# Patient Record
Sex: FEMALE | Race: WHITE | NOT HISPANIC OR LATINO | URBAN - METROPOLITAN AREA
[De-identification: names, ages, dates, MRNs, and addresses within clinical notes are randomized per-mention and may not be internally consistent; named-entity substitution may affect disease eponyms.]

---

## 2016-06-23 RX ORDER — POLYETHYLENE GLYCOL 3350 17 G/17G
17 POWDER, FOR SOLUTION ORAL
Qty: 0 | Refills: 0 | DISCHARGE
Start: 2016-06-23

## 2018-06-01 ENCOUNTER — OUTPATIENT (OUTPATIENT)
Dept: OUTPATIENT SERVICES | Facility: HOSPITAL | Age: 78
LOS: 1 days | End: 2018-06-01
Payer: MEDICAID

## 2018-06-01 DIAGNOSIS — Z90.49 ACQUIRED ABSENCE OF OTHER SPECIFIED PARTS OF DIGESTIVE TRACT: Chronic | ICD-10-CM

## 2018-06-01 PROCEDURE — G9001: CPT

## 2018-06-25 DIAGNOSIS — R69 ILLNESS, UNSPECIFIED: ICD-10-CM

## 2018-07-01 ENCOUNTER — OUTPATIENT (OUTPATIENT)
Dept: OUTPATIENT SERVICES | Facility: HOSPITAL | Age: 78
LOS: 1 days | End: 2018-07-01

## 2018-07-01 DIAGNOSIS — Z90.49 ACQUIRED ABSENCE OF OTHER SPECIFIED PARTS OF DIGESTIVE TRACT: Chronic | ICD-10-CM

## 2018-07-19 DIAGNOSIS — Z71.89 OTHER SPECIFIED COUNSELING: ICD-10-CM

## 2019-01-11 PROBLEM — Z00.00 ENCOUNTER FOR PREVENTIVE HEALTH EXAMINATION: Status: ACTIVE | Noted: 2019-01-11

## 2019-01-14 DIAGNOSIS — I48.91 UNSPECIFIED ATRIAL FIBRILLATION: ICD-10-CM

## 2019-01-14 DIAGNOSIS — Z86.79 PERSONAL HISTORY OF OTHER DISEASES OF THE CIRCULATORY SYSTEM: ICD-10-CM

## 2019-01-14 DIAGNOSIS — I34.0 NONRHEUMATIC MITRAL (VALVE) INSUFFICIENCY: ICD-10-CM

## 2019-01-14 DIAGNOSIS — Z86.39 PERSONAL HISTORY OF OTHER ENDOCRINE, NUTRITIONAL AND METABOLIC DISEASE: ICD-10-CM

## 2019-01-14 DIAGNOSIS — I07.1 RHEUMATIC TRICUSPID INSUFFICIENCY: ICD-10-CM

## 2019-01-14 DIAGNOSIS — Z87.898 PERSONAL HISTORY OF OTHER SPECIFIED CONDITIONS: ICD-10-CM

## 2019-01-14 DIAGNOSIS — I50.9 HEART FAILURE, UNSPECIFIED: ICD-10-CM

## 2019-01-14 RX ORDER — METHYLDOPA 500 MG
160 (50 FE) TABLET ORAL
Refills: 0 | Status: ACTIVE | COMMUNITY

## 2019-01-14 RX ORDER — ALBUTEROL SULFATE 90 UG/1
AEROSOL, METERED RESPIRATORY (INHALATION)
Refills: 0 | Status: ACTIVE | COMMUNITY

## 2019-01-14 RX ORDER — OMEPRAZOLE 40 MG/1
40 CAPSULE, DELAYED RELEASE ORAL
Refills: 0 | Status: ACTIVE | COMMUNITY

## 2019-01-14 RX ORDER — ATORVASTATIN CALCIUM 80 MG/1
80 TABLET, FILM COATED ORAL
Refills: 0 | Status: ACTIVE | COMMUNITY

## 2019-01-18 ENCOUNTER — OUTPATIENT (OUTPATIENT)
Dept: OUTPATIENT SERVICES | Facility: HOSPITAL | Age: 79
LOS: 1 days | End: 2019-01-18
Payer: COMMERCIAL

## 2019-01-18 VITALS
HEART RATE: 74 BPM | DIASTOLIC BLOOD PRESSURE: 80 MMHG | RESPIRATION RATE: 18 BRPM | TEMPERATURE: 98 F | OXYGEN SATURATION: 97 % | WEIGHT: 134.04 LBS | HEIGHT: 55 IN | SYSTOLIC BLOOD PRESSURE: 179 MMHG

## 2019-01-18 DIAGNOSIS — Z01.810 ENCOUNTER FOR PREPROCEDURAL CARDIOVASCULAR EXAMINATION: ICD-10-CM

## 2019-01-18 DIAGNOSIS — Z90.49 ACQUIRED ABSENCE OF OTHER SPECIFIED PARTS OF DIGESTIVE TRACT: Chronic | ICD-10-CM

## 2019-01-18 DIAGNOSIS — I08.1 RHEUMATIC DISORDERS OF BOTH MITRAL AND TRICUSPID VALVES: ICD-10-CM

## 2019-01-18 DIAGNOSIS — I34.0 NONRHEUMATIC MITRAL (VALVE) INSUFFICIENCY: ICD-10-CM

## 2019-01-18 LAB
ANION GAP SERPL CALC-SCNC: 12 MMOL/L — SIGNIFICANT CHANGE UP (ref 5–17)
APTT BLD: 34.1 SEC — SIGNIFICANT CHANGE UP (ref 27.5–36.3)
BASOPHILS # BLD AUTO: 0 K/UL — SIGNIFICANT CHANGE UP (ref 0–0.2)
BASOPHILS NFR BLD AUTO: 0.6 % — SIGNIFICANT CHANGE UP (ref 0–2)
BUN SERPL-MCNC: 12 MG/DL — SIGNIFICANT CHANGE UP (ref 8–20)
CALCIUM SERPL-MCNC: 9.2 MG/DL — SIGNIFICANT CHANGE UP (ref 8.6–10.2)
CHLORIDE SERPL-SCNC: 98 MMOL/L — SIGNIFICANT CHANGE UP (ref 98–107)
CO2 SERPL-SCNC: 26 MMOL/L — SIGNIFICANT CHANGE UP (ref 22–29)
CREAT SERPL-MCNC: 0.76 MG/DL — SIGNIFICANT CHANGE UP (ref 0.5–1.3)
EOSINOPHIL # BLD AUTO: 0 K/UL — SIGNIFICANT CHANGE UP (ref 0–0.5)
EOSINOPHIL NFR BLD AUTO: 0.4 % — SIGNIFICANT CHANGE UP (ref 0–6)
GLUCOSE SERPL-MCNC: 237 MG/DL — HIGH (ref 70–115)
HCT VFR BLD CALC: 30.8 % — LOW (ref 37–47)
HGB BLD-MCNC: 10.3 G/DL — LOW (ref 12–16)
INR BLD: 1.95 RATIO — HIGH (ref 0.88–1.16)
LYMPHOCYTES # BLD AUTO: 1.2 K/UL — SIGNIFICANT CHANGE UP (ref 1–4.8)
LYMPHOCYTES # BLD AUTO: 23 % — SIGNIFICANT CHANGE UP (ref 20–55)
MAGNESIUM SERPL-MCNC: 1.5 MG/DL — LOW (ref 1.6–2.6)
MCHC RBC-ENTMCNC: 29.3 PG — SIGNIFICANT CHANGE UP (ref 27–31)
MCHC RBC-ENTMCNC: 33.4 G/DL — SIGNIFICANT CHANGE UP (ref 32–36)
MCV RBC AUTO: 87.7 FL — SIGNIFICANT CHANGE UP (ref 81–99)
MONOCYTES # BLD AUTO: 0.4 K/UL — SIGNIFICANT CHANGE UP (ref 0–0.8)
MONOCYTES NFR BLD AUTO: 7.5 % — SIGNIFICANT CHANGE UP (ref 3–10)
NEUTROPHILS # BLD AUTO: 3.7 K/UL — SIGNIFICANT CHANGE UP (ref 1.8–8)
NEUTROPHILS NFR BLD AUTO: 68.3 % — SIGNIFICANT CHANGE UP (ref 37–73)
PLATELET # BLD AUTO: 224 K/UL — SIGNIFICANT CHANGE UP (ref 150–400)
POTASSIUM SERPL-MCNC: 4.3 MMOL/L — SIGNIFICANT CHANGE UP (ref 3.5–5.3)
POTASSIUM SERPL-SCNC: 4.3 MMOL/L — SIGNIFICANT CHANGE UP (ref 3.5–5.3)
PROTHROM AB SERPL-ACNC: 22.9 SEC — HIGH (ref 10–12.9)
RBC # BLD: 3.51 M/UL — LOW (ref 4.4–5.2)
RBC # FLD: 16.1 % — HIGH (ref 11–15.6)
SODIUM SERPL-SCNC: 136 MMOL/L — SIGNIFICANT CHANGE UP (ref 135–145)
WBC # BLD: 5.4 K/UL — SIGNIFICANT CHANGE UP (ref 4.8–10.8)
WBC # FLD AUTO: 5.4 K/UL — SIGNIFICANT CHANGE UP (ref 4.8–10.8)

## 2019-01-18 PROCEDURE — 85027 COMPLETE CBC AUTOMATED: CPT

## 2019-01-18 PROCEDURE — 80048 BASIC METABOLIC PNL TOTAL CA: CPT

## 2019-01-18 PROCEDURE — 83735 ASSAY OF MAGNESIUM: CPT

## 2019-01-18 PROCEDURE — 36415 COLL VENOUS BLD VENIPUNCTURE: CPT

## 2019-01-18 PROCEDURE — 93005 ELECTROCARDIOGRAM TRACING: CPT

## 2019-01-18 PROCEDURE — 85730 THROMBOPLASTIN TIME PARTIAL: CPT

## 2019-01-18 PROCEDURE — G0463: CPT

## 2019-01-18 PROCEDURE — 85610 PROTHROMBIN TIME: CPT

## 2019-01-18 PROCEDURE — 93010 ELECTROCARDIOGRAM REPORT: CPT

## 2019-01-18 RX ORDER — MAGNESIUM OXIDE 400 MG ORAL TABLET 241.3 MG
1 TABLET ORAL
Qty: 10 | Refills: 0
Start: 2019-01-18 | End: 2019-01-22

## 2019-01-18 NOTE — H&P PST ADULT - HISTORY OF PRESENT ILLNESS
This is a 78 year old female with CAD HTN HL CAD DM on ( metformin ) Atrial fibrillation ( on Eliquis)  CAD with stents in 2016 2/5/16 EF 55% proximal LAD 70 % in stent , mid LCX 10-30%, Om1 75% ,ostail RCA 60 % , proximal RCA 75% , RCA mid in stent 10-30% , s/p MARTINE proximal LAD    Valvular disease .   Presented to Cardiologist with progressive SOB , LH and episodes of dizziness . Cardiac testing revealed progression of valvular disease   Evaluated by Dr Oshea for possible mitral valve repair vs replacement   Pt presented today for PST prior to URIEL /RHC/LHC This is a 78 year old female with CAD HTN HL CAD DM on ( metformin ) Atrial fibrillation ( on Eliquis)  CAD with stents in 2016 2/5/16 EF 55% proximal LAD 70 % in stent , mid LCX 10-30%, Om1 75% ,ostail RCA 60 % , proximal RCA 75% , RCA mid in stent 10-30% , s/p MARTINE proximal LAD    Valvular disease   being followed and monitored   Presented to Cardiologist with progressive SOB , LH and episodes of dizziness . Echo on  12/19/18  revealed progression of valvular disease with severe MR and TR   Evaluated by Dr Oshea for possible mitral valve repair vs replacement   Pt presented today for PST prior to URIEL /RHC/LHC

## 2019-01-18 NOTE — H&P PST ADULT - PROBLEM SELECTOR PLAN 1
PRE-PROCEDURE ASSESSMENT  URIEL/RHC/LHC   -Patient seen and examined with son at bedside   -Labs reviewed  Hypomagnesemia treat with oral doses sent to pharmacy   -Pre-procedure teaching completed with patient and family  -Questions answered  _ No metformin for 2 days prior to procedure   - No Eliquis 3 days prior to procedure   - Reviewed with Dr Hopkins   - Continue PRE-PROCEDURE ASSESSMENT  URIEL/RHC/LHC   -Patient seen and examined with son at bedside   -Labs reviewed  Hypomagnesemia treat with oral doses sent to pharmacy   -Pre-procedure teaching completed with patient and family  -Questions answered  _ No metformin for 2 days prior to procedure   - No Eliquis 3 days prior to procedure Hold 1/23, 1/24, 1/25    - - Continue all other medications   -NPO except for medications on day of procedure for URIEL/LHC and RHc   reviewed with patient  and son

## 2019-01-18 NOTE — H&P PST ADULT - ASSESSMENT
This is a 78 year old female with CAD HTN HL CAD DM on ( metformin ) Atrial fibrillation ( on Eliquis)  CAD with stents in 2016 < Valvular disease .   Presented to Cardiologist with progressive SOB , LH and episodes of dizziness . Cardiac testing revealed progression of valvular disease   Evaluated by Dr Oshea for possible mitral valve repair vs replacement   Pt presented today for PST prior to URIEL /RHC/LHC   Currently feels well comfortable at rest with remains in Atrial fibrillation BARBIE 2/6 with mild bilateral edema , decreased BS  at bases , This is a 78 year old female with CAD HTN HL CAD DM on ( metformin ) Atrial fibrillation ( on Eliquis)  CAD with stents in 2016 < Valvular disease .   Presented to Cardiologist with progressive SOB , LH and episodes of dizziness . Cardiac testing revealed progression of valvular disease   to be evaluated by Dr Oshea for possible mitral valve repair vs replacement   Pt presented today for PST prior to URIEL /RHC/LHC   Currently feels well comfortable at rest with remains in Atrial fibrillation BARBIE 2/6 with mild bilateral edema , decreased BS  at bases ,

## 2019-01-21 ENCOUNTER — RX RENEWAL (OUTPATIENT)
Age: 79
End: 2019-01-21

## 2019-01-25 ENCOUNTER — TRANSCRIPTION ENCOUNTER (OUTPATIENT)
Age: 79
End: 2019-01-25

## 2019-01-25 ENCOUNTER — APPOINTMENT (OUTPATIENT)
Dept: CARDIOTHORACIC SURGERY | Facility: CLINIC | Age: 79
End: 2019-01-25

## 2019-01-25 ENCOUNTER — OUTPATIENT (OUTPATIENT)
Dept: OUTPATIENT SERVICES | Facility: HOSPITAL | Age: 79
LOS: 1 days | End: 2019-01-25
Payer: MEDICAID

## 2019-01-25 VITALS
SYSTOLIC BLOOD PRESSURE: 157 MMHG | RESPIRATION RATE: 20 BRPM | OXYGEN SATURATION: 99 % | HEART RATE: 61 BPM | DIASTOLIC BLOOD PRESSURE: 99 MMHG

## 2019-01-25 VITALS
DIASTOLIC BLOOD PRESSURE: 71 MMHG | HEART RATE: 70 BPM | SYSTOLIC BLOOD PRESSURE: 162 MMHG | OXYGEN SATURATION: 96 % | RESPIRATION RATE: 17 BRPM

## 2019-01-25 DIAGNOSIS — I36.1 NONRHEUMATIC TRICUSPID (VALVE) INSUFFICIENCY: ICD-10-CM

## 2019-01-25 DIAGNOSIS — I34.0 NONRHEUMATIC MITRAL (VALVE) INSUFFICIENCY: ICD-10-CM

## 2019-01-25 DIAGNOSIS — I08.1 RHEUMATIC DISORDERS OF BOTH MITRAL AND TRICUSPID VALVES: ICD-10-CM

## 2019-01-25 DIAGNOSIS — Z90.49 ACQUIRED ABSENCE OF OTHER SPECIFIED PARTS OF DIGESTIVE TRACT: Chronic | ICD-10-CM

## 2019-01-25 DIAGNOSIS — Z01.810 ENCOUNTER FOR PREPROCEDURAL CARDIOVASCULAR EXAMINATION: ICD-10-CM

## 2019-01-25 LAB — GLUCOSE BLDC GLUCOMTR-MCNC: 205 MG/DL — HIGH (ref 70–99)

## 2019-01-25 PROCEDURE — 93320 DOPPLER ECHO COMPLETE: CPT

## 2019-01-25 PROCEDURE — C1894: CPT

## 2019-01-25 PROCEDURE — C1769: CPT

## 2019-01-25 PROCEDURE — 93460 R&L HRT ART/VENTRICLE ANGIO: CPT

## 2019-01-25 PROCEDURE — 93325 DOPPLER ECHO COLOR FLOW MAPG: CPT | Mod: 26

## 2019-01-25 PROCEDURE — 93320 DOPPLER ECHO COMPLETE: CPT | Mod: 26

## 2019-01-25 PROCEDURE — 99221 1ST HOSP IP/OBS SF/LOW 40: CPT

## 2019-01-25 PROCEDURE — C1887: CPT

## 2019-01-25 PROCEDURE — 93312 ECHO TRANSESOPHAGEAL: CPT

## 2019-01-25 PROCEDURE — 99153 MOD SED SAME PHYS/QHP EA: CPT

## 2019-01-25 PROCEDURE — C1889: CPT

## 2019-01-25 PROCEDURE — 82962 GLUCOSE BLOOD TEST: CPT

## 2019-01-25 PROCEDURE — 93312 ECHO TRANSESOPHAGEAL: CPT | Mod: 26

## 2019-01-25 PROCEDURE — 93325 DOPPLER ECHO COLOR FLOW MAPG: CPT

## 2019-01-25 PROCEDURE — 76376 3D RENDER W/INTRP POSTPROCES: CPT | Mod: 26

## 2019-01-25 PROCEDURE — 99152 MOD SED SAME PHYS/QHP 5/>YRS: CPT

## 2019-01-25 RX ORDER — ATORVASTATIN CALCIUM 80 MG/1
80 TABLET, FILM COATED ORAL AT BEDTIME
Qty: 0 | Refills: 0 | Status: DISCONTINUED | OUTPATIENT
Start: 2019-01-25 | End: 2019-02-09

## 2019-01-25 RX ORDER — ASPIRIN/CALCIUM CARB/MAGNESIUM 324 MG
81 TABLET ORAL DAILY
Qty: 0 | Refills: 0 | Status: DISCONTINUED | OUTPATIENT
Start: 2019-01-25 | End: 2019-02-09

## 2019-01-25 RX ORDER — LOSARTAN POTASSIUM 100 MG/1
25 TABLET, FILM COATED ORAL DAILY
Qty: 0 | Refills: 0 | Status: DISCONTINUED | OUTPATIENT
Start: 2019-01-25 | End: 2019-02-09

## 2019-01-25 RX ORDER — HYDRALAZINE HCL 50 MG
10 TABLET ORAL ONCE
Qty: 0 | Refills: 0 | Status: COMPLETED | OUTPATIENT
Start: 2019-01-25 | End: 2019-01-25

## 2019-01-25 RX ORDER — METFORMIN HYDROCHLORIDE 850 MG/1
1 TABLET ORAL
Qty: 0 | Refills: 0 | COMMUNITY

## 2019-01-25 RX ORDER — MAGNESIUM OXIDE 400 MG ORAL TABLET 241.3 MG
400 TABLET ORAL EVERY 12 HOURS
Qty: 0 | Refills: 0 | Status: DISCONTINUED | OUTPATIENT
Start: 2019-01-25 | End: 2019-02-09

## 2019-01-25 RX ORDER — ALBUTEROL 90 UG/1
1 AEROSOL, METERED ORAL
Qty: 0 | Refills: 0 | Status: DISCONTINUED | OUTPATIENT
Start: 2019-01-25 | End: 2019-02-09

## 2019-01-25 RX ORDER — ISOSORBIDE MONONITRATE 60 MG/1
60 TABLET, EXTENDED RELEASE ORAL DAILY
Qty: 0 | Refills: 0 | Status: DISCONTINUED | OUTPATIENT
Start: 2019-01-25 | End: 2019-01-25

## 2019-01-25 RX ORDER — FUROSEMIDE 40 MG
40 TABLET ORAL DAILY
Qty: 0 | Refills: 0 | Status: DISCONTINUED | OUTPATIENT
Start: 2019-01-25 | End: 2019-02-09

## 2019-01-25 RX ORDER — ASPIRIN/CALCIUM CARB/MAGNESIUM 324 MG
81 TABLET ORAL ONCE
Qty: 0 | Refills: 0 | Status: COMPLETED | OUTPATIENT
Start: 2019-01-25 | End: 2019-01-25

## 2019-01-25 RX ORDER — METOPROLOL TARTRATE 50 MG
200 TABLET ORAL DAILY
Qty: 0 | Refills: 0 | Status: DISCONTINUED | OUTPATIENT
Start: 2019-01-25 | End: 2019-02-09

## 2019-01-25 RX ORDER — FENTANYL CITRATE 50 UG/ML
25 INJECTION INTRAVENOUS ONCE
Qty: 0 | Refills: 0 | Status: DISCONTINUED | OUTPATIENT
Start: 2019-01-25 | End: 2019-01-25

## 2019-01-25 RX ORDER — HYDRALAZINE HCL 50 MG
5 TABLET ORAL ONCE
Qty: 0 | Refills: 0 | Status: COMPLETED | OUTPATIENT
Start: 2019-01-25 | End: 2019-01-25

## 2019-01-25 RX ORDER — LISINOPRIL 2.5 MG/1
10 TABLET ORAL DAILY
Qty: 0 | Refills: 0 | Status: DISCONTINUED | OUTPATIENT
Start: 2019-01-25 | End: 2019-02-09

## 2019-01-25 RX ORDER — INSULIN GLARGINE 100 [IU]/ML
20 INJECTION, SOLUTION SUBCUTANEOUS AT BEDTIME
Qty: 0 | Refills: 0 | Status: DISCONTINUED | OUTPATIENT
Start: 2019-01-25 | End: 2019-02-09

## 2019-01-25 RX ORDER — AMIODARONE HYDROCHLORIDE 400 MG/1
200 TABLET ORAL DAILY
Qty: 0 | Refills: 0 | Status: DISCONTINUED | OUTPATIENT
Start: 2019-01-25 | End: 2019-02-09

## 2019-01-25 RX ORDER — ISOSORBIDE MONONITRATE 60 MG/1
30 TABLET, EXTENDED RELEASE ORAL ONCE
Qty: 0 | Refills: 0 | Status: COMPLETED | OUTPATIENT
Start: 2019-01-25 | End: 2019-01-25

## 2019-01-25 RX ORDER — DIGOXIN 250 MCG
0.12 TABLET ORAL DAILY
Qty: 0 | Refills: 0 | Status: DISCONTINUED | OUTPATIENT
Start: 2019-01-25 | End: 2019-02-09

## 2019-01-25 RX ORDER — SODIUM CHLORIDE 9 MG/ML
400 INJECTION INTRAMUSCULAR; INTRAVENOUS; SUBCUTANEOUS
Qty: 0 | Refills: 0 | Status: DISCONTINUED | OUTPATIENT
Start: 2019-01-25 | End: 2019-02-09

## 2019-01-25 RX ORDER — POLYETHYLENE GLYCOL 3350 17 G/17G
17 POWDER, FOR SOLUTION ORAL DAILY
Qty: 0 | Refills: 0 | Status: DISCONTINUED | OUTPATIENT
Start: 2019-01-25 | End: 2019-02-09

## 2019-01-25 RX ORDER — ISOSORBIDE MONONITRATE 60 MG/1
30 TABLET, EXTENDED RELEASE ORAL DAILY
Qty: 0 | Refills: 0 | Status: DISCONTINUED | OUTPATIENT
Start: 2019-01-25 | End: 2019-02-09

## 2019-01-25 RX ADMIN — Medication 5 MILLIGRAM(S): at 15:46

## 2019-01-25 RX ADMIN — Medication 10 MILLIGRAM(S): at 09:18

## 2019-01-25 RX ADMIN — LOSARTAN POTASSIUM 25 MILLIGRAM(S): 100 TABLET, FILM COATED ORAL at 17:56

## 2019-01-25 RX ADMIN — ISOSORBIDE MONONITRATE 30 MILLIGRAM(S): 60 TABLET, EXTENDED RELEASE ORAL at 15:06

## 2019-01-25 RX ADMIN — Medication 200 MILLIGRAM(S): at 17:57

## 2019-01-25 RX ADMIN — FENTANYL CITRATE 25 MICROGRAM(S): 50 INJECTION INTRAVENOUS at 16:46

## 2019-01-25 RX ADMIN — Medication 40 MILLIGRAM(S): at 17:56

## 2019-01-25 RX ADMIN — LISINOPRIL 10 MILLIGRAM(S): 2.5 TABLET ORAL at 17:56

## 2019-01-25 RX ADMIN — AMIODARONE HYDROCHLORIDE 200 MILLIGRAM(S): 400 TABLET ORAL at 17:56

## 2019-01-25 RX ADMIN — ISOSORBIDE MONONITRATE 30 MILLIGRAM(S): 60 TABLET, EXTENDED RELEASE ORAL at 12:11

## 2019-01-25 RX ADMIN — FENTANYL CITRATE 25 MICROGRAM(S): 50 INJECTION INTRAVENOUS at 15:06

## 2019-01-25 RX ADMIN — Medication 81 MILLIGRAM(S): at 09:18

## 2019-01-25 RX ADMIN — SODIUM CHLORIDE 100 MILLILITER(S): 9 INJECTION INTRAMUSCULAR; INTRAVENOUS; SUBCUTANEOUS at 15:06

## 2019-01-25 RX ADMIN — Medication 10 MILLIGRAM(S): at 11:15

## 2019-01-25 RX ADMIN — MAGNESIUM OXIDE 400 MG ORAL TABLET 400 MILLIGRAM(S): 241.3 TABLET ORAL at 17:56

## 2019-01-25 NOTE — DISCHARGE NOTE ADULT - PLAN OF CARE
Optimize Cardiac Function -Continue all of your home medications  -Follow up with Dr. Rodriguez to optimize your blood pressure medications  -Follow up with Dr. Oshea to discuss -Continue all of your home medications  -Follow up with Dr. Rodriguez to optimize your blood pressure medications  -Follow up with Dr. Oshea to discuss    No heavy lifting, driving, sex, tub baths, swimming, or any activity that submerges the lower half of the body in water for 48 hours.  Limited walking and stairs for 48 hours.    Change the bandaid after 24 hours and every 24 hours after that.  Keep the puncture site dry and covered with a bandaid until a scab forms.    Observe the site frequently.  If bleeding or a large lump (the size of a golf ball or bigger) occurs lie flat, apply continuous direct pressure just above the puncture site for at least 10 minutes, and notify your physician immediately.  If the bleeding cannot be controlled, call 911 immediately for assistance.  Notify your physician of pain, swelling or any drainage.    Notify your physician immediately if coldness, numbness, discoloration or pain in your foot occurs.

## 2019-01-25 NOTE — DISCHARGE NOTE ADULT - CARE PROVIDERS DIRECT ADDRESSES
[email or web address],DirectAddress_Unknown

## 2019-01-25 NOTE — DISCHARGE NOTE ADULT - CARE PLAN
Principal Discharge DX:	CAD (coronary artery disease)  Goal:	Optimize Cardiac Function  Assessment and plan of treatment:	-Continue all of your home medications  -Follow up with Dr. Rodriguez to optimize your blood pressure medications  -Follow up with Dr. Oshea to discuss Principal Discharge DX:	CAD (coronary artery disease)  Goal:	Optimize Cardiac Function  Assessment and plan of treatment:	-Continue all of your home medications  -Follow up with Dr. Rodriguez to optimize your blood pressure medications  -Follow up with Dr. Oshea to discuss    No heavy lifting, driving, sex, tub baths, swimming, or any activity that submerges the lower half of the body in water for 48 hours.  Limited walking and stairs for 48 hours.    Change the bandaid after 24 hours and every 24 hours after that.  Keep the puncture site dry and covered with a bandaid until a scab forms.    Observe the site frequently.  If bleeding or a large lump (the size of a golf ball or bigger) occurs lie flat, apply continuous direct pressure just above the puncture site for at least 10 minutes, and notify your physician immediately.  If the bleeding cannot be controlled, call 911 immediately for assistance.  Notify your physician of pain, swelling or any drainage.    Notify your physician immediately if coldness, numbness, discoloration or pain in your foot occurs.

## 2019-01-25 NOTE — PROGRESS NOTE ADULT - SUBJECTIVE AND OBJECTIVE BOX
This is a 78 year old female with CAD HTN HL CAD DM on (metformin) Atrial fibrillation (on Eliquis)  CAD with stents in 2016 2/5/16 EF 55% proximal LAD 70 % in stent , mid LCX 10-30%, Om1 75% ,ostail RCA 60 % , proximal RCA 75% , RCA mid in stent 10-30% , s/p MARTINE proximal LAD    Valvular disease being followed and monitored   Presented to Cardiologist with progressive SOB , LH and episodes of dizziness . Echo on  12/19/18  revealed progression of valvular disease with severe MR and TR   Evaluated by Dr Oshea for possible mitral valve repair vs replacement     Now s/p URIEL with Dr. Porter. Pt awaiting L&RHC.    Pt currently reports some numbness with swallowing; denies SOB, dyspnea, chest pain, palps    Per verbal report with Dr. Porter; severe TR/TI, moderate MR, severe pulmonary hypertension    Neuro: A&Ox4, neurologically intact, ROM intact  Pulm: CTAB; able to cough independently to clear secretions  Cardiac: NSR on monitor 80bpm, S1,S2  Vascular: No edema present; palpable pulses+2 x4 extremities

## 2019-01-25 NOTE — CONSULT NOTE ADULT - PROBLEM SELECTOR RECOMMENDATION 9
Pt seen with Dr. Oshea.  Pt currently refusing surgery.  Per Dr. Oshea, the plan is to see patient in the office outpatient for further discussion regarding surgical options, including MVR/TVR.

## 2019-01-25 NOTE — PROGRESS NOTE ADULT - SUBJECTIVE AND OBJECTIVE BOX
This is a 78 year old female with CAD HTN HL CAD DM on ( metformin ) Atrial fibrillation ( on Eliquis)  CAD with stents in 2016 2/5/16 EF 55% proximal LAD 70 % in stent , mid LCX 10-30%, Om1 75% ,ostail RCA 60 % , proximal RCA 75% , RCA mid in stent 10-30% , s/p MARTINE proximal LAD    Valvular disease being followed and monitored   Presented to Cardiologist with progressive SOB , LH and episodes of dizziness . Echo on  12/19/18  revealed progression of valvular disease with severe MR and TR   Evaluated by Dr Oshea for possible mitral valve repair vs replacement   Pt presents today for URIEL /RHC/LHC     -last dose of eliquis, Tuesday, 1/22/19  -NPO since 11pm on 1/24/19  -ASA- 3, Mallampati- 2  -consent obtained for LHC with Dr. Richardson utilizing ; ID #[id number]  -consent obtained for URIEL with anesthesia and Dr. Porter utilizing ; Aseel  # [id number]  -blood sugar 205  -labs and EKG reviewed    Pt hypertensive (SBP>190x3) pre URIEL- d/w Dr. Porter; pt rec'd hydralazine 10mg IVPx1   asa 81mg chewable pre-cath    Neuro: A&Ox4, neurologically intact, ROM intact; tolerated ambulation to restroom pre URIEL  Pulm: CTAB  Cardiac: NSR on monitor 76bpm, S1,S2  Vascular: No edema present; palpable pulses+2 x4 extremities

## 2019-01-25 NOTE — DISCHARGE NOTE ADULT - MEDICATION SUMMARY - MEDICATIONS TO TAKE
I will START or STAY ON the medications listed below when I get home from the hospital:    aspirin 81 mg oral delayed release tablet  -- 1 tab(s) by mouth once a day  -- Indication: For blood flow/heart    lisinopril 10 mg oral tablet  -- 1 tab(s) by mouth once a day  -- Indication: For blood pressure    losartan 25 mg oral tablet  -- 1 tab(s) by mouth once a day  -- Indication: For blood pressure    Imdur 60 mg oral tablet, extended release  -- 1 tab(s) by mouth once a day (in the morning)  -- Indication: For blood pressure    amiodarone 200 mg oral tablet  -- 1 tab(s) by mouth once a day  -- Indication: For heart rate/afib    digoxin 125 mcg (0.125 mg) oral tablet  -- 1 tab(s) by mouth once a day  -- Indication: For heart rate/afib    Eliquis 5 mg oral tablet  -- 1 tab(s) by mouth 2 times a day  -- Indication: For blood thinner/afib    metFORMIN 500 mg oral tablet, extended release  -- 1 tab(s) by mouth 2 times a day    RESTART METFORM ON 1/27/19  -- Indication: For Diabetes    Lantus 100 units/mL subcutaneous solution  -- 20 unit(s) subcutaneous once a day (at bedtime)  -- Indication: For Diabetes    atorvastatin 80 mg oral tablet  -- 1 tab(s) by mouth once a day (at bedtime)  -- Indication: For Cholesterol    methimazole 5 mg oral tablet  -- 1 tab(s) by mouth once a day  -- Indication: For thyroid    metoprolol succinate 200 mg oral tablet, extended release  -- 1 tab(s) by mouth once a day  and 150  mg  at night  -- Indication: For heart rate/afib    Ventolin HFA 90 mcg/inh inhalation aerosol  -- 2 puff(s) inhaled 4 times a day, As Needed  -- Indication: For breathing treatment    Lasix 40 mg oral tablet  -- 1 tab(s) by mouth once a day  -- stop after 3 days  -- Indication: For water pill/diuretic    polyethylene glycol 3350 oral powder for reconstitution  -- 17 gram(s) by mouth once a day, As Needed  -- Indication: For Constipation    magnesium oxide 400 mg (241.3 mg elemental magnesium) oral tablet  -- 1 tab(s) by mouth 2 times a day   -- Indication: For supplement    PriLOSEC 40 mg oral delayed release capsule  -- 1 cap(s) by mouth once a day  -- Indication: For stomach protection

## 2019-01-25 NOTE — DISCHARGE NOTE ADULT - HOSPITAL COURSE
This is a 78 year old female with CAD HTN HL CAD DM on (metformin) Atrial fibrillation (on Eliquis)  CAD with stents in 2016 2/5/16 EF 55% proximal LAD 70 % in stent , mid LCX 10-30%, Om1 75% ,ostail RCA 60 % , proximal RCA 75% , RCA mid in stent 10-30% , s/p MARTINE proximal LAD    Valvular disease being followed and monitored   Presented to Cardiologist with progressive SOB , LH and episodes of dizziness . Echo on  12/19/18  revealed progression of valvular disease with severe MR and TR   Evaluated by Dr Oshea for possible mitral valve repair vs replacement     Now s/p URIEL/R&LHC; URIEL revealed mod MR; severe TR/TI; pulmonary HTN; better blood pressure control;  No intervention with cardiac cath; per Dr. Richardson; pt to follow up with Dr. Rodriguez and Dr. Dorie Sauer, CTS PA, in to evaluate pt for Dr. Oshea; pt to follow up outpt for valve surgery    Neuro: A&Ox4, neurologically intact, ROM intact  Pulm: CTAB  Cardiac: afib on monitor 72bpm, S1,S2  Vascular: no edema present; right groin site precautions; slight hematoma present; pt denies numbness or tingling to RLE, toes moveable, +pulses x4 extremities.

## 2019-01-25 NOTE — DISCHARGE NOTE ADULT - PATIENT PORTAL LINK FT
You can access the [facility] Patient Portal, offered by [facility], by registering with the following website: [email or web address]

## 2019-01-25 NOTE — CONSULT NOTE ADULT - ASSESSMENT
78 year old woman with PMH as above.  Progressive SOB with cp over the last few months.  TTE outpatient revealed severe MR and TR.  Presented today for an elective URIEL that showed severe TR and moderate MR.

## 2019-01-25 NOTE — DISCHARGE NOTE ADULT - CARE PROVIDER_API CALL
Dakota Oshea), Surgery; Thoracic and Cardiac Surgery  [address] [place] [address]  Phone: [phone]  Fax: [phone] Dakota Oshea), Surgery; Thoracic and Cardiac Surgery  [address] [place] [address]  Phone: [phone]  Fax: [phone]    sAael Rodriguez  [address] [place] [address]  Phone: [phone]  Fax: (       -

## 2019-01-25 NOTE — DISCHARGE NOTE ADULT - PROVIDER TOKENS
TOKEN:'[id number]' TOKEN:'[id number]',FREE:[LAST:[Michael],FIRST:[Asael],PHONE:[phone]],FAX:[(   )    -],ADDRESS:[[address] [place] [address]]]

## 2019-01-25 NOTE — CONSULT NOTE ADULT - SUBJECTIVE AND OBJECTIVE BOX
Surgeon: Dorie    Consult requesting by: Debra    HISTORY OF PRESENT ILLNESS:  78y Female with PMH Afib (On Eliquis), DM (on Insulin and oral agents), HTN, CAD with stents x 8, asthma, and hypothyroidism.  Was seen by her cardiologist as an outpatient for c/o progressive SOB on exertion, and left sided chest pain.  She had an outpatient TTE that showed severe MR and severe TR.  She Presented today for an elective URIEL.  On exam she was lying on the stretcher.  Interviewed with son at bedside.  Patient speaks primarily French.  Denies CP or SOB.  NAD noted.    PAST MEDICAL & SURGICAL HISTORY:  Atrial fibrillation  Diabetes  Hypertension  Stented coronary artery  Coronary stent restenosis, sequela  Afib  Hypertension  History of appendectomy      MEDICATIONS  (STANDING):  amiodarone    Tablet 200 milliGRAM(s) Oral daily  aspirin enteric coated 81 milliGRAM(s) Oral daily  atorvastatin 80 milliGRAM(s) Oral at bedtime  digoxin     Tablet 0.125 milliGRAM(s) Oral daily  furosemide    Tablet 40 milliGRAM(s) Oral daily  insulin glargine Injectable (LANTUS) 20 Unit(s) SubCutaneous at bedtime  isosorbide   mononitrate ER Tablet (IMDUR) 30 milliGRAM(s) Oral daily  lisinopril 10 milliGRAM(s) Oral daily  losartan 25 milliGRAM(s) Oral daily  magnesium oxide 400 milliGRAM(s) Oral every 12 hours  methimazole 5 milliGRAM(s) Oral daily  metoprolol succinate  milliGRAM(s) Oral daily  sodium chloride 0.9%. 400 milliLiter(s) (100 mL/Hr) IV Continuous <Continuous>    MEDICATIONS  (PRN):  ALBUTerol    90 MICROgram(s) HFA Inhaler 1 Puff(s) Inhalation four times a day PRN Shortness of Breath  polyethylene glycol 3350 17 Gram(s) Oral daily PRN Constipation    Antiplatelet therapy:                           Last dose/amt:    Allergies    No Known Allergies    Intolerances        SOCIAL HISTORY:  Smoker: [ ] Yes  [x ] No        PACK YEARS:                         WHEN QUIT?  ETOH use: [ ] Yes  [x ] No              FREQUENCY / QUANTITY:  Ilicit Drug use:  [ ] Yes  [x ] No  Occupation: none  Live with: son  Assisted device use: denies use    FAMILY HISTORY:      Review of Systems  CONSTITUTIONAL:  Fevers[ ] chills[ ] sweats[ ] fatigue[ ] weight loss[ ] weight gain [ ]                                     NEGATIVE [x ]   NEURO:  parathesias[ ] seizures [ ]  syncope [ ]  confusion [ ]                                                                                NEGATIVE[x ]   EYES: glasses[ ]  blurry vision[ ]  discharge[ ] pain[ ] glaucoma [ ]                                                                          NEGATIVE[x ]   ENMT:  difficulty hearing [ ]  vertigo[ ]  dysphagia[ ] epistaxis[ ] recent dental work [ ]                                    NEGATIVE[x ]   CV:  chest pain[ x] palpitations[ ] FLETCHER [ ] diaphoresis [ ]                                                                                           NEGATIVE[ ]   RESPIRATORY:  wheezing[ ] SOB[x] cough [ ] sputum[ ] hemoptysis[ ]                                                                  NEGATIVE[ ]   GI:  nausea[ ]  vommiting [ ]  diarrhea[ ] constipation [ ] melena [ ]                                                                      NEGATIVE[x ]   : hematuria[ ]  dysuria[ ] urgency[ ] incontinence[ ]                                                                                            NEGATIVE[x ]   MUSKULOSKELETAL:  arthritis[ ]  joint swelling [ ] muscle weakness [ ]                                                                NEGATIVE[x ]   SKIN/BREAST:  rash[ ] itching [ ]  hair loss[ ] masses[ ]                                                                                              NEGATIVE[x ]   PSYCH:  dementia [ ] depresion [ ] anxiety[ ]                                                                                                               NEGATIVE[x ]   HEME/LYMPH:  bruises easily[ ] enlarged lymph nodes[ ] tender lymph nodes[ ]                                               NEGATIVE[x ]   ENDOCRINE:  cold intolerance[ ] heat intolerance[ ] polydipsia[ ]                                                                          NEGATIVE[x ]     PHYSICAL EXAM  Vital Signs Last 24 Hrs  T(C): --  T(F): --  HR: 70 (25 Jan 2019 18:00) (57 - 79)  BP: 198/82 (25 Jan 2019 18:00) (140/67 - 198/82)  BP(mean): --  RR: 18 (25 Jan 2019 18:00) (15 - 19)  SpO2: 99% (25 Jan 2019 18:00) (96% - 100%) on room air at rest.    CONSTITUTIONAL:                                                                           Neuro: WNL[x ] Normal exam oriented to person/place & time with no focal motor or sensory  deficits. Other                     Eyes: WNL[x ]   Normal exam of conjunctiva & lids, pupils equally reactive. Other     ENT: WNL[x ]    Normal exam of nasal/oral mucosa with absence of cyanosis. Other  Neck: WNL[x ]  Normal exam of jugular veins, trachea & thyroid. Other  Chest: WNL[x ] Normal lung exam with good air movement absence of wheezes, rales, or rhonchi: Other                                                                                CV:  Auscultation: normal [x ] S3[ ] S4[ ] Irregular [ ] Rub[ ] Clicks[ ]    Murmurs none:[ ]systolic [x ]  diastolic [ ] holosystolic [ ]  Carotids: No Bruits[x ] Other                 Abdominal Aorta: normal [ ] nonpalpable[ ]Other                                                                                      GI:           WNL[x ] Normal exam of abdomen, liver & spleen with no noted masses or tenderness. Other                                                                                                        Extremities: WNL[x ] Normal no evidence of cyanosis or deformity Edema: none[ ]trace[ ]1+[ ]2+[ ]3+[ ]4+[ ]  Lower Extremity Pulses: Right[ pp] Left[pp ]Varicosities[ ]  SKIN :WNL[ x] Normal exam to inspection & palpation. Other:                                                          LABS:                      Cardiac Cath: < from: Cardiac Cath Lab - Adult (01.25.19 @ 12:47) >  VENTRICLES: Analysis of regional contractile function demonstrated  inferolateral akinesis. Global left ventricular function was moderately  depressed. EF calculated by contrast ventriculography was 40 %.  VALVES: MITRAL VALVE: The mitral valve exhibited moderate to severe  regurgitation.  CORONARY VESSELS: The coronary circulation is right dominant.  LM:   --  Distal left main: There was a discrete 40 % stenosis in the  distal third of the vessel segment.  LAD:   --  Proximal LAD: There was a 10 % stenosis at the site of a prior  stent.  --  Mid LAD: There was a 10 % stenosis at the site of a prior stent.  --  D2: There was a discrete 70 % stenosis at the ostium of the vessel  segment.  CX:   --  Circumflex: The A-V continuation has a severe 95% stenosis with  LANCE 2-3 flow.  --  Ostial circumflex: There was a discrete 70 % stenosis.  --  Mid circumflex: There was a tubular 40 % stenosis at the site of a  prior stent.  --  OM1: There was a discrete 60 % stenosis at the ostium of the vessel  segment.  RCA:   --  Proximal RCA: There was a tubular 90 % stenosis in the proximal  third of the vessel segment, at the origin of RV marginal 1.  COMPLICATIONS: No complications occurred during the cath lab visit.  SUMMARY:  HEMODYNAMICS: Hemodynamic assessment demonstrates moderate to severe  systemic hypertension, moderately elevated LVEDP, moderately to markedly  elevated pulmonary capillary wedge pressure, and severely elevated  pulmonary vascular resistance.  DIAGNOSTIC IMPRESSIONS: Moderately reduced LV Systolic and Diastolic  function with an EF of 40% eith RWMA. 2. Moderate to sever Mitral  Refurgitation. 3. Two vessel CAD. 4. Branch CAD. 5. Patent Jazlyn in the  Proximal to mid LAD and mid LCX.  DIAGNOSTIC RECOMMENDATIONS: OMT. 2. Aggressive RFM. 3. Heart team approach  re: CABS+MVA+TVA vs PCI to RCA and medical therapy for her VHD.  INTERVENTIONAL IMPRESSIONS: Moderately reduced LV Systolic and Diastolic  function with an EF of 40% eith RWMA. 2. Moderate to sever Mitral  Refurgitation. 3. Two vessel CAD. 4. Branch CAD. 5. Patent Jazlyn in the  Proximal to mid LAD and mid LCX.  INTERVENTIONAL RECOMMENDATIONS: OMT. 2. Aggressive RFM. 3. Heart team  approach re: CABS+MVA+TVA vs PCI to RCA and medical therapy for her VHD.  Prepared and signed by  John Richardson M.D.  Signed 01/25/2019 14:15:50    < end of copied text >      TTE / URIEL:  < from: URIEL Echo Doppler (01.25.19 @ 09:33) >  Summary:   1. Normal left ventricular systolic function. Left ventricular ejection   fraction, by visual estimation, is 50 to 55%.   2. There is mild right ventricular enlargement with mildly reduced   systolic function.   3. Severe functional tricuspid regurgitation.   4. Moderate functional mitral regurgitation.   5. Mild aortic insufficiency.   6. No intracardiac thrombus or shunts.   7. No pericardial effusions.   8. ** No prior echocardiograms available for comparison.    [id number] Juana Porter DO, Electronically signed on 1/25/2019 at   1:31:15 PM    *** Final ***    < end of copied text >

## 2019-01-25 NOTE — PROGRESS NOTE ADULT - SUBJECTIVE AND OBJECTIVE BOX
This is a 78 year old female with CAD HTN HL CAD DM on (metformin) Atrial fibrillation (on Eliquis)  CAD with stents in 2016 2/5/16 EF 55% proximal LAD 70 % in stent , mid LCX 10-30%, Om1 75% ,ostail RCA 60 % , proximal RCA 75% , RCA mid in stent 10-30% , s/p MARTINE proximal LAD    Valvular disease being followed and monitored   Presented to Cardiologist with progressive SOB , LH and episodes of dizziness . Echo on  12/19/18  revealed progression of valvular disease with severe MR and TR   Evaluated by Dr Oshea for possible mitral valve repair vs replacement     Now s/P L&RHC with Dr. Richardson; mild disease noted per verbal report; no intervention at this time. Pt to follow up with Dr. Oshea to discuss surgical procedure.    Neuro: A&Ox4, neurologically intact, ROM intact  Pulm: CTAB; able to cough independently to clear secretions  Cardiac: NSR on monitor 80bpm, S1,S2  Vascular: No edema present; palpable pulses+2 x4 extremities; right groin #5 &#7 sheaths in place; toes moveable; pt denies numbness and tingling    Rec'd no heparin during case.    -BP control; imdur 30mg po pre sheath removal  -fentanyl 25mcg IVPx1 This is a 78 year old female with CAD HTN HL CAD DM on (metformin) Atrial fibrillation (on Eliquis)  CAD with stents in 2016 2/5/16 EF 55% proximal LAD 70 % in stent , mid LCX 10-30%, Om1 75% ,ostail RCA 60 % , proximal RCA 75% , RCA mid in stent 10-30% , s/p MARTINE proximal LAD    Valvular disease being followed and monitored   Presented to Cardiologist with progressive SOB , LH and episodes of dizziness . Echo on  12/19/18  revealed progression of valvular disease with severe MR and TR   Evaluated by Dr Oshea for possible mitral valve repair vs replacement     Now s/P L&RHC with Dr. Richardson; mild disease noted per verbal report; no intervention at this time. Pt to follow up with Dr. Oshea to discuss surgical procedure.    Neuro: A&Ox4, neurologically intact, ROM intact  Pulm: CTAB; able to cough independently to clear secretions  Cardiac: NSR on monitor 80bpm, S1,S2  Vascular: No edema present; palpable pulses+2 x4 extremities; right groin #5 &#7 sheaths in place; toes moveable; pt denies numbness and tingling    Rec'd no heparin during case.    -BP control; imdur 30mg po pre sheath removal  -fentanyl 25mcg IVPx1 pre sheath removal  -post cath orders as rx  -spoke with HARVEY Sauer for evaluation of MVr vs. MVR  -follow up outpatient with Dr. Rodriguez  per Dr. Richardson

## 2019-02-26 ENCOUNTER — APPOINTMENT (OUTPATIENT)
Dept: CARDIOTHORACIC SURGERY | Facility: CLINIC | Age: 79
End: 2019-02-26
Payer: MEDICAID

## 2019-02-26 PROCEDURE — 99213 OFFICE O/P EST LOW 20 MIN: CPT

## 2019-02-26 NOTE — HISTORY OF PRESENT ILLNESS
[FreeTextEntry1] : This is a 78 year old female with hypertension, diabetes, hypercholesterolemia, who presents today for evaluation of her mitral valve regurgitation which was noted to be severe on echo with EF estimated at 54% on 12/19/18.  She also has severe TR with previous history of coronary artery stenting x8. Although she is 78, she is frail and walks slow and is often dizzy with increasing shortness of breath with exertion.

## 2019-02-26 NOTE — CONSULT LETTER
[Dear  ___] : Dear  [unfilled], [Consult Letter:] : I had the pleasure of evaluating your patient, [unfilled]. [FreeTextEntry2] : Asael thurman MD

## 2019-02-26 NOTE — ASSESSMENT
[FreeTextEntry1] : Ms. Bourgeois is a 78 year old female with severe mitral and tricuspid regurgitation. Although she is weak and frail she is a candidate for high risk open heart surgery and mitral and tricuspid surgery. The alternative option is mitral clipping which will not address the tricuspid valve.  I explained the risks, benefits, and alternatives of surgery to the patient and family. the patient would like to have a week to think about her options and decide which way she wants to proceed. \par \par \par I thank you for the opportunity to participate in the care of your patients. Please do not hesitate to contact me should you have any questions.\par \par

## 2019-02-26 NOTE — PHYSICAL EXAM
[General Appearance - Alert] : alert [General Appearance - In No Acute Distress] : in no acute distress [Sclera] : the sclera and conjunctiva were normal [Outer Ear] : the ears and nose were normal in appearance [Neck Appearance] : the appearance of the neck was normal [] : no respiratory distress [Exaggerated Use Of Accessory Muscles For Inspiration] : no accessory muscle use [Apical Impulse] : the apical impulse was normal [Heart Sounds] : normal S1 and S2 [FreeTextEntry1] : III/VI systolic murmru at the apex [Examination Of The Chest] : the chest was normal in appearance [Arterial Pulses Carotid] : carotid pulses were normal with no bruits [Arterial Pulses Femoral] : femoral pulses were normal without bruits [Bowel Sounds] : normal bowel sounds [Abdomen Soft] : soft [Cervical Lymph Nodes Enlarged Posterior Bilaterally] : posterior cervical [No CVA Tenderness] : no ~M costovertebral angle tenderness [Abnormal Walk] : normal gait [Skin Color & Pigmentation] : normal skin color and pigmentation [Skin Turgor] : normal skin turgor [Cranial Nerves] : cranial nerves 2-12 were intact [Oriented To Time, Place, And Person] : oriented to person, place, and time [Impaired Insight] : insight and judgment were intact

## 2019-04-29 ENCOUNTER — INPATIENT (INPATIENT)
Facility: HOSPITAL | Age: 79
LOS: 5 days | Discharge: HOME | DRG: 291 | End: 2019-05-05
Attending: THORACIC SURGERY (CARDIOTHORACIC VASCULAR SURGERY) | Admitting: THORACIC SURGERY (CARDIOTHORACIC VASCULAR SURGERY)
Payer: MEDICAID

## 2019-04-29 VITALS
SYSTOLIC BLOOD PRESSURE: 152 MMHG | WEIGHT: 164.91 LBS | OXYGEN SATURATION: 99 % | RESPIRATION RATE: 18 BRPM | HEART RATE: 70 BPM | DIASTOLIC BLOOD PRESSURE: 73 MMHG | TEMPERATURE: 98 F | HEIGHT: 56 IN

## 2019-04-29 DIAGNOSIS — Z90.49 ACQUIRED ABSENCE OF OTHER SPECIFIED PARTS OF DIGESTIVE TRACT: Chronic | ICD-10-CM

## 2019-04-29 LAB
ALBUMIN SERPL ELPH-MCNC: 4.1 G/DL — SIGNIFICANT CHANGE UP (ref 3.3–5.2)
ALP SERPL-CCNC: 132 U/L — HIGH (ref 40–120)
ALT FLD-CCNC: 16 U/L — SIGNIFICANT CHANGE UP
ANION GAP SERPL CALC-SCNC: 14 MMOL/L — SIGNIFICANT CHANGE UP (ref 5–17)
AST SERPL-CCNC: 21 U/L — SIGNIFICANT CHANGE UP
BILIRUB SERPL-MCNC: 0.6 MG/DL — SIGNIFICANT CHANGE UP (ref 0.4–2)
BUN SERPL-MCNC: 19 MG/DL — SIGNIFICANT CHANGE UP (ref 8–20)
CALCIUM SERPL-MCNC: 9.2 MG/DL — SIGNIFICANT CHANGE UP (ref 8.6–10.2)
CHLORIDE SERPL-SCNC: 86 MMOL/L — LOW (ref 98–107)
CO2 SERPL-SCNC: 25 MMOL/L — SIGNIFICANT CHANGE UP (ref 22–29)
CREAT SERPL-MCNC: 1.1 MG/DL — SIGNIFICANT CHANGE UP (ref 0.5–1.3)
GLUCOSE SERPL-MCNC: 209 MG/DL — HIGH (ref 70–115)
HCT VFR BLD CALC: 30.4 % — LOW (ref 37–47)
HGB BLD-MCNC: 10.1 G/DL — LOW (ref 12–16)
MCHC RBC-ENTMCNC: 26.4 PG — LOW (ref 27–31)
MCHC RBC-ENTMCNC: 33.2 G/DL — SIGNIFICANT CHANGE UP (ref 32–36)
MCV RBC AUTO: 79.4 FL — LOW (ref 81–99)
NT-PROBNP SERPL-SCNC: 5635 PG/ML — HIGH (ref 0–300)
PLATELET # BLD AUTO: 283 K/UL — SIGNIFICANT CHANGE UP (ref 150–400)
POTASSIUM SERPL-MCNC: 4.4 MMOL/L — SIGNIFICANT CHANGE UP (ref 3.5–5.3)
POTASSIUM SERPL-SCNC: 4.4 MMOL/L — SIGNIFICANT CHANGE UP (ref 3.5–5.3)
PROT SERPL-MCNC: 7.2 G/DL — SIGNIFICANT CHANGE UP (ref 6.6–8.7)
RBC # BLD: 3.83 M/UL — LOW (ref 4.4–5.2)
RBC # FLD: 16.6 % — HIGH (ref 11–15.6)
SODIUM SERPL-SCNC: 125 MMOL/L — LOW (ref 135–145)
TROPONIN T SERPL-MCNC: <0.01 NG/ML — SIGNIFICANT CHANGE UP (ref 0–0.06)
WBC # BLD: 6.7 K/UL — SIGNIFICANT CHANGE UP (ref 4.8–10.8)
WBC # FLD AUTO: 6.7 K/UL — SIGNIFICANT CHANGE UP (ref 4.8–10.8)

## 2019-04-29 PROCEDURE — 99285 EMERGENCY DEPT VISIT HI MDM: CPT

## 2019-04-29 PROCEDURE — 99233 SBSQ HOSP IP/OBS HIGH 50: CPT

## 2019-04-29 PROCEDURE — 93010 ELECTROCARDIOGRAM REPORT: CPT

## 2019-04-29 PROCEDURE — 71045 X-RAY EXAM CHEST 1 VIEW: CPT | Mod: 26

## 2019-04-29 RX ORDER — SODIUM CHLORIDE 9 MG/ML
3 INJECTION INTRAMUSCULAR; INTRAVENOUS; SUBCUTANEOUS EVERY 8 HOURS
Qty: 0 | Refills: 0 | Status: DISCONTINUED | OUTPATIENT
Start: 2019-04-29 | End: 2019-05-05

## 2019-04-29 NOTE — ED PROVIDER NOTE - MUSCULOSKELETAL, MLM
Spine appears normal, range of motion is not limited, 1+ pitting edema to bilateral lower extremities with mild tenderness on palpation

## 2019-04-29 NOTE — ED STATDOCS - PROGRESS NOTE DETAILS
79 y/o F presents to ED c/o leg swelling, abdominal distention and shortness of breath, chronic, but worsening over the past week, despite compliance with medications. Denies fevers or chills. Was in the ED about 1.5 months ago, was told she has "2 valves that are no good", but they are unsure if she is supposed to have a clip placed or have open heart surgery. She currently has 8 cardiac stents.   Cardiothoracic surgeon: Dr. Oshea   Cardiologist: family cannot recall name of but state they are affiliated with [facility]   PMD: formerly Dr. Evans, but they switched to a new provider in the office   Focused eval, protocol orders entered. Pt to be moved to main ED for cardiac monitoring and complete evaluation by another provider.

## 2019-04-29 NOTE — ED PROVIDER NOTE - CLINICAL SUMMARY MEDICAL DECISION MAKING FREE TEXT BOX
Pt with likely acute CHF, failing outpatient management will require admission.  Will obtain labs, CXR, and EKG.  Reeval

## 2019-04-29 NOTE — ED PROVIDER NOTE - OBJECTIVE STATEMENT
77 y/o F, with hx of DM, HTN, HLD, Afib, CAD, and coronary stents x8, presents to the ED c/o SOB, onset 1 week ago. SOB is worse with exertion.  Associated sx include bilateral lower extremity swelling, abdominal distention, chest pain, dizziness, and nausea.   Pt describes a fullness in her abdomen.  Sx have progressively worsened since onset.  Currently taking Lasix with no relief.  Son at bedside notes similar sx in the past and states that pt had 2 stents placed at the time.  Pt is also scheduled to mitral valve repair next week, however is unsure if procedure will take place due to pt's current sx.  Denies fever, chills, visual changes, cough, vomiting, abd pain, diarrhea, back pain, numbness, or tingling.  HPI translated by son at bedside.    Cardiologist: Dr. Oshea

## 2019-04-29 NOTE — ED STATDOCS - NS_ ATTENDINGSCRIBEDETAILS _ED_A_ED_FT
I, John Allen, performed the initial face to face bedside interview with this patient regarding history of present illness, review of symptoms and relevant past medical, social and family history.  I completed an independent physical examination.    The history, relevant review of systems, past medical and surgical history, medical decision making, and physical examination was documented by the scribe in my presence and I attest to the accuracy of the documentation.

## 2019-04-29 NOTE — ED PROVIDER NOTE - PMH
Afib    Atrial fibrillation    Coronary stent restenosis, sequela    Diabetes    Hypertension    Hypertension    Stented coronary artery

## 2019-04-29 NOTE — ED ADULT NURSE NOTE - OBJECTIVE STATEMENT
pt received A+Ox4, no apparent distress noted. son at bedside. pt c/o increasing SOB x1wk, abd distension and chest discomfort. pt c/o swelling to bilateral lower extremities. pt taking lasix @ home with no relief. pt states SOB increasing with exertion. pt ambulated well to bathroom in no distress, pt placed back on cardiac monitor and , sat 100% on rm air. pt abd soft, distended, pt denies abd pain. pt c/o generalized back pain. pt has hx of DM, HTN, HLD, Afib, CAD, and coronary stents x8. pt breathing even and unlabored. BLANK well x4. pt educated on POC, verbalized understanding. pt safety measures maintained.

## 2019-04-29 NOTE — ED ADULT TRIAGE NOTE - CHIEF COMPLAINT QUOTE
c/o sob getting worst ,swelling of lower extremities, hx cardiac stents x 8,  mitral valve sx scheduled in may

## 2019-04-29 NOTE — ED PROVIDER NOTE - PROGRESS NOTE DETAILS
Spoke with PA for Dr. Oshea for consultation, given pt is scheduled for surgery next week Patient has remained stable. Labs and CXR are as noted. Patient will be admitted to surg team for management.

## 2019-04-30 DIAGNOSIS — E05.90 THYROTOXICOSIS, UNSPECIFIED WITHOUT THYROTOXIC CRISIS OR STORM: ICD-10-CM

## 2019-04-30 DIAGNOSIS — I48.91 UNSPECIFIED ATRIAL FIBRILLATION: ICD-10-CM

## 2019-04-30 DIAGNOSIS — Z29.9 ENCOUNTER FOR PROPHYLACTIC MEASURES, UNSPECIFIED: ICD-10-CM

## 2019-04-30 DIAGNOSIS — I10 ESSENTIAL (PRIMARY) HYPERTENSION: ICD-10-CM

## 2019-04-30 DIAGNOSIS — I50.9 HEART FAILURE, UNSPECIFIED: ICD-10-CM

## 2019-04-30 DIAGNOSIS — E11.9 TYPE 2 DIABETES MELLITUS WITHOUT COMPLICATIONS: ICD-10-CM

## 2019-04-30 LAB
ALBUMIN SERPL ELPH-MCNC: 3.8 G/DL — SIGNIFICANT CHANGE UP (ref 3.3–5.2)
ALP SERPL-CCNC: 111 U/L — SIGNIFICANT CHANGE UP (ref 40–120)
ALT FLD-CCNC: 13 U/L — SIGNIFICANT CHANGE UP
ANION GAP SERPL CALC-SCNC: 15 MMOL/L — SIGNIFICANT CHANGE UP (ref 5–17)
APPEARANCE UR: CLEAR — SIGNIFICANT CHANGE UP
APTT BLD: 33.1 SEC — SIGNIFICANT CHANGE UP (ref 27.5–36.3)
AST SERPL-CCNC: 19 U/L — SIGNIFICANT CHANGE UP
BASOPHILS # BLD AUTO: 0.1 K/UL — SIGNIFICANT CHANGE UP (ref 0–0.2)
BASOPHILS NFR BLD AUTO: 1 % — SIGNIFICANT CHANGE UP (ref 0–2)
BILIRUB SERPL-MCNC: 0.8 MG/DL — SIGNIFICANT CHANGE UP (ref 0.4–2)
BILIRUB UR-MCNC: NEGATIVE — SIGNIFICANT CHANGE UP
BLD GP AB SCN SERPL QL: SIGNIFICANT CHANGE UP
BUN SERPL-MCNC: 20 MG/DL — SIGNIFICANT CHANGE UP (ref 8–20)
CALCIUM SERPL-MCNC: 9.6 MG/DL — SIGNIFICANT CHANGE UP (ref 8.6–10.2)
CHLORIDE SERPL-SCNC: 88 MMOL/L — LOW (ref 98–107)
CO2 SERPL-SCNC: 28 MMOL/L — SIGNIFICANT CHANGE UP (ref 22–29)
COLOR SPEC: YELLOW — SIGNIFICANT CHANGE UP
CREAT SERPL-MCNC: 0.96 MG/DL — SIGNIFICANT CHANGE UP (ref 0.5–1.3)
DIFF PNL FLD: ABNORMAL
EOSINOPHIL # BLD AUTO: 0 K/UL — SIGNIFICANT CHANGE UP (ref 0–0.5)
EOSINOPHIL NFR BLD AUTO: 0.7 % — SIGNIFICANT CHANGE UP (ref 0–6)
EPI CELLS # UR: SIGNIFICANT CHANGE UP
GLUCOSE BLDC GLUCOMTR-MCNC: 233 MG/DL — HIGH (ref 70–99)
GLUCOSE BLDC GLUCOMTR-MCNC: 291 MG/DL — HIGH (ref 70–99)
GLUCOSE SERPL-MCNC: 186 MG/DL — HIGH (ref 70–115)
GLUCOSE UR QL: NEGATIVE MG/DL — SIGNIFICANT CHANGE UP
HBA1C BLD-MCNC: 8.3 % — HIGH (ref 4–5.6)
HCT VFR BLD CALC: 31.1 % — LOW (ref 37–47)
HGB BLD-MCNC: 10.4 G/DL — LOW (ref 12–16)
INR BLD: 1.69 RATIO — HIGH (ref 0.88–1.16)
KETONES UR-MCNC: NEGATIVE — SIGNIFICANT CHANGE UP
LEUKOCYTE ESTERASE UR-ACNC: NEGATIVE — SIGNIFICANT CHANGE UP
LYMPHOCYTES # BLD AUTO: 1.8 K/UL — SIGNIFICANT CHANGE UP (ref 1–4.8)
LYMPHOCYTES # BLD AUTO: 29.3 % — SIGNIFICANT CHANGE UP (ref 20–55)
MCHC RBC-ENTMCNC: 26.1 PG — LOW (ref 27–31)
MCHC RBC-ENTMCNC: 33.4 G/DL — SIGNIFICANT CHANGE UP (ref 32–36)
MCV RBC AUTO: 77.9 FL — LOW (ref 81–99)
MONOCYTES # BLD AUTO: 0.6 K/UL — SIGNIFICANT CHANGE UP (ref 0–0.8)
MONOCYTES NFR BLD AUTO: 10 % — SIGNIFICANT CHANGE UP (ref 3–10)
MRSA PCR RESULT.: SIGNIFICANT CHANGE UP
NEUTROPHILS # BLD AUTO: 3.5 K/UL — SIGNIFICANT CHANGE UP (ref 1.8–8)
NEUTROPHILS NFR BLD AUTO: 58.8 % — SIGNIFICANT CHANGE UP (ref 37–73)
NITRITE UR-MCNC: NEGATIVE — SIGNIFICANT CHANGE UP
NT-PROBNP SERPL-SCNC: 5361 PG/ML — HIGH (ref 0–300)
PH UR: 7 — SIGNIFICANT CHANGE UP (ref 5–8)
PLATELET # BLD AUTO: 281 K/UL — SIGNIFICANT CHANGE UP (ref 150–400)
POTASSIUM SERPL-MCNC: 3.8 MMOL/L — SIGNIFICANT CHANGE UP (ref 3.5–5.3)
POTASSIUM SERPL-SCNC: 3.8 MMOL/L — SIGNIFICANT CHANGE UP (ref 3.5–5.3)
PROT SERPL-MCNC: 6.9 G/DL — SIGNIFICANT CHANGE UP (ref 6.6–8.7)
PROT UR-MCNC: 30 MG/DL
PROTHROM AB SERPL-ACNC: 19.7 SEC — HIGH (ref 10–12.9)
RBC # BLD: 3.99 M/UL — LOW (ref 4.4–5.2)
RBC # FLD: 16.7 % — HIGH (ref 11–15.6)
RBC CASTS # UR COMP ASSIST: SIGNIFICANT CHANGE UP /HPF (ref 0–4)
S AUREUS DNA NOSE QL NAA+PROBE: SIGNIFICANT CHANGE UP
SODIUM SERPL-SCNC: 131 MMOL/L — LOW (ref 135–145)
SP GR SPEC: 1 — LOW (ref 1.01–1.02)
T3 SERPL-MCNC: 74 NG/DL — LOW (ref 80–200)
T4 AB SER-ACNC: 6.8 UG/DL — SIGNIFICANT CHANGE UP (ref 4.5–12)
TSH SERPL-MCNC: 6.65 UIU/ML — HIGH (ref 0.27–4.2)
TSH SERPL-MCNC: 7.92 UIU/ML — HIGH (ref 0.27–4.2)
TYPE + AB SCN PNL BLD: SIGNIFICANT CHANGE UP
UROBILINOGEN FLD QL: 1 MG/DL
WBC # BLD: 6 K/UL — SIGNIFICANT CHANGE UP (ref 4.8–10.8)
WBC # FLD AUTO: 6 K/UL — SIGNIFICANT CHANGE UP (ref 4.8–10.8)
WBC UR QL: NEGATIVE — SIGNIFICANT CHANGE UP

## 2019-04-30 PROCEDURE — 74018 RADEX ABDOMEN 1 VIEW: CPT | Mod: 26

## 2019-04-30 PROCEDURE — 71045 X-RAY EXAM CHEST 1 VIEW: CPT | Mod: 26

## 2019-04-30 PROCEDURE — 99223 1ST HOSP IP/OBS HIGH 75: CPT

## 2019-04-30 PROCEDURE — 93306 TTE W/DOPPLER COMPLETE: CPT | Mod: 26

## 2019-04-30 RX ORDER — DEXTROSE 50 % IN WATER 50 %
15 SYRINGE (ML) INTRAVENOUS ONCE
Qty: 0 | Refills: 0 | Status: DISCONTINUED | OUTPATIENT
Start: 2019-04-30 | End: 2019-05-04

## 2019-04-30 RX ORDER — INSULIN LISPRO 100/ML
4 VIAL (ML) SUBCUTANEOUS ONCE
Qty: 0 | Refills: 0 | Status: COMPLETED | OUTPATIENT
Start: 2019-04-30 | End: 2019-04-30

## 2019-04-30 RX ORDER — INSULIN LISPRO 100/ML
VIAL (ML) SUBCUTANEOUS
Qty: 0 | Refills: 0 | Status: DISCONTINUED | OUTPATIENT
Start: 2019-04-30 | End: 2019-05-01

## 2019-04-30 RX ORDER — LOSARTAN POTASSIUM 100 MG/1
25 TABLET, FILM COATED ORAL DAILY
Qty: 0 | Refills: 0 | Status: DISCONTINUED | OUTPATIENT
Start: 2019-04-29 | End: 2019-04-30

## 2019-04-30 RX ORDER — FUROSEMIDE 40 MG
40 TABLET ORAL
Qty: 0 | Refills: 0 | Status: DISCONTINUED | OUTPATIENT
Start: 2019-04-30 | End: 2019-05-03

## 2019-04-30 RX ORDER — AMIODARONE HYDROCHLORIDE 400 MG/1
200 TABLET ORAL DAILY
Qty: 0 | Refills: 0 | Status: DISCONTINUED | OUTPATIENT
Start: 2019-04-29 | End: 2019-04-30

## 2019-04-30 RX ORDER — ATORVASTATIN CALCIUM 80 MG/1
80 TABLET, FILM COATED ORAL AT BEDTIME
Qty: 0 | Refills: 0 | Status: DISCONTINUED | OUTPATIENT
Start: 2019-04-29 | End: 2019-05-05

## 2019-04-30 RX ORDER — LISINOPRIL 2.5 MG/1
10 TABLET ORAL DAILY
Qty: 0 | Refills: 0 | Status: DISCONTINUED | OUTPATIENT
Start: 2019-04-30 | End: 2019-05-01

## 2019-04-30 RX ORDER — SENNA PLUS 8.6 MG/1
2 TABLET ORAL AT BEDTIME
Qty: 0 | Refills: 0 | Status: DISCONTINUED | OUTPATIENT
Start: 2019-04-30 | End: 2019-05-05

## 2019-04-30 RX ORDER — DEXTROSE 50 % IN WATER 50 %
25 SYRINGE (ML) INTRAVENOUS ONCE
Qty: 0 | Refills: 0 | Status: DISCONTINUED | OUTPATIENT
Start: 2019-04-30 | End: 2019-05-04

## 2019-04-30 RX ORDER — GLUCAGON INJECTION, SOLUTION 0.5 MG/.1ML
1 INJECTION, SOLUTION SUBCUTANEOUS ONCE
Qty: 0 | Refills: 0 | Status: DISCONTINUED | OUTPATIENT
Start: 2019-04-30 | End: 2019-05-04

## 2019-04-30 RX ORDER — ASPIRIN/CALCIUM CARB/MAGNESIUM 324 MG
81 TABLET ORAL DAILY
Qty: 0 | Refills: 0 | Status: DISCONTINUED | OUTPATIENT
Start: 2019-04-29 | End: 2019-05-03

## 2019-04-30 RX ORDER — SODIUM CHLORIDE 9 MG/ML
1000 INJECTION, SOLUTION INTRAVENOUS
Qty: 0 | Refills: 0 | Status: DISCONTINUED | OUTPATIENT
Start: 2019-04-30 | End: 2019-05-04

## 2019-04-30 RX ORDER — INSULIN GLARGINE 100 [IU]/ML
20 INJECTION, SOLUTION SUBCUTANEOUS AT BEDTIME
Qty: 0 | Refills: 0 | Status: DISCONTINUED | OUTPATIENT
Start: 2019-04-29 | End: 2019-05-03

## 2019-04-30 RX ORDER — POLYETHYLENE GLYCOL 3350 17 G/17G
17 POWDER, FOR SOLUTION ORAL DAILY
Qty: 0 | Refills: 0 | Status: DISCONTINUED | OUTPATIENT
Start: 2019-04-30 | End: 2019-05-05

## 2019-04-30 RX ORDER — DEXTROSE 50 % IN WATER 50 %
12.5 SYRINGE (ML) INTRAVENOUS ONCE
Qty: 0 | Refills: 0 | Status: DISCONTINUED | OUTPATIENT
Start: 2019-04-30 | End: 2019-05-04

## 2019-04-30 RX ORDER — LOSARTAN POTASSIUM 100 MG/1
25 TABLET, FILM COATED ORAL DAILY
Qty: 0 | Refills: 0 | Status: DISCONTINUED | OUTPATIENT
Start: 2019-04-30 | End: 2019-05-05

## 2019-04-30 RX ORDER — AMIODARONE HYDROCHLORIDE 400 MG/1
200 TABLET ORAL DAILY
Qty: 0 | Refills: 0 | Status: DISCONTINUED | OUTPATIENT
Start: 2019-04-30 | End: 2019-05-05

## 2019-04-30 RX ORDER — METOPROLOL TARTRATE 50 MG
150 TABLET ORAL DAILY
Qty: 0 | Refills: 0 | Status: DISCONTINUED | OUTPATIENT
Start: 2019-05-01 | End: 2019-05-01

## 2019-04-30 RX ORDER — LISINOPRIL 2.5 MG/1
10 TABLET ORAL DAILY
Qty: 0 | Refills: 0 | Status: DISCONTINUED | OUTPATIENT
Start: 2019-04-29 | End: 2019-04-30

## 2019-04-30 RX ORDER — DIGOXIN 250 MCG
0.12 TABLET ORAL DAILY
Qty: 0 | Refills: 0 | Status: DISCONTINUED | OUTPATIENT
Start: 2019-04-29 | End: 2019-05-05

## 2019-04-30 RX ORDER — ISOSORBIDE MONONITRATE 60 MG/1
60 TABLET, EXTENDED RELEASE ORAL DAILY
Qty: 0 | Refills: 0 | Status: DISCONTINUED | OUTPATIENT
Start: 2019-04-29 | End: 2019-05-05

## 2019-04-30 RX ORDER — METFORMIN HYDROCHLORIDE 850 MG/1
500 TABLET ORAL
Qty: 0 | Refills: 0 | Status: DISCONTINUED | OUTPATIENT
Start: 2019-04-30 | End: 2019-04-30

## 2019-04-30 RX ORDER — MAGNESIUM OXIDE 400 MG ORAL TABLET 241.3 MG
400 TABLET ORAL
Qty: 0 | Refills: 0 | Status: DISCONTINUED | OUTPATIENT
Start: 2019-04-30 | End: 2019-05-05

## 2019-04-30 RX ORDER — PANTOPRAZOLE SODIUM 20 MG/1
40 TABLET, DELAYED RELEASE ORAL
Qty: 0 | Refills: 0 | Status: DISCONTINUED | OUTPATIENT
Start: 2019-04-30 | End: 2019-05-05

## 2019-04-30 RX ORDER — METOPROLOL TARTRATE 50 MG
200 TABLET ORAL DAILY
Qty: 0 | Refills: 0 | Status: DISCONTINUED | OUTPATIENT
Start: 2019-04-30 | End: 2019-04-30

## 2019-04-30 RX ORDER — ALBUTEROL 90 UG/1
2 AEROSOL, METERED ORAL EVERY 6 HOURS
Qty: 0 | Refills: 0 | Status: DISCONTINUED | OUTPATIENT
Start: 2019-04-30 | End: 2019-05-05

## 2019-04-30 RX ORDER — METOPROLOL TARTRATE 50 MG
150 TABLET ORAL DAILY
Qty: 0 | Refills: 0 | Status: DISCONTINUED | OUTPATIENT
Start: 2019-04-30 | End: 2019-04-30

## 2019-04-30 RX ORDER — DOCUSATE SODIUM 100 MG
100 CAPSULE ORAL THREE TIMES A DAY
Qty: 0 | Refills: 0 | Status: DISCONTINUED | OUTPATIENT
Start: 2019-04-30 | End: 2019-05-05

## 2019-04-30 RX ADMIN — SODIUM CHLORIDE 3 MILLILITER(S): 9 INJECTION INTRAMUSCULAR; INTRAVENOUS; SUBCUTANEOUS at 05:44

## 2019-04-30 RX ADMIN — METFORMIN HYDROCHLORIDE 500 MILLIGRAM(S): 850 TABLET ORAL at 09:19

## 2019-04-30 RX ADMIN — ATORVASTATIN CALCIUM 80 MILLIGRAM(S): 80 TABLET, FILM COATED ORAL at 21:36

## 2019-04-30 RX ADMIN — Medication 0.12 MILLIGRAM(S): at 06:06

## 2019-04-30 RX ADMIN — AMIODARONE HYDROCHLORIDE 200 MILLIGRAM(S): 400 TABLET ORAL at 06:06

## 2019-04-30 RX ADMIN — POLYETHYLENE GLYCOL 3350 17 GRAM(S): 17 POWDER, FOR SOLUTION ORAL at 12:44

## 2019-04-30 RX ADMIN — MAGNESIUM OXIDE 400 MG ORAL TABLET 400 MILLIGRAM(S): 241.3 TABLET ORAL at 09:19

## 2019-04-30 RX ADMIN — Medication 40 MILLIGRAM(S): at 00:13

## 2019-04-30 RX ADMIN — MAGNESIUM OXIDE 400 MG ORAL TABLET 400 MILLIGRAM(S): 241.3 TABLET ORAL at 18:04

## 2019-04-30 RX ADMIN — Medication 81 MILLIGRAM(S): at 12:43

## 2019-04-30 RX ADMIN — PANTOPRAZOLE SODIUM 40 MILLIGRAM(S): 20 TABLET, DELAYED RELEASE ORAL at 06:06

## 2019-04-30 RX ADMIN — LOSARTAN POTASSIUM 25 MILLIGRAM(S): 100 TABLET, FILM COATED ORAL at 06:03

## 2019-04-30 RX ADMIN — SENNA PLUS 2 TABLET(S): 8.6 TABLET ORAL at 21:21

## 2019-04-30 RX ADMIN — LISINOPRIL 10 MILLIGRAM(S): 2.5 TABLET ORAL at 06:03

## 2019-04-30 RX ADMIN — SODIUM CHLORIDE 3 MILLILITER(S): 9 INJECTION INTRAMUSCULAR; INTRAVENOUS; SUBCUTANEOUS at 21:30

## 2019-04-30 RX ADMIN — Medication 40 MILLIGRAM(S): at 18:03

## 2019-04-30 RX ADMIN — Medication 100 MILLIGRAM(S): at 21:23

## 2019-04-30 RX ADMIN — ISOSORBIDE MONONITRATE 60 MILLIGRAM(S): 60 TABLET, EXTENDED RELEASE ORAL at 12:43

## 2019-04-30 RX ADMIN — Medication 4 UNIT(S): at 18:28

## 2019-04-30 RX ADMIN — SODIUM CHLORIDE 3 MILLILITER(S): 9 INJECTION INTRAMUSCULAR; INTRAVENOUS; SUBCUTANEOUS at 14:02

## 2019-04-30 RX ADMIN — INSULIN GLARGINE 20 UNIT(S): 100 INJECTION, SOLUTION SUBCUTANEOUS at 21:23

## 2019-04-30 NOTE — PROGRESS NOTE ADULT - PROBLEM SELECTOR PLAN 2
-Afib with slow ventricular response (HR 50's)  -Continue AMIO  -Anti-plt and anti-coagulation therapy when appropriate  Monitor lytes closely with aggressive diuresis. -Afib with slow ventricular response (HR 50's)  -Continue AMIO  -Continue Anti-plt   -Anti-coagulation therapy when appropriate  Monitor lytes closely with aggressive diuresis.

## 2019-04-30 NOTE — PROGRESS NOTE ADULT - PROBLEM SELECTOR PLAN 1
Mitral clip canceled, insurance not covering.   Optimize with medical mgmt.   -Lasix 40 BIB  Monitor lytes, labs, CXR, EDG.  -Continue ASA, statin, digoxin, Imdur, metoprolol (HOLD parameters) Mitral clip canceled, insurance not covering.   Optimize with medical mgmt.   -Lasix 40 BID  Monitor lytes, labs, CXR, EDG.  -Continue ASA, statin, digoxin, Imdur, metoprolol (HOLD parameters)

## 2019-04-30 NOTE — PHYSICAL THERAPY INITIAL EVALUATION ADULT - CRITERIA FOR SKILLED THERAPEUTIC INTERVENTIONS
functional limitations in following categories/impairments found/anticipated discharge recommendation/therapy frequency

## 2019-04-30 NOTE — PHYSICAL THERAPY INITIAL EVALUATION ADULT - PERTINENT HX OF CURRENT PROBLEM, REHAB EVAL
79 yo F with longstanding history of CHF, multivalvular dx presents with exacerbation of CHF.  Son states pt has been getting progressively more SOB over the last 10 days, of which the last 2 days were particularly worse. Son reports pt has mitral clipping scheduled for next week

## 2019-04-30 NOTE — H&P ADULT - ASSESSMENT
79 yo F with longstanding history of CHF, multivalvular dx presents with exacerbation of CHF.  Son states pt has been getting progressively more SOB over the last 10 days, of which the last 2 days were particularly worse.  Pt's functional status is minimal ambulation at home and has 2 pillow orthopnea.  Pt has a mitral clip scheduled with Dr. Oshea for next week, although son states insurance will not cover surgery and it may be cancelled.  Pt states (through translation with son) that her abdomin has increased in size, and is becoming painful, along with the swelling in her legs getting progressively worse over the last week.    Plan to admit to SSH, ICU, aggressive diuresis, monitor lytes, labs, CXR closely.  Above d/w Dr. Oshea and agrees. 79 yo F with longstanding history of CHF, multivalvular dx presents with exacerbation of CHF.  Son states pt has been getting progressively more SOB over the last 10 days, of which the last 2 days were particularly worse.  Pt's functional status is minimal ambulation at home and has 2 pillow orthopnea.  Pt has a mitral clip scheduled with Dr. Oshea for next week, although son states insurance will not cover surgery and it may be cancelled.  Pt states (through translation with son) that her abdomin has increased in size, and is becoming painful, along with the swelling in her legs getting progressively worse over the last week.    Plan to admit to SSH, ICU, aggressive diuresis, monitor lytes, labs, CXR closely.  Above d/w Dr. Oshea and agrees.    E/M TIME SPENT:   39 minutes of noncontinuous time spent   Evaluating/treating patient, reviewing data/labs/imaging, discussing case with multidisciplinary team, discussing plan/goals of care with patient/family about patient's condition . Non-inclusive of procedure time.   greater than 50% of time spent educating patient about condition, medication and prognosis.

## 2019-04-30 NOTE — H&P ADULT - PROBLEM SELECTOR PLAN 2
Continue Lantus 20 uts q HS, Metformin 500 mg BID  Monitor glucose q 4 hrs  May need endocrine consult in am

## 2019-04-30 NOTE — PROGRESS NOTE ADULT - SUBJECTIVE AND OBJECTIVE BOX
Brief Hospital Course:   : Present to [facility] ED c/o worsening SOB x10d with concurrent b/l LE edema, abd distension.   : ECHO:     Significant recent/past 24 hr events:  No acute overnight events. Pt remains in Afib with slow ventricular response, maintain -150's, OOB-chair, and in NAD. ECHO: EF 50 to   55%   3. Basal and mid inferior wall, basal and mid inferolateral wall, and   apical inferior segment are abnormal as described above.   4. RV systolic function is mildly reduced.   5. Moderate mitral annular calcification.   6. Moderate mitral regurgitation.   7. Moderate-severe tricuspid regurgitation.   8. Mild aortic regurgitation.   9. Estimated pulmonary artery systolic pressure is 71.9 mmHg assuming a   right atrial pressure of 15 mmHg, which is consistent with severe   pulmonary hypertension.  10. Right ventricular pressure and volume overload.  11. There is no evidence of pericardial effusion.  12. ** No prior TTEs available for comparison.    < end of copied text >      Subjective:    Review of Systems         Unable to obtain due to: intubated & sedated altered mental status _______________    Constitutional: denies fever, chills, general malaise, fatigue, weight loss, weight gain, diaphoresis   HEENT: denies dry mouth, sore throat, runny nose, photophobia, blurry vision, double vision, discharge, eye pain, difficulty hearing, vertigo, dysphagia, epistaxis, recent dental work    Neck: denies pain or stiffness  Breasts: denies pain, masses, or nipple discharge  Respiratory: denies SOB, FLETCHER, cough, phlegm, wheezing, hemoptysis  Cardiovascular: denies CP, palpitations, edema, diaphoresis   Gastrointestinal: denies nausea, vomiting or hematemesis, diarrhea, constipation, abdominal or epigastric pain, melena or hematochezia   Genitourinary: denies dysuria, frequency, urgency, incontinence, hematuria   Skin: denies rash, hives, itching, burning, masses  Musculoskeletal: denies myalgias, arthritis, joint swelling, muscle weakness  Neurologic: denies syncope, LOC, headache, weakness, dizziness, parasthesias, numbness, seizures, confusion, dementia   Psychiatric: denies feeling anxious, depressed, suicidal, homicidal  Endocrine: denies increased fingerstick glucoses, cold or heat intolerance, polydipsia, polyuria, polyphagia, hair loss  Hematology/Oncology: denies bruising, tender or enlarged lymph nodes   Allergy/immunologic: denies hives or eczema  ROS negative x 10 systems except as noted above      Patient is a 78y old  Female who presents with a chief complaint of "I'm feeling hard to breath, legs swollen" (2019 01:50)    HPI:  77 yo F with longstanding history of CHF, multivalvular dx presents with exacerbation of CHF.  Son states pt has been getting progressively more SOB over the last 10 days, of which the last 2 days were particularly worse.  Pt's functional status is minimal ambulation at home and has 2 pillow orthopnea.  Pt has a mitral clip scheduled with Dr. Oshea for next week, although son states insurance will not cover surgery and it may be cancelled.  Pt states (through translation with son) that her abdomin has increased in size, and is becoming painful, along with the swelling in her legs getting progressively worse over the last week. (2019 01:50)    PAST MEDICAL & SURGICAL HISTORY:  Atrial fibrillation  Diabetes  Hypertension  Stented coronary artery  Coronary stent restenosis, sequela  Afib  Hypertension  History of appendectomy    FAMILY HISTORY:      Vitals   ICU Vital Signs Last 24 Hrs  T(C): 37.4 (2019 14:30), Max: 37.4 (2019 14:30)  T(F): 99.3 (2019 14:30), Max: 99.3 (2019 14:30)  HR: 69 (2019 14:00) (49 - 70)  BP: 137/63 (2019 14:00) (105/57 - 167/67)  BP(mean): 91 (2019 14:00) (71 - 110)  ABP: --  ABP(mean): --  RR: 19 (2019 14:00) (18 - 49)  SpO2: 100% (2019 14:00) (98% - 100%)      VENT SETTINGS         I&O's Detail    2019 07:01  -  2019 07:00  --------------------------------------------------------  IN:    Oral Fluid: 100 mL    Other: 500 mL  Total IN: 600 mL    OUT:    Voided: 600 mL  Total OUT: 600 mL    Total NET: 0 mL      2019 07:01  -  2019 15:18  --------------------------------------------------------  IN:  Total IN: 0 mL    OUT:    Voided: 650 mL  Total OUT: 650 mL    Total NET: -650 mL          LABS                        10.4   6.0   )-----------( 281      ( 2019 06:46 )             31.1     04    131<L>  |  88<L>  |  20.0  ----------------------------<  186<H>  3.8   |  28.0  |  0.96    Ca    9.6      2019 06:46  Mg     1.7         TPro  6.9  /  Alb  3.8  /  TBili  0.8  /  DBili  x   /  AST  19  /  ALT  13  /  AlkPhos  111  30    LIVER FUNCTIONS - ( 2019 06:46 )  Alb: 3.8 g/dL / Pro: 6.9 g/dL / ALK PHOS: 111 U/L / ALT: 13 U/L / AST: 19 U/L / GGT: x           PT/INR - ( 2019 04:06 )   PT: 19.7 sec;   INR: 1.69 ratio         PTT - ( 2019 04:06 )  PTT:33.1 sec  CARDIAC MARKERS ( 2019 21:47 )  CKx     / CKMBx     / Troponin T<0.01 ng/mL /        Urinalysis Basic - ( 2019 00:52 )    Color: Yellow / Appearance: Clear / S.005 / pH: x  Gluc: x / Ketone: Negative  / Bili: Negative / Urobili: 1 mg/dL   Blood: x / Protein: 30 mg/dL / Nitrite: Negative   Leuk Esterase: Negative / RBC: 0-2 /HPF / WBC Negative   Sq Epi: x / Non Sq Epi: Occasional / Bacteria: x            MEDICATIONS  (STANDING):  amiodarone    Tablet 200 milliGRAM(s) Oral daily  aspirin enteric coated 81 milliGRAM(s) Oral daily  atorvastatin 80 milliGRAM(s) Oral at bedtime  digoxin     Tablet 0.125 milliGRAM(s) Oral daily  furosemide   Injectable 40 milliGRAM(s) IV Push two times a day  insulin glargine Injectable (LANTUS) 20 Unit(s) SubCutaneous at bedtime  isosorbide   mononitrate ER Tablet (IMDUR) 60 milliGRAM(s) Oral daily  lisinopril 10 milliGRAM(s) Oral daily  losartan 25 milliGRAM(s) Oral daily  magnesium oxide 400 milliGRAM(s) Oral two times a day with meals  metFORMIN 500 milliGRAM(s) Oral two times a day  pantoprazole    Tablet 40 milliGRAM(s) Oral before breakfast  polyethylene glycol 3350 17 Gram(s) Oral daily  sodium chloride 0.9% lock flush 3 milliLiter(s) IV Push every 8 hours    MEDICATIONS  (PRN):  ALBUTerol    90 MICROgram(s) HFA Inhaler 2 Puff(s) Inhalation every 6 hours PRN Shortness of Breath and/or Wheezing      Allergies:  No Known Allergies        Physical Exam:   Constitutional: NAD, well-groomed, well-developed  HEENT: PERRLA, EOMI, no drainage or redness  Neck: supple,  No JVD  Back: Normal spine flexure, No CVA tenderness, No deformity or limitation of movement  Respiratory: Breath Sounds equal & clear bilaterally to auscultation, no accessory muscle use noted  Cardiovascular: Regular rate, regular rhythm, normal S1, S2; no murmurs or rub  Gastrointestinal: Soft, non-tender, non distended, no hepatosplenomegaly, normal bowel sounds  Extremities: BLANK x 4, no peripheral edema, no cyanosis, no clubbing   Vascular: Equal and normal pulses: 2+ peripheral pulses throughout  Neurological: A+O x 3; speech clear and intact; no sensory, motor  deficits, normal reflexes  Psychiatric: calm, normal mood, normal affect  Musculoskeletal: No joint swelling or deformity; no limitation of movement  Skin: warm, dry, well perfused, no rashes      Code Status:       Critical care time spent: ____minutes (reviewing chart including medication, labs and imaging results, discussions with interdisciplinary team, discussing goals of care/advanced directives, counseling patient and/or family, non-inclusive of procedures)    Case including assessment/plan of care discussed with                    [facility] EICU attending. Brief Hospital Course:   : Present to [facility] ED c/o worsening SOB x10d with concurrent b/l LE edema, abd distension.   : ECHO: EF 50 to 55%, basal and mid inferior wall, basal and mid inferolateral wall, and apical inferior segment are abnormal. RV systolic function is mildly reduced, Mod MAC, Mod MR, Mod-severe TR, Mild AR, Estimated pulmonary artery systolic pressure is 71.9 mmHg (severe pulmonary HTN). Right ventricular pressure and volume overload.    Significant recent/past 24 hr events:  No acute overnight events. Pt remains in Afib with slow ventricular response (HR 50's), maintaining -150's, OOB-chair, and in NAD. Endocrine consulted d/t uncontrolled DM (A1C: 8.3) and thyroid medication mgmt (Home RXN: methimazole, TSH 6.65, T3: 74). Slovak speaking, translated examination, denies N/V/D, HA, changes in vision, numbness/tingling, SOB, chest pain, palpitation, abd pain, urinary sx's or changes in BM.      Subjective:    Review of Systems   ROS negative x 10 systems except as noted above    Patient is a 78y old  Female who presents with a chief complaint of "I'm feeling hard to breath, legs swollen" (2019 01:50)    HPI:  77 yo F with longstanding history of CHF, multivalvular dx presents with exacerbation of CHF.  Son states pt has been getting progressively more SOB over the last 10 days, of which the last 2 days were particularly worse.  Pt's functional status is minimal ambulation at home and has 2 pillow orthopnea.  Pt has a mitral clip scheduled with Dr. Oshea for next week, although son states insurance will not cover surgery and it may be cancelled.  Pt states (through translation with son) that her abdomin has increased in size, and is becoming painful, along with the swelling in her legs getting progressively worse over the last week. (2019 01:50)    PAST MEDICAL & SURGICAL HISTORY:  Atrial fibrillation  Diabetes  Hypertension  Stented coronary artery  Coronary stent restenosis, sequela  Afib  Hypertension  History of appendectomy    Vitals   ICU Vital Signs Last 24 Hrs  T(C): 37.4 (2019 14:30), Max: 37.4 (2019 14:30)  T(F): 99.3 (2019 14:30), Max: 99.3 (2019 14:30)  HR: 69 (2019 14:00) (49 - 70)  BP: 137/63 (2019 14:00) (105/57 - 167/67)  BP(mean): 91 (2019 14:00) (71 - 110)  ABP: --  ABP(mean): --  RR: 19 (2019 14:00) (18 - 49)  SpO2: 100% (2019 14:00) (98% - 100%)    I&O's Detail    2019 07:01  -  2019 07:00  --------------------------------------------------------  IN:    Oral Fluid: 100 mL    Other: 500 mL  Total IN: 600 mL    OUT:    Voided: 600 mL  Total OUT: 600 mL    Total NET: 0 mL      2019 07:01  -  2019 15:18  --------------------------------------------------------  IN:  Total IN: 0 mL    OUT:    Voided: 650 mL  Total OUT: 650 mL    Total NET: -650 mL    LABS                        10.4   6.0   )-----------( 281      ( 2019 06:46 )             31.1     04-30    131<L>  |  88<L>  |  20.0  ----------------------------<  186<H>  3.8   |  28.0  |  0.96    Ca    9.6      2019 06:46  Mg     1.7         TPro  6.9  /  Alb  3.8  /  TBili  0.8  /  DBili  x   /  AST  19  /  ALT  13  /  AlkPhos  111  0430    LIVER FUNCTIONS - ( 2019 06:46 )  Alb: 3.8 g/dL / Pro: 6.9 g/dL / ALK PHOS: 111 U/L / ALT: 13 U/L / AST: 19 U/L / GGT: x           PT/INR - ( 2019 04:06 )   PT: 19.7 sec;   INR: 1.69 ratio      PTT - ( 2019 04:06 )  PTT:33.1 sec  CARDIAC MARKERS ( 2019 21:47 )  CKx     / CKMBx     / Troponin T<0.01 ng/mL /    Urinalysis Basic - ( 2019 00:52 )    Color: Yellow / Appearance: Clear / S.005 / pH: x  Gluc: x / Ketone: Negative  / Bili: Negative / Urobili: 1 mg/dL   Blood: x / Protein: 30 mg/dL / Nitrite: Negative   Leuk Esterase: Negative / RBC: 0-2 /HPF / WBC Negative   Sq Epi: x / Non Sq Epi: Occasional / Bacteria: x      MEDICATIONS  (STANDING):  amiodarone    Tablet 200 milliGRAM(s) Oral daily  aspirin enteric coated 81 milliGRAM(s) Oral daily  atorvastatin 80 milliGRAM(s) Oral at bedtime  digoxin     Tablet 0.125 milliGRAM(s) Oral daily  furosemide   Injectable 40 milliGRAM(s) IV Push two times a day  insulin glargine Injectable (LANTUS) 20 Unit(s) SubCutaneous at bedtime  isosorbide   mononitrate ER Tablet (IMDUR) 60 milliGRAM(s) Oral daily  lisinopril 10 milliGRAM(s) Oral daily  losartan 25 milliGRAM(s) Oral daily  magnesium oxide 400 milliGRAM(s) Oral two times a day with meals  metFORMIN 500 milliGRAM(s) Oral two times a day  pantoprazole    Tablet 40 milliGRAM(s) Oral before breakfast  polyethylene glycol 3350 17 Gram(s) Oral daily  sodium chloride 0.9% lock flush 3 milliLiter(s) IV Push every 8 hours    MEDICATIONS  (PRN):  ALBUTerol    90 MICROgram(s) HFA Inhaler 2 Puff(s) Inhalation every 6 hours PRN Shortness of Breath and/or Wheezing    Allergies:  No Known Allergies    Physical Exam:   Constitutional: NAD, well-groomed, well-developed, Wolof speaking.  Neurological: A+O x 3; speech clear and intact; no sensory, motor deficits  HEENT: PERRLA, EOMI  Neck: supple,  No JVD  Respiratory: Breath Sounds equal & clear bilaterally to auscultation, no accessory muscle use noted  Cardiovascular: Regular rate, regular rhythm, normal S1, S2; no murmurs or rub  Gastrointestinal: Mildly distended. Soft, non-tender, normal bowel sounds  Extremities: BLANK x 4, +2 peripheral edema, no cyanosis, no clubbing   Vascular: Equal and normal pulses: 2+ peripheral pulses throughout  Skin: warm, dry, well perfused    Critical care time spent: 45 minutes (reviewing chart including medication, labs and imaging results, discussions with interdisciplinary team, discussing goals of care/advanced directives, counseling patient and/or family, non-inclusive of procedures)    Case including assessment/plan of care discussed with CTS team and attending.

## 2019-04-30 NOTE — ED ADULT NURSE REASSESSMENT NOTE - NS ED NURSE REASSESS COMMENT FT1
pt awake and alert, resp even and unlabored no distress noted, pt appears comfortable laying in stretcher , pt son at bedside, pt transferred to SICU

## 2019-04-30 NOTE — H&P ADULT - HISTORY OF PRESENT ILLNESS
79 yo F with longstanding history of CHF, multivalvular dx presents with exacerbation of CHF.  Son states pt has been getting progressively more SOB over the last 10 days, of which the last 2 days were particularly worse.  Pt's functional status is minimal ambulation at home and has 2 pillow orthopnea.  Pt has a mitral clip scheduled with Dr. Oshea for next week, although son states insurance will not cover surgery and it may be cancelled.  Pt states (through translation with son) that her abdomin has increased in size, and is becoming painful, along with the swelling in her legs getting progressively worse over the last week.

## 2019-04-30 NOTE — H&P ADULT - NSICDXPASTMEDICALHX_GEN_ALL_CORE_FT
PAST MEDICAL HISTORY:  Afib     Atrial fibrillation     Coronary stent restenosis, sequela     Diabetes     Hypertension     Hypertension     Stented coronary artery

## 2019-04-30 NOTE — PHYSICAL THERAPY INITIAL EVALUATION ADULT - LIVES WITH, PROFILE
children/Pt lives with son and his family in private home, 5 steps to enter with one rail- pt unsure what side. No steps inside

## 2019-04-30 NOTE — H&P ADULT - PROBLEM SELECTOR PLAN 1
Continue ASA, statin, digoxin, Imdur, metoprolol  Lasix 40 mg IV BID stat  Monitor lytes, labs, CXR, EDG

## 2019-04-30 NOTE — PATIENT PROFILE ADULT - CENTRAL VENOUS CATHETER/PICC LINE
Telephone Encounter by Zoila Patrick at 03/28/17 09:13 AM     Author:  Zoila Patrick Service:  (none) Author Type:  Patient      Filed:  03/28/17 09:14 AM Encounter Date:  3/28/2017 Status:  Signed     :  Zoila Patrick (Patient )              VALERIE LIMA    Patient Age: 70 year old    ACCT STATUS:   MESSAGE:[CB1.1T]   Valerie states she is being started on IV antibiotics and feels she may not want to continue with Chemo. She would like a call back to discuss what that means in terms of her potential life span.[CB1.1M]    Next and Last Visit with Provider and Department  Next visit with ROMELIA LAYNE is on 04/07/2017 at  9:30 AM in HEMATOLOGY/ONCOLOGY HLD  Next visit with HEMATOLOGY/ONCOLOGY is on 04/07/2017 at  9:30 AM in HEMATOLOGY/ONCOLOGY HLD  Last visit with ROMELIA LAYNE was on 01/12/2017 at  9:15 AM in HEMATOLOGY/ONCOLOGY HLD  Last visit with HEMATOLOGY/ONCOLOGY was on 01/12/2017 at  9:15 AM in HEMATOLOGY/ONCOLOGY HLD     WEIGHT AND HEIGHT: As of 03/27/2017 weight is 246 lbs.(111.585 kg). Height is 5' 2\"(1.575 m).   BMI is 44.98 kg/(m^2) calculated from:     Height 5' 2\" (1.575 m) as of 3/27/17     Weight 246 lb (111.585 kg) as of 3/27/17      Allergies      Allergen   Reactions   • Sulfa Antibiotics  Itching   • Morphine  Other - See Comments     Makes pt \"loopy\"      Current outpatient prescriptions       Medication  Sig Dispense Refill   • clopidogrel (PLAVIX) 75 MG tablet Take 1 Tab by mouth daily. 30 Tab 5   • atorvastatin (LIPITOR) 20 MG tablet Take 1 Tab by mouth nightly. 30 Tab 3   • metformin (GLUCOPHAGE-XR) 500 MG 24 hr tablet Take 1 Tab by mouth daily with breakfast. 30 Tab 3   • lisinopril (PRINIVIL,ZESTRIL) 10 MG tablet Take 1 Tab by mouth daily. 30 Tab 3   • Nystatin POWD Apply under breasts and groin region daily until rash clears then weekly as preventive 1 Each 3   • clotrimazole-betamethasone (LOTRISONE) cream  Apply under right breast bid until rash clears 30 g 0   • CUSTOM FORMULATION -- SEE SIG Zoledronic 1 mg.  Given monthly. 1 Each 0   • ACYCLOVIR Take  by mouth daily.     • aspirin 325 MG EC tablet Take 1 Tab by mouth daily.  0   • zolpidem (AMBIEN) 5 MG tablet Take 1 Tab by mouth nightly as needed. 30 Tab 3   • polyethylene glycol (GLYCOLAX) powder Take  by mouth daily.        PHARMACY to use:[CB1.1T] n/a[CB1.1M]          Pharmacy preference(s) on file:    [facility] [address] [place]    CALL BACK INFO:[CB1.1T] Ok to leave response (including medical information) on answering machine[CB1.1M]  ROUTING:[CB1.1T] Patient's physician/staff[CB1.1M]        PCP: Dottie Reese MD         INS: Payor: MEDICARE - ASSIGNED / Plan: *No Plan* / Product Type: *No Product type* / Note: This is the primary coverage, but no account was found for this location or the patient's primary location.   ADDRESS:  [address] [place] [id number][CB1.1T]       Revision History        User Key Date/Time User Provider Type Action    > CB1.1 03/28/17 09:14 AM Zoila Patrick Patient  Sign    M - Manual, T - Template             no

## 2019-04-30 NOTE — CONSULT NOTE ADULT - ASSESSMENT
Diabetes.  Plan: Continue Lantus 20 uts q HS, Metformin 500 mg BID  Monitor glucose q 4 hrs  once we get Bgs will determine if she needs standing dose of meal insulin.       H/o HyperT  pt does not remember. She takes MMI for 5 yr.   stop for now since TSh oversuppressed.   check TSI in am   check TFst in 3 days and decide from there next course of action.       CHF (congestive heart failure).  Plan: Continue ASA, statin, digoxin, Imdur, metoprolol  Lasix 40 mg IV BID stat  Monitor lytes, labs, CXR, EDG.         Hypertension.  Plan: Continue Lisinopril, Metoprolol, and above meds  Monitor VS hourly.       Atrial fibrillation.  Plan: Continue Amio 200 mg daily  Monitor lytes closely with aggressive diuresis.

## 2019-04-30 NOTE — CONSULT NOTE ADULT - SUBJECTIVE AND OBJECTIVE BOX
79 yo F with longstanding history of CHF, multivalvular dx presents with exacerbation of CHF.  Son states pt has been getting progressively more SOB over the last 10 days, of which the last 2 days were particularly worse.  Pt's functional status is minimal ambulation at home and has 2 pillow orthopnea.  Pt has a mitral clip scheduled with Dr. Oshea for next week, although son states insurance will not cover surgery and it may be cancelled.  Pt states (through translation with son) that her abdomin has increased in size, and is becoming painful, along with the swelling in her legs getting progressively worse over the last week. She's had dm for many years, but she has memory impairment and she does not know exactly how long. She takes MF and lantus only at home. She checks Bgs only she feels bad and she has 120-140. She has sx of neuropathy.     PAST MEDICAL & SURGICAL HISTORY:  Atrial fibrillation  Diabetes  Hypertension  Stented coronary artery  Coronary stent restenosis, sequela  Afib  Hypertension  History of appendectomy    FAMILY HISTORY: has h/o dm2    SOCIAL HISTORY: from [place], lives with son for the 5 yr and sometiems she goes back to [place], no etoh no drugs    REVIEW OF SYSTEMS:  Constitutional: No fever, no chills  Eyes: No eye swelling,no  blurry vision, no redness, no loss of vision.  Neck: No neck pain, no change in voice.  Lungs: HAs shortness of breath, orthopnea, no wheezing, no cough  CV: No chest pain, no palpitations, no pain with walking.  GI: No nausea, no vomiting, no constipation, no diarrhea, no abdominal pain  : No urinary frequency  Musculoskeletal: No muscle pain, no joint pain  Skin: No rash.  Neurologic: No headaches, no weakness, HAsd  burning and numbness and some pain in feet  Endocrine: No heat intolerance, no cold intolerance  Psych: No depression, no anxiety    MEDICATIONS  (STANDING):  amiodarone    Tablet 200 milliGRAM(s) Oral daily  aspirin enteric coated 81 milliGRAM(s) Oral daily  atorvastatin 80 milliGRAM(s) Oral at bedtime  digoxin     Tablet 0.125 milliGRAM(s) Oral daily  furosemide   Injectable 40 milliGRAM(s) IV Push two times a day  insulin glargine Injectable (LANTUS) 20 Unit(s) SubCutaneous at bedtime  isosorbide   mononitrate ER Tablet (IMDUR) 60 milliGRAM(s) Oral daily  lisinopril 10 milliGRAM(s) Oral daily  losartan 25 milliGRAM(s) Oral daily  magnesium oxide 400 milliGRAM(s) Oral two times a day with meals  metFORMIN 500 milliGRAM(s) Oral two times a day  methimazole 5 milliGRAM(s) Oral daily  pantoprazole    Tablet 40 milliGRAM(s) Oral before breakfast  polyethylene glycol 3350 17 Gram(s) Oral daily  sodium chloride 0.9% lock flush 3 milliLiter(s) IV Push every 8 hours    MEDICATIONS  (PRN):  ALBUTerol    90 MICROgram(s) HFA Inhaler 2 Puff(s) Inhalation every 6 hours PRN Shortness of Breath and/or Wheezing    Allergies  No Known Allergies      PHYSICAL EXAM:    Vital Signs Last 24 Hrs  T(C): 36.4 (2019 10:00), Max: 36.7 (2019 19:52)  T(F): 97.5 (2019 10:00), Max: 98 (2019 19:52)  HR: 69 (2019 14:00) (49 - 70)  BP: 137/63 (2019 14:00) (105/57 - 167/67)  BP(mean): 91 (2019 14:00) (71 - 110)  RR: 19 (2019 14:00) (18 - 49)  SpO2: 100% (2019 14:00) (98% - 100%)    General appearance: Well developed, well nourished.  Eyes: Pupils equal and reactive to light. EOM full. No exophthalmos.  Neck: Trachea midline. No thyroid enlargement.  Lungs: Normal respiratory excursion. Lungs clear.  CV: Regular cardiac rhythm. No murmur. Carotid and pedal pulses intact.  Abdomen: Soft, non tender, no organomegaly or mass.  Musculoskeletal: No cyanosis, clubbing  Skin: Warm and moist. No rash.   Neuro: Cranial nerves intact. Normal motor and sensory function.   Psych: Normal affect, good judgement.    LABS:                        10.4   6.0   )-----------( 281      ( 2019 06:46 )             31.1     04-30    131<L>  |  88<L>  |  20.0  ----------------------------<  186<H>  3.8   |  28.0  |  0.96    Ca    9.6      2019 06:46  Mg     1.7         TPro  6.9  /  Alb  3.8  /  TBili  0.8  /  DBili  x   /  AST  19  /  ALT  13  /  AlkPhos  111      Urinalysis Basic - ( 2019 00:52 )    Color: Yellow / Appearance: Clear / S.005 / pH: x  Gluc: x / Ketone: Negative  / Bili: Negative / Urobili: 1 mg/dL   Blood: x / Protein: 30 mg/dL / Nitrite: Negative   Leuk Esterase: Negative / RBC: 0-2 /HPF / WBC Negative   Sq Epi: x / Non Sq Epi: Occasional / Bacteria: x    LIVER FUNCTIONS - ( 2019 06:46 )  Alb: 3.8 g/dL / Pro: 6.9 g/dL / ALK PHOS: 111 U/L / ALT: 13 U/L / AST: 19 U/L / GGT: x         Hemoglobin A1C, Whole Blood: 8.3 % ( @ 02:42)  T4, Serum: 6.8 ug/dL ( @ 10:25)

## 2019-05-01 ENCOUNTER — OUTPATIENT (OUTPATIENT)
Dept: OUTPATIENT SERVICES | Facility: HOSPITAL | Age: 79
LOS: 1 days | End: 2019-05-01

## 2019-05-01 DIAGNOSIS — Z71.89 OTHER SPECIFIED COUNSELING: ICD-10-CM

## 2019-05-01 DIAGNOSIS — Z90.49 ACQUIRED ABSENCE OF OTHER SPECIFIED PARTS OF DIGESTIVE TRACT: Chronic | ICD-10-CM

## 2019-05-01 LAB
ALBUMIN SERPL ELPH-MCNC: 3.5 G/DL — SIGNIFICANT CHANGE UP (ref 3.3–5.2)
ALP SERPL-CCNC: 103 U/L — SIGNIFICANT CHANGE UP (ref 40–120)
ALT FLD-CCNC: 12 U/L — SIGNIFICANT CHANGE UP
ANION GAP SERPL CALC-SCNC: 12 MMOL/L — SIGNIFICANT CHANGE UP (ref 5–17)
AST SERPL-CCNC: 15 U/L — SIGNIFICANT CHANGE UP
BILIRUB DIRECT SERPL-MCNC: 0.3 MG/DL — SIGNIFICANT CHANGE UP (ref 0–0.3)
BILIRUB INDIRECT FLD-MCNC: 0.5 MG/DL — SIGNIFICANT CHANGE UP (ref 0.2–1)
BILIRUB SERPL-MCNC: 0.8 MG/DL — SIGNIFICANT CHANGE UP (ref 0.4–2)
BUN SERPL-MCNC: 22 MG/DL — HIGH (ref 8–20)
CALCIUM SERPL-MCNC: 9.3 MG/DL — SIGNIFICANT CHANGE UP (ref 8.6–10.2)
CHLORIDE SERPL-SCNC: 89 MMOL/L — LOW (ref 98–107)
CO2 SERPL-SCNC: 30 MMOL/L — HIGH (ref 22–29)
CREAT SERPL-MCNC: 1.08 MG/DL — SIGNIFICANT CHANGE UP (ref 0.5–1.3)
GLUCOSE BLDC GLUCOMTR-MCNC: 173 MG/DL — HIGH (ref 70–99)
GLUCOSE BLDC GLUCOMTR-MCNC: 185 MG/DL — HIGH (ref 70–99)
GLUCOSE BLDC GLUCOMTR-MCNC: 185 MG/DL — HIGH (ref 70–99)
GLUCOSE BLDC GLUCOMTR-MCNC: 203 MG/DL — HIGH (ref 70–99)
GLUCOSE BLDC GLUCOMTR-MCNC: 228 MG/DL — HIGH (ref 70–99)
GLUCOSE BLDC GLUCOMTR-MCNC: 294 MG/DL — HIGH (ref 70–99)
GLUCOSE SERPL-MCNC: 217 MG/DL — HIGH (ref 70–115)
HAV IGM SER-ACNC: SIGNIFICANT CHANGE UP
HBV CORE IGM SER-ACNC: SIGNIFICANT CHANGE UP
HBV SURFACE AG SER-ACNC: SIGNIFICANT CHANGE UP
HCT VFR BLD CALC: 29.7 % — LOW (ref 37–47)
HCV AB S/CO SERPL IA: 0.05 S/CO — SIGNIFICANT CHANGE UP (ref 0–0.99)
HCV AB SERPL-IMP: SIGNIFICANT CHANGE UP
HGB BLD-MCNC: 9.8 G/DL — LOW (ref 12–16)
INR BLD: 1.34 RATIO — HIGH (ref 0.88–1.16)
MAGNESIUM SERPL-MCNC: 1.6 MG/DL — SIGNIFICANT CHANGE UP (ref 1.6–2.6)
MCHC RBC-ENTMCNC: 26.1 PG — LOW (ref 27–31)
MCHC RBC-ENTMCNC: 33 G/DL — SIGNIFICANT CHANGE UP (ref 32–36)
MCV RBC AUTO: 79 FL — LOW (ref 81–99)
PLATELET # BLD AUTO: 256 K/UL — SIGNIFICANT CHANGE UP (ref 150–400)
POTASSIUM SERPL-MCNC: 4 MMOL/L — SIGNIFICANT CHANGE UP (ref 3.5–5.3)
POTASSIUM SERPL-SCNC: 4 MMOL/L — SIGNIFICANT CHANGE UP (ref 3.5–5.3)
PROT SERPL-MCNC: 6.4 G/DL — LOW (ref 6.6–8.7)
PROTHROM AB SERPL-ACNC: 15.5 SEC — HIGH (ref 10–12.9)
RBC # BLD: 3.76 M/UL — LOW (ref 4.4–5.2)
RBC # FLD: 16.9 % — HIGH (ref 11–15.6)
SODIUM SERPL-SCNC: 131 MMOL/L — LOW (ref 135–145)
WBC # BLD: 5.2 K/UL — SIGNIFICANT CHANGE UP (ref 4.8–10.8)
WBC # FLD AUTO: 5.2 K/UL — SIGNIFICANT CHANGE UP (ref 4.8–10.8)

## 2019-05-01 PROCEDURE — 71045 X-RAY EXAM CHEST 1 VIEW: CPT | Mod: 26

## 2019-05-01 PROCEDURE — 99232 SBSQ HOSP IP/OBS MODERATE 35: CPT

## 2019-05-01 RX ORDER — SORBITOL SOLUTION 70 %
30 SOLUTION, ORAL MISCELLANEOUS ONCE
Qty: 0 | Refills: 0 | Status: COMPLETED | OUTPATIENT
Start: 2019-05-01 | End: 2019-05-01

## 2019-05-01 RX ORDER — INSULIN LISPRO 100/ML
VIAL (ML) SUBCUTANEOUS
Qty: 0 | Refills: 0 | Status: DISCONTINUED | OUTPATIENT
Start: 2019-05-01 | End: 2019-05-03

## 2019-05-01 RX ORDER — METOPROLOL TARTRATE 50 MG
50 TABLET ORAL
Qty: 0 | Refills: 0 | Status: DISCONTINUED | OUTPATIENT
Start: 2019-05-01 | End: 2019-05-03

## 2019-05-01 RX ORDER — INSULIN LISPRO 100/ML
5 VIAL (ML) SUBCUTANEOUS
Qty: 0 | Refills: 0 | Status: DISCONTINUED | OUTPATIENT
Start: 2019-05-01 | End: 2019-05-02

## 2019-05-01 RX ORDER — CHLORHEXIDINE GLUCONATE 213 G/1000ML
1 SOLUTION TOPICAL
Qty: 0 | Refills: 0 | Status: DISCONTINUED | OUTPATIENT
Start: 2019-05-01 | End: 2019-05-04

## 2019-05-01 RX ORDER — CHLORHEXIDINE GLUCONATE 213 G/1000ML
15 SOLUTION TOPICAL
Qty: 0 | Refills: 0 | Status: DISCONTINUED | OUTPATIENT
Start: 2019-05-01 | End: 2019-05-04

## 2019-05-01 RX ORDER — CEFUROXIME AXETIL 250 MG
1500 TABLET ORAL ONCE
Qty: 0 | Refills: 0 | Status: DISCONTINUED | OUTPATIENT
Start: 2019-05-01 | End: 2019-05-02

## 2019-05-01 RX ORDER — MAGNESIUM SULFATE 500 MG/ML
2 VIAL (ML) INJECTION ONCE
Qty: 0 | Refills: 0 | Status: COMPLETED | OUTPATIENT
Start: 2019-05-01 | End: 2019-05-01

## 2019-05-01 RX ADMIN — Medication 100 MILLIGRAM(S): at 06:34

## 2019-05-01 RX ADMIN — Medication 0.12 MILLIGRAM(S): at 06:34

## 2019-05-01 RX ADMIN — ISOSORBIDE MONONITRATE 60 MILLIGRAM(S): 60 TABLET, EXTENDED RELEASE ORAL at 12:42

## 2019-05-01 RX ADMIN — Medication 40 MILLIGRAM(S): at 17:03

## 2019-05-01 RX ADMIN — Medication 6: at 12:40

## 2019-05-01 RX ADMIN — SODIUM CHLORIDE 3 MILLILITER(S): 9 INJECTION INTRAMUSCULAR; INTRAVENOUS; SUBCUTANEOUS at 21:26

## 2019-05-01 RX ADMIN — Medication 4: at 21:19

## 2019-05-01 RX ADMIN — Medication 50 GRAM(S): at 06:33

## 2019-05-01 RX ADMIN — SODIUM CHLORIDE 3 MILLILITER(S): 9 INJECTION INTRAMUSCULAR; INTRAVENOUS; SUBCUTANEOUS at 06:24

## 2019-05-01 RX ADMIN — ATORVASTATIN CALCIUM 80 MILLIGRAM(S): 80 TABLET, FILM COATED ORAL at 21:18

## 2019-05-01 RX ADMIN — SODIUM CHLORIDE 3 MILLILITER(S): 9 INJECTION INTRAMUSCULAR; INTRAVENOUS; SUBCUTANEOUS at 18:27

## 2019-05-01 RX ADMIN — Medication 40 MILLIGRAM(S): at 06:34

## 2019-05-01 RX ADMIN — Medication 81 MILLIGRAM(S): at 12:41

## 2019-05-01 RX ADMIN — Medication 30 MILLILITER(S): at 12:40

## 2019-05-01 RX ADMIN — PANTOPRAZOLE SODIUM 40 MILLIGRAM(S): 20 TABLET, DELAYED RELEASE ORAL at 06:34

## 2019-05-01 RX ADMIN — MAGNESIUM OXIDE 400 MG ORAL TABLET 400 MILLIGRAM(S): 241.3 TABLET ORAL at 18:22

## 2019-05-01 RX ADMIN — Medication 100 MILLIGRAM(S): at 21:18

## 2019-05-01 RX ADMIN — MAGNESIUM OXIDE 400 MG ORAL TABLET 400 MILLIGRAM(S): 241.3 TABLET ORAL at 08:10

## 2019-05-01 RX ADMIN — CHLORHEXIDINE GLUCONATE 15 MILLILITER(S): 213 SOLUTION TOPICAL at 17:05

## 2019-05-01 RX ADMIN — POLYETHYLENE GLYCOL 3350 17 GRAM(S): 17 POWDER, FOR SOLUTION ORAL at 12:41

## 2019-05-01 RX ADMIN — Medication 2: at 17:02

## 2019-05-01 RX ADMIN — SENNA PLUS 2 TABLET(S): 8.6 TABLET ORAL at 21:18

## 2019-05-01 RX ADMIN — INSULIN GLARGINE 20 UNIT(S): 100 INJECTION, SOLUTION SUBCUTANEOUS at 21:26

## 2019-05-01 RX ADMIN — Medication 2: at 08:09

## 2019-05-01 RX ADMIN — CHLORHEXIDINE GLUCONATE 1 APPLICATION(S): 213 SOLUTION TOPICAL at 17:05

## 2019-05-01 RX ADMIN — Medication 100 MILLIGRAM(S): at 13:43

## 2019-05-01 RX ADMIN — Medication 5 UNIT(S): at 18:03

## 2019-05-01 RX ADMIN — Medication 50 MILLIGRAM(S): at 18:24

## 2019-05-01 NOTE — CDI QUERY NOTE - NSCDIOTHERTXTBX_GEN_ALL_CORE_HH
Can you please specify the acuity and type on CHF associated with patient’s condition?    A.	Acute on chronic combined systolic and diastolic heart failure  B.	Acute on chronic systolic heart failure  C.	Acute combined systolic and diastolic heart failure  D.	Acute systolic heart failure  E.	Other, please specify  F.	Not clinically significant    Supporting Documentations:    Laboratory:     Serum Pro- BNP:    4/29/19 @ 2147: 5635  4/30/19 @ 0242: 5361     4/30/19 0550 Xray Chest 1 View AP/PA:  IMPRESSION: Cardiomegaly No evidence of active chest disease.    4/30/19 1152 tte Echo Complete w/ Doppler  Summary:   1. Endocardial visualization was enhanced with intravenous echo contrast.   2. Left ventricular ejection fraction, by visual estimation, is 50 to   55%.   3. Basal and mid inferior wall, basal and mid inferolateral wall, and   apical inferior segment are abnormal as described above.   4. RV systolic function is mildly reduced.    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 Mitral valve insufficiency. Tricuspid valve insufficiency.  DIAGNOSTIC IMPRESSIONS: Moderately reduced LV Systolic and Diastolic  function with an EF of 40% eith RWMA. 2. Moderate to sever Mitral  Refurgitation. 3. Two vessel CAD. 4. Branch CAD. 5. Patent Jazlyn in the  Proximal to mid LAD and mid LCX.    Medications:   Furosemide Injectable 40 mg IVP 2x/day. Ordered 4/30/19- active     H&P Adult:  History of Present Illness:   77 yo F with longstanding history of CHF, multivalvular dx presents with exacerbation of CHF.  Son states pt has been getting progressively more SOB over the last 10 days, of which the last 2 days were particularly worse.  Pt's functional status is minimal ambulation at home and has 2 pillow orthopnea.  Pt has a mitral clip scheduled with Dr. Oshea for next week, although son states insurance will not cover surgery and it may be cancelled.  Pt states (through translation with son) that her abdomin has increased in size, and is becoming painful, along with the swelling in her legs getting progressively worse over the last week.  Problem/Plan - 1:  •? Problem: CHF (congestive heart failure).  Plan: Continue ASA, statin, digoxin, Imdur, metoprolol  Lasix 40 mg IV BID stat  Monitor lytes, labs, CXR, EDG    4/30 19 @ 1518 and 5/1/19 @ 0310 Progress Note Adult-CT Surgery PA:  Problem/Plan - 1:  •? Problem: CHF (congestive heart failure).  Plan: Mitral clip canceled, insurance not covering.   Optimize with medical mgmt.   -Lasix 40 BID  Monitor lytes, labs, CXR, EDG.  -Continue ASA, statin, digoxin, Imdur, metoprolol (HOLD parameters).

## 2019-05-01 NOTE — DIETITIAN INITIAL EVALUATION ADULT. - PERTINENT LABORATORY DATA
05-01 Na131 mmol/L<L> Glu 217 mg/dL<H> K+ 4.0 mmol/L Cr  1.08 mg/dL BUN 22.0 mg/dL<H> Phos n/a   Alb 3.5 g/dL PAB n/a

## 2019-05-01 NOTE — DIETITIAN INITIAL EVALUATION ADULT. - OTHER INFO
79 yo F with longstanding history of CHF, multivalvular dx presents with exacerbation of CHF, pending mitral clip noted. Pt eating well, tolerating diet.  Pt reports limited adherence to DM diet, declines all diet ed at this time.

## 2019-05-01 NOTE — PROGRESS NOTE ADULT - PROBLEM SELECTOR PLAN 2
-Afib with slow ventricular response (HR 50's)  -Continue AMIO  -Continue Anti-plt   -Anti-coagulation therapy when appropriate  Monitor lytes closely with aggressive diuresis.

## 2019-05-01 NOTE — PROGRESS NOTE ADULT - SUBJECTIVE AND OBJECTIVE BOX
Subjective:  78y Female feeling much better today, ambulating, no c/o pain, improved SOB    History of Present Illness: 	  79 yo F with longstanding history of CHF, multivalvular dx presents with exacerbation of CHF.  Son states pt has been getting progressively more SOB over the last 10 days, of which the last 2 days were particularly worse.  Pt's functional status is minimal ambulation at home and has 2 pillow orthopnea.  Pt has a mitral clip scheduled with Dr. Oshea for next week, although son states insurance will not cover surgery and it may be cancelled.  Pt states (through translation with son) that her abdomin has increased in size, and is becoming painful, along with the swelling in her legs getting progressively worse over the last week.    Past Medical History:  Heart failure  H/o or current diagnosis of HF- ACEI/ARB contraindication unknown  Atrial fibrillation  Diabetes  Hypertension  Stented coronary artery  Coronary stent restenosis, sequela  CHF (congestive heart failure)  Hyperthyroidism  History of appendectomy  MD SENT        ALBUTerol    90 MICROgram(s) HFA Inhaler 2 Puff(s) Inhalation every 6 hours PRN  amiodarone    Tablet 200 milliGRAM(s) Oral daily  aspirin enteric coated 81 milliGRAM(s) Oral daily  atorvastatin 80 milliGRAM(s) Oral at bedtime  dextrose 40% Gel 15 Gram(s) Oral once PRN  dextrose 5%. 1000 milliLiter(s) IV Continuous <Continuous>  dextrose 50% Injectable 12.5 Gram(s) IV Push once  dextrose 50% Injectable 25 Gram(s) IV Push once  dextrose 50% Injectable 25 Gram(s) IV Push once  digoxin     Tablet 0.125 milliGRAM(s) Oral daily  docusate sodium 100 milliGRAM(s) Oral three times a day  furosemide   Injectable 40 milliGRAM(s) IV Push two times a day  glucagon  Injectable 1 milliGRAM(s) IntraMuscular once PRN  insulin glargine Injectable (LANTUS) 20 Unit(s) SubCutaneous at bedtime  insulin lispro (HumaLOG) corrective regimen sliding scale   SubCutaneous three times a day before meals  isosorbide   mononitrate ER Tablet (IMDUR) 60 milliGRAM(s) Oral daily  lisinopril 10 milliGRAM(s) Oral daily  losartan 25 milliGRAM(s) Oral daily  magnesium oxide 400 milliGRAM(s) Oral two times a day with meals  metoprolol succinate  milliGRAM(s) Oral daily  pantoprazole    Tablet 40 milliGRAM(s) Oral before breakfast  polyethylene glycol 3350 17 Gram(s) Oral daily  senna 2 Tablet(s) Oral at bedtime  sodium chloride 0.9% lock flush 3 milliLiter(s) IV Push every 8 hours  MEDICATIONS  (PRN):  ALBUTerol    90 MICROgram(s) HFA Inhaler 2 Puff(s) Inhalation every 6 hours PRN Shortness of Breath and/or Wheezing  dextrose 40% Gel 15 Gram(s) Oral once PRN Blood Glucose LESS THAN 70 milliGRAM(s)/deciLiter  glucagon  Injectable 1 milliGRAM(s) IntraMuscular once PRN Glucose <70 milliGRAM(s)/deciLiter                              10.4   6.0   )-----------( 281      ( 30 Apr 2019 06:46 )             31.1   04-30    131<L>  |  88<L>  |  20.0  ----------------------------<  186<H>  3.8   |  28.0  |  0.96    Ca    9.6      30 Apr 2019 06:46  Mg     1.7     04-29    TPro  6.9  /  Alb  3.8  /  TBili  0.8  /  DBili  x   /  AST  19  /  ALT  13  /  AlkPhos  111  04-30    CARDIAC MARKERS ( 29 Apr 2019 21:47 )  x     / <0.01 ng/mL / x     / x     / x        PT/INR - ( 30 Apr 2019 04:06 )   PT: 19.7 sec;   INR: 1.69 ratio         PTT - ( 30 Apr 2019 04:06 )  PTT:33.1 sec      Objective:  T(C): 37 (05-01-19 @ 02:00), Max: 37.4 (04-30-19 @ 14:30)  HR: 58 (05-01-19 @ 02:00) (49 - 78)  BP: 146/67 (05-01-19 @ 02:00) (105/57 - 167/68)  RR: 19 (05-01-19 @ 02:00) (16 - 35)  SpO2: 100% (05-01-19 @ 02:00) (95% - 100%)  Wt(kg): --CAPILLARY BLOOD GLUCOSE      POCT Blood Glucose.: 291 mg/dL (30 Apr 2019 21:26)  POCT Blood Glucose.: 233 mg/dL (30 Apr 2019 18:10)  I&O's Summary    29 Apr 2019 07:01  -  30 Apr 2019 07:00  --------------------------------------------------------  IN: 600 mL / OUT: 600 mL / NET: 0 mL    30 Apr 2019 07:01  -  01 May 2019 03:11  --------------------------------------------------------  IN: 0 mL / OUT: 1400 mL / NET: -1400 mL    Lines:  PIV    Batista:  Yes    Physical Exam:  Neuro: A0X3, non focal  Pulm: CtA b/l Unlabored  CV: Irreg, bradycardia  Abd: soft nt/nd  Ext: warm, no 1+ edema, well perfused

## 2019-05-01 NOTE — PROGRESS NOTE ADULT - PROBLEM SELECTOR PLAN 1
Mitral clip pending approval from insurance  Optimize with medical mgmt.   -Lasix 40 BID  Monitor lytes, labs, CXR, EDG.  -Continue ASA, statin, digoxin, Imdur, metoprolol (HOLD parameters)

## 2019-05-01 NOTE — CDI QUERY NOTE - NSCDIOTHERTXTBX2_GEN_ALL_CORE_FT
Can you please provide significant clinical diagnosis regarding patient’s renal status?    A.	Chronic kidney disease, stage unknown at this time  B.	Chronic kidney disease, stage 3  C.	Chronic kidney disease, stage 2  D.	Chronic renal insufficiency  E.	Other, please specify  F.	Not clinically significant      Supporting Documentation:     Demographics/ Visit data: Race: White    4/30/19 1443 Consult Note Adult- Endocrinology Attending:  SOCIAL HISTORY: from [place], lives with son for the 5 yr and sometimes she goes back to [place]    Laboratory:   eGFR if Non African American:    4/29/19 @ 2147: 49L  4/30/19 @ 0646: 57L  5/1/19 @ 0345: 50L    Historical result in [facility] date 1/18/19 @ 1338: 76    Interpretative comment  The units for eGFR are mL/min/1.73M2 (normalized body surface area). The  eGFR is calculated from a serum creatinine using the CKD-EPI equation.  Other variables required for calculation are race, age and sex. Among  patients with chronic kidney disease (CKD), the eGFR is useful in  determining the stage of disease according to KDOQI CKD classification.  All eGFR results are reported numerically with the following  interpretation.          GFR                    With                        Without     (ml/min/1.73 m2)    Kidney Damage       Kidney Damage        >= 90                    Stage 1                     Normal        60-89                    Stage 2                     Decreased GFR        30-59                    Stage 3                     Stage 3        15-29                    Stage 4                     Stage 4        < 15                      Stage 5                     Stage 5  Each stage of CKD assumes that the associated GFR level has been in  effect for at least 3 months. Determination of stages one and two (with  eGFR > 59 ml/min/m2) requires estimation of kidney damage for at least 3  months as defined by structural or functional abnormalities.  Limitations: All estimates of GFR will be less accurate for patients at  extremes of muscle mass (including but not limited to frail elderly,  critically ill, or cancer patients), those with unusual diets, and those  with conditions associated with reduced secretion or extrarenal  elimination of creatinine. The eGFR equation is not recommended for use  in patients with unstable creatinine levels.      Can you please provide significant clinical diagnosis regarding patient’s renal status?    A.	Chronic kidney disease, stage unknown at this time  B.	Chronic kidney disease, stage 3  C.	Chronic kidney disease, stage 2  D.	Chronic renal insufficiency  E.	Other, please specify  F.	Not clinically significant      Supporting Documentation:     Demographics/ Visit data: Race: White      Laboratory:   eGFR if Non African American:    4/29/19 @ 2147: 49L  4/30/19 @ 0646: 57L  5/1/19 @ 0345: 50L    Historical result in [facility] date 1/18/19 @ 1338: 76    Interpretative comment  The units for eGFR are mL/min/1.73M2 (normalized body surface area). The  eGFR is calculated from a serum creatinine using the CKD-EPI equation.  Other variables required for calculation are race, age and sex. Among  patients with chronic kidney disease (CKD), the eGFR is useful in  determining the stage of disease according to KDOQI CKD classification.  All eGFR results are reported numerically with the following  interpretation.          GFR                    With                        Without     (ml/min/1.73 m2)    Kidney Damage       Kidney Damage        >= 90                    Stage 1                     Normal        60-89                    Stage 2                     Decreased GFR        30-59                    Stage 3                     Stage 3        15-29                    Stage 4                     Stage 4        < 15                      Stage 5                     Stage 5  Each stage of CKD assumes that the associated GFR level has been in  effect for at least 3 months. Determination of stages one and two (with  eGFR > 59 ml/min/m2) requires estimation of kidney damage for at least 3  months as defined by structural or functional abnormalities.  Limitations: All estimates of GFR will be less accurate for patients at  extremes of muscle mass (including but not limited to frail elderly,  critically ill, or cancer patients), those with unusual diets, and those  with conditions associated with reduced secretion or extrarenal  elimination of creatinine. The eGFR equation is not recommended for use  in patients with unstable creatinine levels.

## 2019-05-01 NOTE — PROGRESS NOTE ADULT - SUBJECTIVE AND OBJECTIVE BOX
INTERVAL HPI/OVERNIGHT EVENTS:  follow up for dm management.    MEDICATIONS  (STANDING):  amiodarone    Tablet 200 milliGRAM(s) Oral daily  aspirin enteric coated 81 milliGRAM(s) Oral daily  atorvastatin 80 milliGRAM(s) Oral at bedtime  cefuroxime  IVPB 1500 milliGRAM(s) IV Intermittent once  chlorhexidine 0.12% Liquid 15 milliLiter(s) Swish and Spit two times a day  chlorhexidine 4% Liquid 1 Application(s) Topical two times a day  dextrose 5%. 1000 milliLiter(s) (50 mL/Hr) IV Continuous <Continuous>  dextrose 50% Injectable 12.5 Gram(s) IV Push once  dextrose 50% Injectable 25 Gram(s) IV Push once  dextrose 50% Injectable 25 Gram(s) IV Push once  digoxin     Tablet 0.125 milliGRAM(s) Oral daily  docusate sodium 100 milliGRAM(s) Oral three times a day  furosemide   Injectable 40 milliGRAM(s) IV Push two times a day  insulin glargine Injectable (LANTUS) 20 Unit(s) SubCutaneous at bedtime  insulin lispro (HumaLOG) corrective regimen sliding scale   SubCutaneous Before meals and at bedtime  insulin lispro Injectable (HumaLOG) 5 Unit(s) SubCutaneous three times a day before meals  isosorbide   mononitrate ER Tablet (IMDUR) 60 milliGRAM(s) Oral daily  losartan 25 milliGRAM(s) Oral daily  magnesium oxide 400 milliGRAM(s) Oral two times a day with meals  metoprolol tartrate 50 milliGRAM(s) Oral two times a day  pantoprazole    Tablet 40 milliGRAM(s) Oral before breakfast  polyethylene glycol 3350 17 Gram(s) Oral daily  senna 2 Tablet(s) Oral at bedtime  sodium chloride 0.9% lock flush 3 milliLiter(s) IV Push every 8 hours    MEDICATIONS  (PRN):  ALBUTerol    90 MICROgram(s) HFA Inhaler 2 Puff(s) Inhalation every 6 hours PRN Shortness of Breath and/or Wheezing  bisacodyl Suppository 10 milliGRAM(s) Rectal daily PRN Constipation  dextrose 40% Gel 15 Gram(s) Oral once PRN Blood Glucose LESS THAN 70 milliGRAM(s)/deciLiter  glucagon  Injectable 1 milliGRAM(s) IntraMuscular once PRN Glucose <70 milliGRAM(s)/deciLiter      Allergies    No Known Allergies    Review of systems: has SOB , leg swelling,no CP, no n/v    Vital Signs Last 24 Hrs  T(C): 37.2 (01 May 2019 18:00), Max: 37.3 (01 May 2019 14:30)  T(F): 99 (01 May 2019 18:00), Max: 99.1 (01 May 2019 14:30)  HR: 58 (01 May 2019 22:00) (53 - 81)  BP: 156/65 (01 May 2019 22:00) (120/61 - 166/71)  BP(mean): 94 (01 May 2019 22:00) (78 - 117)  RR: 19 (01 May 2019 20:00) (16 - 33)  SpO2: 100% (01 May 2019 22:00) (95% - 100%)    PHYSICAL EXAM:  Physical Exam:  Neuro: A0X3, non focal  Pulm: CtA b/l Unlabored  CV: Irreg, bradycardia  Abd: soft nt/nd  Ext: warm, no 1+ edema, well perfused      LABS:                        9.8    5.2   )-----------( 256      ( 01 May 2019 03:45 )             29.7     05-01    131<L>  |  89<L>  |  22.0<H>  ----------------------------<  217<H>  4.0   |  30.0<H>  |  1.08    Ca    9.3      01 May 2019 03:45  Mg     1.6     05-    TPro  6.4<L>  /  Alb  3.5  /  TBili  0.8  /  DBili  0.3  /  AST  15  /  ALT  12  /  AlkPhos  103  05-01    Urinalysis Basic - ( 2019 00:52 )    Color: Yellow / Appearance: Clear / S.005 / pH: x  Gluc: x / Ketone: Negative  / Bili: Negative / Urobili: 1 mg/dL   Blood: x / Protein: 30 mg/dL / Nitrite: Negative   Leuk Esterase: Negative / RBC: 0-2 /HPF / WBC Negative   Sq Epi: x / Non Sq Epi: Occasional / Bacteria: x          CAPILLARY BLOOD GLUCOSE      RADIOLOGY & ADDITIONAL TESTS:

## 2019-05-02 DIAGNOSIS — I48.2 CHRONIC ATRIAL FIBRILLATION: ICD-10-CM

## 2019-05-02 DIAGNOSIS — I34.0 NONRHEUMATIC MITRAL (VALVE) INSUFFICIENCY: ICD-10-CM

## 2019-05-02 DIAGNOSIS — I25.10 ATHEROSCLEROTIC HEART DISEASE OF NATIVE CORONARY ARTERY WITHOUT ANGINA PECTORIS: ICD-10-CM

## 2019-05-02 DIAGNOSIS — I50.33 ACUTE ON CHRONIC DIASTOLIC (CONGESTIVE) HEART FAILURE: ICD-10-CM

## 2019-05-02 LAB
ANION GAP SERPL CALC-SCNC: 13 MMOL/L — SIGNIFICANT CHANGE UP (ref 5–17)
BUN SERPL-MCNC: 22 MG/DL — HIGH (ref 8–20)
CALCIUM SERPL-MCNC: 9.1 MG/DL — SIGNIFICANT CHANGE UP (ref 8.6–10.2)
CHLORIDE SERPL-SCNC: 90 MMOL/L — LOW (ref 98–107)
CO2 SERPL-SCNC: 30 MMOL/L — HIGH (ref 22–29)
CREAT SERPL-MCNC: 0.78 MG/DL — SIGNIFICANT CHANGE UP (ref 0.5–1.3)
GLUCOSE BLDC GLUCOMTR-MCNC: 142 MG/DL — HIGH (ref 70–99)
GLUCOSE BLDC GLUCOMTR-MCNC: 149 MG/DL — HIGH (ref 70–99)
GLUCOSE BLDC GLUCOMTR-MCNC: 149 MG/DL — HIGH (ref 70–99)
GLUCOSE BLDC GLUCOMTR-MCNC: 152 MG/DL — HIGH (ref 70–99)
GLUCOSE BLDC GLUCOMTR-MCNC: 272 MG/DL — HIGH (ref 70–99)
GLUCOSE SERPL-MCNC: 112 MG/DL — SIGNIFICANT CHANGE UP (ref 70–115)
HCT VFR BLD CALC: 31.9 % — LOW (ref 37–47)
HGB BLD-MCNC: 10.5 G/DL — LOW (ref 12–16)
MAGNESIUM SERPL-MCNC: 2 MG/DL — SIGNIFICANT CHANGE UP (ref 1.6–2.6)
MCHC RBC-ENTMCNC: 26.1 PG — LOW (ref 27–31)
MCHC RBC-ENTMCNC: 32.9 G/DL — SIGNIFICANT CHANGE UP (ref 32–36)
MCV RBC AUTO: 79.4 FL — LOW (ref 81–99)
PLATELET # BLD AUTO: 299 K/UL — SIGNIFICANT CHANGE UP (ref 150–400)
POTASSIUM SERPL-MCNC: 4.1 MMOL/L — SIGNIFICANT CHANGE UP (ref 3.5–5.3)
POTASSIUM SERPL-SCNC: 4.1 MMOL/L — SIGNIFICANT CHANGE UP (ref 3.5–5.3)
RBC # BLD: 4.02 M/UL — LOW (ref 4.4–5.2)
RBC # FLD: 17 % — HIGH (ref 11–15.6)
SODIUM SERPL-SCNC: 133 MMOL/L — LOW (ref 135–145)
WBC # BLD: 6.2 K/UL — SIGNIFICANT CHANGE UP (ref 4.8–10.8)
WBC # FLD AUTO: 6.2 K/UL — SIGNIFICANT CHANGE UP (ref 4.8–10.8)

## 2019-05-02 PROCEDURE — 99232 SBSQ HOSP IP/OBS MODERATE 35: CPT

## 2019-05-02 PROCEDURE — 93880 EXTRACRANIAL BILAT STUDY: CPT | Mod: 26

## 2019-05-02 PROCEDURE — 74018 RADEX ABDOMEN 1 VIEW: CPT | Mod: 26

## 2019-05-02 RX ORDER — INSULIN LISPRO 100/ML
7 VIAL (ML) SUBCUTANEOUS
Qty: 0 | Refills: 0 | Status: DISCONTINUED | OUTPATIENT
Start: 2019-05-02 | End: 2019-05-03

## 2019-05-02 RX ORDER — CEFUROXIME AXETIL 250 MG
1500 TABLET ORAL ONCE
Qty: 0 | Refills: 0 | Status: COMPLETED | OUTPATIENT
Start: 2019-05-03 | End: 2019-05-04

## 2019-05-02 RX ORDER — POLYETHYLENE GLYCOL 3350 17 G/17G
17 POWDER, FOR SOLUTION ORAL DAILY
Qty: 0 | Refills: 0 | Status: DISCONTINUED | OUTPATIENT
Start: 2019-05-02 | End: 2019-05-02

## 2019-05-02 RX ADMIN — ATORVASTATIN CALCIUM 80 MILLIGRAM(S): 80 TABLET, FILM COATED ORAL at 22:26

## 2019-05-02 RX ADMIN — MAGNESIUM OXIDE 400 MG ORAL TABLET 400 MILLIGRAM(S): 241.3 TABLET ORAL at 08:50

## 2019-05-02 RX ADMIN — Medication 40 MILLIGRAM(S): at 17:08

## 2019-05-02 RX ADMIN — SODIUM CHLORIDE 3 MILLILITER(S): 9 INJECTION INTRAMUSCULAR; INTRAVENOUS; SUBCUTANEOUS at 13:49

## 2019-05-02 RX ADMIN — MAGNESIUM OXIDE 400 MG ORAL TABLET 400 MILLIGRAM(S): 241.3 TABLET ORAL at 17:12

## 2019-05-02 RX ADMIN — Medication 2: at 08:57

## 2019-05-02 RX ADMIN — Medication 50 MILLIGRAM(S): at 22:26

## 2019-05-02 RX ADMIN — Medication 5 UNIT(S): at 08:57

## 2019-05-02 RX ADMIN — Medication 81 MILLIGRAM(S): at 11:43

## 2019-05-02 RX ADMIN — CHLORHEXIDINE GLUCONATE 1 APPLICATION(S): 213 SOLUTION TOPICAL at 17:07

## 2019-05-02 RX ADMIN — Medication 100 MILLIGRAM(S): at 06:04

## 2019-05-02 RX ADMIN — Medication 100 MILLIGRAM(S): at 13:57

## 2019-05-02 RX ADMIN — PANTOPRAZOLE SODIUM 40 MILLIGRAM(S): 20 TABLET, DELAYED RELEASE ORAL at 08:50

## 2019-05-02 RX ADMIN — SODIUM CHLORIDE 3 MILLILITER(S): 9 INJECTION INTRAMUSCULAR; INTRAVENOUS; SUBCUTANEOUS at 06:12

## 2019-05-02 RX ADMIN — Medication 5 UNIT(S): at 11:44

## 2019-05-02 RX ADMIN — ISOSORBIDE MONONITRATE 60 MILLIGRAM(S): 60 TABLET, EXTENDED RELEASE ORAL at 11:43

## 2019-05-02 RX ADMIN — CHLORHEXIDINE GLUCONATE 1 APPLICATION(S): 213 SOLUTION TOPICAL at 06:11

## 2019-05-02 RX ADMIN — Medication 40 MILLIGRAM(S): at 06:04

## 2019-05-02 RX ADMIN — INSULIN GLARGINE 20 UNIT(S): 100 INJECTION, SOLUTION SUBCUTANEOUS at 22:51

## 2019-05-02 RX ADMIN — SENNA PLUS 2 TABLET(S): 8.6 TABLET ORAL at 22:27

## 2019-05-02 RX ADMIN — Medication 50 MILLIGRAM(S): at 12:28

## 2019-05-02 RX ADMIN — Medication 7 UNIT(S): at 17:09

## 2019-05-02 RX ADMIN — CHLORHEXIDINE GLUCONATE 15 MILLILITER(S): 213 SOLUTION TOPICAL at 17:07

## 2019-05-02 RX ADMIN — Medication 0.12 MILLIGRAM(S): at 06:04

## 2019-05-02 RX ADMIN — LOSARTAN POTASSIUM 25 MILLIGRAM(S): 100 TABLET, FILM COATED ORAL at 06:05

## 2019-05-02 RX ADMIN — CHLORHEXIDINE GLUCONATE 15 MILLILITER(S): 213 SOLUTION TOPICAL at 06:03

## 2019-05-02 RX ADMIN — Medication 6: at 11:43

## 2019-05-02 RX ADMIN — AMIODARONE HYDROCHLORIDE 200 MILLIGRAM(S): 400 TABLET ORAL at 06:03

## 2019-05-02 RX ADMIN — Medication 100 MILLIGRAM(S): at 22:26

## 2019-05-02 RX ADMIN — POLYETHYLENE GLYCOL 3350 17 GRAM(S): 17 POWDER, FOR SOLUTION ORAL at 11:43

## 2019-05-02 NOTE — PROGRESS NOTE ADULT - SUBJECTIVE AND OBJECTIVE BOX
INTERVAL HPI/OVERNIGHT EVENTS:  follow up for dm management.     MEDICATIONS  (STANDING):  amiodarone    Tablet 200 milliGRAM(s) Oral daily  aspirin enteric coated 81 milliGRAM(s) Oral daily  atorvastatin 80 milliGRAM(s) Oral at bedtime  cefuroxime  IVPB 1500 milliGRAM(s) IV Intermittent once  chlorhexidine 0.12% Liquid 15 milliLiter(s) Swish and Spit two times a day  chlorhexidine 4% Liquid 1 Application(s) Topical two times a day  dextrose 5%. 1000 milliLiter(s) (50 mL/Hr) IV Continuous <Continuous>  dextrose 50% Injectable 12.5 Gram(s) IV Push once  dextrose 50% Injectable 25 Gram(s) IV Push once  dextrose 50% Injectable 25 Gram(s) IV Push once  digoxin     Tablet 0.125 milliGRAM(s) Oral daily  docusate sodium 100 milliGRAM(s) Oral three times a day  furosemide   Injectable 40 milliGRAM(s) IV Push two times a day  insulin glargine Injectable (LANTUS) 20 Unit(s) SubCutaneous at bedtime  insulin lispro (HumaLOG) corrective regimen sliding scale   SubCutaneous Before meals and at bedtime  insulin lispro Injectable (HumaLOG) 7 Unit(s) SubCutaneous three times a day before meals  isosorbide   mononitrate ER Tablet (IMDUR) 60 milliGRAM(s) Oral daily  losartan 25 milliGRAM(s) Oral daily  magnesium oxide 400 milliGRAM(s) Oral two times a day with meals  metoprolol tartrate 50 milliGRAM(s) Oral two times a day  pantoprazole    Tablet 40 milliGRAM(s) Oral before breakfast  polyethylene glycol 3350 17 Gram(s) Oral daily  senna 2 Tablet(s) Oral at bedtime  sodium chloride 0.9% lock flush 3 milliLiter(s) IV Push every 8 hours    MEDICATIONS  (PRN):  ALBUTerol    90 MICROgram(s) HFA Inhaler 2 Puff(s) Inhalation every 6 hours PRN Shortness of Breath and/or Wheezing  bisacodyl Suppository 10 milliGRAM(s) Rectal daily PRN Constipation  dextrose 40% Gel 15 Gram(s) Oral once PRN Blood Glucose LESS THAN 70 milliGRAM(s)/deciLiter  glucagon  Injectable 1 milliGRAM(s) IntraMuscular once PRN Glucose <70 milliGRAM(s)/deciLiter    Allergies  No Known Allergies    Review of systems: better SOB , can walk without FLETCHER, no LE swelling ,no cp, eating well, no N/V/D     Vital Signs Last 24 Hrs  T(C): 36.8 (02 May 2019 15:58), Max: 37.2 (01 May 2019 18:00)  T(F): 98.3 (02 May 2019 15:58), Max: 99 (01 May 2019 18:00)  HR: 57 (02 May 2019 15:00) (52 - 83)  BP: 110/56 (02 May 2019 15:00) (98/43 - 184/77)  BP(mean): 79 (02 May 2019 15:00) (62 - 117)  RR: 19 (02 May 2019 06:00) (17 - 19)  SpO2: 99% (02 May 2019 15:00) (94% - 100%)    PHYSICAL EXAM:  Constitutional: NAD, well-groomed  HEENT: PERRLA, EOMI   Neck: No LAD, No JVD, trachea midline, no thyroid enlargement  Respiratory: CTAB  Cardiovascular: S1 and S2, RRR, no M/G/R  Gastrointestinal: BS+, soft, no organomeglag or mass  Extremities: No peripheral edema, no pedal lesions  Vascular: 2+ peripheral pulses  Neurological: A/O x 3, no focal deficits  Psychiatric: Normal mood, normal affect  Musculoskeletal: 5/5 strength b/l upper and lower extremities  Skin: No rashes    LABS:                        10.5   6.2   )-----------( 299      ( 02 May 2019 04:24 )             31.9     05-02    133<L>  |  90<L>  |  22.0<H>  ----------------------------<  112  4.1   |  30.0<H>  |  0.78    Ca    9.1      02 May 2019 04:24  Mg     2.0     05-02    TPro  6.4<L>  /  Alb  3.5  /  TBili  0.8  /  DBili  0.3  /  AST  15  /  ALT  12  /  AlkPhos  103  05-01

## 2019-05-02 NOTE — PROGRESS NOTE ADULT - SUBJECTIVE AND OBJECTIVE BOX
Cardiac Surgery Pre-op Note:    Patient is a 78y old  Female who presents with a chief complaint of "I'm feeling hard to breath, legs swollen" (01 May 2019 22:22)      HPI:  77 yo F with longstanding history of CHF, multivalvular dx presents with exacerbation of CHF.  Son states pt has been getting progressively more SOB over the last 10 days, of which the last 2 days were particularly worse.  Pt's functional status is minimal ambulation at home and has 2 pillow orthopnea.  Pt has a mitral clip scheduled with Dr. Oshea for next week, although son states insurance will not cover surgery and it may be cancelled.  Pt states (through translation with son) that her abdomin has increased in size, and is becoming painful, along with the swelling in her legs getting progressively worse over the last week. (30 Apr 2019 01:50)                                                                                                             Surgeon: Dr Oshea  Procedure: mitral clip    PAST MEDICAL & SURGICAL HISTORY:  Atrial fibrillation  Diabetes  Hypertension  Stented coronary artery  Coronary stent restenosis, sequela  Afib  Hypertension  History of appendectomy    No Known Allergies      MEDICATIONS  (STANDING):  amiodarone    Tablet 200 milliGRAM(s) Oral daily  aspirin enteric coated 81 milliGRAM(s) Oral daily  atorvastatin 80 milliGRAM(s) Oral at bedtime  cefuroxime  IVPB 1500 milliGRAM(s) IV Intermittent once  chlorhexidine 0.12% Liquid 15 milliLiter(s) Swish and Spit two times a day  chlorhexidine 4% Liquid 1 Application(s) Topical two times a day  digoxin     Tablet 0.125 milliGRAM(s) Oral daily  docusate sodium 100 milliGRAM(s) Oral three times a day  furosemide   Injectable 40 milliGRAM(s) IV Push two times a day  insulin glargine Injectable (LANTUS) 20 Unit(s) SubCutaneous at bedtime  insulin lispro (HumaLOG) corrective regimen sliding scale   SubCutaneous Before meals and at bedtime  insulin lispro Injectable (HumaLOG) 5 Unit(s) SubCutaneous three times a day before meals  isosorbide   mononitrate ER Tablet (IMDUR) 60 milliGRAM(s) Oral daily  losartan 25 milliGRAM(s) Oral daily  magnesium oxide 400 milliGRAM(s) Oral two times a day with meals  metoprolol tartrate 50 milliGRAM(s) Oral two times a day  pantoprazole    Tablet 40 milliGRAM(s) Oral before breakfast  polyethylene glycol 3350 17 Gram(s) Oral daily  senna 2 Tablet(s) Oral at bedtime  sodium chloride 0.9% lock flush 3 milliLiter(s) IV Push every 8 hours    MEDICATIONS  (PRN):  ALBUTerol    90 MICROgram(s) HFA Inhaler 2 Puff(s) Inhalation every 6 hours PRN Shortness of Breath and/or Wheezing  bisacodyl Suppository 10 milliGRAM(s) Rectal daily PRN Constipation  dextrose 40% Gel 15 Gram(s) Oral once PRN Blood Glucose LESS THAN 70 milliGRAM(s)/deciLiter  glucagon  Injectable 1 milliGRAM(s) IntraMuscular once PRN Glucose <70 milliGRAM(s)/deciLiter      Labs:                        9.8    5.2   )-----------( 256      ( 01 May 2019 03:45 )             29.7     05-01    131<L>  |  89<L>  |  22.0<H>  ----------------------------<  217<H>  4.0   |  30.0<H>  |  1.08    Ca    9.3      01 May 2019 03:45  Mg     1.6     05-01  TPro  6.4<L>  /  Alb  3.5  /  TBili  0.8  /  DBili  0.3  /  AST  15  /  ALT  12  /  AlkPhos  103  05-01    PT/INR - ( 01 May 2019 03:45 )   PT: 15.5 sec;   INR: 1.34 ratio    PTT - ( 30 Apr 2019 04:06 )  PTT:33.1 sec    Hemoglobin A1C, Whole Blood: 8.3 % (04-30 @ 02:42)    Serum Pro-Brain Natriuretic Peptide: 5361 pg/mL (04-30 @ 02:42)    04-30 @ 10:25  TSH 6.65  T4 6.8  Triiodothyronine, Total (T3 Total) 74    MRSA/MSSA PCR (04.30.19 @ 06:45)    MRSA PCR Result.: NotDetec    Staph Aureus PCR Result: NotDetec    Prealbumin, Serum (06.28.16 @ 09:17)    Prealbumin, Serum: 16 mg/dL      Diagnostic Tests  < from: Xray Chest 1 View- PORTABLE-Routine (05.01.19 @ 05:50) >  Heart suggest enlargement. Left coronary stents again noted.  Lung fields and pleural surfaces are unremarkable.  Chest is similar to April 30.  IMPRESSION: Heart enlargement and left coronary stents again noted.  < end of copied text >    < from: 12 Lead ECG (04.29.19 @ 20:45) >  Ventricular Rate 70 BPM  Atrial Rate 80 BPM  QRS Duration 104 ms  Q-T Interval 366 ms  QTC Calculation(Bezet) 395 ms  R Axis -13 degrees  T Axis 162 degrees  Diagnosis Line Atrial fibrillation  Cannot rule out Inferior infarct , age undetermined  Cannot rule out Anterior infarct , age undetermined  ST & T wave abnormality, consider lateral ischemia  Abnormal ECG  < end of copied text >    < from: TTE Echo Complete w/Doppler (04.30.19 @ 11:52) >  PHYSICIAN INTERPRETATION:  Left Ventricle: Endocardial visualization was enhanced with intravenous echo contrast. The left ventricular internal cavity size is normal.  Left ventricular ejection fraction, by visual estimation, is 50 to 55%. The interventricular septum is flattened in systole and diastole, consistent with right ventricular pressure and volume overload.  LV Wall Scoring: The basal and mid inferior wall and basal and mid inferolateral wall are akinetic. The apical inferior segment is hypokinetic.  Right Ventricle: The right ventricular size is mildly enlarged. RV systolic function is mildly reduced.  Left Atrium: Mildly enlarged left atrium.  Right Atrium: Mildly enlarged right atrium.  Pericardium: There is no evidence of pericardial effusion.  Mitral Valve: Mild thickening and calcification of the anterior and posterior mitral valve leaflets. There is moderate mitral annular calcification. Moderate mitral valve regurgitation is seen.  Tricuspid Valve: Mildly thickened leaflets. Leaflets are tethered. Moderate-severe tricuspid regurgitation is visualized. Estimated pulmonary artery systolic pressure is 71.9 mmHg assuming a right atrial pressure of 15 mmHg, which is consistent with severe pulmonary HTN.  Aortic Valve: The aortic valve is trileaflet. Sclerotic aortic valve. Peak transaortic gradient equals 9.7 mmHg, mean transaortic gradient equals 4.7 mmHg, the calculated aortic valve area equals 1.39 cm² by the continuity equation. Mild aortic valve regurgitation is seen.  Dimensionless index = 0.43. Borderline low stroke volume index (36 mL/sq m). Visual inspection of the valve is suggestive of mild aortic stenosis.  Pulmonic Valve: The pulmonic valve was not well visualized. Trace pulmonic valve regurgitation.  Aorta: The aortic root is normal in size and structure.  Venous: The inferior vena cava was dilated, with respiratory size variation less than 50%, consistent with elevated right atrial pressure.  Summary:   1. Endocardial visualization was enhanced with intravenous echo contrast.   2. Left ventricular ejection fraction, by visual estimation, is 50 to 55%.   3. Basal and mid inferior wall, basal and mid inferolateral wall, and apical inferior segment are abnormal as described above.   4. RV systolic function is mildly reduced.   5. Moderate mitral annular calcification.   6. Moderate mitral regurgitation.   7. Moderate-severe tricuspid regurgitation.   8. Mild aortic regurgitation.   9. Estimated pulmonary artery systolic pressure is 71.9 mmHg assuming a right atrial pressure of 15 mmHg, which is consistent with severe pulmonary hypertension.  10. Right ventricular pressure and volume overload.  11. There is no evidence of pericardial effusion.  12. ** No prior TTEs available for comparison.  < end of copied text >    < from: Cardiac Cath Lab - Adult (01.25.19 @ 12:47) >  HEMODYNAMICS: Severe PAH with elevated filling pressures and a "V" wave on the PCW tracing c/w LV non-complince and MR. Hemodynamic assessment demonstrates moderate to severe systemic hypertension, moderately elevated LVEDP, moderately to markedly elevated pulmonary capillary wedge pressure, and severely elevated pulmonary vascular resistance.  VENTRICLES: Analysis of regional contractile function demonstrated inferolateral akinesis. Global left ventricular function was moderately depressed. EF calculated by contrast ventriculography was 40 %.  VALVES: MITRAL VALVE: The mitral valve exhibited moderate to severe regurgitation.  CORONARY VESSELS: The coronary circulation is right dominant.  LM:   --  Distal left main: There was a discrete 40 % stenosis in the distal third of the vessel segment.  LAD:   --  Proximal LAD: There was a 10 % stenosis at the site of a prior stent.  --  Mid LAD: There was a 10 % stenosis at the site of a prior stent.  --  D2: There was a discrete 70 % stenosis at the ostium of the vessel segment.  CX:   --  Circumflex: The A-V continuation has a severe 95% stenosis with LANCE 2-3 flow.  --  Ostial circumflex: There was a discrete 70 % stenosis.  --  Mid circumflex: There was a tubular 40 % stenosis at the site of a prior stent.  --  OM1: There was a discrete 60 % stenosis at the ostium of the vessel segment.  RCA:   --  Proximal RCA: There was a tubular 90 % stenosis in the proximal third of the vessel segment, at the origin of RV marginal 1.  COMPLICATIONS: No complications occurred during the cath lab visit.  SUMMARY:  HEMODYNAMICS: Hemodynamic assessment demonstrates moderate to severe systemic hypertension, moderately elevated LVEDP, moderately to markedly elevated pulmonary capillary wedge pressure, and severely elevated pulmonary vascular resistance.  DIAGNOSTIC IMPRESSIONS: Moderately reduced LV Systolic and Diastolic function with an EF of 40% eith RWMA. 2. Moderate to sever Mitral Regurgitation. 3. Two vessel CAD. 4. Branch CAD. 5. Patent Jazlyn in the Proximal to mid LAD and mid LCX.  < end of copied text >      Pt has an AICD/PPM?: No   intra-op URIEL ordered?: Yes  Pre-op Beta Blocker ordered within 24 hrs of surgery? No, Why: Bradycardia (HR to 40s at times pre-op)  Pre-op Hibiclens and peridex ordered (BID x 2 days preferred)?: Yes   Type & Cross done: yes  NPO after Midnight? Yes  Pre-op antibiotics ordered and with/on chart: Yes  Consent obtained: Pending      Constitutional: NAD, well developed, well nourished  Neuro: A+O x 3, non-focal, speech clear and intact  HEENT: NC/AT, PERRL, EOMI, anicteric sclerae, oral mucosa pink and moist  Neck: supple, no JVD  CV: regular rate, irregularly irregular rhythm, +S1S2, no murmurs or rub  Pulm/chest: lung sounds CTA and equal bilaterally, no accessory muscle use noted  Abd: soft, NT, ND, +BS  Ext: BLANK x 4, no C/C/E  Skin: warm, well perfused  Psych: calm, appropriate affect      Time spent: 27 minutes (includes patient assessment, examination, discharge planning, coordination of care, patient and family education and counseling)

## 2019-05-02 NOTE — ADVANCED PRACTICE NURSE CONSULT - ASSESSMENT
went to see pt in am pt is a+ox3 c/o 0 pain, she takes metformin but she is not consistent w checking bg. pt was advised in her language to check bg 2 xdaily. both pt and family verbalized understanding. she confused ss of hypoglycemia and treatment vs hyperglycemia. both pt and family were educated about ss of hypoglycemia and correct treatment

## 2019-05-02 NOTE — ADVANCED PRACTICE NURSE CONSULT - RECOMMEDATIONS
continue diabetes self management education  pt to perform all her bg monitoring including fs successfully while in the hospital

## 2019-05-03 ENCOUNTER — APPOINTMENT (OUTPATIENT)
Dept: CARDIOTHORACIC SURGERY | Facility: HOSPITAL | Age: 79
End: 2019-05-03

## 2019-05-03 LAB
ALBUMIN SERPL ELPH-MCNC: 3.8 G/DL — SIGNIFICANT CHANGE UP (ref 3.3–5.2)
ALP SERPL-CCNC: 72 U/L — SIGNIFICANT CHANGE UP (ref 40–120)
ALT FLD-CCNC: 13 U/L — SIGNIFICANT CHANGE UP
ANION GAP SERPL CALC-SCNC: 14 MMOL/L — SIGNIFICANT CHANGE UP (ref 5–17)
ANION GAP SERPL CALC-SCNC: 18 MMOL/L — HIGH (ref 5–17)
AST SERPL-CCNC: 32 U/L — HIGH
BILIRUB SERPL-MCNC: 1.3 MG/DL — SIGNIFICANT CHANGE UP (ref 0.4–2)
BLD GP AB SCN SERPL QL: SIGNIFICANT CHANGE UP
BUN SERPL-MCNC: 20 MG/DL — SIGNIFICANT CHANGE UP (ref 8–20)
BUN SERPL-MCNC: 26 MG/DL — HIGH (ref 8–20)
CALCIUM SERPL-MCNC: 10.3 MG/DL — HIGH (ref 8.6–10.2)
CALCIUM SERPL-MCNC: 9.1 MG/DL — SIGNIFICANT CHANGE UP (ref 8.6–10.2)
CHLORIDE SERPL-SCNC: 92 MMOL/L — LOW (ref 98–107)
CHLORIDE SERPL-SCNC: 94 MMOL/L — LOW (ref 98–107)
CK MB CFR SERPL CALC: 2.6 NG/ML — SIGNIFICANT CHANGE UP (ref 0–6.7)
CK SERPL-CCNC: 664 U/L — HIGH (ref 25–170)
CO2 SERPL-SCNC: 26 MMOL/L — SIGNIFICANT CHANGE UP (ref 22–29)
CO2 SERPL-SCNC: 29 MMOL/L — SIGNIFICANT CHANGE UP (ref 22–29)
CREAT SERPL-MCNC: 0.79 MG/DL — SIGNIFICANT CHANGE UP (ref 0.5–1.3)
CREAT SERPL-MCNC: 0.9 MG/DL — SIGNIFICANT CHANGE UP (ref 0.5–1.3)
GAS PNL BLDA: SIGNIFICANT CHANGE UP
GLUCOSE BLDC GLUCOMTR-MCNC: 152 MG/DL — HIGH (ref 70–99)
GLUCOSE SERPL-MCNC: 141 MG/DL — HIGH (ref 70–115)
GLUCOSE SERPL-MCNC: 185 MG/DL — HIGH (ref 70–115)
HCT VFR BLD CALC: 29.7 % — LOW (ref 37–47)
HCT VFR BLD CALC: 30.7 % — LOW (ref 37–47)
HGB BLD-MCNC: 10 G/DL — LOW (ref 12–16)
HGB BLD-MCNC: 10.1 G/DL — LOW (ref 12–16)
MAGNESIUM SERPL-MCNC: 1.8 MG/DL — SIGNIFICANT CHANGE UP (ref 1.6–2.6)
MAGNESIUM SERPL-MCNC: 2 MG/DL — SIGNIFICANT CHANGE UP (ref 1.6–2.6)
MCHC RBC-ENTMCNC: 26.7 PG — LOW (ref 27–31)
MCHC RBC-ENTMCNC: 26.8 PG — LOW (ref 27–31)
MCHC RBC-ENTMCNC: 32.9 G/DL — SIGNIFICANT CHANGE UP (ref 32–36)
MCHC RBC-ENTMCNC: 33.7 G/DL — SIGNIFICANT CHANGE UP (ref 32–36)
MCV RBC AUTO: 79.6 FL — LOW (ref 81–99)
MCV RBC AUTO: 81.2 FL — SIGNIFICANT CHANGE UP (ref 81–99)
PLATELET # BLD AUTO: 218 K/UL — SIGNIFICANT CHANGE UP (ref 150–400)
PLATELET # BLD AUTO: 315 K/UL — SIGNIFICANT CHANGE UP (ref 150–400)
POTASSIUM SERPL-MCNC: 3.8 MMOL/L — SIGNIFICANT CHANGE UP (ref 3.5–5.3)
POTASSIUM SERPL-MCNC: 3.8 MMOL/L — SIGNIFICANT CHANGE UP (ref 3.5–5.3)
POTASSIUM SERPL-SCNC: 3.8 MMOL/L — SIGNIFICANT CHANGE UP (ref 3.5–5.3)
POTASSIUM SERPL-SCNC: 3.8 MMOL/L — SIGNIFICANT CHANGE UP (ref 3.5–5.3)
PROT SERPL-MCNC: 6.4 G/DL — LOW (ref 6.6–8.7)
RBC # BLD: 3.73 M/UL — LOW (ref 4.4–5.2)
RBC # BLD: 3.78 M/UL — LOW (ref 4.4–5.2)
RBC # FLD: 16 % — HIGH (ref 11–15.6)
RBC # FLD: 17.7 % — HIGH (ref 11–15.6)
SODIUM SERPL-SCNC: 135 MMOL/L — SIGNIFICANT CHANGE UP (ref 135–145)
SODIUM SERPL-SCNC: 138 MMOL/L — SIGNIFICANT CHANGE UP (ref 135–145)
T3 SERPL-MCNC: 75 NG/DL — LOW (ref 80–200)
T4 AB SER-ACNC: 6 UG/DL — SIGNIFICANT CHANGE UP (ref 4.5–12)
TROPONIN T SERPL-MCNC: 0.02 NG/ML — SIGNIFICANT CHANGE UP (ref 0–0.06)
TSH SERPL-MCNC: 2.89 UIU/ML — SIGNIFICANT CHANGE UP (ref 0.27–4.2)
TSI ACT/NOR SER: <0.1 IU/L — SIGNIFICANT CHANGE UP (ref 0–0.55)
TYPE + AB SCN PNL BLD: SIGNIFICANT CHANGE UP
WBC # BLD: 6.3 K/UL — SIGNIFICANT CHANGE UP (ref 4.8–10.8)
WBC # BLD: 7 K/UL — SIGNIFICANT CHANGE UP (ref 4.8–10.8)
WBC # FLD AUTO: 6.3 K/UL — SIGNIFICANT CHANGE UP (ref 4.8–10.8)
WBC # FLD AUTO: 7 K/UL — SIGNIFICANT CHANGE UP (ref 4.8–10.8)

## 2019-05-03 PROCEDURE — 93306 TTE W/DOPPLER COMPLETE: CPT | Mod: 26

## 2019-05-03 PROCEDURE — 33010: CPT

## 2019-05-03 PROCEDURE — 76376 3D RENDER W/INTRP POSTPROCES: CPT | Mod: 26

## 2019-05-03 PROCEDURE — 93312 ECHO TRANSESOPHAGEAL: CPT | Mod: 26

## 2019-05-03 PROCEDURE — 93010 ELECTROCARDIOGRAM REPORT: CPT

## 2019-05-03 PROCEDURE — 99232 SBSQ HOSP IP/OBS MODERATE 35: CPT

## 2019-05-03 PROCEDURE — 93325 DOPPLER ECHO COLOR FLOW MAPG: CPT | Mod: 26,59

## 2019-05-03 PROCEDURE — 76930: CPT | Mod: 26

## 2019-05-03 PROCEDURE — 33418 REPAIR TCAT MITRAL VALVE: CPT | Mod: 53,Q0

## 2019-05-03 RX ORDER — MAGNESIUM SULFATE 500 MG/ML
2 VIAL (ML) INJECTION ONCE
Qty: 0 | Refills: 0 | Status: COMPLETED | OUTPATIENT
Start: 2019-05-03 | End: 2019-05-03

## 2019-05-03 RX ORDER — PROPOFOL 10 MG/ML
13.16 INJECTION, EMULSION INTRAVENOUS
Qty: 1000 | Refills: 0 | Status: DISCONTINUED | OUTPATIENT
Start: 2019-05-03 | End: 2019-05-04

## 2019-05-03 RX ORDER — CEFUROXIME AXETIL 250 MG
1500 TABLET ORAL EVERY 8 HOURS
Qty: 0 | Refills: 0 | Status: COMPLETED | OUTPATIENT
Start: 2019-05-03 | End: 2019-05-04

## 2019-05-03 RX ORDER — FUROSEMIDE 40 MG
20 TABLET ORAL ONCE
Qty: 0 | Refills: 0 | Status: COMPLETED | OUTPATIENT
Start: 2019-05-03 | End: 2019-05-03

## 2019-05-03 RX ORDER — FENTANYL CITRATE 50 UG/ML
25 INJECTION INTRAVENOUS ONCE
Qty: 0 | Refills: 0 | Status: DISCONTINUED | OUTPATIENT
Start: 2019-05-03 | End: 2019-05-03

## 2019-05-03 RX ORDER — POTASSIUM CHLORIDE 20 MEQ
10 PACKET (EA) ORAL
Qty: 0 | Refills: 0 | Status: DISCONTINUED | OUTPATIENT
Start: 2019-05-03 | End: 2019-05-03

## 2019-05-03 RX ORDER — NICARDIPINE HYDROCHLORIDE 30 MG/1
5 CAPSULE, EXTENDED RELEASE ORAL
Qty: 40 | Refills: 0 | Status: DISCONTINUED | OUTPATIENT
Start: 2019-05-03 | End: 2019-05-04

## 2019-05-03 RX ORDER — POTASSIUM CHLORIDE 20 MEQ
10 PACKET (EA) ORAL ONCE
Qty: 0 | Refills: 0 | Status: COMPLETED | OUTPATIENT
Start: 2019-05-03 | End: 2019-05-03

## 2019-05-03 RX ORDER — MILRINONE LACTATE 1 MG/ML
0.25 INJECTION, SOLUTION INTRAVENOUS
Qty: 20 | Refills: 0 | Status: DISCONTINUED | OUTPATIENT
Start: 2019-05-03 | End: 2019-05-05

## 2019-05-03 RX ADMIN — SODIUM CHLORIDE 3 MILLILITER(S): 9 INJECTION INTRAMUSCULAR; INTRAVENOUS; SUBCUTANEOUS at 15:54

## 2019-05-03 RX ADMIN — SODIUM CHLORIDE 3 MILLILITER(S): 9 INJECTION INTRAMUSCULAR; INTRAVENOUS; SUBCUTANEOUS at 22:30

## 2019-05-03 RX ADMIN — Medication 20 MILLIGRAM(S): at 18:54

## 2019-05-03 RX ADMIN — Medication 40 MILLIGRAM(S): at 05:10

## 2019-05-03 RX ADMIN — Medication 2: at 09:11

## 2019-05-03 RX ADMIN — Medication 100 MILLIGRAM(S): at 05:09

## 2019-05-03 RX ADMIN — Medication 50 MILLIEQUIVALENT(S): at 18:54

## 2019-05-03 RX ADMIN — Medication 0.12 MILLIGRAM(S): at 05:09

## 2019-05-03 RX ADMIN — LOSARTAN POTASSIUM 25 MILLIGRAM(S): 100 TABLET, FILM COATED ORAL at 05:09

## 2019-05-03 RX ADMIN — FENTANYL CITRATE 25 MICROGRAM(S): 50 INJECTION INTRAVENOUS at 17:55

## 2019-05-03 RX ADMIN — PANTOPRAZOLE SODIUM 40 MILLIGRAM(S): 20 TABLET, DELAYED RELEASE ORAL at 05:12

## 2019-05-03 RX ADMIN — Medication 50 GRAM(S): at 23:00

## 2019-05-03 RX ADMIN — SODIUM CHLORIDE 3 MILLILITER(S): 9 INJECTION INTRAMUSCULAR; INTRAVENOUS; SUBCUTANEOUS at 05:07

## 2019-05-03 RX ADMIN — AMIODARONE HYDROCHLORIDE 200 MILLIGRAM(S): 400 TABLET ORAL at 05:09

## 2019-05-03 RX ADMIN — CHLORHEXIDINE GLUCONATE 1 APPLICATION(S): 213 SOLUTION TOPICAL at 08:00

## 2019-05-03 RX ADMIN — Medication 100 MILLIGRAM(S): at 20:05

## 2019-05-03 RX ADMIN — FENTANYL CITRATE 25 MICROGRAM(S): 50 INJECTION INTRAVENOUS at 17:40

## 2019-05-03 RX ADMIN — CHLORHEXIDINE GLUCONATE 15 MILLILITER(S): 213 SOLUTION TOPICAL at 07:56

## 2019-05-03 RX ADMIN — SODIUM CHLORIDE 3 MILLILITER(S): 9 INJECTION INTRAMUSCULAR; INTRAVENOUS; SUBCUTANEOUS at 00:08

## 2019-05-03 RX ADMIN — CHLORHEXIDINE GLUCONATE 15 MILLILITER(S): 213 SOLUTION TOPICAL at 18:09

## 2019-05-03 NOTE — CHART NOTE - NSCHARTNOTEFT_GEN_A_CORE
Failed / Aborted [employer] Percutaneous Mitral Valve Repair Utilizing the Mitral Clip via Right Common Femoral Vein & Pericaridiocentesis  NCT# [id number], [facility] TVT Registry Patient ID# [id number].

## 2019-05-03 NOTE — BRIEF OPERATIVE NOTE - NSICDXBRIEFPOSTOP_GEN_ALL_CORE_FT
POST-OP DIAGNOSIS:  Acute pericardial effusion 03-May-2019 19:02:25  Sebastian Wallace  Acute on chronic combined systolic and diastolic CHF, NYHA class 4 03-May-2019 19:01:24  Sebastian Wallace  Severe mitral regurgitation 03-May-2019 19:01:06  Sebastian Wallace

## 2019-05-03 NOTE — BRIEF OPERATIVE NOTE - COMMENTS
Invasive Lines: Left Brachial Arterial Line, Left Femoral Arterial & Venous Sheaths  IV Medication Infusions: Levophed

## 2019-05-03 NOTE — PROVIDER CONTACT NOTE (OTHER) - RECOMMENDATIONS
Please be advice at present time PT will no longer follow, new PT consult needed when pt. medically stable / appropriate.

## 2019-05-03 NOTE — BRIEF OPERATIVE NOTE - NSICDXBRIEFPREOP_GEN_ALL_CORE_FT
PRE-OP DIAGNOSIS:  Acute on chronic combined systolic and diastolic CHF, NYHA class 4 03-May-2019 19:01:56  Sebastian Wallace  Severe mitral regurgitation 03-May-2019 19:00:14  Sebastian Wallace

## 2019-05-03 NOTE — BRIEF OPERATIVE NOTE - NSICDXBRIEFPROCEDURE_GEN_ALL_CORE_FT
PROCEDURES:  Percutaneous mitral valve repair with leaflet clip implant 03-May-2019 19:03:53 Failed / Aborted Percutaneous Mitral Valve Repair Utilizing the Mitral Clip (NCT# [id number]) ([facility] TVT Registry Patient ID# [id number]) Sebastian Wallace

## 2019-05-03 NOTE — PROVIDER CONTACT NOTE (OTHER) - REASON
new order needed to continue skilled PT intervention due to change in medical status, at present time pt. in surgery

## 2019-05-03 NOTE — BRIEF OPERATIVE NOTE - OPERATION/FINDINGS
Severe Mitral Regurgitation, Acute Pericardial Effusion Requiring Pericardiocentesis, Not Extubated in the OR

## 2019-05-04 DIAGNOSIS — I31.4 CARDIAC TAMPONADE: ICD-10-CM

## 2019-05-04 LAB
ANION GAP SERPL CALC-SCNC: 18 MMOL/L — HIGH (ref 5–17)
BUN SERPL-MCNC: 28 MG/DL — HIGH (ref 8–20)
CALCIUM SERPL-MCNC: 10 MG/DL — SIGNIFICANT CHANGE UP (ref 8.6–10.2)
CHLORIDE SERPL-SCNC: 92 MMOL/L — LOW (ref 98–107)
CK MB CFR SERPL CALC: 3.5 NG/ML — SIGNIFICANT CHANGE UP (ref 0–6.7)
CK SERPL-CCNC: 430 U/L — HIGH (ref 25–170)
CO2 SERPL-SCNC: 25 MMOL/L — SIGNIFICANT CHANGE UP (ref 22–29)
CREAT SERPL-MCNC: 0.99 MG/DL — SIGNIFICANT CHANGE UP (ref 0.5–1.3)
GLUCOSE BLDC GLUCOMTR-MCNC: 168 MG/DL — HIGH (ref 70–99)
GLUCOSE BLDC GLUCOMTR-MCNC: 185 MG/DL — HIGH (ref 70–99)
GLUCOSE BLDC GLUCOMTR-MCNC: 214 MG/DL — HIGH (ref 70–99)
GLUCOSE BLDC GLUCOMTR-MCNC: 231 MG/DL — HIGH (ref 70–99)
GLUCOSE BLDC GLUCOMTR-MCNC: 280 MG/DL — HIGH (ref 70–99)
GLUCOSE BLDC GLUCOMTR-MCNC: 317 MG/DL — HIGH (ref 70–99)
GLUCOSE BLDC GLUCOMTR-MCNC: 373 MG/DL — HIGH (ref 70–99)
GLUCOSE BLDC GLUCOMTR-MCNC: 427 MG/DL — HIGH (ref 70–99)
GLUCOSE SERPL-MCNC: 344 MG/DL — HIGH (ref 70–115)
HCT VFR BLD CALC: 34.4 % — LOW (ref 37–47)
HGB BLD-MCNC: 12.1 G/DL — SIGNIFICANT CHANGE UP (ref 12–16)
MAGNESIUM SERPL-MCNC: 2.3 MG/DL — SIGNIFICANT CHANGE UP (ref 1.6–2.6)
MCHC RBC-ENTMCNC: 28.3 PG — SIGNIFICANT CHANGE UP (ref 27–31)
MCHC RBC-ENTMCNC: 35.2 G/DL — SIGNIFICANT CHANGE UP (ref 32–36)
MCV RBC AUTO: 80.6 FL — LOW (ref 81–99)
PLATELET # BLD AUTO: 264 K/UL — SIGNIFICANT CHANGE UP (ref 150–400)
POTASSIUM SERPL-MCNC: 4 MMOL/L — SIGNIFICANT CHANGE UP (ref 3.5–5.3)
POTASSIUM SERPL-SCNC: 4 MMOL/L — SIGNIFICANT CHANGE UP (ref 3.5–5.3)
RBC # BLD: 4.27 M/UL — LOW (ref 4.4–5.2)
RBC # FLD: 16.2 % — HIGH (ref 11–15.6)
SODIUM SERPL-SCNC: 135 MMOL/L — SIGNIFICANT CHANGE UP (ref 135–145)
TROPONIN T SERPL-MCNC: 0.06 NG/ML — SIGNIFICANT CHANGE UP (ref 0–0.06)
WBC # BLD: 13.3 K/UL — HIGH (ref 4.8–10.8)
WBC # FLD AUTO: 13.3 K/UL — HIGH (ref 4.8–10.8)

## 2019-05-04 PROCEDURE — 99233 SBSQ HOSP IP/OBS HIGH 50: CPT

## 2019-05-04 PROCEDURE — 74018 RADEX ABDOMEN 1 VIEW: CPT | Mod: 26

## 2019-05-04 PROCEDURE — 93010 ELECTROCARDIOGRAM REPORT: CPT | Mod: 76

## 2019-05-04 RX ORDER — INSULIN GLARGINE 100 [IU]/ML
20 INJECTION, SOLUTION SUBCUTANEOUS AT BEDTIME
Qty: 0 | Refills: 0 | Status: DISCONTINUED | OUTPATIENT
Start: 2019-05-04 | End: 2019-05-04

## 2019-05-04 RX ORDER — GLUCAGON INJECTION, SOLUTION 0.5 MG/.1ML
1 INJECTION, SOLUTION SUBCUTANEOUS ONCE
Qty: 0 | Refills: 0 | Status: DISCONTINUED | OUTPATIENT
Start: 2019-05-04 | End: 2019-05-05

## 2019-05-04 RX ORDER — FUROSEMIDE 40 MG
40 TABLET ORAL
Qty: 0 | Refills: 0 | Status: DISCONTINUED | OUTPATIENT
Start: 2019-05-04 | End: 2019-05-04

## 2019-05-04 RX ORDER — INSULIN HUMAN 100 [IU]/ML
1 INJECTION, SOLUTION SUBCUTANEOUS
Qty: 250 | Refills: 0 | Status: DISCONTINUED | OUTPATIENT
Start: 2019-05-04 | End: 2019-05-05

## 2019-05-04 RX ORDER — DEXTROSE 50 % IN WATER 50 %
15 SYRINGE (ML) INTRAVENOUS ONCE
Qty: 0 | Refills: 0 | Status: DISCONTINUED | OUTPATIENT
Start: 2019-05-04 | End: 2019-05-04

## 2019-05-04 RX ORDER — OXYCODONE AND ACETAMINOPHEN 5; 325 MG/1; MG/1
1 TABLET ORAL EVERY 4 HOURS
Qty: 0 | Refills: 0 | Status: DISCONTINUED | OUTPATIENT
Start: 2019-05-04 | End: 2019-05-05

## 2019-05-04 RX ORDER — SODIUM CHLORIDE 9 MG/ML
1000 INJECTION, SOLUTION INTRAVENOUS
Qty: 0 | Refills: 0 | Status: DISCONTINUED | OUTPATIENT
Start: 2019-05-04 | End: 2019-05-05

## 2019-05-04 RX ORDER — FENTANYL CITRATE 50 UG/ML
25 INJECTION INTRAVENOUS ONCE
Qty: 0 | Refills: 0 | Status: DISCONTINUED | OUTPATIENT
Start: 2019-05-04 | End: 2019-05-04

## 2019-05-04 RX ORDER — DEXTROSE 50 % IN WATER 50 %
25 SYRINGE (ML) INTRAVENOUS ONCE
Qty: 0 | Refills: 0 | Status: DISCONTINUED | OUTPATIENT
Start: 2019-05-04 | End: 2019-05-04

## 2019-05-04 RX ORDER — DEXTROSE 50 % IN WATER 50 %
12.5 SYRINGE (ML) INTRAVENOUS ONCE
Qty: 0 | Refills: 0 | Status: DISCONTINUED | OUTPATIENT
Start: 2019-05-04 | End: 2019-05-04

## 2019-05-04 RX ORDER — FENTANYL CITRATE 50 UG/ML
12.5 INJECTION INTRAVENOUS ONCE
Qty: 0 | Refills: 0 | Status: DISCONTINUED | OUTPATIENT
Start: 2019-05-04 | End: 2019-05-04

## 2019-05-04 RX ORDER — INSULIN LISPRO 100/ML
4 VIAL (ML) SUBCUTANEOUS
Qty: 0 | Refills: 0 | Status: DISCONTINUED | OUTPATIENT
Start: 2019-05-04 | End: 2019-05-04

## 2019-05-04 RX ORDER — DEXTROSE 50 % IN WATER 50 %
25 SYRINGE (ML) INTRAVENOUS
Qty: 0 | Refills: 0 | Status: DISCONTINUED | OUTPATIENT
Start: 2019-05-04 | End: 2019-05-05

## 2019-05-04 RX ORDER — FUROSEMIDE 40 MG
40 TABLET ORAL
Qty: 0 | Refills: 0 | Status: DISCONTINUED | OUTPATIENT
Start: 2019-05-04 | End: 2019-05-05

## 2019-05-04 RX ORDER — SIMETHICONE 80 MG/1
80 TABLET, CHEWABLE ORAL ONCE
Qty: 0 | Refills: 0 | Status: COMPLETED | OUTPATIENT
Start: 2019-05-04 | End: 2019-05-04

## 2019-05-04 RX ORDER — DEXTROSE 50 % IN WATER 50 %
50 SYRINGE (ML) INTRAVENOUS
Qty: 0 | Refills: 0 | Status: DISCONTINUED | OUTPATIENT
Start: 2019-05-04 | End: 2019-05-05

## 2019-05-04 RX ORDER — INSULIN LISPRO 100/ML
VIAL (ML) SUBCUTANEOUS
Qty: 0 | Refills: 0 | Status: DISCONTINUED | OUTPATIENT
Start: 2019-05-04 | End: 2019-05-04

## 2019-05-04 RX ORDER — OXYCODONE AND ACETAMINOPHEN 5; 325 MG/1; MG/1
2 TABLET ORAL EVERY 4 HOURS
Qty: 0 | Refills: 0 | Status: DISCONTINUED | OUTPATIENT
Start: 2019-05-04 | End: 2019-05-05

## 2019-05-04 RX ORDER — ACETAMINOPHEN 500 MG
1000 TABLET ORAL ONCE
Qty: 0 | Refills: 0 | Status: COMPLETED | OUTPATIENT
Start: 2019-05-04 | End: 2019-05-04

## 2019-05-04 RX ADMIN — MAGNESIUM OXIDE 400 MG ORAL TABLET 400 MILLIGRAM(S): 241.3 TABLET ORAL at 10:03

## 2019-05-04 RX ADMIN — AMIODARONE HYDROCHLORIDE 200 MILLIGRAM(S): 400 TABLET ORAL at 05:32

## 2019-05-04 RX ADMIN — FENTANYL CITRATE 12.5 MICROGRAM(S): 50 INJECTION INTRAVENOUS at 10:53

## 2019-05-04 RX ADMIN — SODIUM CHLORIDE 3 MILLILITER(S): 9 INJECTION INTRAMUSCULAR; INTRAVENOUS; SUBCUTANEOUS at 05:35

## 2019-05-04 RX ADMIN — Medication 6: at 09:47

## 2019-05-04 RX ADMIN — Medication 40 MILLIGRAM(S): at 06:20

## 2019-05-04 RX ADMIN — Medication 100 MILLIGRAM(S): at 05:32

## 2019-05-04 RX ADMIN — CHLORHEXIDINE GLUCONATE 1 APPLICATION(S): 213 SOLUTION TOPICAL at 05:34

## 2019-05-04 RX ADMIN — SIMETHICONE 80 MILLIGRAM(S): 80 TABLET, CHEWABLE ORAL at 17:40

## 2019-05-04 RX ADMIN — Medication 400 MILLIGRAM(S): at 01:35

## 2019-05-04 RX ADMIN — SODIUM CHLORIDE 3 MILLILITER(S): 9 INJECTION INTRAMUSCULAR; INTRAVENOUS; SUBCUTANEOUS at 14:39

## 2019-05-04 RX ADMIN — POLYETHYLENE GLYCOL 3350 17 GRAM(S): 17 POWDER, FOR SOLUTION ORAL at 12:24

## 2019-05-04 RX ADMIN — FENTANYL CITRATE 12.5 MICROGRAM(S): 50 INJECTION INTRAVENOUS at 10:58

## 2019-05-04 RX ADMIN — Medication 0.12 MILLIGRAM(S): at 05:32

## 2019-05-04 RX ADMIN — Medication 100 MILLIGRAM(S): at 04:28

## 2019-05-04 RX ADMIN — MILRINONE LACTATE 7.12 MICROGRAM(S)/KG/MIN: 1 INJECTION, SOLUTION INTRAVENOUS at 05:28

## 2019-05-04 RX ADMIN — Medication 4 UNIT(S): at 09:47

## 2019-05-04 RX ADMIN — Medication 100 MILLIGRAM(S): at 12:25

## 2019-05-04 RX ADMIN — INSULIN HUMAN 1 UNIT(S)/HR: 100 INJECTION, SOLUTION SUBCUTANEOUS at 17:40

## 2019-05-04 RX ADMIN — FENTANYL CITRATE 12.5 MICROGRAM(S): 50 INJECTION INTRAVENOUS at 04:15

## 2019-05-04 RX ADMIN — FENTANYL CITRATE 12.5 MICROGRAM(S): 50 INJECTION INTRAVENOUS at 04:01

## 2019-05-04 RX ADMIN — PANTOPRAZOLE SODIUM 40 MILLIGRAM(S): 20 TABLET, DELAYED RELEASE ORAL at 06:39

## 2019-05-04 RX ADMIN — Medication 10: at 12:25

## 2019-05-04 RX ADMIN — LOSARTAN POTASSIUM 25 MILLIGRAM(S): 100 TABLET, FILM COATED ORAL at 05:32

## 2019-05-04 RX ADMIN — ISOSORBIDE MONONITRATE 60 MILLIGRAM(S): 60 TABLET, EXTENDED RELEASE ORAL at 12:24

## 2019-05-04 RX ADMIN — MAGNESIUM OXIDE 400 MG ORAL TABLET 400 MILLIGRAM(S): 241.3 TABLET ORAL at 17:41

## 2019-05-04 RX ADMIN — Medication 4 UNIT(S): at 12:25

## 2019-05-04 RX ADMIN — Medication 1000 MILLIGRAM(S): at 01:55

## 2019-05-04 RX ADMIN — Medication 40 MILLIGRAM(S): at 17:47

## 2019-05-04 NOTE — PROGRESS NOTE ADULT - PROBLEM SELECTOR PLAN 1
continue with medical management including diuresis with lasix 40 mg IV daily  lopressor for rate control once off primacor

## 2019-05-04 NOTE — PROGRESS NOTE ADULT - PROBLEM SELECTOR PLAN 2
lasix 40 IV daily  restart ARB continue primacor for inotropic support, decrease to 0.25 mcg/kg/min as perviously d/w Dr Oshea  lasix 40 IV daily  restart ARB

## 2019-05-04 NOTE — PROGRESS NOTE ADULT - PROBLEM SELECTOR PLAN 3
s/p pericardialcentesis  maintain drain for now  no effusion on post echo s/p pericardialcentesis  maintain drain for now  no effusion on post echo  confirm with Dr Oshea and cardiology before restarting aspirin or other anticoagulation

## 2019-05-04 NOTE — PROGRESS NOTE ADULT - SUBJECTIVE AND OBJECTIVE BOX
Brief Hospital Course:   4/29 admitted with acute on chronic diastolic HF  4/30 methimazole d/c'd d/t elevated TSH  5/1 lopressor decreased secondary to bradycardia  5/3 failed/aborted mitral clip after right atrial perforation resulting in need for emergent pericardialcentesis      Subjective: pt c/o pain to left shoulder/neck/headache via . Also very thirsty.  Denies SOB, N/V, fever/chills, other c/o      Patient is a 78y old  Female who presents with a chief complaint of "I'm feeling hard to breath, legs swollen" (03 May 2019 15:47)    HPI:  79 yo F with longstanding history of CHF, multivalvular dx presents with exacerbation of CHF.  Son states pt has been getting progressively more SOB over the last 10 days, of which the last 2 days were particularly worse.  Pt's functional status is minimal ambulation at home and has 2 pillow orthopnea.  Pt has a mitral clip scheduled with Dr. Oshea for next week, although son states insurance will not cover surgery and it may be cancelled.  Pt states (through translation with son) that her abdomin has increased in size, and is becoming painful, along with the swelling in her legs getting progressively worse over the last week. (30 Apr 2019 01:50)    PAST MEDICAL & SURGICAL HISTORY:  Atrial fibrillation  Diabetes  Hypertension  Stented coronary artery  Coronary stent restenosis, sequela  Afib  Hypertension  History of appendectomy    FAMILY HISTORY:      Vitals   ICU Vital Signs Last 24 Hrs  T(C): 37 (04 May 2019 01:00), Max: 37 (04 May 2019 01:00)  T(F): 98.6 (04 May 2019 01:00), Max: 98.6 (04 May 2019 01:00)  HR: 84 (04 May 2019 04:00) (49 - 128), a-fib  BP: 100/84 (03 May 2019 11:14) (97/47 - 160/57)  BP(mean): 72 (03 May 2019 10:00) (68 - 94)  ABP: 160/51 (04 May 2019 04:00) (-8/-11 - 185/64)  ABP(mean): 83 (04 May 2019 04:00) (-9 - 108)  RR: 18 (04 May 2019 04:00) (10 - 39)  SpO2: 97% (04 May 2019 04:00) (93% - 100%) on 2L NC      I&O's Detail    02 May 2019 07:01  -  03 May 2019 07:00  --------------------------------------------------------  Total IN: 1030 mL  Total OUT: 3350 mL  Total NET: -2320 mL    03 May 2019 07:01  -  04 May 2019 04:51  --------------------------------------------------------  IN:    milrinone  Infusion: 85.2 mL    niCARdipine Infusion: 75 mL    propofol Infusion: 5.7 mL    Solution: 50 mL    Solution: 100 mL    Solution: 50 mL  Total IN: 365.9 mL    OUT:    Drain: 85 mL    Indwelling Catheter - Urethral: 475 mL    Voided: 700 mL  Total OUT: 1260 mL    Total NET: -894.1 mL      LABS                        12.1   13.3  )-----------( 264      ( 04 May 2019 02:37 )             34.4     05-04    135  |  92<L>  |  28.0<H>  ----------------------------<  344<H>  4.0   |  25.0  |  0.99    Ca    10.0      04 May 2019 02:37  Mg     2.3     05-04    TPro  6.4<L>  /  Alb  3.8  /  TBili  1.3  /  DBili  x   /  AST  32<H>  /  ALT  13  /  AlkPhos  72  05-03    CARDIAC MARKERS ( 04 May 2019 02:38 )   U/L / CKMB3.5 ng/mL / Troponin T0.06 ng/mL /    CARDIAC MARKERS ( 03 May 2019 16:44 )   U/L / CKMB2.6 ng/mL / Troponin T0.02 ng/mL /    ABG - ( 03 May 2019 16:17 )  pH, Arterial: 7.49  /  pCO2: 42    /  pO2: 425   / HCO3: 32    / Base Excess: 7.7   /  SaO2: x         POCT Blood Glucose.: 152 mg/dL (05-03-19 @ 09:07)      < from: Xray Chest 1 View- PORTABLE-Routine (05.03.19 @ 15:29) >  FINDINGS:   ET tube tip above tracheal bifurcation.  NG tube tip beyond GE junction.  LEFT multi-sidehole pigtail catheter overlies LEFT lower hemithorax  The lungs  are clear.  No pleural abnormality is seen.  The  heart is enlarged in transverse diameter. No hilar mass. Trachea midline.  Visualized osseous structures are intact.  IMPRESSION: Cardiomegaly No evidence of active chest disease. Catheters in place.  < end of copied text >      < from: TTE Echo Complete w/Doppler (05.03.19 @ 17:35) >  PHYSICIAN INTERPRETATION:  Left Ventricle: The left ventricular internal cavity size is normal. Left ventricular ejection fraction, by visual estimation, is 50 to 55%. The mitral in-flow pattern reveals no discernable A-wave, therefore no comment on diastolic function can be made.  Right Ventricle: Normal right ventricular size and function. TV S' 0.1 m/s.  Left Atrium: Mildly enlarged left atrium.  Pericardium: There is no evidence of pericardial effusion.  Mitral Valve: Thickening and calcification of the anterior and posterior mitral valve leaflets. No evidence of mitral stenosis. Moderate mitral valve regurgitation is seen.  Tricuspid Valve: Leaflet thickness is normal. Leaflets are tethered.   Moderate tricuspid regurgitation is visualized. Estimated pulmonary artery systolic pressure is 59.4 mmHg assuming a right atrial pressure of 15 mmHg, which is consistent with moderate pulmonary hypertension.  Aortic Valve: The aortic valve is trileaflet. No evidence of aortic stenosis. Trivial aortic valve regurgitation is seen.  Pulmonic Valve: The pulmonic valve was not well visualized. Trace pulmonic valve regurgitation.  Aorta: The aortic root is normal in size and structure. There is mild aortic root calcification.  Pulmonary Artery: The pulmonary artery is of normal size and origin.  Venous: The inferior vena cava was dilated, with respiratory size variation less than 50%. Patient on Mechanical ventilation unable to assess Right Atrial pressure.  Summary:   1. Left ventricular ejection fraction, by visual estimation, is 50 to 55%.   2. Moderate mitral regurgitation.   3. Moderate tricuspid regurgitation.   4. Estimated pulmonary artery systolic pressure is 59.4 mmHg assuming a right atrial pressure of 15 mmHg, which is consistent with moderate pulmonary hypertension.   5. There is mild aortic root calcification.   6. No pericardial effusion.  < end of copied text >      MEDICATIONS  (STANDING):  amiodarone    Tablet 200 milliGRAM(s) Oral daily  atorvastatin 80 milliGRAM(s) Oral at bedtime  cefuroxime  IVPB 1500 milliGRAM(s) IV Intermittent every 8 hours  chlorhexidine 4% Liquid 1 Application(s) Topical two times a day  digoxin     Tablet 0.125 milliGRAM(s) Oral daily  docusate sodium 100 milliGRAM(s) Oral three times a day  fentaNYL    Injectable 12.5 MICROGram(s) IV Push once  isosorbide   mononitrate ER Tablet (IMDUR) 60 milliGRAM(s) Oral daily  losartan 25 milliGRAM(s) Oral daily  magnesium oxide 400 milliGRAM(s) Oral two times a day with meals  milrinone Infusion 0.375 MICROgram(s)/kG/Min (7.121 mL/Hr) IV Continuous   pantoprazole Tablet 40 milliGRAM(s) Oral before breakfast  polyethylene glycol 3350 17 Gram(s) Oral daily  senna 2 Tablet(s) Oral at bedtime  sodium chloride 0.9% lock flush 3 milliLiter(s) IV Push every 8 hours    MEDICATIONS  (PRN):  ALBUTerol    90 MICROgram(s) HFA Inhaler 2 Puff(s) Inhalation every 6 hours PRN Shortness of Breath and/or Wheezing  bisacodyl Suppository 10 milliGRAM(s) Rectal daily PRN Constipation  dextrose 40% Gel 15 Gram(s) Oral once PRN Blood Glucose LESS THAN 70 milliGRAM(s)/deciLiter  glucagon  Injectable 1 milliGRAM(s) IntraMuscular once PRN Glucose <70 milliGRAM(s)/deciLiter    No Known Allergies      Physical Exam:   Constitutional: NAD, well-groomed, well-developed  HEENT: PERRLA, EOMI, no drainage or redness  Neck: supple,  No JVD  Respiratory: Breath Sounds equal & clear bilaterally to auscultation, no rales/rhonchi/wheezing, no accessory muscle use noted  Chest: pericardial drain in place  Cardiovascular: Regular rate, irregularly irregular rhythm, normal S1, S2; no murmurs or rub  Gastrointestinal: Soft, non-tender, non distended, + bowel sounds  Extremities: BLANK x 4, no peripheral edema, no cyanosis, no clubbing. Bilat groins C/D/I/soft/no hematoma.  Neurological: A+O x 3; speech clear and intact; no sensory, motor  deficits, normal reflexes  Skin: warm, dry, well perfused      Code Status: full code      time spent: 39 minutes (reviewing chart including medication, labs and imaging results, discussions with interdisciplinary team, discussing goals of care/advanced directives, counseling patient and/or family, non-inclusive of procedures)

## 2019-05-05 ENCOUNTER — TRANSCRIPTION ENCOUNTER (OUTPATIENT)
Age: 79
End: 2019-05-05

## 2019-05-05 VITALS — DIASTOLIC BLOOD PRESSURE: 60 MMHG | OXYGEN SATURATION: 94 % | SYSTOLIC BLOOD PRESSURE: 120 MMHG | HEART RATE: 95 BPM

## 2019-05-05 LAB
ANION GAP SERPL CALC-SCNC: 14 MMOL/L — SIGNIFICANT CHANGE UP (ref 5–17)
BUN SERPL-MCNC: 39 MG/DL — HIGH (ref 8–20)
CALCIUM SERPL-MCNC: 9 MG/DL — SIGNIFICANT CHANGE UP (ref 8.6–10.2)
CHLORIDE SERPL-SCNC: 94 MMOL/L — LOW (ref 98–107)
CO2 SERPL-SCNC: 26 MMOL/L — SIGNIFICANT CHANGE UP (ref 22–29)
CREAT SERPL-MCNC: 0.95 MG/DL — SIGNIFICANT CHANGE UP (ref 0.5–1.3)
GLUCOSE BLDC GLUCOMTR-MCNC: 100 MG/DL — HIGH (ref 70–99)
GLUCOSE BLDC GLUCOMTR-MCNC: 105 MG/DL — HIGH (ref 70–99)
GLUCOSE BLDC GLUCOMTR-MCNC: 105 MG/DL — HIGH (ref 70–99)
GLUCOSE BLDC GLUCOMTR-MCNC: 106 MG/DL — HIGH (ref 70–99)
GLUCOSE BLDC GLUCOMTR-MCNC: 111 MG/DL — HIGH (ref 70–99)
GLUCOSE BLDC GLUCOMTR-MCNC: 114 MG/DL — HIGH (ref 70–99)
GLUCOSE BLDC GLUCOMTR-MCNC: 118 MG/DL — HIGH (ref 70–99)
GLUCOSE BLDC GLUCOMTR-MCNC: 149 MG/DL — HIGH (ref 70–99)
GLUCOSE BLDC GLUCOMTR-MCNC: 152 MG/DL — HIGH (ref 70–99)
GLUCOSE BLDC GLUCOMTR-MCNC: 159 MG/DL — HIGH (ref 70–99)
GLUCOSE BLDC GLUCOMTR-MCNC: 162 MG/DL — HIGH (ref 70–99)
GLUCOSE BLDC GLUCOMTR-MCNC: 168 MG/DL — HIGH (ref 70–99)
GLUCOSE BLDC GLUCOMTR-MCNC: 223 MG/DL — HIGH (ref 70–99)
GLUCOSE BLDC GLUCOMTR-MCNC: 231 MG/DL — HIGH (ref 70–99)
GLUCOSE BLDC GLUCOMTR-MCNC: 72 MG/DL — SIGNIFICANT CHANGE UP (ref 70–99)
GLUCOSE SERPL-MCNC: 96 MG/DL — SIGNIFICANT CHANGE UP (ref 70–115)
HCT VFR BLD CALC: 34 % — LOW (ref 37–47)
HGB BLD-MCNC: 11.2 G/DL — LOW (ref 12–16)
MAGNESIUM SERPL-MCNC: 1.9 MG/DL — SIGNIFICANT CHANGE UP (ref 1.6–2.6)
MCHC RBC-ENTMCNC: 27.1 PG — SIGNIFICANT CHANGE UP (ref 27–31)
MCHC RBC-ENTMCNC: 32.9 G/DL — SIGNIFICANT CHANGE UP (ref 32–36)
MCV RBC AUTO: 82.1 FL — SIGNIFICANT CHANGE UP (ref 81–99)
PLATELET # BLD AUTO: 235 K/UL — SIGNIFICANT CHANGE UP (ref 150–400)
POTASSIUM SERPL-MCNC: 3.6 MMOL/L — SIGNIFICANT CHANGE UP (ref 3.5–5.3)
POTASSIUM SERPL-SCNC: 3.6 MMOL/L — SIGNIFICANT CHANGE UP (ref 3.5–5.3)
RBC # BLD: 4.14 M/UL — LOW (ref 4.4–5.2)
RBC # FLD: 16.5 % — HIGH (ref 11–15.6)
SODIUM SERPL-SCNC: 134 MMOL/L — LOW (ref 135–145)
WBC # BLD: 12.4 K/UL — HIGH (ref 4.8–10.8)
WBC # FLD AUTO: 12.4 K/UL — HIGH (ref 4.8–10.8)

## 2019-05-05 PROCEDURE — 71045 X-RAY EXAM CHEST 1 VIEW: CPT | Mod: 26

## 2019-05-05 RX ORDER — INSULIN LISPRO 100/ML
VIAL (ML) SUBCUTANEOUS
Qty: 0 | Refills: 0 | Status: DISCONTINUED | OUTPATIENT
Start: 2019-05-05 | End: 2019-05-05

## 2019-05-05 RX ORDER — POTASSIUM CHLORIDE 20 MEQ
40 PACKET (EA) ORAL ONCE
Qty: 0 | Refills: 0 | Status: COMPLETED | OUTPATIENT
Start: 2019-05-05 | End: 2019-05-05

## 2019-05-05 RX ORDER — INSULIN LISPRO 100/ML
2 VIAL (ML) SUBCUTANEOUS ONCE
Qty: 0 | Refills: 0 | Status: COMPLETED | OUTPATIENT
Start: 2019-05-05 | End: 2019-05-05

## 2019-05-05 RX ORDER — INSULIN HUMAN 100 [IU]/ML
1 INJECTION, SOLUTION SUBCUTANEOUS
Qty: 50 | Refills: 0 | Status: DISCONTINUED | OUTPATIENT
Start: 2019-05-05 | End: 2019-05-05

## 2019-05-05 RX ORDER — ACETAMINOPHEN 500 MG
650 TABLET ORAL ONCE
Qty: 0 | Refills: 0 | Status: COMPLETED | OUTPATIENT
Start: 2019-05-05 | End: 2019-05-05

## 2019-05-05 RX ORDER — POTASSIUM CHLORIDE 20 MEQ
20 PACKET (EA) ORAL DAILY
Qty: 0 | Refills: 0 | Status: DISCONTINUED | OUTPATIENT
Start: 2019-05-05 | End: 2019-05-05

## 2019-05-05 RX ORDER — DEXTROSE 50 % IN WATER 50 %
15 SYRINGE (ML) INTRAVENOUS ONCE
Qty: 0 | Refills: 0 | Status: DISCONTINUED | OUTPATIENT
Start: 2019-05-05 | End: 2019-05-05

## 2019-05-05 RX ORDER — INSULIN GLARGINE 100 [IU]/ML
10 INJECTION, SOLUTION SUBCUTANEOUS ONCE
Qty: 0 | Refills: 0 | Status: COMPLETED | OUTPATIENT
Start: 2019-05-05 | End: 2019-05-05

## 2019-05-05 RX ORDER — INSULIN GLARGINE 100 [IU]/ML
20 INJECTION, SOLUTION SUBCUTANEOUS AT BEDTIME
Qty: 0 | Refills: 0 | Status: DISCONTINUED | OUTPATIENT
Start: 2019-05-05 | End: 2019-05-05

## 2019-05-05 RX ORDER — METOPROLOL TARTRATE 50 MG
1 TABLET ORAL
Qty: 30 | Refills: 0
Start: 2019-05-05 | End: 2019-06-03

## 2019-05-05 RX ORDER — MAGNESIUM SULFATE 500 MG/ML
2 VIAL (ML) INJECTION ONCE
Qty: 0 | Refills: 0 | Status: COMPLETED | OUTPATIENT
Start: 2019-05-05 | End: 2019-05-05

## 2019-05-05 RX ORDER — POTASSIUM CHLORIDE 20 MEQ
1 PACKET (EA) ORAL
Qty: 30 | Refills: 0
Start: 2019-05-05

## 2019-05-05 RX ORDER — METFORMIN HYDROCHLORIDE 850 MG/1
1 TABLET ORAL
Qty: 0 | Refills: 0 | COMMUNITY

## 2019-05-05 RX ADMIN — ATORVASTATIN CALCIUM 80 MILLIGRAM(S): 80 TABLET, FILM COATED ORAL at 00:25

## 2019-05-05 RX ADMIN — AMIODARONE HYDROCHLORIDE 200 MILLIGRAM(S): 400 TABLET ORAL at 06:22

## 2019-05-05 RX ADMIN — SODIUM CHLORIDE 3 MILLILITER(S): 9 INJECTION INTRAMUSCULAR; INTRAVENOUS; SUBCUTANEOUS at 00:22

## 2019-05-05 RX ADMIN — ISOSORBIDE MONONITRATE 60 MILLIGRAM(S): 60 TABLET, EXTENDED RELEASE ORAL at 11:11

## 2019-05-05 RX ADMIN — Medication 50 GRAM(S): at 06:21

## 2019-05-05 RX ADMIN — SODIUM CHLORIDE 3 MILLILITER(S): 9 INJECTION INTRAMUSCULAR; INTRAVENOUS; SUBCUTANEOUS at 08:00

## 2019-05-05 RX ADMIN — Medication 40 MILLIGRAM(S): at 06:22

## 2019-05-05 RX ADMIN — LOSARTAN POTASSIUM 25 MILLIGRAM(S): 100 TABLET, FILM COATED ORAL at 06:24

## 2019-05-05 RX ADMIN — Medication 100 MILLIGRAM(S): at 11:11

## 2019-05-05 RX ADMIN — PANTOPRAZOLE SODIUM 40 MILLIGRAM(S): 20 TABLET, DELAYED RELEASE ORAL at 06:24

## 2019-05-05 RX ADMIN — MAGNESIUM OXIDE 400 MG ORAL TABLET 400 MILLIGRAM(S): 241.3 TABLET ORAL at 16:52

## 2019-05-05 RX ADMIN — Medication 0.12 MILLIGRAM(S): at 06:22

## 2019-05-05 RX ADMIN — SODIUM CHLORIDE 3 MILLILITER(S): 9 INJECTION INTRAMUSCULAR; INTRAVENOUS; SUBCUTANEOUS at 14:22

## 2019-05-05 RX ADMIN — Medication 40 MILLIEQUIVALENT(S): at 06:22

## 2019-05-05 RX ADMIN — Medication 2 UNIT(S): at 16:55

## 2019-05-05 RX ADMIN — INSULIN GLARGINE 10 UNIT(S): 100 INJECTION, SOLUTION SUBCUTANEOUS at 10:04

## 2019-05-05 RX ADMIN — Medication 20 MILLIEQUIVALENT(S): at 11:13

## 2019-05-05 RX ADMIN — MAGNESIUM OXIDE 400 MG ORAL TABLET 400 MILLIGRAM(S): 241.3 TABLET ORAL at 08:39

## 2019-05-05 NOTE — DISCHARGE NOTE PROVIDER - NSDCCPTREATMENT_GEN_ALL_CORE_FT
PRINCIPAL PROCEDURE  Procedure: Pericardiocentesis  Findings and Treatment: s/p attempted mitral clip

## 2019-05-05 NOTE — DISCHARGE NOTE PROVIDER - CARE PROVIDER_API CALL
Dakota Oshea)  Surgery; Thoracic and Cardiac Surgery  [address] [place] [address]  Phone: [phone]  Fax: [phone]  Follow Up Time:

## 2019-05-05 NOTE — PROGRESS NOTE ADULT - PROBLEM SELECTOR PLAN 2
continue primacor for inotropic support, @ 0.25 mcg/kg/min as perviously   Lasix 40 IV daily  restart ARB

## 2019-05-05 NOTE — PROGRESS NOTE ADULT - PROBLEM SELECTOR PROBLEM 6
57 F with PMH sjogrens syndrome with peripheral neuropathy, chronic inflammatory demyelinating polyneuritis, restless leg syndrome, dysautonomia, GI dysmotility, orthostatic hypotension (on midodrine), patient has prior multiple hospitalizations for unresponsiveness/ syncope as well as prior concern for self medication with narcotics/ benzos, currently presented s/p episode of unresponsiveness concern for syncope vs seizure, extensive hx of orthostatic hypotension and pt currently remains orthostatic positive.
Prophylactic measure
Coronary artery disease involving native coronary artery of native heart, angina presence unspecified
Diabetes
Prophylactic measure
Diabetes

## 2019-05-05 NOTE — PROGRESS NOTE ADULT - PROBLEM SELECTOR PLAN 5
Continue Lisinopril, losartan Metoprolol, and above meds  Monitor VS hourly  HOLD parameters
continue digoxin and amiodarone  no a/c until cleared by cardiology
continue premeal humalog and lantus  FS AC+HS with coverage
Continue Lisinopril, losartan Metoprolol, and above meds  Monitor VS hourly  HOLD parameters
continue digoxin and amiodarone  no a/c until cleared by cardiology

## 2019-05-05 NOTE — PROGRESS NOTE ADULT - PROVIDER SPECIALTY LIST ADULT
Anesthesia
CT Surgery
Endocrinology

## 2019-05-05 NOTE — DISCHARGE NOTE PROVIDER - HOSPITAL COURSE
77 yo F with longstanding history of CHF, multivalvular dx presents with exacerbation of CHF. (Acute on chronic diastolic heart failure)  Son states pt has been getting progressively more SOB over the 10 days prior to admission, of which the last 2 days were particularly worse.  Pt's functional status is minimal ambulation at home and has 2 pillow orthopnea.  Pt had a mitral clip scheduled with Dr. Oshea for next week, however it was moved up due to patients symptoms.  The procedure was aborted due to development of pericardial effusion during procedure requiring pericardiocentesis.  She has been aggressively diuresed during this admission.  Her pericardial tube has been removed and she is stable for D/C home.  She has been instructed to follow up closely with her primary doctor/cardiologist and with Dorie Jean to reschedule the procedure to address her mitral regurgitation which is the primary cause of her repeated heart failure exacurbations. 79 yo F with longstanding history of CHF, multivalvular dx presents with exacerbation of CHF. (Acute on chronic diastolic heart failure)  Son states pt has been getting progressively more SOB over the 10 days prior to admission, of which the last 2 days were particularly worse.  Pt's functional status is minimal ambulation at home and has 2 pillow orthopnea.  Pt had a mitral clip scheduled with Dr. Oshea for next week, however it was moved up due to patients symptoms.  The procedure was aborted due to development of pericardial effusion during procedure requiring pericardiocentesis.  She has been aggressively diuresed during this admission, prior to surgery.  Her pericardial tube has been removed and she is stable for D/C home.  She has been instructed to follow up closely with her primary doctor/cardiologist and with Dorie Jean to reschedule the procedure to address her mitral regurgitation which is the primary cause of her repeated heart failure exacurbations. 79 yo F with longstanding history of CHF, multivalvular dx presents with exacerbation of CHF. (Acute on chronic diastolic heart failure)  Son states pt has been getting progressively more SOB over the 10 days prior to admission, of which the last 2 days were particularly worse.  Pt's functional status is minimal ambulation at home and has 2 pillow orthopnea.  Pt had a mitral clip scheduled with Dr. Oshea for next week, however it was moved up due to patients symptoms.  The procedure was aborted due to development of pericardial effusion during procedure requiring pericardiocentesis.  She has been aggressively diuresed during this admission, prior to surgery.  Her pericardial tube has been removed and she is stable for D/C home.  She has been instructed to follow up closely with her primary doctor/cardiologist and with Dorie Jean to reschedule the procedure to address her mitral regurgitation which is the primary cause of her repeated heart failure exacurbations.  She has stage 2 CKD (reduced GFR) and had mild KARLA on CKD during her hospitalization secondary to heart failure and diuresis.

## 2019-05-05 NOTE — PROGRESS NOTE ADULT - PROBLEM SELECTOR PROBLEM 2
Acute on chronic diastolic heart failure
Acute on chronic diastolic heart failure
Atrial fibrillation
Acute on chronic diastolic heart failure
Atrial fibrillation

## 2019-05-05 NOTE — PROGRESS NOTE ADULT - SUBJECTIVE AND OBJECTIVE BOX
INTERVAL HPI/OVERNIGHT EVENTS:  follow up for dm     MEDICATIONS  (STANDING):  amiodarone    Tablet 200 milliGRAM(s) Oral daily  atorvastatin 80 milliGRAM(s) Oral at bedtime  dextrose 5%. 1000 milliLiter(s) (50 mL/Hr) IV Continuous <Continuous>  dextrose 50% Injectable 50 milliLiter(s) IV Push every 15 minutes  dextrose 50% Injectable 25 milliLiter(s) IV Push every 15 minutes  digoxin     Tablet 0.125 milliGRAM(s) Oral daily  docusate sodium 100 milliGRAM(s) Oral three times a day  furosemide    Tablet 40 milliGRAM(s) Oral two times a day  isosorbide   mononitrate ER Tablet (IMDUR) 60 milliGRAM(s) Oral daily  losartan 25 milliGRAM(s) Oral daily  magnesium oxide 400 milliGRAM(s) Oral two times a day with meals  pantoprazole    Tablet 40 milliGRAM(s) Oral before breakfast  polyethylene glycol 3350 17 Gram(s) Oral daily  potassium chloride    Tablet ER 20 milliEquivalent(s) Oral daily  senna 2 Tablet(s) Oral at bedtime  sodium chloride 0.9% lock flush 3 milliLiter(s) IV Push every 8 hours    MEDICATIONS  (PRN):  ALBUTerol    90 MICROgram(s) HFA Inhaler 2 Puff(s) Inhalation every 6 hours PRN Shortness of Breath and/or Wheezing  bisacodyl Suppository 10 milliGRAM(s) Rectal daily PRN Constipation  dextrose 40% Gel 15 Gram(s) Oral once PRN Blood Glucose LESS THAN 70 milliGRAM(s)/deciLiter  glucagon  Injectable 1 milliGRAM(s) IntraMuscular once PRN Glucose <70 milliGRAM(s)/deciLiter    Allergies  No Known Allergies    Review of systems: no cp, no n/vd/ c    Vital Signs Last 24 Hrs  T(C): 36.6 (05 May 2019 11:00), Max: 37.5 (04 May 2019 19:54)  T(F): 97.8 (05 May 2019 11:00), Max: 99.5 (04 May 2019 19:54)  HR: 79 (05 May 2019 12:00) (77 - 96)  BP: 120/56 (05 May 2019 12:00) (120/56 - 150/68)  BP(mean): 81 (05 May 2019 12:00) (81 - 98)  RR: 23 (05 May 2019 12:00) (13 - 28)  SpO2: 91% (05 May 2019 12:00) (91% - 100%)    PHYSICAL EXAM:  Constitutional: NAD, well-groomed, well-developed  HEENT: PERRLA, EOMI  Neck: No LAD, No JVD,  no thyroid enlargement  Respiratory: CTAB  Cardiovascular: S1 and S2, RRR, no M/G/R  Gastrointestinal: BS+, soft, no organomeglag or mass  Extremities: No peripheral edema, no pedal lesions  Vascular: 2+ peripheral pulses  Neurological: A/O x 3, no focal deficits  Psychiatric: Normal mood, normal affect  Musculoskeletal: 5/5 strength b/l upper and lower extremities  Skin: No rashes    LABS:                        11.2   12.4  )-----------( 235      ( 05 May 2019 04:02 )             34.0     05-05    134<L>  |  94<L>  |  39.0<H>  ----------------------------<  96  3.6   |  26.0  |  0.95    Ca    9.0      05 May 2019 04:02  Mg     1.9     05-05    TPro  6.4<L>  /  Alb  3.8  /  TBili  1.3  /  DBili  x   /  AST  32<H>  /  ALT  13  /  AlkPhos  72  05-03

## 2019-05-05 NOTE — PROGRESS NOTE ADULT - PROBLEM SELECTOR PROBLEM 7
Coronary artery disease involving native coronary artery of native heart, angina presence unspecified
Coronary artery disease involving native coronary artery of native heart, angina presence unspecified

## 2019-05-05 NOTE — PROGRESS NOTE ADULT - PROBLEM SELECTOR PROBLEM 1
Non-rheumatic mitral regurgitation
Non-rheumatic mitral regurgitation
CHF (congestive heart failure)
Non-rheumatic mitral regurgitation
CHF (congestive heart failure)

## 2019-05-05 NOTE — PROGRESS NOTE ADULT - SUBJECTIVE AND OBJECTIVE BOX
Subjective:  79yo Female s/p Aborted Mitral Clip with Pericardial Effusion and drainage, resting comfortably, no c/o pain, asking for water.    HPI:    77 yo F with longstanding history of CHF, multivalvular dx presents with exacerbation of CHF.  Son states pt has been getting progressively more SOB over the last 10 days, of which the last 2 days were particularly worse.  Pt's functional status is minimal ambulation at home and has 2 pillow orthopnea.  Pt has a mitral clip scheduled with Dr. Oshea for next week, although son states insurance will not cover surgery and it may be cancelled.  Pt states (through translation with son) that her abdomin has increased in size, and is becoming painful, along with the swelling in her legs getting progressively worse over the last week. (2019 01:50)        Past Medical History:  Heart failure  H/o or current diagnosis of HF- ACEI/ARB contraindication unknown  Atrial fibrillation  Diabetes  Hypertension  Stented coronary artery  Coronary stent restenosis, sequela  Acute pericardial effusion  Acute on chronic combined systolic and diastolic CHF, NYHA class 4  Severe mitral regurgitation  Chronic combined systolic and diastolic CHF, NYHA class 4  CHF (congestive heart failure)  Tamponade  Coronary artery disease involving native coronary artery of native heart, angina presence unspecified  Chronic atrial fibrillation  Acute on chronic diastolic heart failure  Non-rheumatic mitral regurgitation  Diabetes  Induced  by one or more intra-amniotic injections including hospital admission and visits, delivery of fetus and secundines with failed intra-amniotic injection  History of appendectomy        ALBUTerol    90 MICROgram(s) HFA Inhaler 2 Puff(s) Inhalation every 6 hours PRN  amiodarone    Tablet 200 milliGRAM(s) Oral daily  atorvastatin 80 milliGRAM(s) Oral at bedtime  bisacodyl Suppository 10 milliGRAM(s) Rectal daily PRN  dextrose 40% Gel 15 Gram(s) Oral once PRN  dextrose 5%. 1000 milliLiter(s) IV Continuous <Continuous>  dextrose 50% Injectable 50 milliLiter(s) IV Push every 15 minutes  dextrose 50% Injectable 25 milliLiter(s) IV Push every 15 minutes  digoxin     Tablet 0.125 milliGRAM(s) Oral daily  docusate sodium 100 milliGRAM(s) Oral three times a day  furosemide    Tablet 40 milliGRAM(s) Oral two times a day  glucagon  Injectable 1 milliGRAM(s) IntraMuscular once PRN  insulin regular Infusion 1 Unit(s)/Hr IV Continuous <Continuous>  isosorbide   mononitrate ER Tablet (IMDUR) 60 milliGRAM(s) Oral daily  losartan 25 milliGRAM(s) Oral daily  magnesium oxide 400 milliGRAM(s) Oral two times a day with meals  milrinone Infusion 0.25 MICROgram(s)/kG/Min IV Continuous <Continuous>  oxyCODONE    5 mG/acetaminophen 325 mG 2 Tablet(s) Oral every 4 hours PRN  oxyCODONE    5 mG/acetaminophen 325 mG 1 Tablet(s) Oral every 4 hours PRN  pantoprazole    Tablet 40 milliGRAM(s) Oral before breakfast  polyethylene glycol 3350 17 Gram(s) Oral daily  senna 2 Tablet(s) Oral at bedtime  sodium chloride 0.9% lock flush 3 milliLiter(s) IV Push every 8 hours  MEDICATIONS  (PRN):  ALBUTerol    90 MICROgram(s) HFA Inhaler 2 Puff(s) Inhalation every 6 hours PRN Shortness of Breath and/or Wheezing  bisacodyl Suppository 10 milliGRAM(s) Rectal daily PRN Constipation  dextrose 40% Gel 15 Gram(s) Oral once PRN Blood Glucose LESS THAN 70 milliGRAM(s)/deciLiter  glucagon  Injectable 1 milliGRAM(s) IntraMuscular once PRN Glucose <70 milliGRAM(s)/deciLiter  oxyCODONE    5 mG/acetaminophen 325 mG 2 Tablet(s) Oral every 4 hours PRN Severe Pain (7 - 10)  oxyCODONE    5 mG/acetaminophen 325 mG 1 Tablet(s) Oral every 4 hours PRN Mild Pain (1 - 3)      Daily     Daily         ABG - ( 03 May 2019 16:17 )  pH, Arterial: 7.49  pH, Blood: x     /  pCO2: 42    /  pO2: 425   / HCO3: 32    / Base Excess: 7.7   /  SaO2: x             < from: TTE Echo Limited or F/U (19 @ 10:40) >  Summary:   1. Technically fair study.   2. Limited study for evaluation of pericardial effusion. There is   trivial pericardial effusion adjacent to right atrium.    < end of copied text >                            12.1   13.3  )-----------( 264      ( 04 May 2019 02:37 )             34.4   05-04    135  |  92<L>  |  28.0<H>  ----------------------------<  344<H>  4.0   |  25.0  |  0.99    Ca    10.0      04 May 2019 02:37  Mg     2.3     -    TPro  6.4<L>  /  Alb  3.8  /  TBili  1.3  /  DBili  x   /  AST  32<H>  /  ALT  13  /  AlkPhos  72  05-03    CARDIAC MARKERS ( 04 May 2019 02:38 )  x     / 0.06 ng/mL / 430 U/L / x     / 3.5 ng/mL  CARDIAC MARKERS ( 03 May 2019 16:44 )  x     / 0.02 ng/mL / 664 U/L / x     / 2.6 ng/mL          Objective:  T(C): 37.5 (19 @ 19:54), Max: 37.5 (19 @ 19:54)  HR: 85 (19 @ 01:00) (75 - 96)  BP: 150/68 (19 @ 16:00) (129/59 - 150/68)  RR: 13 (19 @ 01:00) (11 - 30)  SpO2: 100% (19 @ 01:00) (94% - 100%)  Wt(kg): --CAPILLARY BLOOD GLUCOSE      POCT Blood Glucose.: 111 mg/dL (05 May 2019 02:45)  POCT Blood Glucose.: 118 mg/dL (05 May 2019 01:05)  POCT Blood Glucose.: 168 mg/dL (04 May 2019 23:52)  POCT Blood Glucose.: 185 mg/dL (04 May 2019 22:43)  POCT Blood Glucose.: 214 mg/dL (04 May 2019 21:36)  POCT Blood Glucose.: 231 mg/dL (04 May 2019 19:13)  POCT Blood Glucose.: 317 mg/dL (04 May 2019 16:53)  POCT Blood Glucose.: 373 mg/dL (04 May 2019 12:22)  POCT Blood Glucose.: 427 mg/dL (04 May 2019 12:20)  POCT Blood Glucose.: 280 mg/dL (04 May 2019 08:47)  I&O's Summary    03 May 2019 07:01  -  04 May 2019 07:00  --------------------------------------------------------  IN: 681.9 mL / OUT: 1455 mL / NET: -773.1 mL    04 May 2019 07:01  -  05 May 2019 02:52  --------------------------------------------------------  IN: 1189.4 mL / OUT: 665 mL / NET: 524.4 mL        Lines:  L Brachial rolan Batista:  No    Physical Exam:  Neuro: A0X3, non focal  Pulm: CtA b/l Unlabored  CV: Irreg  Abd: soft nt/nd  Ext: warm, no edema, well perfused

## 2019-05-05 NOTE — PROGRESS NOTE ADULT - REASON FOR ADMISSION
"I'm feeling hard to breath, legs swollen"
"I'm feeling hard to breath, legs swollen"
SOB, LE edema
"I'm feeling hard to breath, legs swollen"

## 2019-05-05 NOTE — PROGRESS NOTE ADULT - PROBLEM SELECTOR PLAN 4
Hold Methimazole  -TSH 6.65, T3: 74  -Afib with slow ventricular response (HR 50's)  Endocrine consult appreciated
continue lopressor, digoxin, amiodarone  hold eliquis for mitral clip
endocrine consult appreciated  methimazole d/c'd 5/1  repeat TFTs on 5/3 reveal TSH WNL
Hold Methimazole  -TSH 6.65, T3: 74  -Afib with slow ventricular response (HR 50's)  Endocrine consult appreciated
endocrine consult appreciated  methimazole d/c'd 5/1  repeat TFTs on 5/3 reveal TSH WNL

## 2019-05-05 NOTE — DISCHARGE NOTE NURSING/CASE MANAGEMENT/SOCIAL WORK - NSDCPEPT PROEDHF_GEN_ALL_CORE
Activities as tolerated/Monitor weight daily/Call primary care provider for follow up after discharge/Low salt diet/Report signs and symptoms to primary care provider

## 2019-05-05 NOTE — DISCHARGE NOTE PROVIDER - NSDCFUSCHEDAPPT_GEN_ALL_CORE_FT
[facility] ; 05/08/2019 ; [facility] [place] ; 05/08/2019 ; [facility] [place] ; 05/08/2019 ; [facility] [place] ; 05/08/2019 ; [facility] [place] ; 05/08/2019 ; [facility]

## 2019-05-05 NOTE — PROGRESS NOTE ADULT - ASSESSMENT
77 yo F with longstanding history of CHF, multivalvular dx presents with exacerbation of CHF.  Son states pt has been getting progressively more SOB over the last 10 days, of which the last 2 days were particularly worse.  Pt's functional status is minimal ambulation at home and has 2 pillow orthopnea.  Pt has a mitral clip scheduled with Dr. Oshea for next week, although son states insurance will not cover surgery and it may be cancelled.  Pt states (through translation with son) that her abdomin has increased in size, and is becoming painful, along with the swelling in her legs getting progressively worse over the last week.
78 year old female with PMH chronic combined systolic and diastolic HF (though 4/30/19 echo with EF 50-55%), moderate mitral regurgitation, moderate to severe TR, severe pulm HTN, DM (A1c 8.3), HTN. 4/29 admitted with acute on chronic diastolic HF.
78 year old female with PMH chronic combined systolic and diastolic HF (though 4/30/19 echo with EF 50-55%), moderate mitral regurgitation, moderate to severe TR, severe pulm HTN, DM (A1c 8.3), HTN. 4/29 admitted with acute on chronic diastolic HF. 5/3 failed/aborted mitral clip after right atrial perforation resulting in need for emergent pericardialcentesis
Diabetes.  Plan: Continue Lantus 20 uts q HS, Metformin 500 mg BID  Monitor glucose q 4 hrs  increase meal insulin by 2 units      H/o HyperT  pt does not remember. She takes MMI for 5 yr.   stop for now since TSh oversuppressed.   check TSI in am   check TFst in 3 days and decide from there next course of action.       CHF (congestive heart failure).  Plan: Continue ASA, statin, digoxin, Imdur, metoprolol  Lasix 40 mg IV BID stat  Monitor lytes, labs, CXR, EDG.         Hypertension.  Plan: Continue Lisinopril, Metoprolol, and above meds  Monitor VS hourly.       Atrial fibrillation.  Plan: Continue Amio 200 mg daily  Monitor lytes closely with aggressive diuresis.
Diabetes.  Plan: Continue Lantus 20 uts q HS, Metformin 500 mg BID  Monitor glucose q 4 hrs  increase meal insulin by 2 units      H/o HyperT  pt does not remember. She takes MMI for 5 yr.   stop for now since TSh oversuppressed.   check TSI in am   check TFst in 3 days and decide from there next course of action.     CHF (congestive heart failure).  Plan: Continue ASA, statin, digoxin, Imdur, metoprolol  Lasix 40 mg IV BID stat  Monitor lytes, labs, CXR, EDG.         Hypertension.  Plan: Continue Lisinopril, Metoprolol, and above meds  Monitor VS hourly.       Atrial fibrillation.  Plan: Continue Amio 200 mg daily  Monitor lytes closely with aggressive diuresis.
Diabetes.  Plan: Continue Lantus 20 uts q HS, Metformin 500 mg BID  she will need meal insulin if eating better   Monitor glucose q 4 hrs  increase meal insulin by 2 units    H/o HyperT  pt does not remember. She takes MMI for 5 yr.   stop for now since TSh oversuppressed. rexcehck tfst in 2 weeks and decide about treatment.   check TSI     CHF (congestive heart failure).  Plan: Continue ASA, statin, digoxin, Imdur, metoprolol  Lasix 40 mg IV BID stat  Monitor lytes, labs, CXR, EDG.         Hypertension.  Plan: Continue Lisinopril, Metoprolol, and above meds  Monitor VS hourly.       Atrial fibrillation.  Plan: Continue Amio 200 mg daily  Monitor lytes closely with aggressive diuresis.
77 yo F with longstanding history of CHF, multivalvular dx presents with exacerbation of CHF.  Son states pt has been getting progressively more SOB over the last 10 days, of which the last 2 days were particularly worse.  Pt's functional status is minimal ambulation at home and has 2 pillow orthopnea.  Pt has a mitral clip scheduled with Dr. Oshea for next week, however insurance will not cover surgery and possibly cancelled. Currently planned to optimize pt medically.
78 year old female with PMH chronic combined systolic and diastolic HF (though 4/30/19 echo with EF 50-55%), moderate mitral regurgitation, moderate to severe TR, severe pulm HTN, DM (A1c 8.3), HTN. 4/29 admitted with acute on chronic diastolic HF. 5/3 failed/aborted mitral clip after right atrial perforation resulting in need for emergent pericardiocentesis.
77 yo F with longstanding history of CHF, multivalvular dx presents with exacerbation of CHF.  Son states pt has been getting progressively more SOB over the last 10 days, of which the last 2 days were particularly worse.  Pt's functional status is minimal ambulation at home and has 2 pillow orthopnea.  Pt has a mitral clip scheduled with Dr. Oshea for next week, however insurance will not cover surgery and possibly cancelled. Currently planned to optimize pt medically.   Insurance contacted with pt hospitalized for reconsideration.

## 2019-05-05 NOTE — DISCHARGE NOTE PROVIDER - NSDCCPCAREPLAN_GEN_ALL_CORE_FT
PRINCIPAL DISCHARGE DIAGNOSIS  Diagnosis: CHF (congestive heart failure)  Assessment and Plan of Treatment: Acute on chronic diastolic heart failure secondary to mitral valve regurgitation      SECONDARY DISCHARGE DIAGNOSES  Diagnosis: Hypertension  Assessment and Plan of Treatment:     Diagnosis: Diabetes mellitus  Assessment and Plan of Treatment: with long term use of insulin, without complications

## 2019-05-05 NOTE — PROGRESS NOTE ADULT - PROBLEM SELECTOR PLAN 6
OOB-Chair, Ambulate with PT assist  Deep breathing and IS  GI prophylaxis  DVT prophylaxis
continue aspirin, statin and imdur
continue premeal humalog and lantus  FS AC+HS with coverage
OOB-Chair, PT walk  Deep breathing and IS  GI prophylaxis  DVT prophylaxis
Insulin gtt currently off, lantus not yet restarted, glucose 113  FS AC+HS with coverage

## 2019-05-05 NOTE — PROGRESS NOTE ADULT - PROBLEM SELECTOR PLAN 3
s/p pericardiocentesis  no effusion on post echo  Drain d/c'd yesterday  confirm with Dr Oshea and cardiology before restarting aspirin or other anticoagulation

## 2019-05-05 NOTE — DISCHARGE NOTE NURSING/CASE MANAGEMENT/SOCIAL WORK - NSDCDPATPORTLINK_GEN_ALL_CORE
You can access the [facility] Patient Portal, offered by [facility], by registering with the following website: [email or web address]

## 2019-05-05 NOTE — PROGRESS NOTE ADULT - PROBLEM SELECTOR PLAN 1
continue with medical management including diuresis with Lasix 40 mg IV daily  lopressor for rate control once off primacor  Plan for return in 3-4 weeks for Mitral clip

## 2019-05-08 ENCOUNTER — APPOINTMENT (OUTPATIENT)
Dept: CARDIOTHORACIC SURGERY | Facility: HOSPITAL | Age: 79
End: 2019-05-08

## 2019-05-12 ENCOUNTER — OTHER (OUTPATIENT)
Age: 79
End: 2019-05-12

## 2019-07-03 ENCOUNTER — APPOINTMENT (OUTPATIENT)
Dept: CARDIOTHORACIC SURGERY | Facility: CLINIC | Age: 79
End: 2019-07-03
Payer: MEDICAID

## 2019-07-03 VITALS
WEIGHT: 156 LBS | RESPIRATION RATE: 18 BRPM | HEART RATE: 81 BPM | HEIGHT: 60 IN | BODY MASS INDEX: 30.63 KG/M2 | DIASTOLIC BLOOD PRESSURE: 72 MMHG | SYSTOLIC BLOOD PRESSURE: 136 MMHG | OXYGEN SATURATION: 99 %

## 2019-07-03 PROCEDURE — 99024 POSTOP FOLLOW-UP VISIT: CPT

## 2019-07-03 NOTE — ASSESSMENT
[FreeTextEntry1] : Ms. Bourgeois is a 79 year old female with severe mitral valve regurgitation and NYHA class II heart fialure,  who is a candidate for mitral clipping. I discussed the risks, benefits, and alternatives of surgery with the son and patient. She will discuss with the rest of the family and will let us know if she would like to proceed. \par \par \par I thank you for the opportunity to participate in the care of your patients. Please do not hesitate to contact me should you have any questions.\par

## 2019-07-03 NOTE — REVIEW OF SYSTEMS
[Feeling Poorly] : feeling poorly [SOB on Exertion] : shortness of breath during exertion [Feeling Tired] : feeling tired [Negative] : Integumentary [de-identified] : forgetfulness (short term memory)

## 2019-07-03 NOTE — PHYSICAL EXAM
[Neck Appearance] : the appearance of the neck was normal [Sclera] : the sclera and conjunctiva were normal [Exaggerated Use Of Accessory Muscles For Inspiration] : no accessory muscle use [Apical Impulse] : the apical impulse was normal [FreeTextEntry1] : III/VI systolic murmru at the apex [Examination Of The Chest] : the chest was normal in appearance [Heart Sounds] : normal S1 and S2 [Arterial Pulses Carotid] : carotid pulses were normal with no bruits [Arterial Pulses Femoral] : femoral pulses were normal without bruits [Bowel Sounds] : normal bowel sounds [Abdomen Soft] : soft [Cervical Lymph Nodes Enlarged Posterior Bilaterally] : posterior cervical [Supraclavicular Lymph Nodes Enlarged Bilaterally] : supraclavicular [Abnormal Walk] : normal gait [Skin Color & Pigmentation] : normal skin color and pigmentation [Nail Clubbing] : no clubbing  or cyanosis of the fingernails [Cranial Nerves] : cranial nerves 2-12 were intact [] : no rash [Sensation] : the sensory exam was normal to light touch and pinprick [Oriented To Time, Place, And Person] : oriented to person, place, and time [Affect] : the affect was normal

## 2019-07-03 NOTE — HISTORY OF PRESENT ILLNESS
[FreeTextEntry1] : This is a 78 year old female who presents today for evaluation of her mitral valve regurgitation which was noted to be severe on echo with EF estimated at 54% on 12/19/18.  \par \par She was here in April for Mitral Clip Surgery which she developed a pericardial effusion, during transeptal procedure. the effusion was drained and the clip was not performed. \par \par She now presents with her son with complaints of fatigue, shortness of breath, and forgetfulness/ short term memory loss (according to her son).

## 2019-07-03 NOTE — CONSULT LETTER
[Courtesy Letter:] : I had the pleasure of seeing your patient, [unfilled], in my office today. [Consult Closing:] : Thank you very much for allowing me to participate in the care of this patient.  If you have any questions, please do not hesitate to contact me. [Dear  ___] : Dear  [unfilled], [Sincerely,] : Sincerely, [FreeTextEntry2] : Asael Rodriguez MD [FreeTextEntry3] : Dakota Oshea MD\par  of Cardiothoracic Surgery\par [facility]\par [address] [place] [address] \[place] [address]\par [phone]\par

## 2019-07-31 ENCOUNTER — INPATIENT (INPATIENT)
Facility: HOSPITAL | Age: 79
LOS: 2 days | Discharge: HOME | DRG: 287 | End: 2019-08-03
Attending: THORACIC SURGERY (CARDIOTHORACIC VASCULAR SURGERY) | Admitting: INTERNAL MEDICINE
Payer: MEDICARE

## 2019-07-31 VITALS
RESPIRATION RATE: 20 BRPM | WEIGHT: 149.91 LBS | TEMPERATURE: 98 F | OXYGEN SATURATION: 97 % | HEIGHT: 59 IN | HEART RATE: 93 BPM | SYSTOLIC BLOOD PRESSURE: 114 MMHG | DIASTOLIC BLOOD PRESSURE: 60 MMHG

## 2019-07-31 DIAGNOSIS — Z90.49 ACQUIRED ABSENCE OF OTHER SPECIFIED PARTS OF DIGESTIVE TRACT: Chronic | ICD-10-CM

## 2019-07-31 DIAGNOSIS — R07.9 CHEST PAIN, UNSPECIFIED: ICD-10-CM

## 2019-07-31 LAB
ALBUMIN SERPL ELPH-MCNC: 4.4 G/DL — SIGNIFICANT CHANGE UP (ref 3.3–5.2)
ALP SERPL-CCNC: 108 U/L — SIGNIFICANT CHANGE UP (ref 40–120)
ALT FLD-CCNC: 19 U/L — SIGNIFICANT CHANGE UP
ANION GAP SERPL CALC-SCNC: 12 MMOL/L — SIGNIFICANT CHANGE UP (ref 5–17)
APTT BLD: 36.5 SEC — HIGH (ref 27.5–36.3)
AST SERPL-CCNC: 23 U/L — SIGNIFICANT CHANGE UP
BASOPHILS # BLD AUTO: 0.07 K/UL — SIGNIFICANT CHANGE UP (ref 0–0.2)
BASOPHILS NFR BLD AUTO: 1 % — SIGNIFICANT CHANGE UP (ref 0–2)
BILIRUB SERPL-MCNC: 0.4 MG/DL — SIGNIFICANT CHANGE UP (ref 0.4–2)
BUN SERPL-MCNC: 24 MG/DL — HIGH (ref 8–20)
CALCIUM SERPL-MCNC: 9.4 MG/DL — SIGNIFICANT CHANGE UP (ref 8.6–10.2)
CHLORIDE SERPL-SCNC: 93 MMOL/L — LOW (ref 98–107)
CK SERPL-CCNC: 55 U/L — SIGNIFICANT CHANGE UP (ref 25–170)
CO2 SERPL-SCNC: 27 MMOL/L — SIGNIFICANT CHANGE UP (ref 22–29)
CREAT SERPL-MCNC: 0.82 MG/DL — SIGNIFICANT CHANGE UP (ref 0.5–1.3)
EOSINOPHIL # BLD AUTO: 0.08 K/UL — SIGNIFICANT CHANGE UP (ref 0–0.5)
EOSINOPHIL NFR BLD AUTO: 1.2 % — SIGNIFICANT CHANGE UP (ref 0–6)
GLUCOSE BLDC GLUCOMTR-MCNC: 120 MG/DL — HIGH (ref 70–99)
GLUCOSE BLDC GLUCOMTR-MCNC: 132 MG/DL — HIGH (ref 70–99)
GLUCOSE BLDC GLUCOMTR-MCNC: 168 MG/DL — HIGH (ref 70–99)
GLUCOSE BLDC GLUCOMTR-MCNC: 174 MG/DL — HIGH (ref 70–99)
GLUCOSE SERPL-MCNC: 135 MG/DL — HIGH (ref 70–115)
HCT VFR BLD CALC: 31.9 % — LOW (ref 34.5–45)
HGB BLD-MCNC: 10.6 G/DL — LOW (ref 11.5–15.5)
IMM GRANULOCYTES NFR BLD AUTO: 0.4 % — SIGNIFICANT CHANGE UP (ref 0–1.5)
INR BLD: 1.68 RATIO — HIGH (ref 0.88–1.16)
LIDOCAIN IGE QN: 44 U/L — SIGNIFICANT CHANGE UP (ref 22–51)
LYMPHOCYTES # BLD AUTO: 2.46 K/UL — SIGNIFICANT CHANGE UP (ref 1–3.3)
LYMPHOCYTES # BLD AUTO: 36.7 % — SIGNIFICANT CHANGE UP (ref 13–44)
MCHC RBC-ENTMCNC: 29.1 PG — SIGNIFICANT CHANGE UP (ref 27–34)
MCHC RBC-ENTMCNC: 33.2 GM/DL — SIGNIFICANT CHANGE UP (ref 32–36)
MCV RBC AUTO: 87.6 FL — SIGNIFICANT CHANGE UP (ref 80–100)
MONOCYTES # BLD AUTO: 0.6 K/UL — SIGNIFICANT CHANGE UP (ref 0–0.9)
MONOCYTES NFR BLD AUTO: 8.9 % — SIGNIFICANT CHANGE UP (ref 2–14)
NEUTROPHILS # BLD AUTO: 3.47 K/UL — SIGNIFICANT CHANGE UP (ref 1.8–7.4)
NEUTROPHILS NFR BLD AUTO: 51.8 % — SIGNIFICANT CHANGE UP (ref 43–77)
NT-PROBNP SERPL-SCNC: 1720 PG/ML — HIGH (ref 0–300)
PLATELET # BLD AUTO: 210 K/UL — SIGNIFICANT CHANGE UP (ref 150–400)
POTASSIUM SERPL-MCNC: 4.4 MMOL/L — SIGNIFICANT CHANGE UP (ref 3.5–5.3)
POTASSIUM SERPL-SCNC: 4.4 MMOL/L — SIGNIFICANT CHANGE UP (ref 3.5–5.3)
PROT SERPL-MCNC: 7.2 G/DL — SIGNIFICANT CHANGE UP (ref 6.6–8.7)
PROTHROM AB SERPL-ACNC: 19.6 SEC — HIGH (ref 10–12.9)
RBC # BLD: 3.64 M/UL — LOW (ref 3.8–5.2)
RBC # FLD: 18.7 % — HIGH (ref 10.3–14.5)
SODIUM SERPL-SCNC: 132 MMOL/L — LOW (ref 135–145)
TROPONIN T SERPL-MCNC: <0.01 NG/ML — SIGNIFICANT CHANGE UP (ref 0–0.06)
WBC # BLD: 6.71 K/UL — SIGNIFICANT CHANGE UP (ref 3.8–10.5)
WBC # FLD AUTO: 6.71 K/UL — SIGNIFICANT CHANGE UP (ref 3.8–10.5)

## 2019-07-31 PROCEDURE — 93306 TTE W/DOPPLER COMPLETE: CPT | Mod: 26

## 2019-07-31 PROCEDURE — 71045 X-RAY EXAM CHEST 1 VIEW: CPT | Mod: 26

## 2019-07-31 PROCEDURE — 99285 EMERGENCY DEPT VISIT HI MDM: CPT

## 2019-07-31 PROCEDURE — 99223 1ST HOSP IP/OBS HIGH 75: CPT

## 2019-07-31 PROCEDURE — 93010 ELECTROCARDIOGRAM REPORT: CPT | Mod: 76

## 2019-07-31 PROCEDURE — 73030 X-RAY EXAM OF SHOULDER: CPT | Mod: 26,LT

## 2019-07-31 RX ORDER — ATORVASTATIN CALCIUM 80 MG/1
80 TABLET, FILM COATED ORAL AT BEDTIME
Refills: 0 | Status: DISCONTINUED | OUTPATIENT
Start: 2019-07-31 | End: 2019-08-03

## 2019-07-31 RX ORDER — DEXTROSE 50 % IN WATER 50 %
12.5 SYRINGE (ML) INTRAVENOUS ONCE
Refills: 0 | Status: DISCONTINUED | OUTPATIENT
Start: 2019-07-31 | End: 2019-08-03

## 2019-07-31 RX ORDER — ACETAMINOPHEN 500 MG
650 TABLET ORAL EVERY 6 HOURS
Refills: 0 | Status: DISCONTINUED | OUTPATIENT
Start: 2019-07-31 | End: 2019-08-03

## 2019-07-31 RX ORDER — GLUCAGON INJECTION, SOLUTION 0.5 MG/.1ML
1 INJECTION, SOLUTION SUBCUTANEOUS ONCE
Refills: 0 | Status: DISCONTINUED | OUTPATIENT
Start: 2019-07-31 | End: 2019-08-03

## 2019-07-31 RX ORDER — ASPIRIN/CALCIUM CARB/MAGNESIUM 324 MG
325 TABLET ORAL ONCE
Refills: 0 | Status: COMPLETED | OUTPATIENT
Start: 2019-07-31 | End: 2019-07-31

## 2019-07-31 RX ORDER — FUROSEMIDE 40 MG
20 TABLET ORAL
Refills: 0 | Status: DISCONTINUED | OUTPATIENT
Start: 2019-07-31 | End: 2019-08-03

## 2019-07-31 RX ORDER — DEXTROSE 50 % IN WATER 50 %
25 SYRINGE (ML) INTRAVENOUS ONCE
Refills: 0 | Status: DISCONTINUED | OUTPATIENT
Start: 2019-07-31 | End: 2019-08-03

## 2019-07-31 RX ORDER — ASPIRIN/CALCIUM CARB/MAGNESIUM 324 MG
81 TABLET ORAL DAILY
Refills: 0 | Status: DISCONTINUED | OUTPATIENT
Start: 2019-08-01 | End: 2019-08-03

## 2019-07-31 RX ORDER — RANOLAZINE 500 MG/1
500 TABLET, FILM COATED, EXTENDED RELEASE ORAL
Refills: 0 | Status: DISCONTINUED | OUTPATIENT
Start: 2019-07-31 | End: 2019-08-03

## 2019-07-31 RX ORDER — APIXABAN 2.5 MG/1
5 TABLET, FILM COATED ORAL EVERY 12 HOURS
Refills: 0 | Status: DISCONTINUED | OUTPATIENT
Start: 2019-07-31 | End: 2019-07-31

## 2019-07-31 RX ORDER — ISOSORBIDE MONONITRATE 60 MG/1
60 TABLET, EXTENDED RELEASE ORAL DAILY
Refills: 0 | Status: DISCONTINUED | OUTPATIENT
Start: 2019-07-31 | End: 2019-08-03

## 2019-07-31 RX ORDER — NITROGLYCERIN 6.5 MG
0.5 CAPSULE, EXTENDED RELEASE ORAL EVERY 6 HOURS
Refills: 0 | Status: COMPLETED | OUTPATIENT
Start: 2019-07-31 | End: 2019-07-31

## 2019-07-31 RX ORDER — SODIUM CHLORIDE 9 MG/ML
1000 INJECTION, SOLUTION INTRAVENOUS
Refills: 0 | Status: DISCONTINUED | OUTPATIENT
Start: 2019-07-31 | End: 2019-08-03

## 2019-07-31 RX ORDER — INSULIN LISPRO 100/ML
VIAL (ML) SUBCUTANEOUS
Refills: 0 | Status: DISCONTINUED | OUTPATIENT
Start: 2019-07-31 | End: 2019-08-03

## 2019-07-31 RX ORDER — DEXTROSE 50 % IN WATER 50 %
15 SYRINGE (ML) INTRAVENOUS ONCE
Refills: 0 | Status: DISCONTINUED | OUTPATIENT
Start: 2019-07-31 | End: 2019-08-03

## 2019-07-31 RX ORDER — NITROGLYCERIN 6.5 MG
0.4 CAPSULE, EXTENDED RELEASE ORAL
Refills: 0 | Status: COMPLETED | OUTPATIENT
Start: 2019-07-31 | End: 2019-07-31

## 2019-07-31 RX ORDER — ENOXAPARIN SODIUM 100 MG/ML
70 INJECTION SUBCUTANEOUS ONCE
Refills: 0 | Status: COMPLETED | OUTPATIENT
Start: 2019-07-31 | End: 2019-07-31

## 2019-07-31 RX ORDER — MORPHINE SULFATE 50 MG/1
1 CAPSULE, EXTENDED RELEASE ORAL EVERY 4 HOURS
Refills: 0 | Status: DISCONTINUED | OUTPATIENT
Start: 2019-07-31 | End: 2019-08-03

## 2019-07-31 RX ADMIN — Medication 325 MILLIGRAM(S): at 04:15

## 2019-07-31 RX ADMIN — Medication 0.5 INCH(S): at 23:48

## 2019-07-31 RX ADMIN — Medication 0.5 INCH(S): at 11:16

## 2019-07-31 RX ADMIN — Medication 650 MILLIGRAM(S): at 10:11

## 2019-07-31 RX ADMIN — MORPHINE SULFATE 1 MILLIGRAM(S): 50 CAPSULE, EXTENDED RELEASE ORAL at 18:53

## 2019-07-31 RX ADMIN — Medication 0.5 INCH(S): at 17:09

## 2019-07-31 RX ADMIN — Medication 0.4 MILLIGRAM(S): at 08:52

## 2019-07-31 RX ADMIN — Medication 0.4 MILLIGRAM(S): at 09:01

## 2019-07-31 RX ADMIN — MORPHINE SULFATE 1 MILLIGRAM(S): 50 CAPSULE, EXTENDED RELEASE ORAL at 09:10

## 2019-07-31 RX ADMIN — MORPHINE SULFATE 1 MILLIGRAM(S): 50 CAPSULE, EXTENDED RELEASE ORAL at 13:22

## 2019-07-31 RX ADMIN — MORPHINE SULFATE 1 MILLIGRAM(S): 50 CAPSULE, EXTENDED RELEASE ORAL at 14:11

## 2019-07-31 RX ADMIN — ISOSORBIDE MONONITRATE 60 MILLIGRAM(S): 60 TABLET, EXTENDED RELEASE ORAL at 13:18

## 2019-07-31 RX ADMIN — MORPHINE SULFATE 1 MILLIGRAM(S): 50 CAPSULE, EXTENDED RELEASE ORAL at 10:11

## 2019-07-31 RX ADMIN — ENOXAPARIN SODIUM 70 MILLIGRAM(S): 100 INJECTION SUBCUTANEOUS at 23:48

## 2019-07-31 RX ADMIN — Medication 20 MILLIGRAM(S): at 17:09

## 2019-07-31 RX ADMIN — Medication 0.4 MILLIGRAM(S): at 08:43

## 2019-07-31 RX ADMIN — RANOLAZINE 500 MILLIGRAM(S): 500 TABLET, FILM COATED, EXTENDED RELEASE ORAL at 17:09

## 2019-07-31 RX ADMIN — Medication 1: at 18:21

## 2019-07-31 RX ADMIN — Medication 650 MILLIGRAM(S): at 09:01

## 2019-07-31 NOTE — ED ADULT NURSE NOTE - OBJECTIVE STATEMENT
pt lying in bed on a cardiac monitor and pulse ox with son at the bedside.  pt is alert and oriented x3. pt c/o of left sided sharp chest pain worsening yesterday and beginning this past Sunday. as per pt the pain is intermittent.

## 2019-07-31 NOTE — ED PROVIDER NOTE - OBJECTIVE STATEMENT
Patient is a 80 y/o female with a pmhx of AFIB, CHF, CAD, 8 cardiac stents, HTN, presenting for left sided chest pain radiating down the left arm for the past three days. Patient states pain worsened in the past day has become constant. Patient last stent was one year ago.  Patient states cardiologist is dr. gomez. Patient was last admitted at [facility] in may for chf, had pericardiocentesis preformed. Last echo in may. Patient denies SOB, palpitations, leg swelling, calf pain, abd pain, nausea, vomiting, diarrhea, back pain Patient is a 80 y/o female with a pmhx of AFIB, CHF, CAD, 8 cardiac stents, HTN, presenting for left sided chest pain radiating down the left arm for the past three days. Patient states pain worsened in the past day has become constant. Patient last stent was one year ago.  Patient states cardiologist is dr. gomez. Patient was last admitted at [facility] in may for chf, had pericardiocentesis preformed. Last echo in may. Patient denies palpitations, leg swelling, calf pain, abd pain, nausea, vomiting, diarrhea, back pain

## 2019-07-31 NOTE — CONSULT NOTE ADULT - ASSESSMENT
80 y/o F PMH HTN, DM, HLD, Mitral regurgitation, Afib on Eliquis, CAD with stents, attempted mitral clip in may 2019- aborted due to pericardial effusion- requiring drainage, presents to ED with c/o chest pain radiating down left arm and into shoulder accompanied with shortness of breath x 3 days. As per son, pt states felt like previous episodes of chest pain requiring stents/ hospitalization.    CAD s/p PCI  -  [facility] 1/19-LM- 40%, D1 70%, Cx 95%, ostial cx 70%, mid cx 40% in-stent restenosis, OM1 60% ostium, Prox RCA 90%- medical management at that time.    - continue imdur, ranexa, statin, asa   - TTE 4/19 severe pulm htn, ef 50-55%, right ventricular Pressure and volume overload, mod to severe TR, mod MR, WMA basal and mid inferior wall, inferolateral wall, apical inferior segment.  - TTE pending  - trop neg x 1- trend   - possible cardiac cath today- pending attending evaluation- keep Pt NPO for now    Afib  - rate controlled  - continue digoxin   - on apixaban at home- hold for pending cath     Chronic HFrEF  - euvolemic on exam  - BNP- 1720  - continue furosemide daily   - Strict i/o and daily standing weights (if possible).    - Keep K > 4, Mg > 2. 78 y/o F PMH HTN, DM, HLD, Mitral regurgitation, Afib on Eliquis, CAD with stents, attempted mitral clip in may 2019- aborted due to pericardial effusion- requiring drainage, presents to ED with c/o chest pain radiating down left arm and into shoulder accompanied with shortness of breath x 3 days. As per son, pt states felt like previous episodes of chest pain requiring stents/ hospitalization.    CAD s/p PCI  -  [facility] 1/19-LM- 40%, D1 70%, Cx 95%, ostial cx 70%, mid cx 40% in-stent restenosis, OM1 60% ostium, Prox RCA 90%- medical management at that time.    - continue imdur, ranexa, statin, asa   - TTE 4/19 severe pulm htn, ef 50-55%, right ventricular Pressure and volume overload, mod to severe TR, mod MR, WMA basal and mid inferior wall, inferolateral wall, apical inferior segment.  - TTE pending  - Troponin neg x 1- trend   - cardiac cath tomorrow  - NPO after midnight     Afib  - rate controlled  - continue digoxin   - on apixaban at home- hold for cath tomorrow  - Lovenox x1 tonight    Chronic HFrEF  - euvolemic on exam  - BNP- 1720  - continue furosemide daily   - Strict i/o and daily standing weights (if possible).    - Keep K > 4, Mg > 2.

## 2019-07-31 NOTE — CONSULT NOTE ADULT - SUBJECTIVE AND OBJECTIVE BOX
[facility]                                                       [facility]                                                        Office: [address] [address]                                                       Telephone: [phone]. Fax:[phone]      CC:     HPI: 80 y/o F PMH HTN, DM, HLD, Mitral regurgitation, Afib on Eliquis, CAD with stents, attempted mitral clip in may 2019- aborted due to pericardial effusion- requiring drainage, presents to ED with c/o chest pain radiating down left arm and into shoulder accompanied with shortness of breath x 3 days. As per son, pt states felt like previous episodes of chest pain requiring stents/ hospitalization. TTE 4/19 severe pulm htn, ef 50-55%, right ventricular Pressure and volume overload, mod to severe TR, mod MR, WMA basal and mid inferior wall, inferolateral wall, apical inferior segment. [facility] 1/19-LM- 40%, D1 70%, Cx 95%, ostial cx 70%, mid cx 40% in-stent restenosis, OM1 60% ostium, Prox RCA 90%- medical management at that time.      PAST MEDICAL & SURGICAL HISTORY:  Atrial fibrillation  Diabetes  Hypertension  Stented coronary artery  Coronary stent restenosis  History of appendectomy    FAMILY HISTORY:    SOCIAL HISTORY: no EtOH, drugs or tobacco    MEDICATIONS  (STANDING):  apixaban 5 milliGRAM(s) Oral every 12 hours  atorvastatin 80 milliGRAM(s) Oral at bedtime  dextrose 5%. 1000 milliLiter(s) (50 mL/Hr) IV Continuous <Continuous>  dextrose 50% Injectable 12.5 Gram(s) IV Push once  dextrose 50% Injectable 25 Gram(s) IV Push once  dextrose 50% Injectable 25 Gram(s) IV Push once  furosemide    Tablet 20 milliGRAM(s) Oral two times a day  insulin lispro (HumaLOG) corrective regimen sliding scale   SubCutaneous three times a day before meals  isosorbide   mononitrate ER Tablet (IMDUR) 60 milliGRAM(s) Oral daily  methimazole 10 milliGRAM(s) Oral daily  nitroglycerin    2% Ointment 0.5 Inch(s) Transdermal every 6 hours  ranolazine 500 milliGRAM(s) Oral two times a day    ROS: All others negative    PHYSICAL EXAM:  Vital Signs Last 24 Hrs  T(C): 36.6 (31 Jul 2019 07:55), Max: 36.8 (31 Jul 2019 00:10)  T(F): 97.8 (31 Jul 2019 07:55), Max: 98.2 (31 Jul 2019 00:10)  HR: 90 (31 Jul 2019 09:09) (79 - 93)  BP: 121/63 (31 Jul 2019 09:09) (100/51 - 121/63)  BP(mean): 76 (31 Jul 2019 07:55) (76 - 76)  RR: 18 (31 Jul 2019 07:55) (18 - 20)  SpO2: 100% (31 Jul 2019 07:55) (97% - 100%)      Appearance: Normal	  HEENT: Normal oral mucosa, PERRL, EOMI	  Lymphatic: No lymphadenopathy  Cardiovascular: Irreg, irreg, No JVD, 2/6 systolic apex, No edema  Respiratory: Lungs clear to auscultation	  Psychiatry: A & O x 3, Mood & affect appropriate  Gastrointestinal:  Soft, Non-tender, + BS	  Skin: No rashes, No ecchymoses, No cyanosis  Neurologic: Non-focal  Extremities: Normal range of motion, No clubbing, cyanosis or edema  Vascular: Peripheral pulses palpable 2+ bilaterally    ECG: Afib, NSST-T abnl    LABS:                        10.6   6.71  )-----------( 210      ( 31 Jul 2019 05:11 )             31.9     07-31    132<L>  |  93<L>  |  24.0<H>  ----------------------------<  135<H>  4.4   |  27.0  |  0.82    Ca    9.4      31 Jul 2019 05:11    TPro  7.2  /  Alb  4.4  /  TBili  0.4  /  DBili  x   /  AST  23  /  ALT  19  /  AlkPhos  108  07-31    PT/INR - ( 31 Jul 2019 05:11 )   PT: 19.6 sec;   INR: 1.68 ratio         PTT - ( 31 Jul 2019 05:11 )  PTT:36.5 sec  CARDIAC MARKERS ( 31 Jul 2019 05:11 )  x     / <0.01 ng/mL / 55 U/L / x     / x          RADIOLOGY & ADDITIONAL STUDIES:  < from: Cardiac Cath Lab - Adult (01.25.19 @ 12:47) >  VENTRICLES: Analysis of regional contractile function demonstrated  inferolateral akinesis. Global left ventricular function was moderately  depressed. EF calculated by contrast ventriculography was 40 %.  VALVES: MITRAL VALVE: The mitral valve exhibited moderate to severe  regurgitation.  CORONARY VESSELS: The coronary circulation is right dominant.  LM:   --  Distal left main: There was a discrete 40 % stenosis in the  distal third of the vessel segment.  LAD:   --  Proximal LAD: There was a 10 % stenosis at the site of a prior  stent.  --  Mid LAD: There was a 10 % stenosis at the site of a prior stent.  --  D2: There was a discrete 70 % stenosis at the ostium of the vessel  segment.  CX:   --  Circumflex: The A-V continuation has a severe 95% stenosis with  LANCE 2-3 flow.  --  Ostial circumflex: There was a discrete 70 % stenosis.  --  Mid circumflex: There was a tubular 40 % stenosis at the site of a  prior stent.  --  OM1: There was a discrete 60 % stenosis at the ostium of the vessel  segment.  RCA:   --  Proximal RCA: There was a tubular 90 % stenosis in the proximal  third of the vessel segment, at the origin of RV marginal 1.  COMPLICATIONS: No complications occurred during the cath lab visit.  SUMMARY:  HEMODYNAMICS: Hemodynamic assessment demonstrates moderate to severe  systemic hypertension, moderately elevated LVEDP, moderately to markedly  elevated pulmonary capillary wedge pressure, and severely elevated  pulmonary vascular resistance.  DIAGNOSTIC IMPRESSIONS: Moderately reduced LV Systolic and Diastolic  function with an EF of 40% eith RWMA. 2. Moderate to sever Mitral  Refurgitation. 3. Two vessel CAD. 4. Branch CAD. 5. Patent Jazlyn in the  Proximal to mid LAD and mid LCX.  DIAGNOSTIC RECOMMENDATIONS: OMT. 2. Aggressive RFM. 3. Heart team approach  re: CABS+MVA+TVA vs PCI to RCA and medical therapy for her VHD.  INTERVENTIONAL IMPRESSIONS: Moderately reduced LV Systolic and Diastolic  function with an EF of 40% eith RWMA. 2. Moderate to sever Mitral  Refurgitation. 3. Two vessel CAD. 4. Branch CAD. 5. Patent Jazlyn in the  Proximal to mid LAD and mid LCX.  INTERVENTIONAL RECOMMENDATIONS: OMT. 2. Aggressive RFM. 3. Heart team  approach re: CABS+MVA+TVA vs PCI to RCA and medical therapy for her VHD.  Prepared and signed by  John Richardson M.D.  Signed 01/25/2019 14:15:50    < end of copied text >    < from: TTE Echo Complete w/Doppler (04.30.19 @ 11:52) >    Summary:   1. Endocardial visualization was enhanced with intravenous echo contrast.   2. Left ventricular ejection fraction, by visual estimation, is 50 to   55%.   3. Basal and mid inferior wall, basal and mid inferolateral wall, and   apical inferior segment are abnormal as described above.   4. RV systolic function is mildly reduced.   5. Moderate mitral annular calcification.   6. Moderate mitral regurgitation.   7. Moderate-severe tricuspid regurgitation.   8. Mild aortic regurgitation.   9. Estimated pulmonary artery systolic pressure is 71.9 mmHg assuming a   right atrial pressure of 15 mmHg, which is consistent with severe   pulmonary hypertension.  10. Right ventricular pressure and volume overload.  11. There is no evidence of pericardial effusion.  12. ** No prior TTEs available for comparison.    < end of copied text >

## 2019-07-31 NOTE — CONSULT NOTE ADULT - ATTENDING COMMENTS
Patient with recurrent chest pain. Negative troponins.   Plan for cardiac cath in am.   Hold lovenox in am.   Evaluation by CT surgery for repeat mitral clip procedure.

## 2019-07-31 NOTE — H&P ADULT - ASSESSMENT
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     1. Chest pain  -admit to telemetry  -serial TNI and EKG  -SL nitro x 3; switch to nitropaste  - mg x 1 in the ED  -continue statin, ranexa and imdur  -obtain TTE  -check lipid panel and HbA1c  -NPO for possible cardiac cath today  -Cardiology consult - [facility]    2. Chronic Systolic Heart Failure  -appears euvolemic  -continue lasix   -continue imdur and losartan    3. DM  -check HbA1c  -hold Metformin and lantus  -ISS for now    4. Atrial Fibrillation  -rate controlled  -hold eliquis for possible cath    5. Severe TR/MR  -failed mitral clip in May due to atrial perforation and cardiac tamponade  -TTE ordered  -CT surgery - Dr. Oshea informed patient is admitted    6. Hyperthyroidism  -check TSH  -continue methimazole    7. HLD  -continue statin    DVT ppx - Eliquis held for possible cath

## 2019-07-31 NOTE — ED PROVIDER NOTE - ATTENDING CONTRIBUTION TO CARE
I personally saw the patient with the PA, and completed the key components of the history and physical exam. I then discussed the management plan with the PA.   gen in nad resp clear cardiac no murmur abd soft neuro intact  agree with pa plan of care

## 2019-07-31 NOTE — H&P ADULT - HISTORY OF PRESENT ILLNESS
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 Son provides most of the history due to the patient being increasing more forgetful. Son reports his mother was complaining of chest pain for the past three days. States the chest pain is left sided and radiates down her left arm. Pain was partially relieved with SL nitro spray. Denies any aggravating factors. Pain also associated with dizziness, nausea and exertional dyspnea for which the son brought the patient to the ED for further evaluation. In the ED, patient had a negative troponin and no acute ischemic EKG changes. Patient was given full dose aspirin, SL nitro and morphine with slight improvement in her pain. TTE was ordered. Cardio was consulted and patient was made NPO for possible cardiac cath.

## 2019-08-01 ENCOUNTER — TRANSCRIPTION ENCOUNTER (OUTPATIENT)
Age: 79
End: 2019-08-01

## 2019-08-01 DIAGNOSIS — I25.10 ATHEROSCLEROTIC HEART DISEASE OF NATIVE CORONARY ARTERY WITHOUT ANGINA PECTORIS: ICD-10-CM

## 2019-08-01 DIAGNOSIS — I50.32 CHRONIC DIASTOLIC (CONGESTIVE) HEART FAILURE: ICD-10-CM

## 2019-08-01 DIAGNOSIS — I10 ESSENTIAL (PRIMARY) HYPERTENSION: ICD-10-CM

## 2019-08-01 DIAGNOSIS — I34.0 NONRHEUMATIC MITRAL (VALVE) INSUFFICIENCY: ICD-10-CM

## 2019-08-01 DIAGNOSIS — I48.2 CHRONIC ATRIAL FIBRILLATION: ICD-10-CM

## 2019-08-01 DIAGNOSIS — E11.9 TYPE 2 DIABETES MELLITUS WITHOUT COMPLICATIONS: ICD-10-CM

## 2019-08-01 LAB
ALBUMIN SERPL ELPH-MCNC: 4.3 G/DL — SIGNIFICANT CHANGE UP (ref 3.3–5.2)
ALP SERPL-CCNC: 95 U/L — SIGNIFICANT CHANGE UP (ref 40–120)
ALT FLD-CCNC: 19 U/L — SIGNIFICANT CHANGE UP
ANION GAP SERPL CALC-SCNC: 13 MMOL/L — SIGNIFICANT CHANGE UP (ref 5–17)
APPEARANCE UR: CLEAR — SIGNIFICANT CHANGE UP
APTT BLD: 35.1 SEC — SIGNIFICANT CHANGE UP (ref 27.5–36.3)
AST SERPL-CCNC: 23 U/L — SIGNIFICANT CHANGE UP
BASOPHILS # BLD AUTO: 0.07 K/UL — SIGNIFICANT CHANGE UP (ref 0–0.2)
BASOPHILS NFR BLD AUTO: 1.4 % — SIGNIFICANT CHANGE UP (ref 0–2)
BILIRUB SERPL-MCNC: 0.7 MG/DL — SIGNIFICANT CHANGE UP (ref 0.4–2)
BILIRUB UR-MCNC: NEGATIVE — SIGNIFICANT CHANGE UP
BLD GP AB SCN SERPL QL: SIGNIFICANT CHANGE UP
BUN SERPL-MCNC: 19 MG/DL — SIGNIFICANT CHANGE UP (ref 8–20)
CALCIUM SERPL-MCNC: 9.9 MG/DL — SIGNIFICANT CHANGE UP (ref 8.6–10.2)
CHLORIDE SERPL-SCNC: 96 MMOL/L — LOW (ref 98–107)
CO2 SERPL-SCNC: 28 MMOL/L — SIGNIFICANT CHANGE UP (ref 22–29)
COLOR SPEC: YELLOW — SIGNIFICANT CHANGE UP
CREAT SERPL-MCNC: 0.82 MG/DL — SIGNIFICANT CHANGE UP (ref 0.5–1.3)
DIFF PNL FLD: NEGATIVE — SIGNIFICANT CHANGE UP
EOSINOPHIL # BLD AUTO: 0.02 K/UL — SIGNIFICANT CHANGE UP (ref 0–0.5)
EOSINOPHIL NFR BLD AUTO: 0.4 % — SIGNIFICANT CHANGE UP (ref 0–6)
FERRITIN SERPL-MCNC: 210 NG/ML — HIGH (ref 15–150)
GLUCOSE BLDC GLUCOMTR-MCNC: 128 MG/DL — HIGH (ref 70–99)
GLUCOSE BLDC GLUCOMTR-MCNC: 133 MG/DL — HIGH (ref 70–99)
GLUCOSE BLDC GLUCOMTR-MCNC: 153 MG/DL — HIGH (ref 70–99)
GLUCOSE BLDC GLUCOMTR-MCNC: 158 MG/DL — HIGH (ref 70–99)
GLUCOSE BLDC GLUCOMTR-MCNC: 388 MG/DL — HIGH (ref 70–99)
GLUCOSE SERPL-MCNC: 154 MG/DL — HIGH (ref 70–115)
GLUCOSE UR QL: NEGATIVE MG/DL — SIGNIFICANT CHANGE UP
HBA1C BLD-MCNC: 7.9 % — HIGH (ref 4–5.6)
HBA1C BLD-MCNC: 8.1 % — HIGH (ref 4–5.6)
HCT VFR BLD CALC: 34.7 % — SIGNIFICANT CHANGE UP (ref 34.5–45)
HGB BLD-MCNC: 11.7 G/DL — SIGNIFICANT CHANGE UP (ref 11.5–15.5)
IMM GRANULOCYTES NFR BLD AUTO: 0.2 % — SIGNIFICANT CHANGE UP (ref 0–1.5)
INR BLD: 1.24 RATIO — HIGH (ref 0.88–1.16)
IRON SATN MFR SERPL: 13 % — LOW (ref 14–50)
IRON SATN MFR SERPL: 45 UG/DL — SIGNIFICANT CHANGE UP (ref 37–145)
KETONES UR-MCNC: NEGATIVE — SIGNIFICANT CHANGE UP
LEUKOCYTE ESTERASE UR-ACNC: NEGATIVE — SIGNIFICANT CHANGE UP
LYMPHOCYTES # BLD AUTO: 1.87 K/UL — SIGNIFICANT CHANGE UP (ref 1–3.3)
LYMPHOCYTES # BLD AUTO: 37.6 % — SIGNIFICANT CHANGE UP (ref 13–44)
MCHC RBC-ENTMCNC: 29.3 PG — SIGNIFICANT CHANGE UP (ref 27–34)
MCHC RBC-ENTMCNC: 33.7 GM/DL — SIGNIFICANT CHANGE UP (ref 32–36)
MCV RBC AUTO: 87 FL — SIGNIFICANT CHANGE UP (ref 80–100)
MONOCYTES # BLD AUTO: 0.32 K/UL — SIGNIFICANT CHANGE UP (ref 0–0.9)
MONOCYTES NFR BLD AUTO: 6.4 % — SIGNIFICANT CHANGE UP (ref 2–14)
MRSA PCR RESULT.: SIGNIFICANT CHANGE UP
NEUTROPHILS # BLD AUTO: 2.69 K/UL — SIGNIFICANT CHANGE UP (ref 1.8–7.4)
NEUTROPHILS NFR BLD AUTO: 54 % — SIGNIFICANT CHANGE UP (ref 43–77)
NITRITE UR-MCNC: NEGATIVE — SIGNIFICANT CHANGE UP
NT-PROBNP SERPL-SCNC: 2380 PG/ML — HIGH (ref 0–300)
PH UR: 7 — SIGNIFICANT CHANGE UP (ref 5–8)
PLATELET # BLD AUTO: 238 K/UL — SIGNIFICANT CHANGE UP (ref 150–400)
POTASSIUM SERPL-MCNC: 3.9 MMOL/L — SIGNIFICANT CHANGE UP (ref 3.5–5.3)
POTASSIUM SERPL-SCNC: 3.9 MMOL/L — SIGNIFICANT CHANGE UP (ref 3.5–5.3)
PREALB SERPL-MCNC: 22 MG/DL — SIGNIFICANT CHANGE UP (ref 18–38)
PROT SERPL-MCNC: 7.6 G/DL — SIGNIFICANT CHANGE UP (ref 6.6–8.7)
PROT UR-MCNC: NEGATIVE MG/DL — SIGNIFICANT CHANGE UP
PROTHROM AB SERPL-ACNC: 14.4 SEC — HIGH (ref 10–12.9)
RBC # BLD: 3.99 M/UL — SIGNIFICANT CHANGE UP (ref 3.8–5.2)
RBC # FLD: 18.3 % — HIGH (ref 10.3–14.5)
S AUREUS DNA NOSE QL NAA+PROBE: SIGNIFICANT CHANGE UP
SODIUM SERPL-SCNC: 137 MMOL/L — SIGNIFICANT CHANGE UP (ref 135–145)
SP GR SPEC: 1.01 — SIGNIFICANT CHANGE UP (ref 1.01–1.02)
T4 FREE SERPL-MCNC: 1.4 NG/DL — SIGNIFICANT CHANGE UP (ref 0.9–1.8)
TIBC SERPL-MCNC: 337 UG/DL — SIGNIFICANT CHANGE UP (ref 220–430)
TRANSFERRIN SERPL-MCNC: 236 MG/DL — SIGNIFICANT CHANGE UP (ref 192–382)
TROPONIN T SERPL-MCNC: <0.01 NG/ML — SIGNIFICANT CHANGE UP (ref 0–0.06)
TSH SERPL-MCNC: 0.24 UIU/ML — LOW (ref 0.27–4.2)
UROBILINOGEN FLD QL: 1 MG/DL
WBC # BLD: 4.98 K/UL — SIGNIFICANT CHANGE UP (ref 3.8–10.5)
WBC # FLD AUTO: 4.98 K/UL — SIGNIFICANT CHANGE UP (ref 3.8–10.5)

## 2019-08-01 PROCEDURE — 93880 EXTRACRANIAL BILAT STUDY: CPT | Mod: 26

## 2019-08-01 PROCEDURE — 99024 POSTOP FOLLOW-UP VISIT: CPT

## 2019-08-01 PROCEDURE — 99152 MOD SED SAME PHYS/QHP 5/>YRS: CPT

## 2019-08-01 PROCEDURE — 71045 X-RAY EXAM CHEST 1 VIEW: CPT | Mod: 26

## 2019-08-01 PROCEDURE — 93458 L HRT ARTERY/VENTRICLE ANGIO: CPT | Mod: 26

## 2019-08-01 PROCEDURE — 99233 SBSQ HOSP IP/OBS HIGH 50: CPT

## 2019-08-01 PROCEDURE — 93010 ELECTROCARDIOGRAM REPORT: CPT

## 2019-08-01 RX ORDER — ASPIRIN/CALCIUM CARB/MAGNESIUM 324 MG
81 TABLET ORAL DAILY
Refills: 0 | Status: DISCONTINUED | OUTPATIENT
Start: 2019-08-01 | End: 2019-08-01

## 2019-08-01 RX ORDER — METOPROLOL TARTRATE 50 MG
50 TABLET ORAL DAILY
Refills: 0 | Status: DISCONTINUED | OUTPATIENT
Start: 2019-08-01 | End: 2019-08-03

## 2019-08-01 RX ORDER — INSULIN GLARGINE 100 [IU]/ML
20 INJECTION, SOLUTION SUBCUTANEOUS AT BEDTIME
Refills: 0 | Status: DISCONTINUED | OUTPATIENT
Start: 2019-08-01 | End: 2019-08-03

## 2019-08-01 RX ORDER — SODIUM CHLORIDE 9 MG/ML
1000 INJECTION INTRAMUSCULAR; INTRAVENOUS; SUBCUTANEOUS
Refills: 0 | Status: DISCONTINUED | OUTPATIENT
Start: 2019-08-01 | End: 2019-08-01

## 2019-08-01 RX ORDER — DIGOXIN 250 MCG
0.12 TABLET ORAL DAILY
Refills: 0 | Status: DISCONTINUED | OUTPATIENT
Start: 2019-08-01 | End: 2019-08-03

## 2019-08-01 RX ORDER — ENOXAPARIN SODIUM 100 MG/ML
70 INJECTION SUBCUTANEOUS
Refills: 0 | Status: DISCONTINUED | OUTPATIENT
Start: 2019-08-01 | End: 2019-08-03

## 2019-08-01 RX ORDER — AMIODARONE HYDROCHLORIDE 400 MG/1
200 TABLET ORAL DAILY
Refills: 0 | Status: DISCONTINUED | OUTPATIENT
Start: 2019-08-01 | End: 2019-08-03

## 2019-08-01 RX ORDER — SODIUM CHLORIDE 9 MG/ML
150 INJECTION INTRAMUSCULAR; INTRAVENOUS; SUBCUTANEOUS
Refills: 0 | Status: DISCONTINUED | OUTPATIENT
Start: 2019-08-01 | End: 2019-08-03

## 2019-08-01 RX ORDER — PANTOPRAZOLE SODIUM 20 MG/1
40 TABLET, DELAYED RELEASE ORAL
Refills: 0 | Status: DISCONTINUED | OUTPATIENT
Start: 2019-08-01 | End: 2019-08-03

## 2019-08-01 RX ADMIN — Medication 20 MILLIGRAM(S): at 17:09

## 2019-08-01 RX ADMIN — RANOLAZINE 500 MILLIGRAM(S): 500 TABLET, FILM COATED, EXTENDED RELEASE ORAL at 05:29

## 2019-08-01 RX ADMIN — AMIODARONE HYDROCHLORIDE 200 MILLIGRAM(S): 400 TABLET ORAL at 17:09

## 2019-08-01 RX ADMIN — Medication 50 MILLIGRAM(S): at 17:09

## 2019-08-01 RX ADMIN — Medication 5 MILLIGRAM(S): at 19:51

## 2019-08-01 RX ADMIN — INSULIN GLARGINE 20 UNIT(S): 100 INJECTION, SOLUTION SUBCUTANEOUS at 21:59

## 2019-08-01 RX ADMIN — Medication 20 MILLIGRAM(S): at 05:29

## 2019-08-01 RX ADMIN — ATORVASTATIN CALCIUM 80 MILLIGRAM(S): 80 TABLET, FILM COATED ORAL at 21:59

## 2019-08-01 RX ADMIN — Medication 5: at 17:10

## 2019-08-01 RX ADMIN — ISOSORBIDE MONONITRATE 60 MILLIGRAM(S): 60 TABLET, EXTENDED RELEASE ORAL at 11:33

## 2019-08-01 RX ADMIN — ENOXAPARIN SODIUM 70 MILLIGRAM(S): 100 INJECTION SUBCUTANEOUS at 21:59

## 2019-08-01 RX ADMIN — Medication 81 MILLIGRAM(S): at 08:14

## 2019-08-01 RX ADMIN — Medication 0.12 MILLIGRAM(S): at 17:09

## 2019-08-01 RX ADMIN — RANOLAZINE 500 MILLIGRAM(S): 500 TABLET, FILM COATED, EXTENDED RELEASE ORAL at 17:09

## 2019-08-01 NOTE — PROGRESS NOTE ADULT - SUBJECTIVE AND OBJECTIVE BOX
Patient s/p cardiac cath.     LM- normal.   LAD- ostial 50-70%.   Circumflex - ostial 805.   RCA- ostial 80%, proximal 705.     LVEDP 14 mmHg.     Patient with new circumflex disease.     Considering significant disease and valvular pathology. Plan for re-evaluation for open surgery.     - will follow.

## 2019-08-01 NOTE — PROGRESS NOTE ADULT - SUBJECTIVE AND OBJECTIVE BOX
services utilized for charting following:  Consent  NPO>8 hours  No CP at present  Dr Beckham to consent  Assessment and Recommendation:   · Assessment		   80 y/o F PMH HTN, DM, HLD, Mitral regurgitation, Afib on Eliquis, CAD with stents, attempted mitral clip in may 2019- aborted due to pericardial effusion- requiring drainage, presents to ED with c/o chest pain radiating down left arm and into shoulder accompanied with shortness of breath x 3 days. As per son, pt states felt like previous episodes of chest pain requiring stents/ hospitalization.    CAD s/p PCI  -  [facility] 1/19-LM- 40%, D1 70%, Cx 95%, ostial cx 70%, mid cx 40% in-stent restenosis, OM1 60% ostium, Prox RCA 90%- medical management at that time.    - continue imdur, ranexa, statin, asa   - TTE 4/19 severe pulm htn, ef 50-55%, right ventricular Pressure and volume overload, mod to severe TR, mod MR, WMA basal and mid inferior wall, inferolateral wall, apical inferior segment.  - TTE pending  - Troponin neg x 1- trend   - cardiac cath tomorrow  - NPO after midnight     Afib  - rate controlled  - continue digoxin   - on apixaban at home- hold for cath tomorrow  - Lovenox x1 tonight    Chronic HFrEF  - euvolemic on exam  - BNP- 1720  - continue furosemide daily   - Strict i/o and daily standing weights (if possible).    - Keep K > 4, Mg > 2.         Attending Attestation:   Patient with recurrent chest pain. Negative troponins.   Plan for cardiac cath in am.   Hold lovenox in am.     ASA 3 Mallampati 2  BR 1.7%        REVIEW OF SYSTEMS:  Denies SOB, CP, NV, HA, dizziness, palpitations,    PHYSICAL EXAM: A&Ox3 NAD Skin warm and dry  NEURO: Speech intact +gag +swallow Tongue midline BLANK  NECK: No JVD, trachea midline. Eupneic  HEART: 89 sR on tele  PULMONARY:  CTA shai  ABDOMEN: Soft nontender X4 +BS   EXTREMITIES: no edema All PP palpable

## 2019-08-01 NOTE — DISCHARGE NOTE PROVIDER - NSDCFUADDINST_GEN_ALL_CORE_FT
No heavy lifting, driving, sex, tub baths, swimming, or any activity that submerges the lower half of the body in water for 48 hours.  Limited walking and stairs for 48 hours.    Change the bandaid after 24 hours and every 24 hours after that.  Keep the puncture site dry and covered with a bandaid until a scab forms.    Observe the site frequently.  If bleeding or a large lump (the size of a golf ball or bigger) occurs lie flat, apply continuous direct pressure just above the puncture site for at least 10 minutes, and notify your physician immediately.  If the bleeding cannot be controlled, call 911 immediately for assistance.  Notify your physician of pain, swelling or any drainage.    Notify your physician immediately if coldness, numbness, discoloration or pain in your foot occurs. Please call the Cardiothoracic Surgery office at [phone] if you are experiencing any shortness of breath, chest pain, fevers or chills, drainage from the incisions, persistent nausea, vomiting or if you have any questions about your medications. If the symptoms are severe, call 911 and go to the nearest hospital. You can also call [phone] for an emergency [facility] ambulance, which will take you to the closest [facility].    If you need any assistance for making any appointments for a new consult or referral in any specialty, please call our [facility] Clinical Coordination Center at [phone].

## 2019-08-01 NOTE — DISCHARGE NOTE PROVIDER - HOSPITAL COURSE
s/p [facility] RFA #5 Cypriot sheath with failed MYNX closure    · Subjective and Objective:     Patient s/p cardiac cath.         LM- normal.     LAD- ostial 50-70%.     Circumflex - ostial 805.     RCA- ostial 80%, proximal 705.         LVEDP 14 mmHg.         Patient with new circumflex disease. s/p [facility] RFA #5 Hebrew sheath with failed MYNX closure    · Subjective and Objective:     Patient s/p cardiac cath.         LM- normal.     LAD- ostial 50-70%.     Circumflex - ostial 805.     RCA- ostial 80%, proximal 705.         LVEDP 14 mmHg.         Patient with new circumflex disease.        79F h/o AF, severe MR, TR, HTN, HLD, chronic diastolic heart failure, DM, CAD s/p PCI, prior acute pericardial effusion d/t bleeding from mitral clip attempt which was aborted in May 2019. Patient presents again with continued complaints of SOB/CP for cath. Cath today revealed new ostial Cx lesion which will require a surgical evaluation. URIEL: moderate MR.

## 2019-08-01 NOTE — PROGRESS NOTE ADULT - SUBJECTIVE AND OBJECTIVE BOX
CHIEF COMPLAINT/INTERVAL HISTORY:    Patient is a 79y old  Female who presents with a chief complaint of chest pain (01 Aug 2019 12:47)    SUBJECTIVE & OBJECTIVE: Pt seen and examined at bedside. No overnight events. Patient reports feeling better today, chest pain resolved. Patient s/p diagnostic cath with new LCX disease. Seen by CT surgery; possible open heart surgery . Start therapeutic Lovenox.     ROS: No chest pain, palpitations, SOB, light headedness, dizziness, headache, nausea/vomiting, fevers/chills, abdominal pain, dysuria.    ICU Vital Signs Last 24 Hrs  T(C): 36.9 (01 Aug 2019 11:35), Max: 36.9 (01 Aug 2019 11:35)  T(F): 98.4 (01 Aug 2019 11:35), Max: 98.4 (01 Aug 2019 11:35)  HR: 94 (01 Aug 2019 17:05) (86 - 103)  BP: 130/76 (01 Aug 2019 17:05) (124/58 - 155/80)  RR: 20 (01 Aug 2019 11:35) (18 - 20)  SpO2: 98% (01 Aug 2019 11:35) (95% - 99%)      MEDICATIONS  (STANDING):  amiodarone    Tablet 200 milliGRAM(s) Oral daily  aspirin  chewable 81 milliGRAM(s) Oral daily  atorvastatin 80 milliGRAM(s) Oral at bedtime  dextrose 5%. 1000 milliLiter(s) (50 mL/Hr) IV Continuous <Continuous>  dextrose 50% Injectable 12.5 Gram(s) IV Push once  dextrose 50% Injectable 25 Gram(s) IV Push once  dextrose 50% Injectable 25 Gram(s) IV Push once  digoxin     Tablet 0.125 milliGRAM(s) Oral daily  enoxaparin Injectable 70 milliGRAM(s) SubCutaneous two times a day  furosemide    Tablet 20 milliGRAM(s) Oral two times a day  insulin glargine Injectable (LANTUS) 20 Unit(s) SubCutaneous at bedtime  insulin lispro (HumaLOG) corrective regimen sliding scale   SubCutaneous three times a day before meals  isosorbide   mononitrate ER Tablet (IMDUR) 60 milliGRAM(s) Oral daily  methimazole 10 milliGRAM(s) Oral daily  metoprolol succinate ER 50 milliGRAM(s) Oral daily  pantoprazole    Tablet 40 milliGRAM(s) Oral before breakfast  ranolazine 500 milliGRAM(s) Oral two times a day  sodium chloride 0.9%. 150 milliLiter(s) (50 mL/Hr) IV Continuous <Continuous>    MEDICATIONS  (PRN):  acetaminophen   Tablet .. 650 milliGRAM(s) Oral every 6 hours PRN Temp greater or equal to 38C (100.4F), Mild Pain (1 - 3)  dextrose 40% Gel 15 Gram(s) Oral once PRN Blood Glucose LESS THAN 70 milliGRAM(s)/deciliter  glucagon  Injectable 1 milliGRAM(s) IntraMuscular once PRN Glucose LESS THAN 70 milligrams/deciliter  morphine  - Injectable 1 milliGRAM(s) IV Push every 4 hours PRN Severe Pain (7 - 10)      LABS:                        11.7   4.98  )-----------( 238      ( 01 Aug 2019 13:17 )             34.7     08    137  |  96<L>  |  19.0  ----------------------------<  154<H>  3.9   |  28.0  |  0.82    Ca    9.9      01 Aug 2019 13:17    TPro  7.6  /  Alb  4.3  /  TBili  0.7  /  DBili  x   /  AST  23  /  ALT  19  /  AlkPhos  95  08    PT/INR - ( 01 Aug 2019 13:13 )   PT: 14.4 sec;   INR: 1.24 ratio         PTT - ( 01 Aug 2019 13:13 )  PTT:35.1 sec  Urinalysis Basic - ( 01 Aug 2019 14:20 )    Color: Yellow / Appearance: Clear / S.010 / pH: x  Gluc: x / Ketone: Negative  / Bili: Negative / Urobili: 1 mg/dL   Blood: x / Protein: Negative mg/dL / Nitrite: Negative   Leuk Esterase: Negative / RBC: x / WBC x   Sq Epi: x / Non Sq Epi: x / Bacteria: x        CAPILLARY BLOOD GLUCOSE      POCT Blood Glucose.: 388 mg/dL (01 Aug 2019 16:55)  POCT Blood Glucose.: 158 mg/dL (01 Aug 2019 12:04)  POCT Blood Glucose.: 133 mg/dL (01 Aug 2019 08:09)  POCT Blood Glucose.: 153 mg/dL (01 Aug 2019 05:27)  POCT Blood Glucose.: 174 mg/dL (2019 22:02)    PHYSICAL EXAM:    GENERAL: NAD, well-groomed, well-developed  HEAD:  Atraumatic, Normocephalic  EYES: EOMI, PERRLA, conjunctiva and sclera clear  ENMT: Moist mucous membranes  NECK: Supple  NERVOUS SYSTEM:  Alert & Oriented X3, Motor Strength 5/5 B/L upper and lower extremities  CHEST/LUNG: bilateral air entry, coarse  HEART: Regular rate and rhythm; S1/S2, + murmur  ABDOMEN: Soft, Nontender, Nondistended; Bowel sounds present  EXTREMITIES:  no pedal edema CHIEF COMPLAINT/INTERVAL HISTORY:    Patient is a 79y old  Female who presents with a chief complaint of chest pain (01 Aug 2019 12:47)    SUBJECTIVE & OBJECTIVE: Pt seen and examined at bedside. No overnight events. Patient reports feeling better today, chest pain resolved. Patient s/p diagnostic cath with new LCX disease. Seen by CT surgery; possible open heart surgery . Start therapeutic Lovenox.     ROS: No chest pain, palpitations, SOB, light headedness, dizziness, headache, nausea/vomiting, fevers/chills, abdominal pain, dysuria.    ICU Vital Signs Last 24 Hrs  T(C): 36.9 (01 Aug 2019 11:35), Max: 36.9 (01 Aug 2019 11:35)  T(F): 98.4 (01 Aug 2019 11:35), Max: 98.4 (01 Aug 2019 11:35)  HR: 94 (01 Aug 2019 17:05) (86 - 103)  BP: 130/76 (01 Aug 2019 17:05) (124/58 - 155/80)  RR: 20 (01 Aug 2019 11:35) (18 - 20)  SpO2: 98% (01 Aug 2019 11:35) (95% - 99%)      MEDICATIONS  (STANDING):  amiodarone    Tablet 200 milliGRAM(s) Oral daily  aspirin  chewable 81 milliGRAM(s) Oral daily  atorvastatin 80 milliGRAM(s) Oral at bedtime  dextrose 5%. 1000 milliLiter(s) (50 mL/Hr) IV Continuous <Continuous>  dextrose 50% Injectable 12.5 Gram(s) IV Push once  dextrose 50% Injectable 25 Gram(s) IV Push once  dextrose 50% Injectable 25 Gram(s) IV Push once  digoxin     Tablet 0.125 milliGRAM(s) Oral daily  enoxaparin Injectable 70 milliGRAM(s) SubCutaneous two times a day  furosemide    Tablet 20 milliGRAM(s) Oral two times a day  insulin glargine Injectable (LANTUS) 20 Unit(s) SubCutaneous at bedtime  insulin lispro (HumaLOG) corrective regimen sliding scale   SubCutaneous three times a day before meals  isosorbide   mononitrate ER Tablet (IMDUR) 60 milliGRAM(s) Oral daily  methimazole 10 milliGRAM(s) Oral daily  metoprolol succinate ER 50 milliGRAM(s) Oral daily  pantoprazole    Tablet 40 milliGRAM(s) Oral before breakfast  ranolazine 500 milliGRAM(s) Oral two times a day  sodium chloride 0.9%. 150 milliLiter(s) (50 mL/Hr) IV Continuous <Continuous>    MEDICATIONS  (PRN):  acetaminophen   Tablet .. 650 milliGRAM(s) Oral every 6 hours PRN Temp greater or equal to 38C (100.4F), Mild Pain (1 - 3)  dextrose 40% Gel 15 Gram(s) Oral once PRN Blood Glucose LESS THAN 70 milliGRAM(s)/deciliter  glucagon  Injectable 1 milliGRAM(s) IntraMuscular once PRN Glucose LESS THAN 70 milligrams/deciliter  morphine  - Injectable 1 milliGRAM(s) IV Push every 4 hours PRN Severe Pain (7 - 10)      LABS:                        11.7   4.98  )-----------( 238      ( 01 Aug 2019 13:17 )             34.7     08    137  |  96<L>  |  19.0  ----------------------------<  154<H>  3.9   |  28.0  |  0.82    Ca    9.9      01 Aug 2019 13:17    TPro  7.6  /  Alb  4.3  /  TBili  0.7  /  DBili  x   /  AST  23  /  ALT  19  /  AlkPhos  95  08    PT/INR - ( 01 Aug 2019 13:13 )   PT: 14.4 sec;   INR: 1.24 ratio         PTT - ( 01 Aug 2019 13:13 )  PTT:35.1 sec  Urinalysis Basic - ( 01 Aug 2019 14:20 )    Color: Yellow / Appearance: Clear / S.010 / pH: x  Gluc: x / Ketone: Negative  / Bili: Negative / Urobili: 1 mg/dL   Blood: x / Protein: Negative mg/dL / Nitrite: Negative   Leuk Esterase: Negative / RBC: x / WBC x   Sq Epi: x / Non Sq Epi: x / Bacteria: x        CAPILLARY BLOOD GLUCOSE      POCT Blood Glucose.: 388 mg/dL (01 Aug 2019 16:55)  POCT Blood Glucose.: 158 mg/dL (01 Aug 2019 12:04)  POCT Blood Glucose.: 133 mg/dL (01 Aug 2019 08:09)  POCT Blood Glucose.: 153 mg/dL (01 Aug 2019 05:27)  POCT Blood Glucose.: 174 mg/dL (2019 22:02)    PHYSICAL EXAM:    GENERAL: elderly female, laying in bed, NAD  HEAD:  Atraumatic, Normocephalic  EYES: EOMI, PERRLA, conjunctiva and sclera clear  ENMT: Moist mucous membranes  NECK: Supple  NERVOUS SYSTEM:  Alert & Oriented X3, Motor Strength 5/5 B/L upper and lower extremities  CHEST/LUNG: bilateral air entry, coarse  HEART: Regular rate and rhythm; S1/S2, + murmur  ABDOMEN: Soft, Nontender, Nondistended; Bowel sounds present  EXTREMITIES:  no pedal edema

## 2019-08-01 NOTE — DISCHARGE NOTE PROVIDER - PROVIDER TOKENS
PROVIDER:[TOKEN:[[id number]]] PROVIDER:[TOKEN:[[id number]]],PROVIDER:[TOKEN:[[id number]],FOLLOWUP:[1 week]]

## 2019-08-01 NOTE — DISCHARGE NOTE PROVIDER - CARE PROVIDER_API CALL
Chang Beckham)  Cardiovascular Disease; Interventional Cardiology  [address] [facility], [address] [place] [address]  Phone: [phone]  Fax: [phone]  Follow Up Time: Chang Beckham)  Cardiovascular Disease; Interventional Cardiology  [address] [facility], [address]  [place] [address]  Phone: [phone]  Fax: [phone]  Follow Up Time:     Dakota Oshea)  Surgery; Thoracic and Cardiac Surgery  [address] [place] [address]  Phone: [phone]  Fax: [phone]  Follow Up Time: 1 week

## 2019-08-01 NOTE — CONSULT NOTE ADULT - SUBJECTIVE AND OBJECTIVE BOX
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 Son provides most of the history due to the patient being increasing more forgetful. Son reports his mother was complaining of chest pain for the past three days. States the chest pain is left sided and radiates down her left arm. Pain was partially relieved with SL nitro spray. Denies any aggravating factors. Pain also associated with dizziness, nausea and exertional dyspnea for which the son brought the patient to the ED for further evaluation. In the ED, patient had a negative troponin and no acute ischemic EKG changes. Patient was given full dose aspirin, SL nitro and morphine with slight improvement in her pain. TTE was ordered. Cardio was consulted and patient was made NPO for possible cardiac cath. (31 Jul 2019 09:43)      Current Subjective Assessment: Patient currently without pain. Seen at the bedside with the [place] video , [facility] #[id number]. Patient reports intermittent SOB/heaviness to her breathing worse with exertion past 3-4 months as well has sharp chest pain which comes and goes and is located mostly under her left breast. She does not know if pain or SOB is positional. She reports being more active until feeling more fatigued and SOB recently. Patient s/p MV clip attempt in May 2019 which was aborted due to pericardial effusion.     Past Medical History  Atrial fibrillation  Diabetes  Hypertension  Stented coronary artery  Coronary stent restenosis, sequela  Afib  Hypertension      Past Surgical History  History of appendectomy      MEDICATIONS  (STANDING):  aspirin  chewable 81 milliGRAM(s) Oral daily  atorvastatin 80 milliGRAM(s) Oral at bedtime  dextrose 5%. 1000 milliLiter(s) (50 mL/Hr) IV Continuous <Continuous>  dextrose 50% Injectable 12.5 Gram(s) IV Push once  dextrose 50% Injectable 25 Gram(s) IV Push once  dextrose 50% Injectable 25 Gram(s) IV Push once  furosemide    Tablet 20 milliGRAM(s) Oral two times a day  insulin lispro (HumaLOG) corrective regimen sliding scale   SubCutaneous three times a day before meals  isosorbide   mononitrate ER Tablet (IMDUR) 60 milliGRAM(s) Oral daily  methimazole 10 milliGRAM(s) Oral daily  ranolazine 500 milliGRAM(s) Oral two times a day  sodium chloride 0.9%. 150 milliLiter(s) (50 mL/Hr) IV Continuous <Continuous>    MEDICATIONS  (PRN):  acetaminophen   Tablet .. 650 milliGRAM(s) Oral every 6 hours PRN Temp greater or equal to 38C (100.4F), Mild Pain (1 - 3)  dextrose 40% Gel 15 Gram(s) Oral once PRN Blood Glucose LESS THAN 70 milliGRAM(s)/deciliter  glucagon  Injectable 1 milliGRAM(s) IntraMuscular once PRN Glucose LESS THAN 70 milligrams/deciliter  morphine  - Injectable 1 milliGRAM(s) IV Push every 4 hours PRN Severe Pain (7 - 10)    Antiplatelet therapy:                           Last dose/amt:    Allergies: No Known Allergies  OHS (Unknown)      SOCIAL HISTORY:  Smoker: [ ] Yes  [x ] No        PACK YEARS:                         WHEN QUIT?  ETOH use: [ ] Yes  [ x] No              FREQUENCY / QUANTITY:  Ilicit Drug use:  [ ] Yes  [ x] No  Occupation: none  Live with: family  Assist device use:     PMD:  Referring Cardiologist:    Relevant Family History  FAMILY HISTORY:      Review of Systems  GENERAL:  Fevers[] chills[] sweats[] fatigue[] weight loss[] weight gain []                                      [ ] NEGATIVE  NEURO:  parathesias[] seizures []  syncope []  confusion []                                                                [ ] NEGATIVE                 EYES: glasses[]  blurry vision[]  discharge[] pain[] glaucoma []                                                           [ ] NEGATIVE                 ENMT:  difficulty hearing []  vertigo[]  dysphagia[] epistaxis[] recent dental work []                     [ ] NEGATIVE                 CV:  chest pain[] palpitations[] FLETCHER [] diaphoresis [] edema[]                                                           [ ] NEGATIVE                                 RESPIRATORY:  wheezing[] SOB[] cough [] sputum[] hemoptysis[]                                                   [ ] NEGATIVE               GI:  nausea[]  vomiting []  diarrhea[] constipation [] melena []                                                          [ ] NEGATIVE            : hematuria[ ]  dysuria[ ] urgency[] incontinence[]                                                                          [ ] NEGATIVE                    MUSKULOSKELETAL:  arthritis[ ]  joint swelling [ ] muscle weakness [ ]                                            [ ] NEGATIVE                     SKIN/BREAST:  rash[ ] itching [ ]  hair loss[ ] masses[ ]                                                                          [ ] NEGATIVE                     PSYCH:  dementia [ ] depression [ ] anxiety[ ]                                                                                          [ ] NEGATIVE                        HEME/LYMPH:  bruises easily[ ] enlarged lymph nodes[ ] tender lymph nodes[ ]                           [ ] NEGATIVE                      ENDOCRINE:  cold intolerance[ ] heat intolerance[ ] polydipsia[ ]                                                      [ ] NEGATIVE                        Telemetry:    PHYSICAL EXAM  Vital Signs Last 24 Hrs  T(C): 36.9 (01 Aug 2019 11:35), Max: 36.9 (01 Aug 2019 11:35)  T(F): 98.4 (01 Aug 2019 11:35), Max: 98.4 (01 Aug 2019 11:35)  HR: 91 (01 Aug 2019 11:35) (82 - 103)  BP: 150/60 (01 Aug 2019 11:35) (114/62 - 155/80)  BP(mean): --  RR: 20 (01 Aug 2019 11:35) (18 - 20)  SpO2: 98% (01 Aug 2019 11:35) (95% - 100%)    General: Well nourished, well developed, no acute distress.                                                         Neuro: Normal exam oriented to person/place & time with no focal motor or sensory  deficits.                    Eyes: Normal exam of conjunctiva & lids, pupils equally reactive.   ENT: Normal exam of nasal/oral mucosa with absence of cyanosis.   Neck: Normal exam of jugular veins, trachea & thyroid.   Chest: Normal lung exam with good air movement absence of wheezes, rales, or rhonchi:                                                                          CV:  Auscultation: normal [ ] S3[ ] S4[ ] Irregular [ ] Rub[ ] Clicks[ ]  Murmurs none:[ ]systolic [ ]  diastolic [ ] holosystolic [ ]  Carotids: No Bruits[ ] Other____________ Abdominal Aorta: normal [ ] nonpalpable[ ]                                                                         GI: Normal exam of abdomen, liver & spleen with no noted masses or tenderness.                                                                                              Extremities: Normal no evidence of cyanosis or deformity Edema: none[ ]trace[ ]1+[ ]2+[ ]3+[ ]4+[ ]  Lower Extremity Pulses: Right[ ] Left[ ]Varicosities[ ]  SKIN : Normal exam to inspection & palpation.                                                           LABS:                        10.6   6.71  )-----------( 210      ( 31 Jul 2019 05:11 )             31.9     07-31    132<L>  |  93<L>  |  24.0<H>  ----------------------------<  135<H>  4.4   |  27.0  |  0.82    Ca    9.4      31 Jul 2019 05:11    TPro  7.2  /  Alb  4.4  /  TBili  0.4  /  DBili  x   /  AST  23  /  ALT  19  /  AlkPhos  108  07-31    PT/INR - ( 31 Jul 2019 05:11 )   PT: 19.6 sec;   INR: 1.68 ratio         PTT - ( 31 Jul 2019 05:11 )  PTT:36.5 sec    CARDIAC MARKERS ( 01 Aug 2019 06:05 )  x     / <0.01 ng/mL / x     / x     / x      CARDIAC MARKERS ( 31 Jul 2019 18:04 )  x     / <0.01 ng/mL / x     / x     / x      CARDIAC MARKERS ( 31 Jul 2019 10:45 )  x     / <0.01 ng/mL / x     / x     / x      CARDIAC MARKERS ( 31 Jul 2019 05:11 )  x     / <0.01 ng/mL / 55 U/L / x     / x              Cardiac Cath:    TTE / URIEL:    Assessment:  79y Female presents with Chest pain      Plan: History of Present Illness:  HPI:  Patient is a 79 year old female with PMH of CHF (EF 55%), HTN, HLD, CAD s/p PCI, DM, Atrial Fibrillation on Eliquis, Valvular Disease (Severe TR/MR), 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 Son provides most of the history due to the patient being increasing more forgetful. Son reports his mother was complaining of chest pain for the past three days. States the chest pain is left sided and radiates down her left arm. Pain was partially relieved with SL nitro spray. Denies any aggravating factors. Pain also associated with dizziness, nausea and exertional dyspnea for which the son brought the patient to the ED for further evaluation. In the ED, patient had a negative troponin and no acute ischemic EKG changes. Patient was given full dose aspirin, SL nitro and morphine with slight improvement in her pain. TTE was ordered. Cardio was consulted and patient was made NPO for possible cardiac cath. (31 Jul 2019 09:43)      Current Subjective Assessment: Patient currently without pain. Seen at the bedside with the [place] video , [facility] #[id number]. Patient reports intermittent SOB/heaviness to her breathing worse with exertion past 3-4 months as well has sharp chest pain which comes and goes and is located mostly under her left breast. She does not know if pain or SOB is positional. She reports being more active until feeling more fatigued and SOB recently. Patient s/p MV clip attempt in May 2019 which was aborted due to pericardial effusion.     Past Medical History  Atrial fibrillation  Diabetes  Hypertension  Stented coronary artery  Coronary stent restenosis, sequela  Afib  Hypertension      Past Surgical History  History of appendectomy      MEDICATIONS  (STANDING):  aspirin  chewable 81 milliGRAM(s) Oral daily  atorvastatin 80 milliGRAM(s) Oral at bedtime  dextrose 5%. 1000 milliLiter(s) (50 mL/Hr) IV Continuous <Continuous>  dextrose 50% Injectable 12.5 Gram(s) IV Push once  dextrose 50% Injectable 25 Gram(s) IV Push once  dextrose 50% Injectable 25 Gram(s) IV Push once  furosemide    Tablet 20 milliGRAM(s) Oral two times a day  insulin lispro (HumaLOG) corrective regimen sliding scale   SubCutaneous three times a day before meals  isosorbide   mononitrate ER Tablet (IMDUR) 60 milliGRAM(s) Oral daily  methimazole 10 milliGRAM(s) Oral daily  ranolazine 500 milliGRAM(s) Oral two times a day  sodium chloride 0.9%. 150 milliLiter(s) (50 mL/Hr) IV Continuous <Continuous>    MEDICATIONS  (PRN):  acetaminophen   Tablet .. 650 milliGRAM(s) Oral every 6 hours PRN Temp greater or equal to 38C (100.4F), Mild Pain (1 - 3)  dextrose 40% Gel 15 Gram(s) Oral once PRN Blood Glucose LESS THAN 70 milliGRAM(s)/deciliter  glucagon  Injectable 1 milliGRAM(s) IntraMuscular once PRN Glucose LESS THAN 70 milligrams/deciliter  morphine  - Injectable 1 milliGRAM(s) IV Push every 4 hours PRN Severe Pain (7 - 10)    Allergies: No Known Allergies  OHS (Unknown)      SOCIAL HISTORY:  Smoker: [ ] Yes  [x ] No        PACK YEARS:                         WHEN QUIT?  ETOH use: [ ] Yes  [ x] No              FREQUENCY / QUANTITY:  Ilicit Drug use:  [ ] Yes  [ x] No  Occupation: none  Live with:    Assist device use: none    PMD:  Referring Cardiologist: [place]    Relevant Family History  FAMILY HISTORY:       Review of Systems  GENERAL:  Fevers[] chills[] sweats[] fatigue[x] weight loss[] weight gain []                                      [ ] NEGATIVE  NEURO:  parathesias[] seizures []  syncope []  confusion []                                                                [x ] NEGATIVE                 EYES: glasses[]  blurry vision[]  discharge[] pain[] glaucoma []                                                           [x ] NEGATIVE                 ENMT:  difficulty hearing []  vertigo[]  dysphagia[] epistaxis[] recent dental work []                     [ x] NEGATIVE                 CV:  chest pain[x] palpitations[] FLETCHER [x] diaphoresis [] edema[]                                                           [ ] NEGATIVE                                 RESPIRATORY:  wheezing[] SOB[x] cough [] sputum[] hemoptysis[]                                                   [ ] NEGATIVE               GI:  nausea[]  vomiting []  diarrhea[] constipation [] melena []                                                          [x ] NEGATIVE            : hematuria[ ]  dysuria[ ] urgency[] incontinence[]                                                                          [ x] NEGATIVE                    MUSKULOSKELETAL:  arthritis[ ]  joint swelling [ ] muscle weakness [ ]                                            [x ] NEGATIVE                     SKIN/BREAST:  rash[ ] itching [ ]  hair loss[ ] masses[ ]                                                                          [x ] NEGATIVE                     PSYCH:  dementia [ ] depression [ ] anxiety[ ]                                                                                          [x ] NEGATIVE                        HEME/LYMPH:  bruises easily[ ] enlarged lymph nodes[ ] tender lymph nodes[ ]                           [ x] NEGATIVE                      ENDOCRINE:  cold intolerance[ ] heat intolerance[ ] polydipsia[ ]                                                      [x ] NEGATIVE                        Telemetry: AF    PHYSICAL EXAM  Vital Signs Last 24 Hrs  T(C): 36.9 (01 Aug 2019 11:35), Max: 36.9 (01 Aug 2019 11:35)  T(F): 98.4 (01 Aug 2019 11:35), Max: 98.4 (01 Aug 2019 11:35)  HR: 91 (01 Aug 2019 11:35) (82 - 103)  BP: 150/60 (01 Aug 2019 11:35) (114/62 - 155/80)  BP(mean): --  RR: 20 (01 Aug 2019 11:35) (18 - 20)  SpO2: 98% (01 Aug 2019 11:35) (95% - 100%)    General: Well nourished, well developed, no acute distress.                                                         Neuro: Normal exam oriented to person/place & time with no focal motor or sensory  deficits.                    Eyes: Normal exam of conjunctiva & lids, pupils equally reactive.   ENT: Normal exam of nasal/oral mucosa with absence of cyanosis. some broken teeth noted  Neck: Normal exam of jugular veins, trachea & thyroid.   Chest: Normal lung exam with good air movement absence of wheezes, rales, or rhonchi:                                                                          CV:  Auscultation: normal [x ] S3[ ] S4[ ] Irregular [ ] Rub[ ] Clicks[ ]  Murmurs none:[ ]systolic [faint ]  diastolic [ ] holosystolic [ ]  Carotids: No Bruits[ ] Other____________ Abdominal Aorta: normal [ ] nonpalpable[ ]                                                                         GI: Normal exam of abdomen, liver & spleen with no noted masses or tenderness.                                                                                              Extremities: Normal no evidence of cyanosis or deformity Edema: none[ x]trace[ ]1+[ ]2+[ ]3+[ ]4+[ ]  Lower Extremity Pulses: Right[+ ] Left[ +]Varicosities[ ]  SKIN : Normal exam to inspection & palpation.                                                           LABS:                        10.6   6.71  )-----------( 210      ( 31 Jul 2019 05:11 )             31.9     07-31    132<L>  |  93<L>  |  24.0<H>  ----------------------------<  135<H>  4.4   |  27.0  |  0.82    Ca    9.4      31 Jul 2019 05:11    TPro  7.2  /  Alb  4.4  /  TBili  0.4  /  DBili  x   /  AST  23  /  ALT  19  /  AlkPhos  108  07-31    PT/INR - ( 31 Jul 2019 05:11 )   PT: 19.6 sec;   INR: 1.68 ratio         PTT - ( 31 Jul 2019 05:11 )  PTT:36.5 sec    CARDIAC MARKERS ( 01 Aug 2019 06:05 )  x     / <0.01 ng/mL / x     / x     / x      CARDIAC MARKERS ( 31 Jul 2019 18:04 )  x     / <0.01 ng/mL / x     / x     / x      CARDIAC MARKERS ( 31 Jul 2019 10:45 )  x     / <0.01 ng/mL / x     / x     / x      CARDIAC MARKERS ( 31 Jul 2019 05:11 )  x     / <0.01 ng/mL / 55 U/L / x     / x          Cath today:   LM- normal.   LAD- ostial 50-70%.   Circumflex - ostial 805.   RCA- ostial 80%, proximal 705.     LVEDP 14 mmHg.     Patient with new circumflex disease.     Considering significant disease and valvular pathology. Plan for re-evaluation for open surgery.     TTE: < from: TTE Echo Complete w/Doppler (07.31.19 @ 11:40) >      Summary:   1. Left ventricular ejection fraction, by visual estimation, is 40 to   45%.   2. Moderately decreased global left ventricular systolic function.   3. Basal and mid inferolateral wall and basal and mid inferior wall are   abnormal as described   4. Elevated mean left atrial pressure.   5. The mitral in-flow pattern reveals no discernable A-wave, therefore   no comment on diastolic function can be made.   6. The right ventricular size is mildly enlarged. RV systolic function   is low normal.   7. Moderately enlarged left atrium.   8. Moderately enlarged right atrium.   9. Sclerotic aortic valve with normal opening.  10. Thickening of the anterior and posterior mitral valve leaflets.  11. Moderate mitral valve regurgitation.  12. Mild to moderate mitral annular calcification.  13. Moderate tricuspid regurgitation.  14. Estimated pulmonary artery systolic pressure is 39.8 mmHg assuming a   right atrial pressure of 8 mmHg, which is consistent with borderline   pulmonary hypertension.  15. There is no evidence of pericardial effusion.    MD Saul Electronically signed on 7/31/2019 at 2:32:38 PM    < end of copied text >

## 2019-08-01 NOTE — CONSULT NOTE ADULT - ASSESSMENT
79F h/o AF, severe MR, TR, HTN, HLD, chronic diastolic heart failure, DM, CAD s/p PCI, prior acute pericardial effusion d/t bleeding from mitral clip attempt which was aborted in May 2019. Patient presents again with continued complaints of SOB/CP for cath. Cath today revealed new ostial Cx lesion which will require a surgical evaluation.     Plan  Will discuss case with Dr Oshea who will evaluate patient for open heart surgery.  Preop testing in progress

## 2019-08-01 NOTE — PROGRESS NOTE ADULT - ASSESSMENT
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     1. Chest pain; ACS ruled out  -history of CAD s/p multiple stents  -TNI negative x 3  -continue aspirin, statin, ranexa and imdur  -TTE reviewed  -s/p cardiac cath today; new circumflex disease  -due to significant disease and valvular pathology; CTS to re-evaluate for open surgery  -cardiology recommendations appreciated    2. Chronic Systolic Heart Failure  -appears euvolemic  -continue lasix PO daily  -continue imdur and losartan  -strict I/os, daily weights  -TTE reviewed  -cardiology recommendations appreciated    3. DM  -HbA1c 8.1  -hold Metformin   -resume lantus 20 units at bedtime  -continue ISS    4. Severe TR/MR  -failed mitral clip in May due to atrial perforation and cardiac tamponade  -TTE reviewed  -CT surgery consult appreciated; discussed with PA. Possibility of open heart surgery on 8/5.    5. Atrial Fibrillation  -rate controlled  -switch to therapeutic lovenox; possible OR on 8/5    6. Hyperthyroidism  -TSH 0.23; check free T3/T4  -continue methimazole    7. 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     1. Chest pain; ACS ruled out  -history of CAD s/p multiple stents  -TNI negative x 3  -continue aspirin, statin, ranexa and imdur  -TTE reviewed  -s/p cardiac cath today; new circumflex disease  -due to significant disease and valvular pathology; CTS to re-evaluate for open surgery  -cardiology recommendations appreciated    2. Chronic Systolic Heart Failure  -appears euvolemic  -continue lasix PO daily  -continue imdur and losartan  -strict I/os, daily weights  -TTE reviewed  -cardiology recommendations appreciated    3. DM  -HbA1c 8.1  -hold Metformin   -resume lantus 20 units at bedtime  -continue ISS    4. Severe TR/MR  -failed mitral clip in May due to atrial perforation and cardiac tamponade  -TTE reviewed  -CT surgery consult appreciated; discussed with PA. Possibility of open heart surgery on 8/5.    5. Atrial Fibrillation  -rate controlled  -switch to therapeutic lovenox; possible OR on 8/5    6. Hyperthyroidism  -TSH 0.23; check free T3/T4  -continue methimazole    7. HLD  -check lipid panel  -continue statin    DVT ppx - Lovenox SC    Dispo - CT surgery to determine if patient will be taken to the OR on 8/5 with Dr. Avila. Recommending to hold eliquis and start therapeutic Lovenox. At this time, patient to remain on medicine service per discussion with CT surgery PA.

## 2019-08-01 NOTE — DISCHARGE NOTE PROVIDER - INSTRUCTIONS
Choose lean meats and poultry without skin and prepare them without added saturated/trans fat. Choose fish at least twice a week, especially those high in omega-3 (salmon or trout). Select fat-free or low-fat dairy products. To lower cholesterol, reduce saturated fat to no more than 5 to 6 percent of total calories. For someone eating 2,000 calories a day, that’s about 13 grams of saturated fat.  Cut back on beverages and foods with added sugars.  Choose and prepare foods with little or no salt. To lower blood pressure, reducing daily intake of sodium to 1,500 mg is desirable because it can lower blood pressure.  If you drink alcohol, drink sparingly and NOT if you are taking narcotics for pain. Follow the American Heart Association recommendations when you eat out, and keep an eye on your portion sizes.

## 2019-08-01 NOTE — DISCHARGE NOTE PROVIDER - NSDCCPCAREPLAN_GEN_ALL_CORE_FT
PRINCIPAL DISCHARGE DIAGNOSIS  Diagnosis: CAD (coronary artery disease)  Assessment and Plan of Treatment: Take all medications as prescribed.  Follow up with Dr Oshea as outpatient for surgical planning.      SECONDARY DISCHARGE DIAGNOSES  Diagnosis: Chronic systolic congestive heart failure  Assessment and Plan of Treatment: You are being discharged with a diagnosis of heart failure. To prevent exacerbation of congestive heart failure (CHF) it is important to follow a heart-healthy, LOW SODIUM diet. You should read all food labels and keep your sodium intake less than 1500 mg per day. Examples of high sodium foods include fast food or processed food (ie frozen TV dinners), chinese food, soup and regular cold cuts. You should also restrict your fluid intake to less than 1-1.5 liters per day. Please weigh yourself every day at the same time in the morning and document your weight in a weight log. Please call your doctor right away if you gain over 2-3 pounds in one day, or you notice an increase in leg swelling, abdominal swelling or shortness of breath. Making sure you take all of your medications as prescribed and maintaining a normal blood pressure are also important for preventing a CHF exacerbation.    Diagnosis: Essential hypertension  Assessment and Plan of Treatment: Take all medications as prescribed.    Diagnosis: Chronic atrial fibrillation  Assessment and Plan of Treatment: Take all medications as prescribed.    Diagnosis: Type 2 diabetes mellitus without complication, with long-term current use of insulin  Assessment and Plan of Treatment: Take all medications as prescribed.

## 2019-08-01 NOTE — PROGRESS NOTE ADULT - ASSESSMENT
APatient s/p cardiac cath #5 Setswana sheath with failed MYNX closure    LM- normal.   LAD- ostial 50-70%.   Circumflex - ostial 805.   RCA- ostial 80%, proximal 705.     LVEDP 14 mmHg.     Patient with new circumflex disease.     Considering significant disease and valvular pathology. Plan for re-evaluation for open surgery.

## 2019-08-01 NOTE — DISCHARGE NOTE PROVIDER - CARE PROVIDERS DIRECT ADDRESSES
,[email or web address]

## 2019-08-01 NOTE — PROGRESS NOTE ADULT - SUBJECTIVE AND OBJECTIVE BOX
Patient s/p cardiac cath #5 Czech sheath with failed MYNX closure    LM- normal.   LAD- ostial 50-70%.   Circumflex - ostial 805.   RCA- ostial 80%, proximal 705.     LVEDP 14 mmHg.     Patient with new circumflex disease.     Considering significant disease and valvular pathology. Plan for re-evaluation for open surgery.     - will follow.     Seen post procedure by Dr Beckham with son at bedside  Tolerated procedure well w/o complications  CTS called by Dr Beckham  LM for Dr Kenny          REVIEW OF SYSTEMS:  Denies SOB, CP, NV, HA, dizziness, palpitations, site pain    PHYSICAL EXAM: A&Ox3 NAD Skin warm and dry  NEURO: Speech intact +gag +swallow Tongue midline BLANK  NECK: No JVD, trachea midline. Eupneic  HEART: RRR gr2/6 SM SR on tele  PULMONARY:  CTA shai  ABDOMEN: Soft nontender X4 +BS Vdg  EXTREMITIES:RFA site: No bleed, hematoma, pain, ecchymosis or swelling Rt DP/PT+ Prior surgery of Rt groin site with palpable scar tissue noted w/concurrent vascular access site

## 2019-08-02 DIAGNOSIS — I50.22 CHRONIC SYSTOLIC (CONGESTIVE) HEART FAILURE: ICD-10-CM

## 2019-08-02 LAB
ALBUMIN SERPL ELPH-MCNC: 4.3 G/DL — SIGNIFICANT CHANGE UP (ref 3.3–5.2)
ALP SERPL-CCNC: 83 U/L — SIGNIFICANT CHANGE UP (ref 40–120)
ALT FLD-CCNC: 19 U/L — SIGNIFICANT CHANGE UP
ANION GAP SERPL CALC-SCNC: 15 MMOL/L — SIGNIFICANT CHANGE UP (ref 5–17)
AST SERPL-CCNC: 20 U/L — SIGNIFICANT CHANGE UP
BASOPHILS # BLD AUTO: 0.06 K/UL — SIGNIFICANT CHANGE UP (ref 0–0.2)
BASOPHILS NFR BLD AUTO: 1 % — SIGNIFICANT CHANGE UP (ref 0–2)
BILIRUB SERPL-MCNC: 0.6 MG/DL — SIGNIFICANT CHANGE UP (ref 0.4–2)
BUN SERPL-MCNC: 29 MG/DL — HIGH (ref 8–20)
CALCIUM SERPL-MCNC: 9.3 MG/DL — SIGNIFICANT CHANGE UP (ref 8.6–10.2)
CHLORIDE SERPL-SCNC: 96 MMOL/L — LOW (ref 98–107)
CHOLEST SERPL-MCNC: 139 MG/DL — SIGNIFICANT CHANGE UP (ref 110–199)
CO2 SERPL-SCNC: 27 MMOL/L — SIGNIFICANT CHANGE UP (ref 22–29)
CREAT SERPL-MCNC: 1.02 MG/DL — SIGNIFICANT CHANGE UP (ref 0.5–1.3)
CULTURE RESULTS: NO GROWTH — SIGNIFICANT CHANGE UP
EOSINOPHIL # BLD AUTO: 0.05 K/UL — SIGNIFICANT CHANGE UP (ref 0–0.5)
EOSINOPHIL NFR BLD AUTO: 0.9 % — SIGNIFICANT CHANGE UP (ref 0–6)
GLUCOSE BLDC GLUCOMTR-MCNC: 136 MG/DL — HIGH (ref 70–99)
GLUCOSE BLDC GLUCOMTR-MCNC: 165 MG/DL — HIGH (ref 70–99)
GLUCOSE BLDC GLUCOMTR-MCNC: 218 MG/DL — HIGH (ref 70–99)
GLUCOSE SERPL-MCNC: 144 MG/DL — HIGH (ref 70–115)
HCT VFR BLD CALC: 34.4 % — LOW (ref 34.5–45)
HDLC SERPL-MCNC: 41 MG/DL — LOW
HGB BLD-MCNC: 11.4 G/DL — LOW (ref 11.5–15.5)
IMM GRANULOCYTES NFR BLD AUTO: 0.3 % — SIGNIFICANT CHANGE UP (ref 0–1.5)
LIPID PNL WITH DIRECT LDL SERPL: 77 MG/DL — SIGNIFICANT CHANGE UP
LYMPHOCYTES # BLD AUTO: 2.17 K/UL — SIGNIFICANT CHANGE UP (ref 1–3.3)
LYMPHOCYTES # BLD AUTO: 37.7 % — SIGNIFICANT CHANGE UP (ref 13–44)
MAGNESIUM SERPL-MCNC: 1.6 MG/DL — SIGNIFICANT CHANGE UP (ref 1.6–2.6)
MCHC RBC-ENTMCNC: 29.2 PG — SIGNIFICANT CHANGE UP (ref 27–34)
MCHC RBC-ENTMCNC: 33.1 GM/DL — SIGNIFICANT CHANGE UP (ref 32–36)
MCV RBC AUTO: 88.2 FL — SIGNIFICANT CHANGE UP (ref 80–100)
MONOCYTES # BLD AUTO: 0.54 K/UL — SIGNIFICANT CHANGE UP (ref 0–0.9)
MONOCYTES NFR BLD AUTO: 9.4 % — SIGNIFICANT CHANGE UP (ref 2–14)
NEUTROPHILS # BLD AUTO: 2.91 K/UL — SIGNIFICANT CHANGE UP (ref 1.8–7.4)
NEUTROPHILS NFR BLD AUTO: 50.7 % — SIGNIFICANT CHANGE UP (ref 43–77)
PHOSPHATE SERPL-MCNC: 4.6 MG/DL — SIGNIFICANT CHANGE UP (ref 2.4–4.7)
PLATELET # BLD AUTO: 236 K/UL — SIGNIFICANT CHANGE UP (ref 150–400)
POTASSIUM SERPL-MCNC: 3.8 MMOL/L — SIGNIFICANT CHANGE UP (ref 3.5–5.3)
POTASSIUM SERPL-SCNC: 3.8 MMOL/L — SIGNIFICANT CHANGE UP (ref 3.5–5.3)
PROT SERPL-MCNC: 7 G/DL — SIGNIFICANT CHANGE UP (ref 6.6–8.7)
RBC # BLD: 3.9 M/UL — SIGNIFICANT CHANGE UP (ref 3.8–5.2)
RBC # FLD: 18.2 % — HIGH (ref 10.3–14.5)
SODIUM SERPL-SCNC: 138 MMOL/L — SIGNIFICANT CHANGE UP (ref 135–145)
SPECIMEN SOURCE: SIGNIFICANT CHANGE UP
T3FREE SERPL-MCNC: 2.6 PG/ML — SIGNIFICANT CHANGE UP (ref 1.8–4.6)
T4 FREE SERPL-MCNC: 1.5 NG/DL — SIGNIFICANT CHANGE UP (ref 0.9–1.8)
TOTAL CHOLESTEROL/HDL RATIO MEASUREMENT: 3 RATIO — LOW (ref 3.3–7.1)
TRIGL SERPL-MCNC: 107 MG/DL — SIGNIFICANT CHANGE UP (ref 10–200)
WBC # BLD: 5.75 K/UL — SIGNIFICANT CHANGE UP (ref 3.8–10.5)
WBC # FLD AUTO: 5.75 K/UL — SIGNIFICANT CHANGE UP (ref 3.8–10.5)

## 2019-08-02 PROCEDURE — 93320 DOPPLER ECHO COMPLETE: CPT | Mod: 26

## 2019-08-02 PROCEDURE — 93312 ECHO TRANSESOPHAGEAL: CPT | Mod: 26

## 2019-08-02 PROCEDURE — 99231 SBSQ HOSP IP/OBS SF/LOW 25: CPT

## 2019-08-02 PROCEDURE — 93325 DOPPLER ECHO COLOR FLOW MAPG: CPT | Mod: 26

## 2019-08-02 RX ADMIN — Medication 0.12 MILLIGRAM(S): at 06:32

## 2019-08-02 RX ADMIN — AMIODARONE HYDROCHLORIDE 200 MILLIGRAM(S): 400 TABLET ORAL at 06:32

## 2019-08-02 RX ADMIN — ENOXAPARIN SODIUM 70 MILLIGRAM(S): 100 INJECTION SUBCUTANEOUS at 11:04

## 2019-08-02 RX ADMIN — PANTOPRAZOLE SODIUM 40 MILLIGRAM(S): 20 TABLET, DELAYED RELEASE ORAL at 06:32

## 2019-08-02 RX ADMIN — ISOSORBIDE MONONITRATE 60 MILLIGRAM(S): 60 TABLET, EXTENDED RELEASE ORAL at 20:10

## 2019-08-02 RX ADMIN — Medication 5 MILLIGRAM(S): at 06:32

## 2019-08-02 RX ADMIN — Medication 20 MILLIGRAM(S): at 20:09

## 2019-08-02 RX ADMIN — ENOXAPARIN SODIUM 70 MILLIGRAM(S): 100 INJECTION SUBCUTANEOUS at 21:39

## 2019-08-02 RX ADMIN — ATORVASTATIN CALCIUM 80 MILLIGRAM(S): 80 TABLET, FILM COATED ORAL at 21:39

## 2019-08-02 RX ADMIN — INSULIN GLARGINE 20 UNIT(S): 100 INJECTION, SOLUTION SUBCUTANEOUS at 21:39

## 2019-08-02 RX ADMIN — Medication 50 MILLIGRAM(S): at 06:32

## 2019-08-02 RX ADMIN — RANOLAZINE 500 MILLIGRAM(S): 500 TABLET, FILM COATED, EXTENDED RELEASE ORAL at 20:09

## 2019-08-02 RX ADMIN — Medication 81 MILLIGRAM(S): at 11:05

## 2019-08-02 RX ADMIN — Medication 20 MILLIGRAM(S): at 06:32

## 2019-08-02 RX ADMIN — RANOLAZINE 500 MILLIGRAM(S): 500 TABLET, FILM COATED, EXTENDED RELEASE ORAL at 06:32

## 2019-08-02 NOTE — PROGRESS NOTE ADULT - SUBJECTIVE AND OBJECTIVE BOX
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  Had LHC yesterday and elevated by CT surgery.      PMH  Afil fibrillation  Diabetes  Hypertension  Stented coronary artery  Type 2 diabetes mellitus without complication, with long-term current use of insulin  Coronary artery disease involving native coronary artery of native heart without angina pectoris  Non-rheumatic mitral regurgitation  History of appendectomy    REVIEW OF SYSTEMS  General: Denies fever, chills, pain, no discomfort  Respiratory and Thorax:  Denies cough, sob, or any discomfort  Cardiovascular:  Denies chest pain, palpitations or any discomfort	  Gastrointestinal:  Denies n/v/d, constipation, or any discomfort  Genitourinary:  Denies frequency, burning, or pain  Musculoskeletal:  Denies joint pain, swelling, or any discomfort   Neurological:  Denies headache, dizziness blurred vision, numbing or tingling  Psychiatric:  Denies sadness or depression  Hematology  Madi bleeding o swelling  	  MEDICATIONS:  amiodarone    Tablet 200 milliGRAM(s) Oral daily  digoxin     Tablet 0.125 milliGRAM(s) Oral daily  enalapril 5 milliGRAM(s) Oral daily  furosemide    Tablet 20 milliGRAM(s) Oral two times a day  isosorbide   mononitrate ER Tablet (IMDUR) 60 milliGRAM(s) Oral daily  metoprolol succinate ER 50 milliGRAM(s) Oral daily  ranolazine 500 milliGRAM(s) Oral two times a day  acetaminophen   Tablet .. 650 milliGRAM(s) Oral every 6 hours PRN  morphine  - Injectable 1 milliGRAM(s) IV Push every 4 hours PRN  pantoprazole    Tablet 40 milliGRAM(s) Oral before breakfast  atorvastatin 80 milliGRAM(s) Oral at bedtime  dextrose 40% Gel 15 Gram(s) Oral once PRN  dextrose 50% Injectable 12.5 Gram(s) IV Push once  dextrose 50% Injectable 25 Gram(s) IV Push once  dextrose 50% Injectable 25 Gram(s) IV Push once  glucagon  Injectable 1 milliGRAM(s) IntraMuscular once PRN  insulin glargine Injectable (LANTUS) 20 Unit(s) SubCutaneous at bedtime  insulin lispro (HumaLOG) corrective regimen sliding scale   SubCutaneous three times a day before meals  methimazole 10 milliGRAM(s) Oral daily  aspirin  chewable 81 milliGRAM(s) Oral daily  dextrose 5%. 1000 milliLiter(s) IV Continuous <Continuous>  enoxaparin Injectable 70 milliGRAM(s) SubCutaneous two times a day  sodium chloride 0.9%. 150 milliLiter(s) IV Continuous <Continuous>    PHYSICAL EXAM:  T(C): 37 (19 @ 05:14), Max: 37 (19 @ 05:14)  HR: 73 (19 @ 05:14) (73 - 94)  BP: 118/56 (19 @ 05:14) (110/63 - 130/76)  RR: 20 (19 @ 05:14) (20 - 20)  SpO2: 95% (19 @ 05:14) (95% - 99%)  I&O's Summary  01 Aug 2019 07:01  -  02 Aug 2019 07:00  --------------------------------------------------------  IN: 360 mL / OUT: 1350 mL / NET: -990 mL  Daily Weight in k.8 (02 Aug 2019 05:25)    Appearance: Normal	Exam via  [facility] [id number]  HEENT:   Normal oral mucosa, PERRL, EOMI	  Lymphatic: No lymphadenopathy  Psychiatry: A & O x 3, Mood & affect appropriate  Gastrointestinal:  Soft, Non-tender,	  Skin: No rashes, No ecchymoses, No cyanosis  Neurologic: Non-focal  Extremities: Normal range of motion, No clubbing, cyanosis or edema  Vascular: Peripheral pulses palpable 2+ bilaterally  Procedure Site:  Right groin:  No bleeding, no pain, no bruising, no hematoma, +PP, no edema.      TELEMETRY: 	 afib 70                         11.4   5.75  )-----------( 236      ( 02 Aug 2019 06:39 )             34.4     08-    138  |  96<L>  |  29.0<H>  ----------------------------<  144<H>  3.8   |  27.0  |  1.02    Ca    9.3      02 Aug 2019 06:39  Phos  4.6       Mg     1.6       TPro  7.0  /  Alb  4.3  /  TBili  0.6  /  DBili  x   /  AST  20  /  ALT  19  /  AlkPhos  83    proBNP: Serum Pro-Brain Natriuretic Peptide: 2380 pg/mL ( @ 13:17)  HgA1c: Hemoglobin A1C, Whole Blood: 8.1 % ( @ 13:17)

## 2019-08-02 NOTE — PHYSICAL THERAPY INITIAL EVALUATION ADULT - ADDITIONAL COMMENTS
Pt lives in a private home with her son, daughter in law and grandchildren. 3 steps to enter with handrails, 5 steps inside with handrails. Pt was independent PTA without assist device. Pt does not own DME.

## 2019-08-02 NOTE — PROGRESS NOTE ADULT - ASSESSMENT
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  Had LHC yesterday and elevated by CT surgery.      Patient s/p cardiac cath #5 Wol[place] sheath with failed MYNX closure  LM- normal.   LAD- ostial 50-70%.   Circumflex - ostial 805.   RCA- ostial 80%, proximal 705.   LVEDP 14 mmHg.   Patient with new circumflex disease.   With significant disease and valvular pathology, CT surgery consulted

## 2019-08-02 NOTE — PROGRESS NOTE ADULT - SUBJECTIVE AND OBJECTIVE BOX
[facility]                                                        Office: [address] [address]                                                       Telephone: [phone]. Fax: [phone]      CC: chest pain    INTERVAL HISTORY: no further symptoms.     MEDICATIONS  (STANDING):  amiodarone    Tablet 200 milliGRAM(s) Oral daily  aspirin  chewable 81 milliGRAM(s) Oral daily  atorvastatin 80 milliGRAM(s) Oral at bedtime  dextrose 5%. 1000 milliLiter(s) (50 mL/Hr) IV Continuous <Continuous>  dextrose 50% Injectable 12.5 Gram(s) IV Push once  dextrose 50% Injectable 25 Gram(s) IV Push once  dextrose 50% Injectable 25 Gram(s) IV Push once  digoxin     Tablet 0.125 milliGRAM(s) Oral daily  enalapril 5 milliGRAM(s) Oral daily  enoxaparin Injectable 70 milliGRAM(s) SubCutaneous two times a day  furosemide    Tablet 20 milliGRAM(s) Oral two times a day  insulin glargine Injectable (LANTUS) 20 Unit(s) SubCutaneous at bedtime  insulin lispro (HumaLOG) corrective regimen sliding scale   SubCutaneous three times a day before meals  isosorbide   mononitrate ER Tablet (IMDUR) 60 milliGRAM(s) Oral daily  methimazole 10 milliGRAM(s) Oral daily  metoprolol succinate ER 50 milliGRAM(s) Oral daily  pantoprazole    Tablet 40 milliGRAM(s) Oral before breakfast  ranolazine 500 milliGRAM(s) Oral two times a day  sodium chloride 0.9%. 150 milliLiter(s) (50 mL/Hr) IV Continuous <Continuous>    ROS: All others negative     PHYSICAL EXAM:  T(C): 36.6 (08-02-19 @ 15:53), Max: 37 (08-02-19 @ 05:14)  HR: 75 (08-02-19 @ 16:30) (73 - 81)  BP: 140/63 (08-02-19 @ 16:30) (98/53 - 140/63)  RR: 18 (08-02-19 @ 16:30) (18 - 20)  SpO2: 98% (08-02-19 @ 16:30) (95% - 100%)  Wt(kg): --  I&O's Summary    01 Aug 2019 07:01  -  02 Aug 2019 07:00  --------------------------------------------------------  IN: 360 mL / OUT: 1350 mL / NET: -990 mL      Appearance: Normal	  HEENT:   Normal oral mucosa, PERRL, EOMI	  Lymphatic: No lymphadenopathy  Cardiovascular: Normal S1 S2, No JVD, No murmurs, No edema  Respiratory: Lungs clear to auscultation	  Psychiatry: A & O x 3, Mood & affect appropriate  Gastrointestinal:  Soft, Non-tender, + BS	  Skin: No rashes, No ecchymoses, No cyanosis  Neurologic: Non-focal  Extremities: Normal range of motion, No clubbing, cyanosis or edema  Vascular: Peripheral pulses palpable 2+ bilaterally  	      LABS:	 	                        11.4   5.75  )-----------( 236      ( 02 Aug 2019 06:39 )             34.4     08-02    138  |  96<L>  |  29.0<H>  ----------------------------<  144<H>  3.8   |  27.0  |  1.02    Ca    9.3      02 Aug 2019 06:39  Phos  4.6     08-02  Mg     1.6     08-02    TPro  7.0  /  Alb  4.3  /  TBili  0.6  /  DBili  x   /  AST  20  /  ALT  19  /  AlkPhos  83  08-02

## 2019-08-02 NOTE — CHART NOTE - NSCHARTNOTEFT_GEN_A_CORE
URIEL procedure note.    Indication: Mitral Regurgitation.    URIEL procedure risks and benefits were discussed with the patient through the - Mr Muñoz, ID # [id number].    Informed consent obtained from the patient.    Patient tolerated the procedure well.     URIEL Findings:  1. Estimated LVEF 40-45%.  2. Biatrial enlargement  3. No Clot in NISSA  4. Moderate MR with tethered mitral leaflets  5. Moderate to Severe TR.  6. No pericardial effusion.    Refer to report for full details.    URIEL findings were informed to Dr Oshea and Dr Beckham.

## 2019-08-02 NOTE — PROGRESS NOTE ADULT - ASSESSMENT
79F h/o AF, severe MR, TR, HTN, HLD, chronic diastolic heart failure, DM, CAD s/p PCI, prior acute pericardial effusion d/t bleeding from mitral clip attempt which was aborted in May 2019. Patient presents again with continued complaints of SOB/CP for cath. Cath today revealed new ostial Cx lesion which will require a surgical evaluation.     Plan  Will discuss case with Dr Oshea who will evaluate patient for open heart surgery in the future   Plan for URIEL today   Preop testing in progress  DW Dr Oshea in am rounds

## 2019-08-02 NOTE — PROGRESS NOTE ADULT - SUBJECTIVE AND OBJECTIVE BOX
Subjective: pt in bed NAD no issues overnight     VITAL SIGNS  T(C): 36.6 (19 @ 15:53), Max: 37 (19 @ 05:14)  HR: 75 (19 @ 16:30) (73 - 94)  BP: 140/63 (19 @ 16:30) (98/53 - 140/63)  RR: 18 (19 @ 16:30) (18 - 20)  SpO2: 98% (19 @ 16:30) (95% - 100%) RA              Daily     Daily Weight in k.8 (02 Aug 2019 05:25)  Admit Wt: Drug Dosing Weight  Height (cm): 149.86 (2019 00:10)  Weight (kg): 68 (2019 00:10)    Telemetry: Afib   LVEF:   40-45%     MEDICATIONS  acetaminophen   Tablet .. 650 milliGRAM(s) Oral every 6 hours PRN  amiodarone    Tablet 200 milliGRAM(s) Oral daily  aspirin  chewable 81 milliGRAM(s) Oral daily  atorvastatin 80 milliGRAM(s) Oral at bedtime  dextrose 40% Gel 15 Gram(s) Oral once PRN  dextrose 5%. 1000 milliLiter(s) IV Continuous <Continuous>  dextrose 50% Injectable 12.5 Gram(s) IV Push once  dextrose 50% Injectable 25 Gram(s) IV Push once  dextrose 50% Injectable 25 Gram(s) IV Push once  digoxin     Tablet 0.125 milliGRAM(s) Oral daily  enalapril 5 milliGRAM(s) Oral daily  enoxaparin Injectable 70 milliGRAM(s) SubCutaneous two times a day  furosemide    Tablet 20 milliGRAM(s) Oral two times a day  glucagon  Injectable 1 milliGRAM(s) IntraMuscular once PRN  insulin glargine Injectable (LANTUS) 20 Unit(s) SubCutaneous at bedtime  insulin lispro (HumaLOG) corrective regimen sliding scale   SubCutaneous three times a day before meals  isosorbide   mononitrate ER Tablet (IMDUR) 60 milliGRAM(s) Oral daily  methimazole 10 milliGRAM(s) Oral daily  metoprolol succinate ER 50 milliGRAM(s) Oral daily  morphine  - Injectable 1 milliGRAM(s) IV Push every 4 hours PRN  pantoprazole    Tablet 40 milliGRAM(s) Oral before breakfast  ranolazine 500 milliGRAM(s) Oral two times a day  sodium chloride 0.9%. 150 milliLiter(s) IV Continuous <Continuous>    MEDICATIONS  (PRN):  acetaminophen   Tablet .. 650 milliGRAM(s) Oral every 6 hours PRN Temp greater or equal to 38C (100.4F), Mild Pain (1 - 3)  dextrose 40% Gel 15 Gram(s) Oral once PRN Blood Glucose LESS THAN 70 milliGRAM(s)/deciliter  glucagon  Injectable 1 milliGRAM(s) IntraMuscular once PRN Glucose LESS THAN 70 milligrams/deciliter  morphine  - Injectable 1 milliGRAM(s) IV Push every 4 hours PRN Severe Pain (7 - 10)        PHYSICAL EXAM  General: Alert Awake NAD   Respiratory:  Decreased at bases b/l   CV: Irreg Rate S1 S2   Abdomen:  Soft NT ND + BS   Extremities:  trace edema b/l LE       I&O's Detail    01 Aug 2019 07:01  -  02 Aug 2019 07:00  --------------------------------------------------------  IN:    Oral Fluid: 360 mL  Total IN: 360 mL    OUT:    Voided: 1350 mL  Total OUT: 1350 mL    Total NET: -990 mL          LABS      138  |  96<L>  |  29.0<H>  ----------------------------<  144<H>  3.8   |  27.0  |  1.02    Ca    9.3      02 Aug 2019 06:39  Phos  4.6       Mg     1.6         TPro  7.0  /  Alb  4.3  /  TBili  0.6  /  DBili  x   /  AST  20  /  ALT  19  /  AlkPhos  83                                   11.4   5.75  )-----------( 236      ( 02 Aug 2019 06:39 )             34.4          PT/INR - ( 01 Aug 2019 13:13 )   PT: 14.4 sec;   INR: 1.24 ratio         PTT - ( 01 Aug 2019 13:13 )  PTT:35.1 sec      LIVER FUNCTIONS - ( 02 Aug 2019 06:39 )  Alb: 4.3 g/dL / Pro: 7.0 g/dL / ALK PHOS: 83 U/L / ALT: 19 U/L / AST: 20 U/L / GGT: x              CAPILLARY BLOOD GLUCOSE      POCT Blood Glucose.: 165 mg/dL (02 Aug 2019 12:18)  POCT Blood Glucose.: 136 mg/dL (02 Aug 2019 08:17)  POCT Blood Glucose.: 128 mg/dL (01 Aug 2019 21:13)           Today's CXR:  < from: Xray Chest 1 View AP/PA. (19 @ 13:56) >  No focal consolidation.  No pleural effusions or pneumothorax.  There is cardiomegaly. Coronary arterial stents.  Atherosclerotic calcifications in the aorta.    IMPRESSION: No focal consolidation.    < end of copied text >      PAST MEDICAL & SURGICAL HISTORY:  Atrial fibrillation  Diabetes  Hypertension  Stented coronary artery  Coronary stent restenosis, sequela  Afib  Hypertension  History of appendectomy

## 2019-08-03 ENCOUNTER — TRANSCRIPTION ENCOUNTER (OUTPATIENT)
Age: 79
End: 2019-08-03

## 2019-08-03 VITALS
SYSTOLIC BLOOD PRESSURE: 97 MMHG | OXYGEN SATURATION: 100 % | TEMPERATURE: 98 F | RESPIRATION RATE: 18 BRPM | DIASTOLIC BLOOD PRESSURE: 60 MMHG | HEART RATE: 76 BPM

## 2019-08-03 LAB
ANION GAP SERPL CALC-SCNC: 13 MMOL/L — SIGNIFICANT CHANGE UP (ref 5–17)
BUN SERPL-MCNC: 33 MG/DL — HIGH (ref 8–20)
CALCIUM SERPL-MCNC: 9.6 MG/DL — SIGNIFICANT CHANGE UP (ref 8.6–10.2)
CHLORIDE SERPL-SCNC: 94 MMOL/L — LOW (ref 98–107)
CO2 SERPL-SCNC: 27 MMOL/L — SIGNIFICANT CHANGE UP (ref 22–29)
CREAT SERPL-MCNC: 1.16 MG/DL — SIGNIFICANT CHANGE UP (ref 0.5–1.3)
GLUCOSE BLDC GLUCOMTR-MCNC: 114 MG/DL — HIGH (ref 70–99)
GLUCOSE BLDC GLUCOMTR-MCNC: 154 MG/DL — HIGH (ref 70–99)
GLUCOSE BLDC GLUCOMTR-MCNC: 217 MG/DL — HIGH (ref 70–99)
GLUCOSE SERPL-MCNC: 144 MG/DL — HIGH (ref 70–115)
HCT VFR BLD CALC: 33.8 % — LOW (ref 34.5–45)
HGB BLD-MCNC: 11.1 G/DL — LOW (ref 11.5–15.5)
MAGNESIUM SERPL-MCNC: 1.6 MG/DL — SIGNIFICANT CHANGE UP (ref 1.6–2.6)
MCHC RBC-ENTMCNC: 29.2 PG — SIGNIFICANT CHANGE UP (ref 27–34)
MCHC RBC-ENTMCNC: 32.8 GM/DL — SIGNIFICANT CHANGE UP (ref 32–36)
MCV RBC AUTO: 88.9 FL — SIGNIFICANT CHANGE UP (ref 80–100)
PHOSPHATE SERPL-MCNC: 4.5 MG/DL — SIGNIFICANT CHANGE UP (ref 2.4–4.7)
PLATELET # BLD AUTO: 225 K/UL — SIGNIFICANT CHANGE UP (ref 150–400)
POTASSIUM SERPL-MCNC: 3.8 MMOL/L — SIGNIFICANT CHANGE UP (ref 3.5–5.3)
POTASSIUM SERPL-SCNC: 3.8 MMOL/L — SIGNIFICANT CHANGE UP (ref 3.5–5.3)
RBC # BLD: 3.8 M/UL — SIGNIFICANT CHANGE UP (ref 3.8–5.2)
RBC # FLD: 18.1 % — HIGH (ref 10.3–14.5)
SODIUM SERPL-SCNC: 134 MMOL/L — LOW (ref 135–145)
WBC # BLD: 5.56 K/UL — SIGNIFICANT CHANGE UP (ref 3.8–10.5)
WBC # FLD AUTO: 5.56 K/UL — SIGNIFICANT CHANGE UP (ref 3.8–10.5)

## 2019-08-03 PROCEDURE — 99238 HOSP IP/OBS DSCHRG MGMT 30/<: CPT

## 2019-08-03 PROCEDURE — 71045 X-RAY EXAM CHEST 1 VIEW: CPT | Mod: 26

## 2019-08-03 RX ORDER — ISOSORBIDE MONONITRATE 60 MG/1
1 TABLET, EXTENDED RELEASE ORAL
Qty: 0 | Refills: 0 | DISCHARGE

## 2019-08-03 RX ORDER — RANOLAZINE 500 MG/1
1 TABLET, FILM COATED, EXTENDED RELEASE ORAL
Qty: 60 | Refills: 1
Start: 2019-08-03 | End: 2019-10-01

## 2019-08-03 RX ORDER — DIGOXIN 250 MCG
1 TABLET ORAL
Qty: 30 | Refills: 1
Start: 2019-08-03 | End: 2019-10-01

## 2019-08-03 RX ORDER — INSULIN GLARGINE 100 [IU]/ML
20 INJECTION, SOLUTION SUBCUTANEOUS
Qty: 5 | Refills: 1
Start: 2019-08-03 | End: 2019-10-01

## 2019-08-03 RX ORDER — INSULIN GLARGINE 100 [IU]/ML
20 INJECTION, SOLUTION SUBCUTANEOUS
Qty: 0 | Refills: 0 | DISCHARGE

## 2019-08-03 RX ORDER — FUROSEMIDE 40 MG
1 TABLET ORAL
Qty: 0 | Refills: 0 | DISCHARGE

## 2019-08-03 RX ORDER — ACETAMINOPHEN 500 MG
2 TABLET ORAL
Qty: 0 | Refills: 0 | DISCHARGE
Start: 2019-08-03

## 2019-08-03 RX ORDER — ATORVASTATIN CALCIUM 80 MG/1
1 TABLET, FILM COATED ORAL
Qty: 30 | Refills: 1
Start: 2019-08-03 | End: 2019-10-01

## 2019-08-03 RX ORDER — ISOSORBIDE MONONITRATE 60 MG/1
1 TABLET, EXTENDED RELEASE ORAL
Qty: 30 | Refills: 1
Start: 2019-08-03 | End: 2019-10-01

## 2019-08-03 RX ORDER — FUROSEMIDE 40 MG
20 TABLET ORAL DAILY
Refills: 0 | Status: DISCONTINUED | OUTPATIENT
Start: 2019-08-03 | End: 2019-08-03

## 2019-08-03 RX ORDER — POTASSIUM CHLORIDE 20 MEQ
40 PACKET (EA) ORAL ONCE
Refills: 0 | Status: COMPLETED | OUTPATIENT
Start: 2019-08-03 | End: 2019-08-03

## 2019-08-03 RX ORDER — APIXABAN 2.5 MG/1
1 TABLET, FILM COATED ORAL
Qty: 60 | Refills: 1
Start: 2019-08-03 | End: 2019-10-01

## 2019-08-03 RX ORDER — ASPIRIN/CALCIUM CARB/MAGNESIUM 324 MG
1 TABLET ORAL
Qty: 30 | Refills: 1
Start: 2019-08-03 | End: 2019-10-01

## 2019-08-03 RX ORDER — AMIODARONE HYDROCHLORIDE 400 MG/1
1 TABLET ORAL
Qty: 30 | Refills: 1
Start: 2019-08-03 | End: 2019-10-01

## 2019-08-03 RX ORDER — FUROSEMIDE 40 MG
1 TABLET ORAL
Qty: 30 | Refills: 0
Start: 2019-08-03 | End: 2019-09-01

## 2019-08-03 RX ORDER — METFORMIN HYDROCHLORIDE 850 MG/1
1 TABLET ORAL
Qty: 0 | Refills: 0 | DISCHARGE

## 2019-08-03 RX ORDER — MAGNESIUM SULFATE 500 MG/ML
2 VIAL (ML) INJECTION ONCE
Refills: 0 | Status: COMPLETED | OUTPATIENT
Start: 2019-08-03 | End: 2019-08-03

## 2019-08-03 RX ORDER — METHIMAZOLE 10 MG/1
1 TABLET ORAL
Qty: 30 | Refills: 1
Start: 2019-08-03 | End: 2019-10-01

## 2019-08-03 RX ORDER — METOPROLOL TARTRATE 50 MG
1 TABLET ORAL
Qty: 30 | Refills: 1
Start: 2019-08-03 | End: 2019-10-01

## 2019-08-03 RX ADMIN — Medication 20 MILLIGRAM(S): at 05:23

## 2019-08-03 RX ADMIN — Medication 50 MILLIGRAM(S): at 05:23

## 2019-08-03 RX ADMIN — ENOXAPARIN SODIUM 70 MILLIGRAM(S): 100 INJECTION SUBCUTANEOUS at 11:37

## 2019-08-03 RX ADMIN — RANOLAZINE 500 MILLIGRAM(S): 500 TABLET, FILM COATED, EXTENDED RELEASE ORAL at 05:23

## 2019-08-03 RX ADMIN — ISOSORBIDE MONONITRATE 60 MILLIGRAM(S): 60 TABLET, EXTENDED RELEASE ORAL at 11:37

## 2019-08-03 RX ADMIN — AMIODARONE HYDROCHLORIDE 200 MILLIGRAM(S): 400 TABLET ORAL at 05:23

## 2019-08-03 RX ADMIN — Medication 5 MILLIGRAM(S): at 05:23

## 2019-08-03 RX ADMIN — Medication 2: at 12:56

## 2019-08-03 RX ADMIN — Medication 0.12 MILLIGRAM(S): at 05:23

## 2019-08-03 RX ADMIN — PANTOPRAZOLE SODIUM 40 MILLIGRAM(S): 20 TABLET, DELAYED RELEASE ORAL at 05:23

## 2019-08-03 RX ADMIN — Medication 81 MILLIGRAM(S): at 11:37

## 2019-08-03 RX ADMIN — Medication 50 GRAM(S): at 11:37

## 2019-08-03 RX ADMIN — Medication 40 MILLIEQUIVALENT(S): at 11:37

## 2019-08-03 RX ADMIN — Medication 1: at 17:34

## 2019-08-03 NOTE — PROGRESS NOTE ADULT - PROBLEM SELECTOR PROBLEM 2
Coronary artery disease involving native coronary artery of native heart without angina pectoris
Chronic systolic congestive heart failure
Coronary artery disease involving native coronary artery of native heart without angina pectoris

## 2019-08-03 NOTE — PROGRESS NOTE ADULT - PROBLEM SELECTOR PROBLEM 1
Coronary artery disease involving native coronary artery of native heart without angina pectoris
Non-rheumatic mitral regurgitation
Non-rheumatic mitral regurgitation

## 2019-08-03 NOTE — PROGRESS NOTE ADULT - ASSESSMENT
79F h/o AF, severe MR, TR, HTN, HLD, chronic diastolic heart failure, DM, CAD s/p PCI, prior acute pericardial effusion d/t bleeding from mitral clip attempt which was aborted in May 2019. Patient presents again with continued complaints of SOB/CP for cath. Cath today revealed new ostial Cx lesion which will require a surgical evaluation. URIEL: moderate MR.    Plan  Supplement electrolytes today  Discussed with Dr Oshea: patient does appear to be a good candidate for open heart surgery but will benefit from a recovery at home instead of waiting for availability in the OR schedule inpatient.   Will plan to discharge today and return as outpatient to see Dr Oshea in the office to discuss surgical options and timing. Discussed with patient's son Chava by phone.

## 2019-08-03 NOTE — DISCHARGE NOTE NURSING/CASE MANAGEMENT/SOCIAL WORK - NSDCDPATPORTLINK_GEN_ALL_CORE
You can access the [facility], offered by [facility], by registering with the following website: [email or web address] + [email or web address]

## 2019-08-03 NOTE — PROGRESS NOTE ADULT - PROBLEM SELECTOR PROBLEM 3
Type 2 diabetes mellitus without complication, with long-term current use of insulin
Chronic atrial fibrillation
Type 2 diabetes mellitus without complication, with long-term current use of insulin

## 2019-08-03 NOTE — PROGRESS NOTE ADULT - PROBLEM SELECTOR PLAN 1
On aspirin.  SEvere 2 vessel CAD.  CT surgery team to make final decision regarding treatment timing.   No further general cardiology workup/management.
Afterload reduction with Metoprolol and Enalapril  For possible eventual MV repair

## 2019-08-03 NOTE — PROGRESS NOTE ADULT - PROBLEM/PLAN-1
Date of Service: 05/02/2019    SUBJECTIVE:  The patient is a 72-year-old female who presents for Medicare wellness visit.  Her hearing is good to soft speech at 3 feet.  Memory and cognitive screen is normal.  She worries about occasional word-finding difficulty, but it has not been a marked problem.  She has a strong family history of Alzheimer's in her family in an elderly age and it is a concern of hers.    The patient's depression screen is negative, but she has significant concerns about her 's illnesses.  He had been found to have abnormal cells in his urothelial tract and the patient has been hesitant to do any further followup.  They have gone by the [facility], but did not go in, and she has been quite concerned about that.    The patient has been bothered by aching discomfort, particularly at the right knee.  The patient received injections by Dr. Leach, had done well, but she was out gardening and now notes more aching discomfort over the lateral aspect of the knee.  She also has occasional pains on the outside aspect of the left hip.    The patient's bone densitometry showed osteopenia in 05/2018, a 2-4 year followup may be recommended.  The patient had a mammogram that was normal in 05/2018.  She had a reassuring ultrasound of the abdomen.  Her CT uro-scan had been unremarkable.  She has a history of multinodular thyroid, had been stable in 11/2017 and no further workup had been recommended at that time.    The patient remains on her Maxzide daily, Vitamin D and Flaxseed Oil.  Her weight is down approximately a pound.  She is a candidate for a second shingles vaccine.  She has had the first already.  Her last TTP had been done in 2015.    CURRENT MEDICATIONS:  The patient's medications were reviewed and updated in the chart.     ALLERGIES:  Reviewed and updated in the chart.     PAST FAMILY, MEDICAL AND SOCIAL HISTORY:  Reviewed and updated in the chart.     REVIEW OF SYSTEMS:  The patient 
notes no new cardiovascular, gastrointestinal or urinary, neurologic symptoms.  Patient has had no history of significant night sweats, fever, weight loss.  The patient has had no marked difficulty with swallowing, cough, wheezing or shortness of breath.  Patient has had no significant nausea, vomiting, diarrhea or painful urination.  The patient notes no joint swelling, stiffness or pain, except as noted above.  The patient has not had any new significant feelings of depression or anxiety, except as noted above.  All other systems were reviewed and were negative except as noted above.       PHYSICAL EXAMINATION:  VITAL SIGNS:  Reviewed and included in the chart.  GENERAL APPEARANCE:  The patient is alert and oriented x3.  Affect is appropriate.  SKIN:  No significant rash or lesions.  HEENT:  Pupils were equal, round and react to light.  Extraocular movements are intact.  Tympanic membranes are gray with normal landmarks.  Hearing is grossly normal.  Oral cavity and pharynx have no significant abnormality of the gums or posterior pharynx.  No significant tonsillar enlargement.  NECK:  No thyromegaly or masses.  No lymphadenopathy.  No bruits.  Supple.  CHEST:  Clear to auscultation.  No rales or wheezing appreciated.  Respiratory effort was normal.  BACK:  Without any deformity or tenderness.  CARDIAC:  Regular rhythm with a normal S1 and S2.  No S3, S4 or significant murmur was appreciated.    ABDOMEN:  No hepatosplenomegaly.  No mass or tenderness.  No abnormal pulsation or bruit.  No hernia was appreciated.  EXTREMITIES:  No clubbing, cyanosis or edema.    LABORATORY AND DIAGNOSTIC:   Her last laboratory testing, in February. Her TSH was normal.  Urinalysis unremarkable.  CBC, normal, chemistry panel reassuring, and cholesterol was 213 with an HDL of 60.    ASSESSMENT AND PLAN:  The patient with stable Medicare wellness examination.  She will follow up in 6 months with a new provider.  History of hypertension, 
hyperlipidemia, history of a multinodular thyroid.    Usual anticipatory guidance was given.      She will follow up with Dr. Leach regarding her knee.  Her other care providers include Dr. Butler regarding hematuria, she sees Dr. Ann from OB/GYN, and Dr. Galan regarding regular eye care.      Dictated By: Rodger Owens MD  Signing Provider: MD CLEVE Falcon/EO ([id number])  DD: 05/02/2019 15:07:45 TD: 05/03/2019 12:23:04    Copy Sent To:     
DISPLAY PLAN FREE TEXT
DISPLAY PLAN FREE TEXT

## 2019-08-03 NOTE — PROGRESS NOTE ADULT - SUBJECTIVE AND OBJECTIVE BOX
Subjective: "I feel ok, thank god" Patient seen and examined with Upper sorbian  via ipad language services. Denies CP, SOB.     VITAL SIGNS  Vital Signs Last 24 Hrs  T(C): 36.6 (19 @ 10:45), Max: 36.9 (19 @ 05:20)  T(F): 97.8 (19 @ 10:45), Max: 98.4 (19 @ 05:20)  HR: 74 (19 @ 11:46) (68 - 85)  BP: 104/65 (19 @ 10:45) (100/66 - 140/63)  RR: 18 (19 @ 11:46) (16 - 18)  SpO2: 98% (19 @ 11:46) (97% - 100%)  on RA              Telemetry/Alarms:  AF 70s  LVEF: 40-45%    MEDICATIONS  acetaminophen   Tablet .. 650 milliGRAM(s) Oral every 6 hours PRN  amiodarone    Tablet 200 milliGRAM(s) Oral daily  aspirin  chewable 81 milliGRAM(s) Oral daily  atorvastatin 80 milliGRAM(s) Oral at bedtime  dextrose 40% Gel 15 Gram(s) Oral once PRN  dextrose 50% Injectable 12.5 Gram(s) IV Push once  dextrose 50% Injectable 25 Gram(s) IV Push once  dextrose 50% Injectable 25 Gram(s) IV Push once  digoxin     Tablet 0.125 milliGRAM(s) Oral daily  enalapril 5 milliGRAM(s) Oral daily  enoxaparin Injectable 70 milliGRAM(s) SubCutaneous two times a day  furosemide    Tablet 20 milliGRAM(s) Oral two times a day  glucagon  Injectable 1 milliGRAM(s) IntraMuscular once PRN  insulin glargine Injectable (LANTUS) 20 Unit(s) SubCutaneous at bedtime  insulin lispro (HumaLOG) corrective regimen sliding scale   SubCutaneous three times a day before meals  isosorbide   mononitrate ER Tablet (IMDUR) 60 milliGRAM(s) Oral daily  methimazole 10 milliGRAM(s) Oral daily  metoprolol succinate ER 50 milliGRAM(s) Oral daily  morphine  - Injectable 1 milliGRAM(s) IV Push every 4 hours PRN  pantoprazole    Tablet 40 milliGRAM(s) Oral before breakfast  ranolazine 500 milliGRAM(s) Oral two times a day      PHYSICAL EXAM  General: well nourished, well developed, no acute distress  Neurology: alert and oriented x 3, nonfocal, no gross deficits  Respiratory: clear to auscultation bilaterally  CV: regular rate and rhythm, normal S1, S2, faint murmur  Abdomen: soft, nontender, nondistended, positive bowel sounds  Extremities: warm, well perfused. no edema. + DP pulses       @ 07:01  -   @ 15:20  --------------------------------------------------------  IN: 50 mL / OUT: 0 mL / NET: 50 mL        Weights:  Daily     Daily Weight in k.2 (03 Aug 2019 05:30)  Admit Wt: Drug Dosing Weight  Height (cm): 149.86 (2019 00:10)  Weight (kg): 68 (2019 00:10)  BMI (kg/m2): 30.3 (2019 00:10)  BSA (m2): 1.63 (2019 00:10)    LABS      134<L>  |  94<L>  |  33.0<H>  ----------------------------<  144<H>  3.8   |  27.0  |  1.16    Ca    9.6      03 Aug 2019 06:16  Phos  4.5       Mg     1.6         TPro  7.0  /  Alb  4.3  /  TBili  0.6  /  DBili  x   /  AST  20  /  ALT  19  /  AlkPhos  83                                   11.1   5.56  )-----------( 225      ( 03 Aug 2019 06:16 )             33.8              Prealbumin, Serum: 22 mg/dL ( @ 13:17)    Hemoglobin A1C, Whole Blood: 8.1 % ( @ 13:17)  Hemoglobin A1C, Whole Blood: 7.9 % ( @ 06:05)      Glucoses:   CAPILLARY BLOOD GLUCOSE      POCT Blood Glucose.: 217 mg/dL (03 Aug 2019 12:15)  POCT Blood Glucose.: 114 mg/dL (03 Aug 2019 08:18)  POCT Blood Glucose.: 218 mg/dL (02 Aug 2019 21:10)      Last CXR:   < from: Xray Chest 1 View- PORTABLE-Routine (19 @ 05:37) >    FINDINGS:  Prior study dated 2019 was available for review.    The lungs are clear.  No pleural abnormality is seen.      The heart is prominent in size.  Calcific tendinosis of the left shoulder   is noted.      IMPRESSION: No gross consolidation is seen. Prominent cardiac silhouette.    < end of copied text >    Last EKG: < from: 12 Lead ECG (19 @ 08:43) >    Diagnosis Line Atrial fibrillation  Nonspecific T wave abnormality  Prolonged QT    < end of copied text >      Echo: < from: TTE Echo Complete w/Doppler (19 @ 11:40) >      Summary:   1. Left ventricular ejection fraction, by visual estimation, is 40 to   45%.   2. Moderately decreased global left ventricular systolic function.   3. Basal and mid inferolateral wall and basal and mid inferior wall are   abnormal as described   4. Elevated mean left atrial pressure.   5. The mitral in-flow pattern reveals no discernable A-wave, therefore   no comment on diastolic function can be made.   6. The right ventricular size is mildly enlarged. RV systolic function   is low normal.   7. Moderately enlarged left atrium.   8. Moderately enlarged right atrium.   9. Sclerotic aortic valve with normal opening.  10. Thickening of the anterior and posterior mitral valve leaflets.  11. Moderate mitral valve regurgitation.  12. Mild to moderate mitral annular calcification.  13. Moderate tricuspid regurgitation.  14. Estimated pulmonary artery systolic pressure is 39.8 mmHg assuming a   right atrial pressure of 8 mmHg, which is consistent with borderline   pulmonary hypertension.  15. There is no evidence of pericardial effusion.    < end of copied text >      Cath: < from: Cardiac Cath Lab - Adult (19 @ 08:55) >  VENTRICLES: No LV gram was performed; however, a recent echocardiogram  demonstrated an EF of 40 %.  CORONARY VESSELS: The coronary circulation is co-dominant.  LM:   --  LM: Normal.  LAD:   --  Ostial LAD: There was a 40 % stenosis.  --  Proximal LAD: There was a 0 % stenosis in-stent.  CX:   --  Ostial circumflex: There was a 80 % stenosis.  RCA:   --  Ostial RCA: There was a 80 % stenosis.  --  Proximal RCA: There was a 70 % stenosis in-stent.  COMPLICATIONS: No complications occurred during the cath lab visit.    < end of copied text >    Carotids: < from: US Duplex Carotid Arteries Complete, Bilateral (19 @ 16:05) >    IMPRESSION: Moderate plaque without a hemodynamic abnormality.    < end of copied text >      PAST MEDICAL & SURGICAL HISTORY:  Atrial fibrillation  Diabetes  Hypertension  Stented coronary artery  Coronary stent restenosis, sequela  Afib  Hypertension  History of appendectomy

## 2019-08-14 ENCOUNTER — APPOINTMENT (OUTPATIENT)
Dept: CARDIOTHORACIC SURGERY | Facility: CLINIC | Age: 79
End: 2019-08-14
Payer: MEDICARE

## 2019-08-14 PROCEDURE — 99214 OFFICE O/P EST MOD 30 MIN: CPT

## 2019-08-14 NOTE — PHYSICAL EXAM
[Sclera] : the sclera and conjunctiva were normal [Extraocular Movements] : extraocular movements were intact [Neck Appearance] : the appearance of the neck was normal [] : no respiratory distress [Apical Impulse] : the apical impulse was normal [Heart Sounds] : normal S1 and S2 [FreeTextEntry1] : 5/6 BARBIE [Examination Of The Chest] : the chest was normal in appearance [Bowel Sounds] : normal bowel sounds [No CVA Tenderness] : no ~M costovertebral angle tenderness [Abnormal Walk] : normal gait [Skin Color & Pigmentation] : normal skin color and pigmentation [No Focal Deficits] : no focal deficits [Oriented To Time, Place, And Person] : oriented to person, place, and time

## 2019-08-14 NOTE — CONSULT LETTER
[Please see my note below.] : Please see my note below. [Consult Closing:] : Thank you very much for allowing me to participate in the care of this patient.  If you have any questions, please do not hesitate to contact me. [Sincerely,] : Sincerely, [FreeTextEntry3] : Colton Fuller MD\par Chief, Cardiovascular Surgery at [facility]\par System Director of Surgical Heart Failure\par Professor, Cardiothoracic Surgery, [employer]\par

## 2019-08-14 NOTE — ASSESSMENT
[FreeTextEntry1] : Very pleasant 79-year-old lady with severe mitral regurgitation. The patient and had an attempt at a mitral clip which was complicated by development of a pericardial effusion. Currently the patient remains very symptomatic, with shortness of breath on minimal exertion, as well as chest discomfort. She also had an angiogram in August that reveals coronary artery disease. Because the patient is not a candidate for a mitral clip, I have been asked to evaluate the patient for open-heart surgery. Given the patient's age, and poor functional condition, I feel that she is at higher risk for surgery. However because there are no other options, and she remains very symptomatic, and finally has agreed to proceed with surgery. We will schedule her for surgery within the next few weeks. Thank you for the opportunity to participate in the care of your patient.

## 2019-08-14 NOTE — HISTORY OF PRESENT ILLNESS
[FreeTextEntry1] : This is a 78 year old female who presents today for evaluation of her mitral valve regurgitation which was noted to be severe on echo with EF estimated at 54% on 12/19/18.  \par \par She was here in April for Mitral Clip Surgery which was unsuccessful at that time.  She now presents with her son with complaints of fatigue, occasional shortness of breath, and forgetfulness/ short term memory loss (according to her son).  \par \par Cardiac Cath was performed on 8/1/19 revealing new ostial RCA lesion 80% and 70% Prox RCA.

## 2019-08-26 ENCOUNTER — OUTPATIENT (OUTPATIENT)
Dept: OUTPATIENT SERVICES | Facility: HOSPITAL | Age: 79
LOS: 1 days | End: 2019-08-26
Payer: MEDICAID

## 2019-08-26 VITALS
WEIGHT: 136.25 LBS | SYSTOLIC BLOOD PRESSURE: 122 MMHG | TEMPERATURE: 99 F | HEIGHT: 60 IN | HEART RATE: 89 BPM | DIASTOLIC BLOOD PRESSURE: 76 MMHG | RESPIRATION RATE: 20 BRPM

## 2019-08-26 DIAGNOSIS — I34.0 NONRHEUMATIC MITRAL (VALVE) INSUFFICIENCY: ICD-10-CM

## 2019-08-26 DIAGNOSIS — I25.10 ATHEROSCLEROTIC HEART DISEASE OF NATIVE CORONARY ARTERY WITHOUT ANGINA PECTORIS: ICD-10-CM

## 2019-08-26 DIAGNOSIS — H26.9 UNSPECIFIED CATARACT: Chronic | ICD-10-CM

## 2019-08-26 DIAGNOSIS — Z86.79 PERSONAL HISTORY OF OTHER DISEASES OF THE CIRCULATORY SYSTEM: ICD-10-CM

## 2019-08-26 DIAGNOSIS — Z90.49 ACQUIRED ABSENCE OF OTHER SPECIFIED PARTS OF DIGESTIVE TRACT: Chronic | ICD-10-CM

## 2019-08-26 DIAGNOSIS — Z01.818 ENCOUNTER FOR OTHER PREPROCEDURAL EXAMINATION: ICD-10-CM

## 2019-08-26 DIAGNOSIS — Z98.61 CORONARY ANGIOPLASTY STATUS: Chronic | ICD-10-CM

## 2019-08-26 DIAGNOSIS — Z98.890 OTHER SPECIFIED POSTPROCEDURAL STATES: Chronic | ICD-10-CM

## 2019-08-26 DIAGNOSIS — E11.9 TYPE 2 DIABETES MELLITUS WITHOUT COMPLICATIONS: ICD-10-CM

## 2019-08-26 DIAGNOSIS — Z29.9 ENCOUNTER FOR PROPHYLACTIC MEASURES, UNSPECIFIED: ICD-10-CM

## 2019-08-26 LAB
ALBUMIN SERPL ELPH-MCNC: 4.6 G/DL — SIGNIFICANT CHANGE UP (ref 3.3–5.2)
ALP SERPL-CCNC: 112 U/L — SIGNIFICANT CHANGE UP (ref 40–120)
ALT FLD-CCNC: 26 U/L — SIGNIFICANT CHANGE UP
ANION GAP SERPL CALC-SCNC: 11 MMOL/L — SIGNIFICANT CHANGE UP (ref 5–17)
APPEARANCE UR: CLEAR — SIGNIFICANT CHANGE UP
APTT BLD: 36.5 SEC — HIGH (ref 27.5–36.3)
AST SERPL-CCNC: 21 U/L — SIGNIFICANT CHANGE UP
BACTERIA # UR AUTO: ABNORMAL
BASOPHILS # BLD AUTO: 0.08 K/UL — SIGNIFICANT CHANGE UP (ref 0–0.2)
BASOPHILS NFR BLD AUTO: 1.5 % — SIGNIFICANT CHANGE UP (ref 0–2)
BILIRUB SERPL-MCNC: 0.4 MG/DL — SIGNIFICANT CHANGE UP (ref 0.4–2)
BILIRUB UR-MCNC: NEGATIVE — SIGNIFICANT CHANGE UP
BLD GP AB SCN SERPL QL: SIGNIFICANT CHANGE UP
BUN SERPL-MCNC: 23 MG/DL — HIGH (ref 8–20)
CALCIUM SERPL-MCNC: 9.7 MG/DL — SIGNIFICANT CHANGE UP (ref 8.6–10.2)
CHLORIDE SERPL-SCNC: 98 MMOL/L — SIGNIFICANT CHANGE UP (ref 98–107)
CO2 SERPL-SCNC: 28 MMOL/L — SIGNIFICANT CHANGE UP (ref 22–29)
COLOR SPEC: YELLOW — SIGNIFICANT CHANGE UP
CREAT SERPL-MCNC: 0.91 MG/DL — SIGNIFICANT CHANGE UP (ref 0.5–1.3)
DIFF PNL FLD: ABNORMAL
EOSINOPHIL # BLD AUTO: 0.04 K/UL — SIGNIFICANT CHANGE UP (ref 0–0.5)
EOSINOPHIL NFR BLD AUTO: 0.7 % — SIGNIFICANT CHANGE UP (ref 0–6)
EPI CELLS # UR: SIGNIFICANT CHANGE UP
GLUCOSE SERPL-MCNC: 225 MG/DL — HIGH (ref 70–115)
GLUCOSE UR QL: NEGATIVE MG/DL — SIGNIFICANT CHANGE UP
HBA1C BLD-MCNC: 7.7 % — HIGH (ref 4–5.6)
HCT VFR BLD CALC: 35.3 % — SIGNIFICANT CHANGE UP (ref 34.5–45)
HGB BLD-MCNC: 11.4 G/DL — LOW (ref 11.5–15.5)
IMM GRANULOCYTES NFR BLD AUTO: 0.4 % — SIGNIFICANT CHANGE UP (ref 0–1.5)
INR BLD: 1.69 RATIO — HIGH (ref 0.88–1.16)
KETONES UR-MCNC: NEGATIVE — SIGNIFICANT CHANGE UP
LEUKOCYTE ESTERASE UR-ACNC: ABNORMAL
LYMPHOCYTES # BLD AUTO: 2.05 K/UL — SIGNIFICANT CHANGE UP (ref 1–3.3)
LYMPHOCYTES # BLD AUTO: 38.3 % — SIGNIFICANT CHANGE UP (ref 13–44)
MCHC RBC-ENTMCNC: 29.8 PG — SIGNIFICANT CHANGE UP (ref 27–34)
MCHC RBC-ENTMCNC: 32.3 GM/DL — SIGNIFICANT CHANGE UP (ref 32–36)
MCV RBC AUTO: 92.4 FL — SIGNIFICANT CHANGE UP (ref 80–100)
MONOCYTES # BLD AUTO: 0.42 K/UL — SIGNIFICANT CHANGE UP (ref 0–0.9)
MONOCYTES NFR BLD AUTO: 7.9 % — SIGNIFICANT CHANGE UP (ref 2–14)
MRSA PCR RESULT.: SIGNIFICANT CHANGE UP
NEUTROPHILS # BLD AUTO: 2.74 K/UL — SIGNIFICANT CHANGE UP (ref 1.8–7.4)
NEUTROPHILS NFR BLD AUTO: 51.2 % — SIGNIFICANT CHANGE UP (ref 43–77)
NITRITE UR-MCNC: NEGATIVE — SIGNIFICANT CHANGE UP
NT-PROBNP SERPL-SCNC: 1578 PG/ML — HIGH (ref 0–300)
PH UR: 6 — SIGNIFICANT CHANGE UP (ref 5–8)
PLATELET # BLD AUTO: 249 K/UL — SIGNIFICANT CHANGE UP (ref 150–400)
POTASSIUM SERPL-MCNC: 4.8 MMOL/L — SIGNIFICANT CHANGE UP (ref 3.5–5.3)
POTASSIUM SERPL-SCNC: 4.8 MMOL/L — SIGNIFICANT CHANGE UP (ref 3.5–5.3)
PREALB SERPL-MCNC: 22 MG/DL — SIGNIFICANT CHANGE UP (ref 18–38)
PROT SERPL-MCNC: 7.7 G/DL — SIGNIFICANT CHANGE UP (ref 6.6–8.7)
PROT UR-MCNC: 30 MG/DL
PROTHROM AB SERPL-ACNC: 19.8 SEC — HIGH (ref 10–12.9)
RBC # BLD: 3.82 M/UL — SIGNIFICANT CHANGE UP (ref 3.8–5.2)
RBC # FLD: 15.7 % — HIGH (ref 10.3–14.5)
RBC CASTS # UR COMP ASSIST: SIGNIFICANT CHANGE UP /HPF (ref 0–4)
S AUREUS DNA NOSE QL NAA+PROBE: SIGNIFICANT CHANGE UP
SODIUM SERPL-SCNC: 137 MMOL/L — SIGNIFICANT CHANGE UP (ref 135–145)
SP GR SPEC: 1.01 — SIGNIFICANT CHANGE UP (ref 1.01–1.02)
T3 SERPL-MCNC: 82 NG/DL — SIGNIFICANT CHANGE UP (ref 80–200)
T4 AB SER-ACNC: 8.6 UG/DL — SIGNIFICANT CHANGE UP (ref 4.5–12)
TSH SERPL-MCNC: 0.45 UIU/ML — SIGNIFICANT CHANGE UP (ref 0.27–4.2)
UROBILINOGEN FLD QL: NEGATIVE MG/DL — SIGNIFICANT CHANGE UP
WBC # BLD: 5.35 K/UL — SIGNIFICANT CHANGE UP (ref 3.8–10.5)
WBC # FLD AUTO: 5.35 K/UL — SIGNIFICANT CHANGE UP (ref 3.8–10.5)
WBC UR QL: SIGNIFICANT CHANGE UP

## 2019-08-26 PROCEDURE — 93010 ELECTROCARDIOGRAM REPORT: CPT

## 2019-08-26 PROCEDURE — 71046 X-RAY EXAM CHEST 2 VIEWS: CPT | Mod: 26

## 2019-08-26 RX ORDER — CEFUROXIME AXETIL 250 MG
1500 TABLET ORAL ONCE
Refills: 0 | Status: COMPLETED | OUTPATIENT
Start: 2019-09-03 | End: 2019-09-03

## 2019-08-26 NOTE — PATIENT PROFILE ADULT - NSPROEDALEARNPREF_GEN_A_NUR
verbal instruction/written material/individual instruction/Pre op teaching surgical scrub pain management instructions given to pt via

## 2019-08-26 NOTE — H&P PST ADULT - NSICDXPASTMEDICALHX_GEN_ALL_CORE_FT
PAST MEDICAL HISTORY:  Afib     Atrial fibrillation     CHF exacerbation     Coronary stent restenosis, sequela     Diabetes     Hypertension     Hypertension     Stented coronary artery

## 2019-08-26 NOTE — H&P PST ADULT - NSICDXPASTSURGICALHX_GEN_ALL_CORE_FT
PAST SURGICAL HISTORY:  Cataract     H/O hernia repair 1970's    History of ankle surgery right orif    History of appendectomy     Post PTCA 8 stents

## 2019-08-26 NOTE — H&P PST ADULT - HISTORY OF PRESENT ILLNESS
79 year old female presents with son Chava to PST , pt speaks Bengali son interpreting . Pt c/o chest pain and history of heart murmurs . Son states pt had an episode of CHF and has history of MI with 8 stents in her heart . Increased dyspnea recently , denies CP at present . Scheduled for surgery with DR. Fuller for bypass and mitral and tricuspid valve repair.

## 2019-08-26 NOTE — PATIENT PROFILE ADULT - NSPROREFERSVCHOMECARDIO_GEN_A_NUR
54F with multiple comorbidities, on HD p/w chest pain with nausea and vomiting today. Must r/o ACS given possible atypical presentation in a patient with DM and ESRD, hyperkalemia with n/v as pt did not complete HD yesterday, basic labs to assess for DKA given elevated FS. Re-assess. no

## 2019-08-26 NOTE — H&P PST ADULT - NSICDXPROBLEM_GEN_ALL_CORE_FT
PROBLEM DIAGNOSES  Problem: Diabetes  Assessment and Plan: fingerstick on admit    Problem: H/O tricuspid valve disease  Assessment and Plan: tricuspid valve repair replace     Problem: MR (mitral regurgitation)  Assessment and Plan: mitral valve repair /replace    Problem: CAD (coronary artery disease)  Assessment and Plan: coronary arter bypass graft with DR. Fuller    Problem: Need for prophylactic measure  Assessment and Plan: caprinin scor e5   surgical team assess need for dvt prophylaxis

## 2019-08-26 NOTE — H&P PST ADULT - NSANTHOSAYNRD_GEN_A_CORE
No. NARAYAN screening performed.  STOP BANG Legend: 0-2 = LOW Risk; 3-4 = INTERMEDIATE Risk; 5-8 = HIGH Risk

## 2019-08-26 NOTE — H&P PST ADULT - ASSESSMENT
Pleasant 79 year old female presents with c/o increased dyspnea. Pt has hx angina, stented heart ,CAD ,htn and diabetes. Pt speaks Setswana , son interpreted . pt scheduled for surgery with DR. Fuller.   OPIOID RISK TOOL    ARNALDO EACH BOX THAT APPLIES AND ADD TOTALS AT THE END    FAMILY HISTORY OF SUBSTANCE ABUSE                 FEMALE         MALE                                                Alcohol                             [  ]1 pt          [  ]3pts                                               Illegal Durgs                     [  ]2 pts        [  ]3pts                                               Rx Drugs                           [  ]4 pts        [  ]4 pts    PERSONAL HISTORY OF SUBSTANCE ABUSE                                                                                          Alcohol                             [  ]3 pts       [  ]3 pts                                               Illegal Durgs                     [  ]4 pts        [  ]4 pts                                               Rx Drugs                           [  ]5 pts        [  ]5 pts    AGE BETWEEN 16-45 YEARS                                      [  ]1 pt         [  ]1 pt    HISTORY OF PREADOLESCENT   SEXUAL ABUSE                                                             [  ]3 pts        [  ]0pts    PSYCHOLOGICAL DISEASE                     ADD, OCD, Bipolar, Schizophrenia        [  ]2 pts         [  ]2 pts                      Depression                                               [  ]1 pt           [  ]1 pt           SCORING TOTAL   (add numbers and type here)              (*0**)                                     A score of 3 or lower indicated LOW risk for future opiod abuse  A score of 4 to 7 indicated moderate risk for future opiod abuse  A score of 8 or higher indicates a high risk for opiod abuse  CAPRINI VTE 2.0 SCORE [CLOT updated 2019]    AGE RELATED RISK FACTORS                                                       MOBILITY RELATED FACTORS  [ ] Age 41-60 years                                            (1 Point)                    [ ] Bed rest                                                        (1 Point)  [ ] Age: 61-74 years                                           (2 Points)                  [ ] Plaster cast                                                   (2 Points)  [X ] Age= 75 years                                              (3 Points)                    [ ] Bed bound for more than 72 hours                 (2 Points)    DISEASE RELATED RISK FACTORS                                               GENDER SPECIFIC FACTORS  [ ] Edema in the lower extremities                       (1 Point)              [ ] Pregnancy                                                     (1 Point)  [ ] Varicose veins                                               (1 Point)                     [ ] Post-partum < 6 weeks                                   (1 Point)             [ ] BMI > 25 Kg/m2                                            (1 Point)                     [ ] Hormonal therapy  or oral contraception          (1 Point)                 [ ] Sepsis (in the previous month)                        (1 Point)               [ ] History of pregnancy complications                 (1 point)  [ ] Pneumonia or serious lung disease                                               [ ] Unexplained or recurrent                     (1 Point)           (in the previous month)                               (1 Point)  [ ] Abnormal pulmonary function test                     (1 Point)                 SURGERY RELATED RISK FACTORS  [ ] Acute myocardial infarction                              (1 Point)               [ ]  Section                                             (1 Point)  [ ] Congestive heart failure (in the previous month)  (1 Point)      [ ] Minor surgery                                                  (1 Point)   [ ] Inflammatory bowel disease                             (1 Point)               [ ] Arthroscopic surgery                                        (2 Points)  [ ] Central venous access                                      (2 Points)                [ X] General surgery lasting more than 45 minutes (2 points)  [ ] Malignancy- Present or previous                   (2 Points)                [ ] Elective arthroplasty                                         (5 points)    [ ] Stroke (in the previous month)                          (5 Points)                                                                                                                                                           HEMATOLOGY RELATED FACTORS                                                 TRAUMA RELATED RISK FACTORS  [ ] Prior episodes of VTE                                     (3 Points)                [ ] Fracture of the hip, pelvis, or leg                       (5 Points)  [ ] Positive family history for VTE                         (3 Points)             [ ] Acute spinal cord injury (in the previous month)  (5 Points)  [ ] Prothrombin 13888 A                                     (3 Points)               [ ] Paralysis  (less than 1 month)                             (5 Points)  [ ] Factor V Leiden                                             (3 Points)                  [ ] Multiple Trauma within 1 month                        (5 Points)  [ ] Lupus anticoagulants                                     (3 Points)                                                           [ ] Anticardiolipin antibodies                               (3 Points)                                                       [ ] High homocysteine in the blood                      (3 Points)                                             [ ] Other congenital or acquired thrombophilia      (3 Points)                                                [ ] Heparin induced thrombocytopenia                  (3 Points)                                     Total Score [     5     ]

## 2019-08-27 LAB
CULTURE RESULTS: NO GROWTH — SIGNIFICANT CHANGE UP
SPECIMEN SOURCE: SIGNIFICANT CHANGE UP

## 2019-09-01 ENCOUNTER — OUTPATIENT (OUTPATIENT)
Dept: OUTPATIENT SERVICES | Facility: HOSPITAL | Age: 79
LOS: 1 days | End: 2019-09-01
Payer: MEDICARE

## 2019-09-01 DIAGNOSIS — Z98.61 CORONARY ANGIOPLASTY STATUS: Chronic | ICD-10-CM

## 2019-09-01 DIAGNOSIS — Z98.890 OTHER SPECIFIED POSTPROCEDURAL STATES: Chronic | ICD-10-CM

## 2019-09-01 DIAGNOSIS — H26.9 UNSPECIFIED CATARACT: Chronic | ICD-10-CM

## 2019-09-01 DIAGNOSIS — Z90.49 ACQUIRED ABSENCE OF OTHER SPECIFIED PARTS OF DIGESTIVE TRACT: Chronic | ICD-10-CM

## 2019-09-01 PROCEDURE — G9001: CPT

## 2019-09-02 ENCOUNTER — TRANSCRIPTION ENCOUNTER (OUTPATIENT)
Age: 79
End: 2019-09-02

## 2019-09-02 NOTE — CHART NOTE - NSCHARTNOTEFT_GEN_A_CORE
Called patient at home number and spoke to son Chava Ibrahim.  Son states patient does not speak english.  Explained to son that her case has been moved to first case and asked that she come to the hospital tomorrow morning at 6am.  Son agrees and states patient will be at [facility] at 6am.  Reminded that she should remain NPO after midnight.  Son understands and will pass information on to his mother.

## 2019-09-03 ENCOUNTER — RESULT REVIEW (OUTPATIENT)
Age: 79
End: 2019-09-03

## 2019-09-03 ENCOUNTER — APPOINTMENT (OUTPATIENT)
Dept: CARDIOTHORACIC SURGERY | Facility: HOSPITAL | Age: 79
End: 2019-09-03

## 2019-09-03 ENCOUNTER — INPATIENT (INPATIENT)
Facility: HOSPITAL | Age: 79
LOS: 7 days | Discharge: HOME | DRG: 220 | End: 2019-09-11
Attending: THORACIC SURGERY (CARDIOTHORACIC VASCULAR SURGERY) | Admitting: THORACIC SURGERY (CARDIOTHORACIC VASCULAR SURGERY)
Payer: MEDICARE

## 2019-09-03 VITALS
HEART RATE: 96 BPM | TEMPERATURE: 98 F | SYSTOLIC BLOOD PRESSURE: 146 MMHG | HEIGHT: 60 IN | WEIGHT: 136.25 LBS | RESPIRATION RATE: 16 BRPM | DIASTOLIC BLOOD PRESSURE: 81 MMHG | OXYGEN SATURATION: 97 %

## 2019-09-03 DIAGNOSIS — Z98.890 OTHER SPECIFIED POSTPROCEDURAL STATES: Chronic | ICD-10-CM

## 2019-09-03 DIAGNOSIS — Z98.61 CORONARY ANGIOPLASTY STATUS: Chronic | ICD-10-CM

## 2019-09-03 DIAGNOSIS — Z71.89 OTHER SPECIFIED COUNSELING: ICD-10-CM

## 2019-09-03 DIAGNOSIS — I34.0 NONRHEUMATIC MITRAL (VALVE) INSUFFICIENCY: ICD-10-CM

## 2019-09-03 DIAGNOSIS — Z90.49 ACQUIRED ABSENCE OF OTHER SPECIFIED PARTS OF DIGESTIVE TRACT: Chronic | ICD-10-CM

## 2019-09-03 DIAGNOSIS — H26.9 UNSPECIFIED CATARACT: Chronic | ICD-10-CM

## 2019-09-03 LAB
ALBUMIN SERPL ELPH-MCNC: 3.9 G/DL — SIGNIFICANT CHANGE UP (ref 3.3–5.2)
ALP SERPL-CCNC: 64 U/L — SIGNIFICANT CHANGE UP (ref 40–120)
ALT FLD-CCNC: 28 U/L — SIGNIFICANT CHANGE UP
ANION GAP SERPL CALC-SCNC: 17 MMOL/L — SIGNIFICANT CHANGE UP (ref 5–17)
APTT BLD: 29.1 SEC — SIGNIFICANT CHANGE UP (ref 27.5–36.3)
AST SERPL-CCNC: 54 U/L — HIGH
BILIRUB DIRECT SERPL-MCNC: 0.3 MG/DL — SIGNIFICANT CHANGE UP (ref 0–0.3)
BILIRUB INDIRECT FLD-MCNC: 0.5 MG/DL — SIGNIFICANT CHANGE UP (ref 0.2–1)
BILIRUB SERPL-MCNC: 0.8 MG/DL — SIGNIFICANT CHANGE UP (ref 0.4–2)
BUN SERPL-MCNC: 14 MG/DL — SIGNIFICANT CHANGE UP (ref 8–20)
CALCIUM SERPL-MCNC: 9.3 MG/DL — SIGNIFICANT CHANGE UP (ref 8.6–10.2)
CHLORIDE SERPL-SCNC: 104 MMOL/L — SIGNIFICANT CHANGE UP (ref 98–107)
CK MB CFR SERPL CALC: 51.9 NG/ML — HIGH (ref 0–6.7)
CK SERPL-CCNC: 473 U/L — HIGH (ref 25–170)
CO2 SERPL-SCNC: 23 MMOL/L — SIGNIFICANT CHANGE UP (ref 22–29)
CREAT SERPL-MCNC: 0.75 MG/DL — SIGNIFICANT CHANGE UP (ref 0.5–1.3)
GAS PNL BLDA: SIGNIFICANT CHANGE UP
GAS PNL BLDA: SIGNIFICANT CHANGE UP
GLUCOSE BLDC GLUCOMTR-MCNC: 137 MG/DL — HIGH (ref 70–99)
GLUCOSE BLDC GLUCOMTR-MCNC: 155 MG/DL — HIGH (ref 70–99)
GLUCOSE BLDC GLUCOMTR-MCNC: 170 MG/DL — HIGH (ref 70–99)
GLUCOSE BLDC GLUCOMTR-MCNC: 201 MG/DL — HIGH (ref 70–99)
GLUCOSE BLDC GLUCOMTR-MCNC: 219 MG/DL — HIGH (ref 70–99)
GLUCOSE BLDC GLUCOMTR-MCNC: 224 MG/DL — HIGH (ref 70–99)
GLUCOSE BLDC GLUCOMTR-MCNC: 225 MG/DL — HIGH (ref 70–99)
GLUCOSE SERPL-MCNC: 157 MG/DL — HIGH (ref 70–115)
HCT VFR BLD CALC: 27.5 % — LOW (ref 34.5–45)
HGB BLD-MCNC: 9.2 G/DL — LOW (ref 11.5–15.5)
INR BLD: 1.38 RATIO — HIGH (ref 0.88–1.16)
MAGNESIUM SERPL-MCNC: 2.5 MG/DL — SIGNIFICANT CHANGE UP (ref 1.6–2.6)
MCHC RBC-ENTMCNC: 30 PG — SIGNIFICANT CHANGE UP (ref 27–34)
MCHC RBC-ENTMCNC: 33.5 GM/DL — SIGNIFICANT CHANGE UP (ref 32–36)
MCV RBC AUTO: 89.6 FL — SIGNIFICANT CHANGE UP (ref 80–100)
PHOSPHATE SERPL-MCNC: 2 MG/DL — LOW (ref 2.4–4.7)
PLATELET # BLD AUTO: 221 K/UL — SIGNIFICANT CHANGE UP (ref 150–400)
POTASSIUM SERPL-MCNC: 4.2 MMOL/L — SIGNIFICANT CHANGE UP (ref 3.5–5.3)
POTASSIUM SERPL-SCNC: 4.2 MMOL/L — SIGNIFICANT CHANGE UP (ref 3.5–5.3)
PROT SERPL-MCNC: 6.2 G/DL — LOW (ref 6.6–8.7)
PROTHROM AB SERPL-ACNC: 16 SEC — HIGH (ref 10–12.9)
RBC # BLD: 3.07 M/UL — LOW (ref 3.8–5.2)
RBC # FLD: 15 % — HIGH (ref 10.3–14.5)
SODIUM SERPL-SCNC: 144 MMOL/L — SIGNIFICANT CHANGE UP (ref 135–145)
TROPONIN T SERPL-MCNC: 0.78 NG/ML — HIGH (ref 0–0.06)
WBC # BLD: 14.92 K/UL — HIGH (ref 3.8–10.5)
WBC # FLD AUTO: 14.92 K/UL — HIGH (ref 3.8–10.5)

## 2019-09-03 PROCEDURE — G0463: CPT

## 2019-09-03 PROCEDURE — 87640 STAPH A DNA AMP PROBE: CPT

## 2019-09-03 PROCEDURE — 93010 ELECTROCARDIOGRAM REPORT: CPT

## 2019-09-03 PROCEDURE — 84480 ASSAY TRIIODOTHYRONINE (T3): CPT

## 2019-09-03 PROCEDURE — 71045 X-RAY EXAM CHEST 1 VIEW: CPT | Mod: 26

## 2019-09-03 PROCEDURE — 33517 CABG ARTERY-VEIN SINGLE: CPT

## 2019-09-03 PROCEDURE — 85027 COMPLETE CBC AUTOMATED: CPT

## 2019-09-03 PROCEDURE — 71046 X-RAY EXAM CHEST 2 VIEWS: CPT

## 2019-09-03 PROCEDURE — 84436 ASSAY OF TOTAL THYROXINE: CPT

## 2019-09-03 PROCEDURE — 84134 ASSAY OF PREALBUMIN: CPT

## 2019-09-03 PROCEDURE — 85610 PROTHROMBIN TIME: CPT

## 2019-09-03 PROCEDURE — 84443 ASSAY THYROID STIM HORMONE: CPT

## 2019-09-03 PROCEDURE — 93005 ELECTROCARDIOGRAM TRACING: CPT

## 2019-09-03 PROCEDURE — 33463 VALVULOPLASTY TRICUSPID: CPT

## 2019-09-03 PROCEDURE — 33533 CABG ARTERIAL SINGLE: CPT

## 2019-09-03 PROCEDURE — 36415 COLL VENOUS BLD VENIPUNCTURE: CPT

## 2019-09-03 PROCEDURE — 83880 ASSAY OF NATRIURETIC PEPTIDE: CPT

## 2019-09-03 PROCEDURE — 33430 REPLACEMENT OF MITRAL VALVE: CPT

## 2019-09-03 PROCEDURE — 87086 URINE CULTURE/COLONY COUNT: CPT

## 2019-09-03 PROCEDURE — 33533 CABG ARTERIAL SINGLE: CPT | Mod: AS

## 2019-09-03 PROCEDURE — 86900 BLOOD TYPING SEROLOGIC ABO: CPT

## 2019-09-03 PROCEDURE — 88305 TISSUE EXAM BY PATHOLOGIST: CPT | Mod: 26

## 2019-09-03 PROCEDURE — 86850 RBC ANTIBODY SCREEN: CPT

## 2019-09-03 PROCEDURE — 85730 THROMBOPLASTIN TIME PARTIAL: CPT

## 2019-09-03 PROCEDURE — 81001 URINALYSIS AUTO W/SCOPE: CPT

## 2019-09-03 PROCEDURE — 86901 BLOOD TYPING SEROLOGIC RH(D): CPT

## 2019-09-03 PROCEDURE — 33430 REPLACEMENT OF MITRAL VALVE: CPT | Mod: AS

## 2019-09-03 PROCEDURE — 83036 HEMOGLOBIN GLYCOSYLATED A1C: CPT

## 2019-09-03 PROCEDURE — 33463 VALVULOPLASTY TRICUSPID: CPT | Mod: AS

## 2019-09-03 PROCEDURE — 80053 COMPREHEN METABOLIC PANEL: CPT

## 2019-09-03 PROCEDURE — 33517 CABG ARTERY-VEIN SINGLE: CPT | Mod: AS

## 2019-09-03 PROCEDURE — 86923 COMPATIBILITY TEST ELECTRIC: CPT

## 2019-09-03 RX ORDER — POTASSIUM CHLORIDE 20 MEQ
10 PACKET (EA) ORAL
Refills: 0 | Status: COMPLETED | OUTPATIENT
Start: 2019-09-03 | End: 2019-09-03

## 2019-09-03 RX ORDER — PROPOFOL 10 MG/ML
26.97 INJECTION, EMULSION INTRAVENOUS
Qty: 1000 | Refills: 0 | Status: DISCONTINUED | OUTPATIENT
Start: 2019-09-03 | End: 2019-09-04

## 2019-09-03 RX ORDER — SODIUM CHLORIDE 9 MG/ML
1000 INJECTION INTRAMUSCULAR; INTRAVENOUS; SUBCUTANEOUS
Refills: 0 | Status: DISCONTINUED | OUTPATIENT
Start: 2019-09-03 | End: 2019-09-05

## 2019-09-03 RX ORDER — POTASSIUM CHLORIDE 20 MEQ
10 PACKET (EA) ORAL
Refills: 0 | Status: DISCONTINUED | OUTPATIENT
Start: 2019-09-03 | End: 2019-09-04

## 2019-09-03 RX ORDER — ACETAMINOPHEN 500 MG
1000 TABLET ORAL ONCE
Refills: 0 | Status: COMPLETED | OUTPATIENT
Start: 2019-09-03 | End: 2019-09-03

## 2019-09-03 RX ORDER — DOCUSATE SODIUM 100 MG
100 CAPSULE ORAL THREE TIMES A DAY
Refills: 0 | Status: DISCONTINUED | OUTPATIENT
Start: 2019-09-04 | End: 2019-09-11

## 2019-09-03 RX ORDER — PANTOPRAZOLE SODIUM 20 MG/1
40 TABLET, DELAYED RELEASE ORAL DAILY
Refills: 0 | Status: DISCONTINUED | OUTPATIENT
Start: 2019-09-04 | End: 2019-09-11

## 2019-09-03 RX ORDER — CHLORHEXIDINE GLUCONATE 213 G/1000ML
5 SOLUTION TOPICAL EVERY 4 HOURS
Refills: 0 | Status: DISCONTINUED | OUTPATIENT
Start: 2019-09-03 | End: 2019-09-04

## 2019-09-03 RX ORDER — CHLORHEXIDINE GLUCONATE 213 G/1000ML
1 SOLUTION TOPICAL DAILY
Refills: 0 | Status: DISCONTINUED | OUTPATIENT
Start: 2019-09-03 | End: 2019-09-05

## 2019-09-03 RX ORDER — PANTOPRAZOLE SODIUM 20 MG/1
40 TABLET, DELAYED RELEASE ORAL ONCE
Refills: 0 | Status: COMPLETED | OUTPATIENT
Start: 2019-09-03 | End: 2019-09-03

## 2019-09-03 RX ORDER — CEFUROXIME AXETIL 250 MG
1500 TABLET ORAL EVERY 8 HOURS
Refills: 0 | Status: COMPLETED | OUTPATIENT
Start: 2019-09-03 | End: 2019-09-05

## 2019-09-03 RX ORDER — INSULIN HUMAN 100 [IU]/ML
2 INJECTION, SOLUTION SUBCUTANEOUS
Qty: 100 | Refills: 0 | Status: DISCONTINUED | OUTPATIENT
Start: 2019-09-03 | End: 2019-09-04

## 2019-09-03 RX ORDER — NOREPINEPHRINE BITARTRATE/D5W 8 MG/250ML
0.05 PLASTIC BAG, INJECTION (ML) INTRAVENOUS
Qty: 8 | Refills: 0 | Status: DISCONTINUED | OUTPATIENT
Start: 2019-09-03 | End: 2019-09-04

## 2019-09-03 RX ORDER — NICARDIPINE HYDROCHLORIDE 30 MG/1
5 CAPSULE, EXTENDED RELEASE ORAL
Qty: 40 | Refills: 0 | Status: DISCONTINUED | OUTPATIENT
Start: 2019-09-03 | End: 2019-09-05

## 2019-09-03 RX ORDER — SODIUM CHLORIDE 9 MG/ML
3 INJECTION INTRAMUSCULAR; INTRAVENOUS; SUBCUTANEOUS EVERY 8 HOURS
Refills: 0 | Status: DISCONTINUED | OUTPATIENT
Start: 2019-09-03 | End: 2019-09-03

## 2019-09-03 RX ORDER — VANCOMYCIN HCL 1 G
1000 VIAL (EA) INTRAVENOUS EVERY 12 HOURS
Refills: 0 | Status: COMPLETED | OUTPATIENT
Start: 2019-09-03 | End: 2019-09-05

## 2019-09-03 RX ORDER — ASPIRIN/CALCIUM CARB/MAGNESIUM 324 MG
325 TABLET ORAL ONCE
Refills: 0 | Status: COMPLETED | OUTPATIENT
Start: 2019-09-03 | End: 2019-09-03

## 2019-09-03 RX ADMIN — Medication 100 MILLIGRAM(S): at 17:04

## 2019-09-03 RX ADMIN — NICARDIPINE HYDROCHLORIDE 25 MG/HR: 30 CAPSULE, EXTENDED RELEASE ORAL at 23:19

## 2019-09-03 RX ADMIN — INSULIN HUMAN 2 UNIT(S)/HR: 100 INJECTION, SOLUTION SUBCUTANEOUS at 17:03

## 2019-09-03 RX ADMIN — Medication 100 MILLIEQUIVALENT(S): at 21:22

## 2019-09-03 RX ADMIN — Medication 400 MILLIGRAM(S): at 21:01

## 2019-09-03 RX ADMIN — Medication 100 MILLIEQUIVALENT(S): at 16:14

## 2019-09-03 RX ADMIN — Medication 100 MILLIEQUIVALENT(S): at 22:00

## 2019-09-03 RX ADMIN — Medication 100 MILLIGRAM(S): at 23:34

## 2019-09-03 RX ADMIN — PANTOPRAZOLE SODIUM 40 MILLIGRAM(S): 20 TABLET, DELAYED RELEASE ORAL at 17:15

## 2019-09-03 RX ADMIN — Medication 250 MILLIGRAM(S): at 20:04

## 2019-09-03 RX ADMIN — CHLORHEXIDINE GLUCONATE 5 MILLILITER(S): 213 SOLUTION TOPICAL at 22:00

## 2019-09-03 RX ADMIN — NICARDIPINE HYDROCHLORIDE 25 MG/HR: 30 CAPSULE, EXTENDED RELEASE ORAL at 16:14

## 2019-09-03 RX ADMIN — Medication 325 MILLIGRAM(S): at 21:34

## 2019-09-03 RX ADMIN — Medication 1000 MILLIGRAM(S): at 21:45

## 2019-09-03 RX ADMIN — CHLORHEXIDINE GLUCONATE 5 MILLILITER(S): 213 SOLUTION TOPICAL at 17:14

## 2019-09-03 RX ADMIN — Medication 100 MILLIEQUIVALENT(S): at 17:03

## 2019-09-03 NOTE — BRIEF OPERATIVE NOTE - NSICDXBRIEFPOSTOP_GEN_ALL_CORE_FT
POST-OP DIAGNOSIS:  CAD (coronary artery disease) 03-Sep-2019 15:02:04  Tyler Heath  Tricuspid valve regurgitation 03-Sep-2019 15:01:45  Tyler Heath  MR (mitral regurgitation) 03-Sep-2019 15:01:35  Tyler Heath  Atrial fibrillation 03-Sep-2019 15:01:26  Tyler Heath

## 2019-09-03 NOTE — ASU PREOP CHECKLIST - WARM FLUIDS/WARM BLANKETS
Please call.  Vit B12 test, blood counts, test for lupus and inflammatory conditions are all normal. no

## 2019-09-03 NOTE — BRIEF OPERATIVE NOTE - NSICDXBRIEFPREOP_GEN_ALL_CORE_FT
PRE-OP DIAGNOSIS:  Atrial fibrillation 03-Sep-2019 15:01:11  Tyler Heath  Tricuspid valve regurgitation 03-Sep-2019 15:00:35  Tyler Heath  MR (mitral regurgitation) 03-Sep-2019 15:00:07  Tyler Heath  CAD in native artery 03-Sep-2019 14:59:55  Tyler Heath

## 2019-09-03 NOTE — BRIEF OPERATIVE NOTE - OPERATION/FINDINGS
TR. MR, CAD, Afib requiring CABG x 2 with LIMA to LAD and SVG to OM, Tricuspid Valve repair, Mitral valve replacement with # 29 Mangna Ease bioprosthetic valve, Atrial Clip with #35 clip and Endoscopic vein harvest left thigh.  '/'

## 2019-09-03 NOTE — BRIEF OPERATIVE NOTE - NSICDXBRIEFPROCEDURE_GEN_ALL_CORE_FT
PROCEDURES:  Clipping, left atrial appendage 03-Sep-2019 14:59:34  Tyler Heath  CABG, with endoscopic vein procurement, mitral valve replacement, and tricuspid valve repair 03-Sep-2019 14:59:12  Tyler Heath

## 2019-09-04 LAB
ALBUMIN SERPL ELPH-MCNC: 4.2 G/DL — SIGNIFICANT CHANGE UP (ref 3.3–5.2)
ALP SERPL-CCNC: 61 U/L — SIGNIFICANT CHANGE UP (ref 40–120)
ALT FLD-CCNC: 30 U/L — SIGNIFICANT CHANGE UP
ANION GAP SERPL CALC-SCNC: 12 MMOL/L — SIGNIFICANT CHANGE UP (ref 5–17)
APTT BLD: 28.4 SEC — SIGNIFICANT CHANGE UP (ref 27.5–36.3)
AST SERPL-CCNC: 73 U/L — HIGH
BILIRUB DIRECT SERPL-MCNC: 0.2 MG/DL — SIGNIFICANT CHANGE UP (ref 0–0.3)
BILIRUB INDIRECT FLD-MCNC: 0.5 MG/DL — SIGNIFICANT CHANGE UP (ref 0.2–1)
BILIRUB SERPL-MCNC: 0.7 MG/DL — SIGNIFICANT CHANGE UP (ref 0.4–2)
BUN SERPL-MCNC: 18 MG/DL — SIGNIFICANT CHANGE UP (ref 8–20)
CALCIUM SERPL-MCNC: 9.2 MG/DL — SIGNIFICANT CHANGE UP (ref 8.6–10.2)
CHLORIDE SERPL-SCNC: 106 MMOL/L — SIGNIFICANT CHANGE UP (ref 98–107)
CK MB CFR SERPL CALC: 39.5 NG/ML — HIGH (ref 0–6.7)
CK SERPL-CCNC: 568 U/L — HIGH (ref 25–170)
CO2 SERPL-SCNC: 24 MMOL/L — SIGNIFICANT CHANGE UP (ref 22–29)
CREAT SERPL-MCNC: 0.82 MG/DL — SIGNIFICANT CHANGE UP (ref 0.5–1.3)
GAS PNL BLDA: SIGNIFICANT CHANGE UP
GLUCOSE BLDC GLUCOMTR-MCNC: 105 MG/DL — HIGH (ref 70–99)
GLUCOSE BLDC GLUCOMTR-MCNC: 114 MG/DL — HIGH (ref 70–99)
GLUCOSE BLDC GLUCOMTR-MCNC: 119 MG/DL — HIGH (ref 70–99)
GLUCOSE BLDC GLUCOMTR-MCNC: 122 MG/DL — HIGH (ref 70–99)
GLUCOSE BLDC GLUCOMTR-MCNC: 125 MG/DL — HIGH (ref 70–99)
GLUCOSE BLDC GLUCOMTR-MCNC: 126 MG/DL — HIGH (ref 70–99)
GLUCOSE BLDC GLUCOMTR-MCNC: 128 MG/DL — HIGH (ref 70–99)
GLUCOSE BLDC GLUCOMTR-MCNC: 129 MG/DL — HIGH (ref 70–99)
GLUCOSE BLDC GLUCOMTR-MCNC: 131 MG/DL — HIGH (ref 70–99)
GLUCOSE BLDC GLUCOMTR-MCNC: 138 MG/DL — HIGH (ref 70–99)
GLUCOSE BLDC GLUCOMTR-MCNC: 139 MG/DL — HIGH (ref 70–99)
GLUCOSE BLDC GLUCOMTR-MCNC: 140 MG/DL — HIGH (ref 70–99)
GLUCOSE BLDC GLUCOMTR-MCNC: 141 MG/DL — HIGH (ref 70–99)
GLUCOSE BLDC GLUCOMTR-MCNC: 142 MG/DL — HIGH (ref 70–99)
GLUCOSE BLDC GLUCOMTR-MCNC: 144 MG/DL — HIGH (ref 70–99)
GLUCOSE BLDC GLUCOMTR-MCNC: 156 MG/DL — HIGH (ref 70–99)
GLUCOSE BLDC GLUCOMTR-MCNC: 157 MG/DL — HIGH (ref 70–99)
GLUCOSE BLDC GLUCOMTR-MCNC: 178 MG/DL — HIGH (ref 70–99)
GLUCOSE BLDC GLUCOMTR-MCNC: 181 MG/DL — HIGH (ref 70–99)
GLUCOSE BLDC GLUCOMTR-MCNC: 186 MG/DL — HIGH (ref 70–99)
GLUCOSE BLDC GLUCOMTR-MCNC: 198 MG/DL — HIGH (ref 70–99)
GLUCOSE BLDC GLUCOMTR-MCNC: 208 MG/DL — HIGH (ref 70–99)
GLUCOSE BLDC GLUCOMTR-MCNC: 95 MG/DL — SIGNIFICANT CHANGE UP (ref 70–99)
GLUCOSE SERPL-MCNC: 117 MG/DL — HIGH (ref 70–115)
HCT VFR BLD CALC: 28.4 % — LOW (ref 34.5–45)
HGB BLD-MCNC: 9.7 G/DL — LOW (ref 11.5–15.5)
INR BLD: 1.18 RATIO — HIGH (ref 0.88–1.16)
MAGNESIUM SERPL-MCNC: 2.1 MG/DL — SIGNIFICANT CHANGE UP (ref 1.6–2.6)
MCHC RBC-ENTMCNC: 30.1 PG — SIGNIFICANT CHANGE UP (ref 27–34)
MCHC RBC-ENTMCNC: 34.2 GM/DL — SIGNIFICANT CHANGE UP (ref 32–36)
MCV RBC AUTO: 88.2 FL — SIGNIFICANT CHANGE UP (ref 80–100)
PHOSPHATE SERPL-MCNC: 2.5 MG/DL — SIGNIFICANT CHANGE UP (ref 2.4–4.7)
PLATELET # BLD AUTO: 226 K/UL — SIGNIFICANT CHANGE UP (ref 150–400)
POTASSIUM SERPL-MCNC: 4.8 MMOL/L — SIGNIFICANT CHANGE UP (ref 3.5–5.3)
POTASSIUM SERPL-SCNC: 4.8 MMOL/L — SIGNIFICANT CHANGE UP (ref 3.5–5.3)
PROT SERPL-MCNC: 6.5 G/DL — LOW (ref 6.6–8.7)
PROTHROM AB SERPL-ACNC: 13.6 SEC — HIGH (ref 10–12.9)
RBC # BLD: 3.22 M/UL — LOW (ref 3.8–5.2)
RBC # FLD: 15.5 % — HIGH (ref 10.3–14.5)
SODIUM SERPL-SCNC: 142 MMOL/L — SIGNIFICANT CHANGE UP (ref 135–145)
TROPONIN T SERPL-MCNC: 0.65 NG/ML — HIGH (ref 0–0.06)
WBC # BLD: 13.05 K/UL — HIGH (ref 3.8–10.5)
WBC # FLD AUTO: 13.05 K/UL — HIGH (ref 3.8–10.5)

## 2019-09-04 PROCEDURE — 93010 ELECTROCARDIOGRAM REPORT: CPT

## 2019-09-04 PROCEDURE — 71045 X-RAY EXAM CHEST 1 VIEW: CPT | Mod: 26

## 2019-09-04 PROCEDURE — 99223 1ST HOSP IP/OBS HIGH 75: CPT

## 2019-09-04 RX ORDER — ACETAMINOPHEN 500 MG
1000 TABLET ORAL ONCE
Refills: 0 | Status: COMPLETED | OUTPATIENT
Start: 2019-09-04 | End: 2019-09-04

## 2019-09-04 RX ORDER — GLUCAGON INJECTION, SOLUTION 0.5 MG/.1ML
1 INJECTION, SOLUTION SUBCUTANEOUS ONCE
Refills: 0 | Status: DISCONTINUED | OUTPATIENT
Start: 2019-09-04 | End: 2019-09-05

## 2019-09-04 RX ORDER — DEXTROSE 50 % IN WATER 50 %
25 SYRINGE (ML) INTRAVENOUS ONCE
Refills: 0 | Status: DISCONTINUED | OUTPATIENT
Start: 2019-09-04 | End: 2019-09-11

## 2019-09-04 RX ORDER — DEXTROSE 50 % IN WATER 50 %
12.5 SYRINGE (ML) INTRAVENOUS ONCE
Refills: 0 | Status: DISCONTINUED | OUTPATIENT
Start: 2019-09-04 | End: 2019-09-11

## 2019-09-04 RX ORDER — METOPROLOL TARTRATE 50 MG
50 TABLET ORAL
Refills: 0 | Status: DISCONTINUED | OUTPATIENT
Start: 2019-09-05 | End: 2019-09-05

## 2019-09-04 RX ORDER — INSULIN GLARGINE 100 [IU]/ML
20 INJECTION, SOLUTION SUBCUTANEOUS AT BEDTIME
Refills: 0 | Status: DISCONTINUED | OUTPATIENT
Start: 2019-09-04 | End: 2019-09-07

## 2019-09-04 RX ORDER — INSULIN LISPRO 100/ML
4 VIAL (ML) SUBCUTANEOUS
Refills: 0 | Status: DISCONTINUED | OUTPATIENT
Start: 2019-09-04 | End: 2019-09-04

## 2019-09-04 RX ORDER — HYDROMORPHONE HYDROCHLORIDE 2 MG/ML
0.25 INJECTION INTRAMUSCULAR; INTRAVENOUS; SUBCUTANEOUS ONCE
Refills: 0 | Status: DISCONTINUED | OUTPATIENT
Start: 2019-09-04 | End: 2019-09-04

## 2019-09-04 RX ORDER — INSULIN LISPRO 100/ML
VIAL (ML) SUBCUTANEOUS
Refills: 0 | Status: DISCONTINUED | OUTPATIENT
Start: 2019-09-04 | End: 2019-09-05

## 2019-09-04 RX ORDER — SODIUM CHLORIDE 9 MG/ML
1000 INJECTION, SOLUTION INTRAVENOUS
Refills: 0 | Status: DISCONTINUED | OUTPATIENT
Start: 2019-09-04 | End: 2019-09-05

## 2019-09-04 RX ORDER — INSULIN LISPRO 100/ML
6 VIAL (ML) SUBCUTANEOUS
Refills: 0 | Status: DISCONTINUED | OUTPATIENT
Start: 2019-09-04 | End: 2019-09-07

## 2019-09-04 RX ORDER — AMLODIPINE BESYLATE 2.5 MG/1
5 TABLET ORAL DAILY
Refills: 0 | Status: DISCONTINUED | OUTPATIENT
Start: 2019-09-04 | End: 2019-09-11

## 2019-09-04 RX ORDER — ASPIRIN/CALCIUM CARB/MAGNESIUM 324 MG
325 TABLET ORAL DAILY
Refills: 0 | Status: DISCONTINUED | OUTPATIENT
Start: 2019-09-04 | End: 2019-09-11

## 2019-09-04 RX ORDER — ENOXAPARIN SODIUM 100 MG/ML
40 INJECTION SUBCUTANEOUS DAILY
Refills: 0 | Status: DISCONTINUED | OUTPATIENT
Start: 2019-09-04 | End: 2019-09-08

## 2019-09-04 RX ORDER — METOPROLOL TARTRATE 50 MG
25 TABLET ORAL ONCE
Refills: 0 | Status: COMPLETED | OUTPATIENT
Start: 2019-09-04 | End: 2019-09-04

## 2019-09-04 RX ORDER — POLYETHYLENE GLYCOL 3350 17 G/17G
17 POWDER, FOR SOLUTION ORAL DAILY
Refills: 0 | Status: DISCONTINUED | OUTPATIENT
Start: 2019-09-04 | End: 2019-09-11

## 2019-09-04 RX ORDER — INSULIN GLARGINE 100 [IU]/ML
24 INJECTION, SOLUTION SUBCUTANEOUS AT BEDTIME
Refills: 0 | Status: DISCONTINUED | OUTPATIENT
Start: 2019-09-04 | End: 2019-09-04

## 2019-09-04 RX ORDER — METOPROLOL TARTRATE 50 MG
25 TABLET ORAL
Refills: 0 | Status: DISCONTINUED | OUTPATIENT
Start: 2019-09-04 | End: 2019-09-04

## 2019-09-04 RX ORDER — ATORVASTATIN CALCIUM 80 MG/1
80 TABLET, FILM COATED ORAL AT BEDTIME
Refills: 0 | Status: DISCONTINUED | OUTPATIENT
Start: 2019-09-04 | End: 2019-09-09

## 2019-09-04 RX ORDER — WARFARIN SODIUM 2.5 MG/1
2.5 TABLET ORAL ONCE
Refills: 0 | Status: COMPLETED | OUTPATIENT
Start: 2019-09-04 | End: 2019-09-04

## 2019-09-04 RX ORDER — DEXTROSE 50 % IN WATER 50 %
15 SYRINGE (ML) INTRAVENOUS ONCE
Refills: 0 | Status: DISCONTINUED | OUTPATIENT
Start: 2019-09-04 | End: 2019-09-11

## 2019-09-04 RX ADMIN — INSULIN GLARGINE 20 UNIT(S): 100 INJECTION, SOLUTION SUBCUTANEOUS at 22:23

## 2019-09-04 RX ADMIN — HYDROMORPHONE HYDROCHLORIDE 0.25 MILLIGRAM(S): 2 INJECTION INTRAMUSCULAR; INTRAVENOUS; SUBCUTANEOUS at 10:30

## 2019-09-04 RX ADMIN — Medication 100 MILLIGRAM(S): at 08:00

## 2019-09-04 RX ADMIN — AMLODIPINE BESYLATE 5 MILLIGRAM(S): 2.5 TABLET ORAL at 22:22

## 2019-09-04 RX ADMIN — NICARDIPINE HYDROCHLORIDE 25 MG/HR: 30 CAPSULE, EXTENDED RELEASE ORAL at 10:37

## 2019-09-04 RX ADMIN — Medication 400 MILLIGRAM(S): at 03:31

## 2019-09-04 RX ADMIN — Medication 100 MILLIGRAM(S): at 15:49

## 2019-09-04 RX ADMIN — NICARDIPINE HYDROCHLORIDE 25 MG/HR: 30 CAPSULE, EXTENDED RELEASE ORAL at 06:26

## 2019-09-04 RX ADMIN — Medication 1000 MILLIGRAM(S): at 04:15

## 2019-09-04 RX ADMIN — CHLORHEXIDINE GLUCONATE 5 MILLILITER(S): 213 SOLUTION TOPICAL at 03:04

## 2019-09-04 RX ADMIN — WARFARIN SODIUM 2.5 MILLIGRAM(S): 2.5 TABLET ORAL at 21:33

## 2019-09-04 RX ADMIN — Medication 25 MILLIGRAM(S): at 17:04

## 2019-09-04 RX ADMIN — HYDROMORPHONE HYDROCHLORIDE 0.25 MILLIGRAM(S): 2 INJECTION INTRAMUSCULAR; INTRAVENOUS; SUBCUTANEOUS at 18:15

## 2019-09-04 RX ADMIN — POLYETHYLENE GLYCOL 3350 17 GRAM(S): 17 POWDER, FOR SOLUTION ORAL at 15:07

## 2019-09-04 RX ADMIN — PANTOPRAZOLE SODIUM 40 MILLIGRAM(S): 20 TABLET, DELAYED RELEASE ORAL at 11:10

## 2019-09-04 RX ADMIN — Medication 25 MILLIGRAM(S): at 18:32

## 2019-09-04 RX ADMIN — HYDROMORPHONE HYDROCHLORIDE 0.25 MILLIGRAM(S): 2 INJECTION INTRAMUSCULAR; INTRAVENOUS; SUBCUTANEOUS at 18:30

## 2019-09-04 RX ADMIN — Medication 250 MILLIGRAM(S): at 08:00

## 2019-09-04 RX ADMIN — HYDROMORPHONE HYDROCHLORIDE 0.25 MILLIGRAM(S): 2 INJECTION INTRAMUSCULAR; INTRAVENOUS; SUBCUTANEOUS at 06:30

## 2019-09-04 RX ADMIN — Medication 400 MILLIGRAM(S): at 15:20

## 2019-09-04 RX ADMIN — CHLORHEXIDINE GLUCONATE 1 APPLICATION(S): 213 SOLUTION TOPICAL at 07:17

## 2019-09-04 RX ADMIN — HYDROMORPHONE HYDROCHLORIDE 0.25 MILLIGRAM(S): 2 INJECTION INTRAMUSCULAR; INTRAVENOUS; SUBCUTANEOUS at 10:45

## 2019-09-04 RX ADMIN — INSULIN HUMAN 2 UNIT(S)/HR: 100 INJECTION, SOLUTION SUBCUTANEOUS at 14:07

## 2019-09-04 RX ADMIN — Medication 1000 MILLIGRAM(S): at 15:45

## 2019-09-04 RX ADMIN — Medication 100 MILLIGRAM(S): at 21:33

## 2019-09-04 RX ADMIN — Medication 400 MILLIGRAM(S): at 09:30

## 2019-09-04 RX ADMIN — Medication 4 UNIT(S): at 17:00

## 2019-09-04 RX ADMIN — ENOXAPARIN SODIUM 40 MILLIGRAM(S): 100 INJECTION SUBCUTANEOUS at 12:58

## 2019-09-04 RX ADMIN — HYDROMORPHONE HYDROCHLORIDE 0.25 MILLIGRAM(S): 2 INJECTION INTRAMUSCULAR; INTRAVENOUS; SUBCUTANEOUS at 06:45

## 2019-09-04 RX ADMIN — Medication 250 MILLIGRAM(S): at 19:53

## 2019-09-04 RX ADMIN — Medication 1000 MILLIGRAM(S): at 09:45

## 2019-09-04 RX ADMIN — Medication 325 MILLIGRAM(S): at 11:10

## 2019-09-04 RX ADMIN — ATORVASTATIN CALCIUM 80 MILLIGRAM(S): 80 TABLET, FILM COATED ORAL at 21:33

## 2019-09-04 RX ADMIN — Medication 100 MILLIGRAM(S): at 11:10

## 2019-09-04 RX ADMIN — Medication 25 MILLIGRAM(S): at 12:10

## 2019-09-04 NOTE — PHYSICAL THERAPY INITIAL EVALUATION ADULT - PERTINENT HX OF CURRENT PROBLEM, REHAB EVAL
Per EMR: Pt had c/o of chest pain and a hx of heart murmurs with recent increase in dyspnea. Pt was scheduled for surgery.

## 2019-09-04 NOTE — CONSULT NOTE ADULT - SUBJECTIVE AND OBJECTIVE BOX
HPI:  79 year old female presents post op  bypass and mitral and tricuspid valve repair   Ot son translating as pt speak French       PAST MEDICAL & SURGICAL HISTORY:  CHF exacerbation  Atrial fibrillation  Diabetes x 30 year s on Lantus and MFN as outpt- no complications from DM   sees ophtalmology regularly   Hypertension  Stented coronary artery  Hyperthryodism x 30 years on Methimazole   Coronary stent restenosis, sequela  Afib  Hypertension  History of ankle surgery: right orif  Post PTCA: 8 stents  H/O hernia repair: 1970&#x27;s  Cataract  History of appendectomy      FAMILY HISTORY:  Family history of early CAD: brother mother  FHx: breast cancer: mother      SOCIAL HISTORY: non smokler no etoh       REVIEW OF SYSTEMS:    Constitutional: No fever, no chills, no change in weight.    Eyes: No eye swelling,no  blurry vision, no redness, no loss of vision.    Neck: No neck pain, no change in voice.    Lungs: No shortness of breath, no wheezing, no cough feeling better     CV: No chest pain, no palpitations, no pain with walking.    GI: No nausea, no vomiting, no constipation, no diarrhea, no abdominal pain    : No urinary frequency, no blood in urine, no urinary burning, no difficulty voiding.    Musculoskeletal: No muscle pain, no joint pain, no swelling.    Skin: No rash, no infections.    Neurologic: No headaches, no weakness, no burning or pain in feet, no tremor.    Endocrine: No heat intolerance, no cold intolerance, no increased sweating, no shakiness between meals.    Psych: No depression, no anxiety, no trouble concentrating    MEDICATIONS  (STANDING):  amLODIPine   Tablet 5 milliGRAM(s) Oral daily  aspirin enteric coated 325 milliGRAM(s) Oral daily  atorvastatin 80 milliGRAM(s) Oral at bedtime  cefuroxime  IVPB 1500 milliGRAM(s) IV Intermittent every 8 hours  chlorhexidine 2% Cloths 1 Application(s) Topical daily  dextrose 5%. 1000 milliLiter(s) (50 mL/Hr) IV Continuous <Continuous>  dextrose 50% Injectable 12.5 Gram(s) IV Push once  dextrose 50% Injectable 25 Gram(s) IV Push once  dextrose 50% Injectable 25 Gram(s) IV Push once  docusate sodium 100 milliGRAM(s) Oral three times a day  enoxaparin Injectable 40 milliGRAM(s) SubCutaneous daily  insulin glargine Injectable (LANTUS) 20 Unit(s) SubCutaneous at bedtime  insulin lispro (HumaLOG) corrective regimen sliding scale   SubCutaneous three times a day before meals  insulin lispro Injectable (HumaLOG) 6 Unit(s) SubCutaneous three times a day before meals  methimazole 10 milliGRAM(s) Oral daily  niCARdipine Infusion 5 mG/Hr (25 mL/Hr) IV Continuous <Continuous>  pantoprazole    Tablet 40 milliGRAM(s) Oral daily  polyethylene glycol 3350 17 Gram(s) Oral daily  sodium chloride 0.9%. 1000 milliLiter(s) (10 mL/Hr) IV Continuous <Continuous>  sodium chloride 0.9%. 1000 milliLiter(s) (5 mL/Hr) IV Continuous <Continuous>  vancomycin  IVPB 1000 milliGRAM(s) IV Intermittent every 12 hours    MEDICATIONS  (PRN):  dextrose 40% Gel 15 Gram(s) Oral once PRN Blood Glucose LESS THAN 70 milliGRAM(s)/deciLiter  glucagon  Injectable 1 milliGRAM(s) IntraMuscular once PRN Glucose <70 milliGRAM(s)/deciLiter      Allergies    No Known Allergies    Intolerances    OHS (Unknown)        CAPILLARY BLOOD GLUCOSE  CAPILLARY BLOOD GLUCOSE      POCT Blood Glucose.: 122 mg/dL (04 Sep 2019 23:25)  POCT Blood Glucose.: 129 mg/dL (04 Sep 2019 22:22)  POCT Blood Glucose.: 128 mg/dL (04 Sep 2019 21:30)  POCT Blood Glucose.: 138 mg/dL (04 Sep 2019 20:05)  POCT Blood Glucose.: 198 mg/dL (04 Sep 2019 18:49)  POCT Blood Glucose.: 208 mg/dL (04 Sep 2019 18:07)  POCT Blood Glucose.: 126 mg/dL (04 Sep 2019 16:58)  POCT Blood Glucose.: 144 mg/dL (04 Sep 2019 15:47)  POCT Blood Glucose.: 142 mg/dL (04 Sep 2019 14:48)  POCT Blood Glucose.: 157 mg/dL (04 Sep 2019 14:18)  POCT Blood Glucose.: 186 mg/dL (04 Sep 2019 13:11)  POCT Blood Glucose.: 178 mg/dL (04 Sep 2019 12:03)  POCT Blood Glucose.: 181 mg/dL (04 Sep 2019 10:58)  POCT Blood Glucose.: 131 mg/dL (04 Sep 2019 09:59)  POCT Blood Glucose.: 141 mg/dL (04 Sep 2019 09:07)  POCT Blood Glucose.: 156 mg/dL (04 Sep 2019 07:55)  POCT Blood Glucose.: 139 mg/dL (04 Sep 2019 07:00)  POCT Blood Glucose.: 125 mg/dL (04 Sep 2019 04:52)  POCT Blood Glucose.: 95 mg/dL (04 Sep 2019 04:04)  POCT Blood Glucose.: 105 mg/dL (04 Sep 2019 02:57)  POCT Blood Glucose.: 119 mg/dL (04 Sep 2019 01:55)  POCT Blood Glucose.: 114 mg/dL (04 Sep 2019 00:57)  POCT Blood Glucose.: 140 mg/dL (03 Sep 2019 23:55)      POCT Blood Glucose.: 122 mg/dL (04 Sep 2019 23:25)      PHYSICAL EXAM:    Vital Signs Last 24 Hrs  T(C): 36.9 (04 Sep 2019 23:00), Max: 38 (04 Sep 2019 06:00)  T(F): 98.5 (04 Sep 2019 23:00), Max: 100.4 (04 Sep 2019 06:00)  HR: 75 (04 Sep 2019 23:00) (60 - 80)  BP: --  BP(mean): --  RR: 30 (04 Sep 2019 23:00) (10 - 30)  SpO2: 95% (04 Sep 2019 23:00) (92% - 100%)    General appearance: Well developed, well nourished.    Eyes: Pupils equal and reactive to light. EOM full. No exophthalmos.    Neck: Trachea midline. ? mild thryomegaly .    Lungs: Normal respiratory excursion. Lungs clear.    CV: Regular cardiac rhythm. No murmur. Carotid and pedal pulses intact.    Abdomen: Soft, non tender, no organomegaly or mass.    Musculoskeletal: No cyanosis, clubbing, or edema. No pedal lesions.    Skin: Warm and moist. No rash. No acanthosis.    Neuro: Cranial nerves intact. Normal motor and sensory function. DTR's normal.    Psych: Normal affect, good judgement.            LABS:                        9.7    13.05 )-----------( 226      ( 04 Sep 2019 02:54 )             28.4     09-04    142  |  106  |  18.0  ----------------------------<  117<H>  4.8   |  24.0  |  0.82    Ca    9.2      04 Sep 2019 02:54  Phos  2.5     09-04  Mg     2.1     09-04    TPro  6.5<L>  /  Alb  4.2  /  TBili  0.7  /  DBili  0.2  /  AST  73<H>  /  ALT  30  /  AlkPhos  61  09-04      LIVER FUNCTIONS - ( 04 Sep 2019 02:54 )  Alb: 4.2 g/dL / Pro: 6.5 g/dL / ALK PHOS: 61 U/L / ALT: 30 U/L / AST: 73 U/L / GGT: x               Thyroid Stimulating Hormone, Serum (08.26.19 @ 15:17)    Thyroid Stimulating Hormone, Serum: 0.45 uIU/mL          RADIOLOGY & ADDITIONAL STUDIES:

## 2019-09-04 NOTE — PROGRESS NOTE ADULT - ASSESSMENT
79F h/o AF, severe MR, TR, HTN, HLD, chronic diastolic heart failure, DM, CAD s/p PCI, prior acute pericardial effusion d/t bleeding from mitral clip attempt which was aborted in May 2019. Patient presents again with continued complaints of SOB/CP for cath. Cath today revealed new ostial Cx lesion which will require a surgical evaluation. URIEL: moderate MR. Patient went to the operating room for a C2L, MVR, TV repair, atrial clip. Uneventful post operative course.

## 2019-09-04 NOTE — PHYSICAL THERAPY INITIAL EVALUATION ADULT - AMBULATION SKILLS, REHAB EVAL
independent/Per son: supervision with ambulation outdoors in the community secondary to pt was complaining of her legs feeling tired/fatigue

## 2019-09-04 NOTE — CONSULT NOTE ADULT - ASSESSMENT
T2DM- cirrently on insuin drip and premeal- to be transiiton edoff drip    pt eating welll    BS at home 120-150  well controlled     Hyperthryoidsm on Methimaozle TFT wnl preop   cont Methimazole   CV - post op CABG And mitralvavle repair- p t feel lessdyspenic

## 2019-09-04 NOTE — PHYSICAL THERAPY INITIAL EVALUATION ADULT - ACTIVE RANGE OF MOTION EXAMINATION, REHAB EVAL
bilateral lower extremity Active ROM was WNL (within normal limits)/shai. upper extremity Active ROM was WNL (within normal limits)

## 2019-09-04 NOTE — PHYSICAL THERAPY INITIAL EVALUATION ADULT - ADDITIONAL COMMENTS
Per son translation and interpretation due to pt speaks Greenlandic and requested her son to translate:  Pt lives in a private house with 4 steps to enter with 1 handrail and 5 stairs inside with 2 handrails.  Pt does not own any medical equipment  Pt lives with her son who is able care and assist if needed.   Pt was independent prior to surgery; requiring no assistance, only supervision with outdoor ambulation due to complaints of feeling tired/fatigue in bilateral LEs.

## 2019-09-04 NOTE — PROGRESS NOTE ADULT - SUBJECTIVE AND OBJECTIVE BOX
Brief Hospital Course:     Significant recent/past 24 hr events:  Patient is now post op from C2L, MVR, TV repair and atrial clip   Remained on cardene overnight 5mg/hr  Off levophed   patient trialed on cpap 5/5 and, RR increased to high 30s, will try again.     Subjective:    Review of Systems    ROS negative x 10 systems except as noted above    HPI:  79 year old female presents with son Chava to PST , pt speaks Occitan son interpreting . Pt c/o chest pain and history of heart murmurs . Son states pt had an episode of CHF and has history of MI with 8 stents in her heart . Increased dyspnea recently , denies CP at present .   Patient went to the OR for an C2L, MVR, TV repair, and atrial clip.       PAST MEDICAL & SURGICAL HISTORY:  CHF exacerbation  Atrial fibrillation  Diabetes  Hypertension  Stented coronary artery  Coronary stent restenosis, sequela  Afib  Hypertension  History of ankle surgery: right orif  Post PTCA: 8 stents  H/O hernia repair: 1970&#x27;s  Cataract  History of appendectomy    FAMILY HISTORY:  Family history of early CAD: brother mother  FHx: breast cancer: mother      Vitals   ICU Vital Signs Last 24 Hrs  T(C): 37.6 (04 Sep 2019 03:00), Max: 37.6 (04 Sep 2019 03:00)  T(F): 99.7 (04 Sep 2019 03:00), Max: 99.7 (04 Sep 2019 03:00)  HR: 70 (04 Sep 2019 03:00) (59 - 144)  BP: 146/81 (03 Sep 2019 07:08) (146/81 - 146/81)  BP(mean): --  ABP: 163/41 (04 Sep 2019 03:00) (126/35 - 169/51)  ABP(mean): 76 (04 Sep 2019 03:00) (63 - 94)  RR: 17 (04 Sep 2019 03:00) (10 - 58)  SpO2: 96% (04 Sep 2019 03:00) (92% - 100%)      VENT SETTINGS   Mode: AC/ CMV (Assist Control/ Continuous Mandatory Ventilation)  RR (machine): 10  TV (machine): 700  FiO2: 40  PEEP: 5  MAP: 10  PIP: 31    ABG - ( 03 Sep 2019 21:06 )  pH, Arterial: 7.43  pH, Blood: x     /  pCO2: 38    /  pO2: 109   / HCO3: 25    / Base Excess: 1.1   /  SaO2: 99            I&O's Detail    03 Sep 2019 07:01  -  04 Sep 2019 03:11  --------------------------------------------------------  IN:    insulin regular Infusion: 63 mL    niCARdipine Infusion: 315 mL    sodium chloride 0.9%.: 65 mL    sodium chloride 0.9%.: 130 mL    Solution: 250 mL    Solution: 100 mL    Solution: 200 mL    Solution: 100 mL  Total IN: 1223 mL    OUT:    Chest Tube: 370 mL    Chest Tube: 95 mL    Indwelling Catheter - Urethral: 830 mL  Total OUT: 1295 mL    Total NET: -72 mL          LABS                        9.7    13.05 )-----------( 226      ( 04 Sep 2019 02:54 )             28.4     09-03    144  |  104  |  14.0  ----------------------------<  157<H>  4.2   |  23.0  |  0.75    Ca    9.3      03 Sep 2019 16:17  Phos  2.0     09-03  Mg     2.5     09-03    TPro  6.2<L>  /  Alb  3.9  /  TBili  0.8  /  DBili  0.3  /  AST  54<H>  /  ALT  28  /  AlkPhos  64  09-03    PT/INR - ( 04 Sep 2019 02:54 )   PT: 13.6 sec;   INR: 1.18 ratio         PTT - ( 04 Sep 2019 02:54 )  PTT:28.4 sec  CARDIAC MARKERS ( 03 Sep 2019 16:17 )   U/L / CKMB51.9 ng/mL / Troponin T0.78 ng/mL /          POCT Blood Glucose.: 105 mg/dL (09-04-19 @ 02:57)  POCT Blood Glucose.: 119 mg/dL (09-04-19 @ 01:55)  POCT Blood Glucose.: 114 mg/dL (09-04-19 @ 00:57)  POCT Blood Glucose.: 140 mg/dL (09-03-19 @ 23:55)  POCT Blood Glucose.: 155 mg/dL (09-03-19 @ 22:55)  POCT Blood Glucose.: 170 mg/dL (09-03-19 @ 22:07)  POCT Blood Glucose.: 201 mg/dL (09-03-19 @ 19:52)  POCT Blood Glucose.: 225 mg/dL (09-03-19 @ 18:54)  POCT Blood Glucose.: 224 mg/dL (09-03-19 @ 18:03)  POCT Blood Glucose.: 219 mg/dL (09-03-19 @ 17:05)  POCT Blood Glucose.: 137 mg/dL (09-03-19 @ 06:49)        MEDICATIONS  (STANDING):  cefuroxime  IVPB 1500 milliGRAM(s) IV Intermittent every 8 hours  chlorhexidine 0.12% Liquid 5 milliLiter(s) Oral Mucosa every 4 hours  chlorhexidine 2% Cloths 1 Application(s) Topical daily  docusate sodium 100 milliGRAM(s) Oral three times a day  insulin regular Infusion 2 Unit(s)/Hr (2 mL/Hr) IV Continuous <Continuous>  niCARdipine Infusion 5 mG/Hr (25 mL/Hr) IV Continuous <Continuous>  norepinephrine Infusion 0.05 MICROgram(s)/kG/Min (5.794 mL/Hr) IV Continuous <Continuous>  pantoprazole    Tablet 40 milliGRAM(s) Oral daily  potassium chloride  10 mEq/50 mL IVPB 10 milliEquivalent(s) IV Intermittent every 1 hour  potassium chloride  10 mEq/50 mL IVPB 10 milliEquivalent(s) IV Intermittent every 1 hour  potassium chloride  10 mEq/50 mL IVPB 10 milliEquivalent(s) IV Intermittent every 1 hour  propofol Infusion 26.969 MICROgram(s)/kG/Min (10 mL/Hr) IV Continuous <Continuous>  sodium chloride 0.9%. 1000 milliLiter(s) (10 mL/Hr) IV Continuous <Continuous>  sodium chloride 0.9%. 1000 milliLiter(s) (5 mL/Hr) IV Continuous <Continuous>  vancomycin  IVPB 1000 milliGRAM(s) IV Intermittent every 12 hours    MEDICATIONS  (PRN):    Allergies:  No Known Allergies  OHS (Unknown)      Physical Exam:   Constitutional: NAD, well-groomed, well-developed  HEENT: PERRLA, EOMI, no drainage or redness  Neck: supple,  No JVD  Respiratory: Breath Sounds equal & clear bilaterally to auscultation, no rales/rhonchi/wheezing, no accessory muscle use noted  Cardiovascular: Regular rate, regular rhythm, normal S1, S2; no murmurs or rub  Gastrointestinal: Soft, non-tender, non distended, + bowel sounds  Extremities: BLANK x 4, no peripheral edema, no cyanosis, no clubbing   Neurological: A+O x 3; no sensory, motor  deficits, normal reflexes  Skin: warm, dry, well perfused

## 2019-09-05 DIAGNOSIS — Z29.9 ENCOUNTER FOR PROPHYLACTIC MEASURES, UNSPECIFIED: ICD-10-CM

## 2019-09-05 DIAGNOSIS — I34.0 NONRHEUMATIC MITRAL (VALVE) INSUFFICIENCY: ICD-10-CM

## 2019-09-05 DIAGNOSIS — I25.10 ATHEROSCLEROTIC HEART DISEASE OF NATIVE CORONARY ARTERY WITHOUT ANGINA PECTORIS: ICD-10-CM

## 2019-09-05 DIAGNOSIS — I10 ESSENTIAL (PRIMARY) HYPERTENSION: ICD-10-CM

## 2019-09-05 LAB
ANION GAP SERPL CALC-SCNC: 11 MMOL/L — SIGNIFICANT CHANGE UP (ref 5–17)
BUN SERPL-MCNC: 33 MG/DL — HIGH (ref 8–20)
CALCIUM SERPL-MCNC: 8.8 MG/DL — SIGNIFICANT CHANGE UP (ref 8.6–10.2)
CHLORIDE SERPL-SCNC: 107 MMOL/L — SIGNIFICANT CHANGE UP (ref 98–107)
CO2 SERPL-SCNC: 23 MMOL/L — SIGNIFICANT CHANGE UP (ref 22–29)
CREAT SERPL-MCNC: 0.78 MG/DL — SIGNIFICANT CHANGE UP (ref 0.5–1.3)
GLUCOSE BLDC GLUCOMTR-MCNC: 128 MG/DL — HIGH (ref 70–99)
GLUCOSE BLDC GLUCOMTR-MCNC: 132 MG/DL — HIGH (ref 70–99)
GLUCOSE BLDC GLUCOMTR-MCNC: 165 MG/DL — HIGH (ref 70–99)
GLUCOSE BLDC GLUCOMTR-MCNC: 206 MG/DL — HIGH (ref 70–99)
GLUCOSE BLDC GLUCOMTR-MCNC: 64 MG/DL — LOW (ref 70–99)
GLUCOSE SERPL-MCNC: 154 MG/DL — HIGH (ref 70–115)
HCT VFR BLD CALC: 26.9 % — LOW (ref 34.5–45)
HGB BLD-MCNC: 8.8 G/DL — LOW (ref 11.5–15.5)
INR BLD: 1.33 RATIO — HIGH (ref 0.88–1.16)
MAGNESIUM SERPL-MCNC: 2.1 MG/DL — SIGNIFICANT CHANGE UP (ref 1.6–2.6)
MCHC RBC-ENTMCNC: 30 PG — SIGNIFICANT CHANGE UP (ref 27–34)
MCHC RBC-ENTMCNC: 32.7 GM/DL — SIGNIFICANT CHANGE UP (ref 32–36)
MCV RBC AUTO: 91.8 FL — SIGNIFICANT CHANGE UP (ref 80–100)
PLATELET # BLD AUTO: 180 K/UL — SIGNIFICANT CHANGE UP (ref 150–400)
POTASSIUM SERPL-MCNC: 4.8 MMOL/L — SIGNIFICANT CHANGE UP (ref 3.5–5.3)
POTASSIUM SERPL-SCNC: 4.8 MMOL/L — SIGNIFICANT CHANGE UP (ref 3.5–5.3)
PROTHROM AB SERPL-ACNC: 15.4 SEC — HIGH (ref 10–12.9)
RBC # BLD: 2.93 M/UL — LOW (ref 3.8–5.2)
RBC # FLD: 16.1 % — HIGH (ref 10.3–14.5)
SODIUM SERPL-SCNC: 141 MMOL/L — SIGNIFICANT CHANGE UP (ref 135–145)
WBC # BLD: 16.31 K/UL — HIGH (ref 3.8–10.5)
WBC # FLD AUTO: 16.31 K/UL — HIGH (ref 3.8–10.5)

## 2019-09-05 PROCEDURE — 71045 X-RAY EXAM CHEST 1 VIEW: CPT | Mod: 26

## 2019-09-05 PROCEDURE — 93010 ELECTROCARDIOGRAM REPORT: CPT | Mod: 76

## 2019-09-05 RX ORDER — HYDROMORPHONE HYDROCHLORIDE 2 MG/ML
0.5 INJECTION INTRAMUSCULAR; INTRAVENOUS; SUBCUTANEOUS ONCE
Refills: 0 | Status: DISCONTINUED | OUTPATIENT
Start: 2019-09-05 | End: 2019-09-05

## 2019-09-05 RX ORDER — MAGNESIUM SULFATE 500 MG/ML
2 VIAL (ML) INJECTION ONCE
Refills: 0 | Status: COMPLETED | OUTPATIENT
Start: 2019-09-05 | End: 2019-09-05

## 2019-09-05 RX ORDER — FUROSEMIDE 40 MG
40 TABLET ORAL ONCE
Refills: 0 | Status: COMPLETED | OUTPATIENT
Start: 2019-09-05 | End: 2019-09-05

## 2019-09-05 RX ORDER — SODIUM CHLORIDE 9 MG/ML
3 INJECTION INTRAMUSCULAR; INTRAVENOUS; SUBCUTANEOUS EVERY 8 HOURS
Refills: 0 | Status: DISCONTINUED | OUTPATIENT
Start: 2019-09-05 | End: 2019-09-11

## 2019-09-05 RX ORDER — OXYCODONE AND ACETAMINOPHEN 5; 325 MG/1; MG/1
2 TABLET ORAL EVERY 4 HOURS
Refills: 0 | Status: DISCONTINUED | OUTPATIENT
Start: 2019-09-05 | End: 2019-09-11

## 2019-09-05 RX ORDER — WARFARIN SODIUM 2.5 MG/1
2.5 TABLET ORAL ONCE
Refills: 0 | Status: COMPLETED | OUTPATIENT
Start: 2019-09-05 | End: 2019-09-05

## 2019-09-05 RX ORDER — FENTANYL CITRATE 50 UG/ML
25 INJECTION INTRAVENOUS ONCE
Refills: 0 | Status: DISCONTINUED | OUTPATIENT
Start: 2019-09-05 | End: 2019-09-05

## 2019-09-05 RX ORDER — OXYCODONE AND ACETAMINOPHEN 5; 325 MG/1; MG/1
1 TABLET ORAL EVERY 4 HOURS
Refills: 0 | Status: DISCONTINUED | OUTPATIENT
Start: 2019-09-05 | End: 2019-09-11

## 2019-09-05 RX ORDER — INSULIN LISPRO 100/ML
VIAL (ML) SUBCUTANEOUS
Refills: 0 | Status: DISCONTINUED | OUTPATIENT
Start: 2019-09-05 | End: 2019-09-11

## 2019-09-05 RX ADMIN — Medication 2: at 08:45

## 2019-09-05 RX ADMIN — OXYCODONE AND ACETAMINOPHEN 1 TABLET(S): 5; 325 TABLET ORAL at 18:16

## 2019-09-05 RX ADMIN — Medication 6 UNIT(S): at 08:45

## 2019-09-05 RX ADMIN — POLYETHYLENE GLYCOL 3350 17 GRAM(S): 17 POWDER, FOR SOLUTION ORAL at 13:24

## 2019-09-05 RX ADMIN — HYDROMORPHONE HYDROCHLORIDE 0.5 MILLIGRAM(S): 2 INJECTION INTRAMUSCULAR; INTRAVENOUS; SUBCUTANEOUS at 04:10

## 2019-09-05 RX ADMIN — Medication 100 MILLIGRAM(S): at 08:45

## 2019-09-05 RX ADMIN — Medication 100 MILLIGRAM(S): at 00:23

## 2019-09-05 RX ADMIN — Medication 50 MILLIGRAM(S): at 17:15

## 2019-09-05 RX ADMIN — Medication 325 MILLIGRAM(S): at 13:26

## 2019-09-05 RX ADMIN — FENTANYL CITRATE 25 MICROGRAM(S): 50 INJECTION INTRAVENOUS at 00:37

## 2019-09-05 RX ADMIN — Medication 6 UNIT(S): at 13:25

## 2019-09-05 RX ADMIN — PANTOPRAZOLE SODIUM 40 MILLIGRAM(S): 20 TABLET, DELAYED RELEASE ORAL at 13:24

## 2019-09-05 RX ADMIN — HYDROMORPHONE HYDROCHLORIDE 0.5 MILLIGRAM(S): 2 INJECTION INTRAMUSCULAR; INTRAVENOUS; SUBCUTANEOUS at 04:40

## 2019-09-05 RX ADMIN — OXYCODONE AND ACETAMINOPHEN 1 TABLET(S): 5; 325 TABLET ORAL at 19:29

## 2019-09-05 RX ADMIN — WARFARIN SODIUM 2.5 MILLIGRAM(S): 2.5 TABLET ORAL at 23:58

## 2019-09-05 RX ADMIN — ATORVASTATIN CALCIUM 80 MILLIGRAM(S): 80 TABLET, FILM COATED ORAL at 23:58

## 2019-09-05 RX ADMIN — Medication 100 MILLIGRAM(S): at 13:26

## 2019-09-05 RX ADMIN — HYDROMORPHONE HYDROCHLORIDE 0.5 MILLIGRAM(S): 2 INJECTION INTRAMUSCULAR; INTRAVENOUS; SUBCUTANEOUS at 10:15

## 2019-09-05 RX ADMIN — Medication 4: at 17:15

## 2019-09-05 RX ADMIN — ENOXAPARIN SODIUM 40 MILLIGRAM(S): 100 INJECTION SUBCUTANEOUS at 13:24

## 2019-09-05 RX ADMIN — SODIUM CHLORIDE 3 MILLILITER(S): 9 INJECTION INTRAMUSCULAR; INTRAVENOUS; SUBCUTANEOUS at 13:26

## 2019-09-05 RX ADMIN — Medication 100 MILLIGRAM(S): at 23:58

## 2019-09-05 RX ADMIN — Medication 250 MILLIGRAM(S): at 09:44

## 2019-09-05 RX ADMIN — Medication 50 MILLIGRAM(S): at 05:52

## 2019-09-05 RX ADMIN — FENTANYL CITRATE 25 MICROGRAM(S): 50 INJECTION INTRAVENOUS at 00:23

## 2019-09-05 RX ADMIN — Medication 100 MILLIGRAM(S): at 05:53

## 2019-09-05 RX ADMIN — HYDROMORPHONE HYDROCHLORIDE 0.5 MILLIGRAM(S): 2 INJECTION INTRAMUSCULAR; INTRAVENOUS; SUBCUTANEOUS at 01:11

## 2019-09-05 RX ADMIN — OXYCODONE AND ACETAMINOPHEN 2 TABLET(S): 5; 325 TABLET ORAL at 15:54

## 2019-09-05 RX ADMIN — Medication 40 MILLIGRAM(S): at 09:44

## 2019-09-05 RX ADMIN — SODIUM CHLORIDE 3 MILLILITER(S): 9 INJECTION INTRAMUSCULAR; INTRAVENOUS; SUBCUTANEOUS at 23:44

## 2019-09-05 RX ADMIN — OXYCODONE AND ACETAMINOPHEN 2 TABLET(S): 5; 325 TABLET ORAL at 16:54

## 2019-09-05 RX ADMIN — Medication 50 GRAM(S): at 04:13

## 2019-09-05 RX ADMIN — HYDROMORPHONE HYDROCHLORIDE 0.5 MILLIGRAM(S): 2 INJECTION INTRAMUSCULAR; INTRAVENOUS; SUBCUTANEOUS at 10:30

## 2019-09-05 RX ADMIN — Medication 6 UNIT(S): at 17:15

## 2019-09-05 RX ADMIN — HYDROMORPHONE HYDROCHLORIDE 0.5 MILLIGRAM(S): 2 INJECTION INTRAMUSCULAR; INTRAVENOUS; SUBCUTANEOUS at 00:41

## 2019-09-05 NOTE — PROGRESS NOTE ADULT - SUBJECTIVE AND OBJECTIVE BOX
Brief Hospital Course:     Significant recent/past 24 hr events:  extubated  remains hypertensive off cardene, additional lopressor dose given and norvasc added  patient is transitioned off insulin gtt     Subjective:    Review of Systems  Patient is complaining of some right sided pain, on the chest wall       Patient is a 79y old  Female who presents with a chief complaint of open heart surgery (04 Sep 2019 17:15)    HPI:  79 year old female presents with son Chava to PST , pt speaks Romansh son interpreting . Pt c/o chest pain and history of heart murmurs . Son states pt had an episode of CHF and has history of MI with 8 stents in her heart . Increased dyspnea recently , denies CP at present . Scheduled for surgery with DR. Fuller for bypass and mitral and tricuspid valve repair. (26 Aug 2019 12:50)    PAST MEDICAL & SURGICAL HISTORY:  CHF exacerbation  Atrial fibrillation  Diabetes  Hypertension  Stented coronary artery  Coronary stent restenosis, sequela  Afib  Hypertension  History of ankle surgery: right orif  Post PTCA: 8 stents  H/O hernia repair: 1970&#x27;s  Cataract  History of appendectomy    FAMILY HISTORY:  Family history of early CAD: brother mother  FHx: breast cancer: mother      Vitals   ICU Vital Signs Last 24 Hrs  T(C): 36.9 (04 Sep 2019 23:00), Max: 38 (04 Sep 2019 06:00)  T(F): 98.5 (04 Sep 2019 23:00), Max: 100.4 (04 Sep 2019 06:00)  HR: 75 (05 Sep 2019 00:00) (60 - 80)  BP: --  BP(mean): --  ABP: 169/47 (05 Sep 2019 00:00) (133/33 - 176/41)  ABP(mean): 92 (05 Sep 2019 00:00) (62 - 93)  RR: 25 (05 Sep 2019 00:00) (10 - 30)  SpO2: 95% (05 Sep 2019 00:00) (92% - 100%)      VENT SETTINGS   Mode: CPAP with PS  FiO2: 40  PEEP: 5  PS: 5  MAP: 8    ABG - ( 04 Sep 2019 05:46 )  pH, Arterial: 7.48  pH, Blood: x     /  pCO2: 36    /  pO2: 118   / HCO3: 27    / Base Excess: 3.0   /  SaO2: 99                  I&O's Detail    03 Sep 2019 07:01  -  04 Sep 2019 07:00  --------------------------------------------------------  IN:    insulin regular Infusion: 67 mL    niCARdipine Infusion: 465 mL    sodium chloride 0.9%.: 170 mL    sodium chloride 0.9%.: 85 mL    Solution: 200 mL    Solution: 250 mL    Solution: 200 mL    Solution: 100 mL  Total IN: 1537 mL    OUT:    Chest Tube: 120 mL    Chest Tube: 410 mL    Indwelling Catheter - Urethral: 1045 mL  Total OUT: 1575 mL    Total NET: -38 mL      04 Sep 2019 07:01  -  05 Sep 2019 00:44  --------------------------------------------------------  IN:    insulin regular Infusion: 27.5 mL    niCARdipine Infusion: 212.5 mL    Oral Fluid: 600 mL    sodium chloride 0.9%.: 85 mL    sodium chloride 0.9%.: 170 mL    Solution: 100 mL    Solution: 100 mL    Solution: 500 mL  Total IN: 1795 mL    OUT:    Chest Tube: 72 mL    Chest Tube: 100 mL    Indwelling Catheter - Urethral: 1115 mL  Total OUT: 1287 mL    Total NET: 508 mL          LABS                        9.7    13.05 )-----------( 226      ( 04 Sep 2019 02:54 )             28.4     09-04    142  |  106  |  18.0  ----------------------------<  117<H>  4.8   |  24.0  |  0.82    Ca    9.2      04 Sep 2019 02:54  Phos  2.5     09-04  Mg     2.1     09-04    TPro  6.5<L>  /  Alb  4.2  /  TBili  0.7  /  DBili  0.2  /  AST  73<H>  /  ALT  30  /  AlkPhos  61  09-04    PT/INR - ( 04 Sep 2019 02:54 )   PT: 13.6 sec;   INR: 1.18 ratio         PTT - ( 04 Sep 2019 02:54 )  PTT:28.4 sec  CARDIAC MARKERS ( 04 Sep 2019 02:54 )   U/L / CKMB39.5 ng/mL / Troponin T0.65 ng/mL /          POCT Blood Glucose.: 128 mg/dL (09-05-19 @ 00:26)  POCT Blood Glucose.: 122 mg/dL (09-04-19 @ 23:25)  POCT Blood Glucose.: 129 mg/dL (09-04-19 @ 22:22)  POCT Blood Glucose.: 128 mg/dL (09-04-19 @ 21:30)  POCT Blood Glucose.: 138 mg/dL (09-04-19 @ 20:05)  POCT Blood Glucose.: 198 mg/dL (09-04-19 @ 18:49)  POCT Blood Glucose.: 208 mg/dL (09-04-19 @ 18:07)  POCT Blood Glucose.: 126 mg/dL (09-04-19 @ 16:58)  POCT Blood Glucose.: 144 mg/dL (09-04-19 @ 15:47)  POCT Blood Glucose.: 142 mg/dL (09-04-19 @ 14:48)  POCT Blood Glucose.: 157 mg/dL (09-04-19 @ 14:18)  POCT Blood Glucose.: 186 mg/dL (09-04-19 @ 13:11)  POCT Blood Glucose.: 178 mg/dL (09-04-19 @ 12:03)  POCT Blood Glucose.: 181 mg/dL (09-04-19 @ 10:58)  POCT Blood Glucose.: 131 mg/dL (09-04-19 @ 09:59)  POCT Blood Glucose.: 141 mg/dL (09-04-19 @ 09:07)  POCT Blood Glucose.: 156 mg/dL (09-04-19 @ 07:55)  POCT Blood Glucose.: 139 mg/dL (09-04-19 @ 07:00)  POCT Blood Glucose.: 125 mg/dL (09-04-19 @ 04:52)  POCT Blood Glucose.: 95 mg/dL (09-04-19 @ 04:04)  POCT Blood Glucose.: 105 mg/dL (09-04-19 @ 02:57)  POCT Blood Glucose.: 119 mg/dL (09-04-19 @ 01:55)  POCT Blood Glucose.: 114 mg/dL (09-04-19 @ 00:57)        MEDICATIONS  (STANDING):  amLODIPine   Tablet 5 milliGRAM(s) Oral daily  aspirin enteric coated 325 milliGRAM(s) Oral daily  atorvastatin 80 milliGRAM(s) Oral at bedtime  cefuroxime  IVPB 1500 milliGRAM(s) IV Intermittent every 8 hours  chlorhexidine 2% Cloths 1 Application(s) Topical daily  dextrose 5%. 1000 milliLiter(s) (50 mL/Hr) IV Continuous <Continuous>  dextrose 50% Injectable 12.5 Gram(s) IV Push once  dextrose 50% Injectable 25 Gram(s) IV Push once  dextrose 50% Injectable 25 Gram(s) IV Push once  docusate sodium 100 milliGRAM(s) Oral three times a day  enoxaparin Injectable 40 milliGRAM(s) SubCutaneous daily  insulin glargine Injectable (LANTUS) 20 Unit(s) SubCutaneous at bedtime  insulin lispro (HumaLOG) corrective regimen sliding scale   SubCutaneous three times a day before meals  insulin lispro Injectable (HumaLOG) 6 Unit(s) SubCutaneous three times a day before meals  methimazole 10 milliGRAM(s) Oral daily  metoprolol tartrate 50 milliGRAM(s) Oral two times a day  niCARdipine Infusion 5 mG/Hr (25 mL/Hr) IV Continuous <Continuous>  pantoprazole    Tablet 40 milliGRAM(s) Oral daily  polyethylene glycol 3350 17 Gram(s) Oral daily  sodium chloride 0.9%. 1000 milliLiter(s) (10 mL/Hr) IV Continuous <Continuous>  sodium chloride 0.9%. 1000 milliLiter(s) (5 mL/Hr) IV Continuous <Continuous>  vancomycin  IVPB 1000 milliGRAM(s) IV Intermittent every 12 hours    MEDICATIONS  (PRN):  dextrose 40% Gel 15 Gram(s) Oral once PRN Blood Glucose LESS THAN 70 milliGRAM(s)/deciLiter  glucagon  Injectable 1 milliGRAM(s) IntraMuscular once PRN Glucose <70 milliGRAM(s)/deciLiter    Allergies:  No Known Allergies  OHS (Unknown)      Physical Exam:   Constitutional: NAD, well-groomed, well-developed  HEENT: PERRLA, EOMI, no drainage or redness  Neck: supple,  No JVD  Respiratory: Breath Sounds equal & clear bilaterally to auscultation, no rales/rhonchi/wheezing, no accessory muscle use noted  Cardiovascular: Regular rate, regular rhythm, normal S1, S2; no murmurs or rub  Gastrointestinal: Soft, non-tender, non distended, + bowel sounds  Extremities: BLANK x 4, no peripheral edema, no cyanosis, no clubbing   Neurological: A+O x 3; speech clear and intact; no sensory, motor  deficits, normal reflexes  Skin: warm, dry, well perfused

## 2019-09-05 NOTE — CHART NOTE - NSCHARTNOTEFT_GEN_A_CORE
CTS Transfer Acceptance Note to 4 CTU from CTICU     Pt seen at the bedside.  Son at bedside translating ( Pt speaks Japanese)  No complaints .  Medications and Labs reviewed.  Pt is noted to be in a JXN rhythm.  stable On Lopressor

## 2019-09-05 NOTE — DIETITIAN INITIAL EVALUATION ADULT. - PERTINENT LABORATORY DATA
09-05 Na141 mmol/L Glu 154 mg/dL<H> K+ 4.8 mmol/L Cr  0.78 mg/dL BUN 33.0 mg/dL<H> Phos n/a   Alb n/a   PAB n/a     n/a  HgbA1C

## 2019-09-05 NOTE — DIETITIAN INITIAL EVALUATION ADULT. - OTHER INFO
79 year old female presents with son Chava to PST , pt speaks Greek son interpreting . Pt c/o chest pain and history of heart murmurs . Son states pt had an episode of CHF and has history of MI with 8 stents in her heart .  Pt now s/p C2L, MVR, TV repair, atrial clip. Pt now extubated, awake and alert, taking and tolerating solid diet well with 100% intake. Pt with no recent wt changes. Declined diet education at this time.

## 2019-09-06 ENCOUNTER — TRANSCRIPTION ENCOUNTER (OUTPATIENT)
Age: 79
End: 2019-09-06

## 2019-09-06 DIAGNOSIS — I48.91 UNSPECIFIED ATRIAL FIBRILLATION: ICD-10-CM

## 2019-09-06 DIAGNOSIS — R00.1 BRADYCARDIA, UNSPECIFIED: ICD-10-CM

## 2019-09-06 LAB
ANION GAP SERPL CALC-SCNC: 12 MMOL/L — SIGNIFICANT CHANGE UP (ref 5–17)
BUN SERPL-MCNC: 43 MG/DL — HIGH (ref 8–20)
CALCIUM SERPL-MCNC: 8.8 MG/DL — SIGNIFICANT CHANGE UP (ref 8.6–10.2)
CHLORIDE SERPL-SCNC: 99 MMOL/L — SIGNIFICANT CHANGE UP (ref 98–107)
CO2 SERPL-SCNC: 24 MMOL/L — SIGNIFICANT CHANGE UP (ref 22–29)
CREAT SERPL-MCNC: 1.01 MG/DL — SIGNIFICANT CHANGE UP (ref 0.5–1.3)
GLUCOSE BLDC GLUCOMTR-MCNC: 121 MG/DL — HIGH (ref 70–99)
GLUCOSE BLDC GLUCOMTR-MCNC: 148 MG/DL — HIGH (ref 70–99)
GLUCOSE BLDC GLUCOMTR-MCNC: 206 MG/DL — HIGH (ref 70–99)
GLUCOSE BLDC GLUCOMTR-MCNC: 221 MG/DL — HIGH (ref 70–99)
GLUCOSE BLDC GLUCOMTR-MCNC: 233 MG/DL — HIGH (ref 70–99)
GLUCOSE BLDC GLUCOMTR-MCNC: 84 MG/DL — SIGNIFICANT CHANGE UP (ref 70–99)
GLUCOSE SERPL-MCNC: 162 MG/DL — HIGH (ref 70–115)
HCT VFR BLD CALC: 27.5 % — LOW (ref 34.5–45)
HGB BLD-MCNC: 8.8 G/DL — LOW (ref 11.5–15.5)
INR BLD: 1.19 RATIO — HIGH (ref 0.88–1.16)
MAGNESIUM SERPL-MCNC: 2.2 MG/DL — SIGNIFICANT CHANGE UP (ref 1.6–2.6)
MCHC RBC-ENTMCNC: 30.1 PG — SIGNIFICANT CHANGE UP (ref 27–34)
MCHC RBC-ENTMCNC: 32 GM/DL — SIGNIFICANT CHANGE UP (ref 32–36)
MCV RBC AUTO: 94.2 FL — SIGNIFICANT CHANGE UP (ref 80–100)
PLATELET # BLD AUTO: 163 K/UL — SIGNIFICANT CHANGE UP (ref 150–400)
POTASSIUM SERPL-MCNC: 4.3 MMOL/L — SIGNIFICANT CHANGE UP (ref 3.5–5.3)
POTASSIUM SERPL-SCNC: 4.3 MMOL/L — SIGNIFICANT CHANGE UP (ref 3.5–5.3)
PROTHROM AB SERPL-ACNC: 13.7 SEC — HIGH (ref 10–12.9)
RBC # BLD: 2.92 M/UL — LOW (ref 3.8–5.2)
RBC # FLD: 15.2 % — HIGH (ref 10.3–14.5)
SODIUM SERPL-SCNC: 135 MMOL/L — SIGNIFICANT CHANGE UP (ref 135–145)
WBC # BLD: 15.04 K/UL — HIGH (ref 3.8–10.5)
WBC # FLD AUTO: 15.04 K/UL — HIGH (ref 3.8–10.5)

## 2019-09-06 PROCEDURE — 99232 SBSQ HOSP IP/OBS MODERATE 35: CPT

## 2019-09-06 PROCEDURE — 99222 1ST HOSP IP/OBS MODERATE 55: CPT

## 2019-09-06 PROCEDURE — 71045 X-RAY EXAM CHEST 1 VIEW: CPT | Mod: 26

## 2019-09-06 RX ORDER — DEXTROSE 50 % IN WATER 50 %
12.5 SYRINGE (ML) INTRAVENOUS ONCE
Refills: 0 | Status: COMPLETED | OUTPATIENT
Start: 2019-09-06 | End: 2019-09-06

## 2019-09-06 RX ORDER — METOPROLOL TARTRATE 50 MG
12.5 TABLET ORAL
Refills: 0 | Status: DISCONTINUED | OUTPATIENT
Start: 2019-09-06 | End: 2019-09-06

## 2019-09-06 RX ORDER — WARFARIN SODIUM 2.5 MG/1
1 TABLET ORAL ONCE
Refills: 0 | Status: COMPLETED | OUTPATIENT
Start: 2019-09-06 | End: 2019-09-06

## 2019-09-06 RX ADMIN — ENOXAPARIN SODIUM 40 MILLIGRAM(S): 100 INJECTION SUBCUTANEOUS at 10:48

## 2019-09-06 RX ADMIN — OXYCODONE AND ACETAMINOPHEN 1 TABLET(S): 5; 325 TABLET ORAL at 18:24

## 2019-09-06 RX ADMIN — Medication 12.5 MILLIGRAM(S): at 18:30

## 2019-09-06 RX ADMIN — Medication 6 UNIT(S): at 12:43

## 2019-09-06 RX ADMIN — OXYCODONE AND ACETAMINOPHEN 1 TABLET(S): 5; 325 TABLET ORAL at 11:18

## 2019-09-06 RX ADMIN — Medication 325 MILLIGRAM(S): at 10:48

## 2019-09-06 RX ADMIN — Medication 4: at 21:43

## 2019-09-06 RX ADMIN — WARFARIN SODIUM 1 MILLIGRAM(S): 2.5 TABLET ORAL at 21:42

## 2019-09-06 RX ADMIN — OXYCODONE AND ACETAMINOPHEN 1 TABLET(S): 5; 325 TABLET ORAL at 18:55

## 2019-09-06 RX ADMIN — SODIUM CHLORIDE 3 MILLILITER(S): 9 INJECTION INTRAMUSCULAR; INTRAVENOUS; SUBCUTANEOUS at 13:11

## 2019-09-06 RX ADMIN — INSULIN GLARGINE 20 UNIT(S): 100 INJECTION, SOLUTION SUBCUTANEOUS at 21:42

## 2019-09-06 RX ADMIN — AMLODIPINE BESYLATE 5 MILLIGRAM(S): 2.5 TABLET ORAL at 05:36

## 2019-09-06 RX ADMIN — SODIUM CHLORIDE 3 MILLILITER(S): 9 INJECTION INTRAMUSCULAR; INTRAVENOUS; SUBCUTANEOUS at 21:38

## 2019-09-06 RX ADMIN — ATORVASTATIN CALCIUM 80 MILLIGRAM(S): 80 TABLET, FILM COATED ORAL at 21:42

## 2019-09-06 RX ADMIN — OXYCODONE AND ACETAMINOPHEN 1 TABLET(S): 5; 325 TABLET ORAL at 07:30

## 2019-09-06 RX ADMIN — Medication 100 MILLIGRAM(S): at 05:36

## 2019-09-06 RX ADMIN — Medication 100 MILLIGRAM(S): at 10:48

## 2019-09-06 RX ADMIN — SODIUM CHLORIDE 3 MILLILITER(S): 9 INJECTION INTRAMUSCULAR; INTRAVENOUS; SUBCUTANEOUS at 07:33

## 2019-09-06 RX ADMIN — Medication 6 UNIT(S): at 18:31

## 2019-09-06 RX ADMIN — PANTOPRAZOLE SODIUM 40 MILLIGRAM(S): 20 TABLET, DELAYED RELEASE ORAL at 10:49

## 2019-09-06 RX ADMIN — Medication 6 UNIT(S): at 08:43

## 2019-09-06 RX ADMIN — Medication 4: at 08:43

## 2019-09-06 RX ADMIN — OXYCODONE AND ACETAMINOPHEN 1 TABLET(S): 5; 325 TABLET ORAL at 07:04

## 2019-09-06 RX ADMIN — OXYCODONE AND ACETAMINOPHEN 1 TABLET(S): 5; 325 TABLET ORAL at 23:56

## 2019-09-06 RX ADMIN — Medication 4: at 12:43

## 2019-09-06 RX ADMIN — OXYCODONE AND ACETAMINOPHEN 1 TABLET(S): 5; 325 TABLET ORAL at 11:50

## 2019-09-06 RX ADMIN — Medication 100 MILLIGRAM(S): at 21:42

## 2019-09-06 NOTE — CONSULT NOTE ADULT - SUBJECTIVE AND OBJECTIVE BOX
[place] CARDIAC ELECTROPHYSIOLOGY  [facility] Faculty Practice   Office: [address] [address]  Telephone: [phone] Fax:[phone]      79 year old Upper sorbian speaking female seen w/ son at bedside. Declines telephone  in favor of son translating. H/O HTN, DM, CAD s/p multiple remote stents & recent cardiac cath w/ , severe MR s/p attempted Chichi-clip c/b pericardial effusion, severe TR, persistent atrial fibrillation, now POD #3 s/p       PAST MEDICAL & SURGICAL HISTORY:  CHF exacerbation  Atrial fibrillation  Diabetes  Hypertension  Stented coronary artery  Coronary stent restenosis, sequela  Afib  Hypertension  History of ankle surgery: right orif  Post PTCA: 8 stents  H/O hernia repair: 1970&#x27;s  Cataract  History of appendectomy      REVIEW OF SYSTEMS:    CONSTITUTIONAL: No fever, weight loss, or fatigue  EYES: No eye pain, visual disturbances, or discharge  ENMT:  No difficulty hearing, tinnitus, vertigo; No sinus or throat pain  NECK: No pain or stiffness  BREASTS: No pain, masses, or nipple discharge  RESPIRATORY: No cough, wheezing, chills or hemoptysis; No shortness of breath  CARDIOVASCULAR: No chest pain, palpitations, dizziness, or leg swelling  GASTROINTESTINAL: No abdominal or epigastric pain. No nausea, vomiting, or hematemesis; No diarrhea or constipation. No melena or hematochezia.  GENITOURINARY: No dysuria, frequency, hematuria, or incontinence  NEUROLOGICAL: No headaches, memory loss, loss of strength, numbness, or tremors  SKIN: No itching, burning, rashes, or lesions   LYMPH NODES: No enlarged glands  ENDOCRINE: No heat or cold intolerance; No hair loss  MUSCULOSKELETAL: No joint pain or swelling; No muscle, back, or extremity pain  PSYCHIATRIC: No depression, anxiety, mood swings, or difficulty sleeping  HEME/LYMPH: No easy bruising, or bleeding gums  ALLERY AND IMMUNOLOGIC: No hives or eczema      MEDICATIONS  (STANDING):  amLODIPine   Tablet 5 milliGRAM(s) Oral daily  aspirin enteric coated 325 milliGRAM(s) Oral daily  atorvastatin 80 milliGRAM(s) Oral at bedtime  dextrose 50% Injectable 12.5 Gram(s) IV Push once  dextrose 50% Injectable 25 Gram(s) IV Push once  dextrose 50% Injectable 25 Gram(s) IV Push once  docusate sodium 100 milliGRAM(s) Oral three times a day  enoxaparin Injectable 40 milliGRAM(s) SubCutaneous daily  insulin glargine Injectable (LANTUS) 20 Unit(s) SubCutaneous at bedtime  insulin lispro (HumaLOG) corrective regimen sliding scale   SubCutaneous Before meals and at bedtime  insulin lispro Injectable (HumaLOG) 6 Unit(s) SubCutaneous three times a day before meals  methimazole 10 milliGRAM(s) Oral daily  pantoprazole    Tablet 40 milliGRAM(s) Oral daily  polyethylene glycol 3350 17 Gram(s) Oral daily  sodium chloride 0.9% lock flush 3 milliLiter(s) IV Push every 8 hours  warfarin 1 milliGRAM(s) Oral once    MEDICATIONS  (PRN):  dextrose 40% Gel 15 Gram(s) Oral once PRN Blood Glucose LESS THAN 70 milliGRAM(s)/deciLiter  oxyCODONE    5 mG/acetaminophen 325 mG 1 Tablet(s) Oral every 4 hours PRN Moderate Pain (4 - 6)  oxyCODONE    5 mG/acetaminophen 325 mG 2 Tablet(s) Oral every 4 hours PRN Severe Pain (7 - 10)      Allergies    No Known Allergies    Intolerances    OHS (Unknown)      SOCIAL HISTORY:    FAMILY HISTORY:  Family history of early CAD: brother mother  FHx: breast cancer: mother      Vital Signs Last 24 Hrs  T(C): 36.8 (06 Sep 2019 10:30), Max: 37.3 (06 Sep 2019 05:23)  T(F): 98.3 (06 Sep 2019 10:30), Max: 99.2 (06 Sep 2019 05:23)  HR: 86 (06 Sep 2019 10:30) (62 - 86)  BP: 145/80 (06 Sep 2019 10:30) (117/62 - 168/67)  BP(mean): 98 (05 Sep 2019 16:00) (94 - 98)  RR: 18 (06 Sep 2019 10:30) (16 - 25)  SpO2: 97% (06 Sep 2019 10:30) (92% - 99%)    Physical Exam:  Constitutional: AAOx3, NAD  Neck: supple, No JVD  Cardiovascular: +S1S2 RRR, no murmurs, rubs, gallops   Pulmonary: CTA b/l, unlabored, no wheezes, rales. rhonci  Abdomen: +BS, soft NTND  Extremities: no edema b/l, +distal pulses b/l  Neuro: non focal, speech clear, BLANK x 4    LABS:                        8.8    15.04 )-----------( 163      ( 06 Sep 2019 06:33 )             27.5   09-06    135  |  99  |  43.0<H>  ----------------------------<  162<H>  4.3   |  24.0  |  1.01    Ca    8.8      06 Sep 2019 06:33  Mg     2.2     09-06      PT/INR - ( 06 Sep 2019 06:33 )   PT: 13.7 sec;   INR: 1.19 ratio                 RADIOLOGY & ADDITIONAL STUDIES: [place] CARDIAC ELECTROPHYSIOLOGY  [facility]   Office: [address] [address]  Telephone: [phone] Fax:[phone]      79 year old Faroese speaking female seen w/ son at bedside. Declines telephone  in favor of son translating. H/O HTN, DM, CAD s/p multiple remote stents & recent cardiac cath w/ , severe MR s/p attempted Chichi-clip c/b pericardial effusion, severe TR, persistent atrial fibrillation, now POD #3 s/p C2L, bio MVR, TV repair and NISSA clip. Post operatively, she was briefly noted to be in sinus rhythm w/ 1st degree AVB, she then reverted to atrial fibrillation with well controlled VR. Metoprolol was restarted 9/4 AM at 25mg, which was increased to 50mg 9/4PM. She received 2 additional dose of metoprolol 50mg 9/5AM & PM. She remained in AF, and 9/5PM was noted to have a period of junctional rhythm at 70s bpm. She subsequently had bradycardia to 30-40s bpm overnight, as well as 1 brief episode of bradycardia to ~40bpm this AM while sitting in the bedside chair. She denies associated symptoms, including dizziness, near/true syncope, CP or SOB. She further denies dizziness or near/true syncope prior to admission. EP is consulted for AF w/ slow VR.       PAST MEDICAL & SURGICAL HISTORY:  CHF exacerbation  Atrial fibrillation  Diabetes  Hypertension  Stented coronary artery  Coronary stent restenosis, sequela  Afib  Hypertension  History of ankle surgery: right orif  Post PTCA: 8 stents  H/O hernia repair: 1970&#x27;s  Cataract  History of appendectomy      REVIEW OF SYSTEMS:  CONSTITUTIONAL: No fever, weight loss, or fatigue  EYES: No visual disturbances  NECK: No pain or stiffness  RESPIRATORY: No cough, wheezing, chills or hemoptysis  CARDIOVASCULAR: see HPI  GASTROINTESTINAL: No abdominal or epigastric pain. No nausea, vomiting, or hematemesis; No diarrhea or constipation. No melena or hematochezia.  NEUROLOGICAL: No headaches, memory loss, loss of strength, numbness, or tremors  SKIN: No itching, burning, rashes, or lesions   LYMPH NODES: No enlarged glands  ENDOCRINE: No heat or cold intolerance; No hair loss  MUSCULOSKELETAL: No joint pain or swelling; No muscle, back, or extremity pain  PSYCHIATRIC: No depression, anxiety, mood swings, or difficulty sleeping  HEME/LYMPH: No easy bruising, or bleeding gums  ALLERY AND IMMUNOLOGIC: No hives or eczema      MEDICATIONS  (STANDING):  amLODIPine   Tablet 5 milliGRAM(s) Oral daily  aspirin enteric coated 325 milliGRAM(s) Oral daily  atorvastatin 80 milliGRAM(s) Oral at bedtime  dextrose 50% Injectable 12.5 Gram(s) IV Push once  dextrose 50% Injectable 25 Gram(s) IV Push once  dextrose 50% Injectable 25 Gram(s) IV Push once  docusate sodium 100 milliGRAM(s) Oral three times a day  enoxaparin Injectable 40 milliGRAM(s) SubCutaneous daily  insulin glargine Injectable (LANTUS) 20 Unit(s) SubCutaneous at bedtime  insulin lispro (HumaLOG) corrective regimen sliding scale   SubCutaneous Before meals and at bedtime  insulin lispro Injectable (HumaLOG) 6 Unit(s) SubCutaneous three times a day before meals  methimazole 10 milliGRAM(s) Oral daily  pantoprazole    Tablet 40 milliGRAM(s) Oral daily  polyethylene glycol 3350 17 Gram(s) Oral daily  sodium chloride 0.9% lock flush 3 milliLiter(s) IV Push every 8 hours  warfarin 1 milliGRAM(s) Oral once    MEDICATIONS  (PRN):  dextrose 40% Gel 15 Gram(s) Oral once PRN Blood Glucose LESS THAN 70 milliGRAM(s)/deciLiter  oxyCODONE    5 mG/acetaminophen 325 mG 1 Tablet(s) Oral every 4 hours PRN Moderate Pain (4 - 6)  oxyCODONE    5 mG/acetaminophen 325 mG 2 Tablet(s) Oral every 4 hours PRN Severe Pain (7 - 10)      Allergies  No Known Allergies      SOCIAL HISTORY: , lives w/  and adult son    FAMILY HISTORY:  Family history of early CAD: brother mother  FHx: breast cancer: mother      Vital Signs Last 24 Hrs  T(C): 36.8 (06 Sep 2019 10:30), Max: 37.3 (06 Sep 2019 05:23)  T(F): 98.3 (06 Sep 2019 10:30), Max: 99.2 (06 Sep 2019 05:23)  HR: 86 (06 Sep 2019 10:30) (62 - 86)  BP: 145/80 (06 Sep 2019 10:30) (117/62 - 168/67)  BP(mean): 98 (05 Sep 2019 16:00) (94 - 98)  RR: 18 (06 Sep 2019 10:30) (16 - 25)  SpO2: 97% (06 Sep 2019 10:30) (92% - 99%)    Physical Exam:  Constitutional: AAOx3, NAD  Neck: supple, No JVD  Cardiovascular: +S1S2 RRR, no murmurs, rubs, gallops   Pulmonary: CTA b/l, unlabored, no wheezes, rales. rhonci  Abdomen: +BS, soft NTND  Extremities: no edema b/l, +distal pulses b/l  Neuro: non focal, speech clear, BLANK x 4    LABS:                        8.8    15.04 )-----------( 163      ( 06 Sep 2019 06:33 )             27.5   09-06    135  |  99  |  43.0<H>  ----------------------------<  162<H>  4.3   |  24.0  |  1.01    Ca    8.8      06 Sep 2019 06:33  Mg     2.2     09-06      PT/INR - ( 06 Sep 2019 06:33 )   PT: 13.7 sec;   INR: 1.19 ratio         RADIOLOGY & ADDITIONAL STUDIES:  < from: [facility] Echo Doppler (08.02.19 @ 17:08) >    Summary:   1. Left ventricular ejection fraction, by visual estimation, is 40 to   45%.   2. Mildly decreased global left ventricular systolic function.   3. Normal right ventricular size and function.   4. There is no evidence of pericardial effusion.   5. Moderate mitral valve regurgitation.   6. The mitral valve leaflets are tethered which is due to reduced   systolic function and elevated LVDP.   7. Moderate-severe tricuspid regurgitation.   8. Color flow doppler and intravenous injection of agitated saline   demonstrates the presence of an intact intra atrial septum.   9. Mitral valve mean gradient is 2.0 mmHg consistent with mild mitral   stenosis.  10. No evidence of aortic stenosis.  11. No left atrial appendage thrombus.    MD Isabelle Electronically signed on 8/5/2019 at 4:56:36 PM   < end of copied text >      < from: Cardiac Cath Lab - Adult (08.01.19 @ 08:55) >  VENTRICLES: No LV gram was performed; however, a recent echocardiogram  demonstrated an EF of 40 %.  CORONARY VESSELS: The coronary circulation is co-dominant.  LM:   --  LM: Normal.  LAD:   --  Ostial LAD: There was a 40 % stenosis.  --  Proximal LAD: There was a 0 % stenosis in-stent.  CX:   --  Ostial circumflex: There was a 80 % stenosis.  RCA:   --  Ostial RCA: There was a 80 % stenosis.  --  Proximal RCA: There was a 70 % stenosis in-stent.  COMPLICATIONS: No complications occurred during the cath lab visit.  SUMMARY:  Summary: ADDENDUM 8/2/2019 --> Listed "5F Mynx " supply item used in  Failed Hemostasis procedure.  DIAGNOSTIC IMPRESSIONS: Small caliber RCA.  Severe ostial circumflex dsiease. For intervention, would have to land into  LM which will compromise ostial/proximal LAD.  DIAGNOSTIC RECOMMENDATIONS: CT surgery evaluation for CABG/valve surgery.  Prepared and signed by  Chang Beckham MD  Signed 08/02/2019 12:16:51    < end of copied text > [place] CARDIAC ELECTROPHYSIOLOGY  [facility] Practice   Office: [address] [address]  Telephone: [phone] Fax:[phone]      79 year old Amharic speaking female seen w/ son at bedside. Declines telephone  in favor of son translating. H/O HTN, DM, CAD s/p multiple remote stents & recent cardiac cath w/ , severe MR s/p attempted Chichi-clip c/b pericardial effusion, severe TR, persistent atrial fibrillation, now POD #3 s/p C2L, bio MVR, TV repair and NISSA clip. Post operatively, she was briefly noted to be in sinus rhythm w/ 1st degree AVB, she then reverted to atrial fibrillation with well controlled VR. Metoprolol was restarted 9/4 AM at 25mg, which was increased to 50mg 9/4PM. She received 2 additional dose of metoprolol 50mg 9/5AM & PM. She remained in AF, and 9/5PM was noted to have a period of junctional rhythm at 70s bpm. She subsequently had bradycardia to 30-40s bpm overnight, as well as 1 brief episode of bradycardia to ~40bpm this AM while sitting in the bedside chair. Isolated pauses of ~2 secs - no high risk pauses. She denies associated symptoms, including dizziness, near/true syncope, CP or SOB. She further denies dizziness or near/true syncope prior to admission. EP is consulted for AF w/ slow VR.       PAST MEDICAL & SURGICAL HISTORY:  CHF exacerbation  Atrial fibrillation  Diabetes  Hypertension  Stented coronary artery  Coronary stent restenosis, sequela  Afib  Hypertension  History of ankle surgery: right orif  Post PTCA: 8 stents  H/O hernia repair: 1970&#x27;s  Cataract  History of appendectomy      REVIEW OF SYSTEMS:  CONSTITUTIONAL: No fever, weight loss, or fatigue  EYES: No visual disturbances  NECK: No pain or stiffness  RESPIRATORY: No cough, wheezing, chills or hemoptysis  CARDIOVASCULAR: see HPI  GASTROINTESTINAL: No abdominal or epigastric pain. No nausea, vomiting, or hematemesis; No diarrhea or constipation. No melena or hematochezia.  NEUROLOGICAL: No headaches, memory loss, loss of strength, numbness, or tremors  SKIN: No itching, burning, rashes, or lesions   LYMPH NODES: No enlarged glands  ENDOCRINE: No heat or cold intolerance; No hair loss  MUSCULOSKELETAL: No joint pain or swelling; No muscle, back, or extremity pain  PSYCHIATRIC: No depression, anxiety, mood swings, or difficulty sleeping  HEME/LYMPH: No easy bruising, or bleeding gums  ALLERY AND IMMUNOLOGIC: No hives or eczema      MEDICATIONS  (STANDING):  amLODIPine   Tablet 5 milliGRAM(s) Oral daily  aspirin enteric coated 325 milliGRAM(s) Oral daily  atorvastatin 80 milliGRAM(s) Oral at bedtime  dextrose 50% Injectable 12.5 Gram(s) IV Push once  dextrose 50% Injectable 25 Gram(s) IV Push once  dextrose 50% Injectable 25 Gram(s) IV Push once  docusate sodium 100 milliGRAM(s) Oral three times a day  enoxaparin Injectable 40 milliGRAM(s) SubCutaneous daily  insulin glargine Injectable (LANTUS) 20 Unit(s) SubCutaneous at bedtime  insulin lispro (HumaLOG) corrective regimen sliding scale   SubCutaneous Before meals and at bedtime  insulin lispro Injectable (HumaLOG) 6 Unit(s) SubCutaneous three times a day before meals  methimazole 10 milliGRAM(s) Oral daily  pantoprazole    Tablet 40 milliGRAM(s) Oral daily  polyethylene glycol 3350 17 Gram(s) Oral daily  sodium chloride 0.9% lock flush 3 milliLiter(s) IV Push every 8 hours  warfarin 1 milliGRAM(s) Oral once    MEDICATIONS  (PRN):  dextrose 40% Gel 15 Gram(s) Oral once PRN Blood Glucose LESS THAN 70 milliGRAM(s)/deciLiter  oxyCODONE    5 mG/acetaminophen 325 mG 1 Tablet(s) Oral every 4 hours PRN Moderate Pain (4 - 6)  oxyCODONE    5 mG/acetaminophen 325 mG 2 Tablet(s) Oral every 4 hours PRN Severe Pain (7 - 10)      Allergies  No Known Allergies      SOCIAL HISTORY: , lives w/  and adult son    FAMILY HISTORY:  Family history of early CAD: brother mother  FHx: breast cancer: mother      Vital Signs Last 24 Hrs  T(C): 36.8 (06 Sep 2019 10:30), Max: 37.3 (06 Sep 2019 05:23)  T(F): 98.3 (06 Sep 2019 10:30), Max: 99.2 (06 Sep 2019 05:23)  HR: 86 (06 Sep 2019 10:30) (62 - 86)  BP: 145/80 (06 Sep 2019 10:30) (117/62 - 168/67)  BP(mean): 98 (05 Sep 2019 16:00) (94 - 98)  RR: 18 (06 Sep 2019 10:30) (16 - 25)  SpO2: 97% (06 Sep 2019 10:30) (92% - 99%)    Physical Exam:  Constitutional: AAOx3, NAD  Neck: supple, No JVD  Cardiovascular: +S1S2 RRR, no murmurs, rubs, gallops   Pulmonary: CTA b/l, unlabored, no wheezes, rales. rhonci  Abdomen: +BS, soft NTND  Extremities: no edema b/l, +distal pulses b/l  Neuro: non focal, speech clear, BLANK x 4    LABS:                        8.8    15.04 )-----------( 163      ( 06 Sep 2019 06:33 )             27.5   09-06    135  |  99  |  43.0<H>  ----------------------------<  162<H>  4.3   |  24.0  |  1.01    Ca    8.8      06 Sep 2019 06:33  Mg     2.2     09-06      PT/INR - ( 06 Sep 2019 06:33 )   PT: 13.7 sec;   INR: 1.19 ratio         RADIOLOGY & ADDITIONAL STUDIES:  < from: [facility] Echo Doppler (08.02.19 @ 17:08) >    Summary:   1. Left ventricular ejection fraction, by visual estimation, is 40 to   45%.   2. Mildly decreased global left ventricular systolic function.   3. Normal right ventricular size and function.   4. There is no evidence of pericardial effusion.   5. Moderate mitral valve regurgitation.   6. The mitral valve leaflets are tethered which is due to reduced   systolic function and elevated LVDP.   7. Moderate-severe tricuspid regurgitation.   8. Color flow doppler and intravenous injection of agitated saline   demonstrates the presence of an intact intra atrial septum.   9. Mitral valve mean gradient is 2.0 mmHg consistent with mild mitral   stenosis.  10. No evidence of aortic stenosis.  11. No left atrial appendage thrombus.    MD Isabelle Electronically signed on 8/5/2019 at 4:56:36 PM   < end of copied text >      < from: Cardiac Cath Lab - Adult (08.01.19 @ 08:55) >  VENTRICLES: No LV gram was performed; however, a recent echocardiogram  demonstrated an EF of 40 %.  CORONARY VESSELS: The coronary circulation is co-dominant.  LM:   --  LM: Normal.  LAD:   --  Ostial LAD: There was a 40 % stenosis.  --  Proximal LAD: There was a 0 % stenosis in-stent.  CX:   --  Ostial circumflex: There was a 80 % stenosis.  RCA:   --  Ostial RCA: There was a 80 % stenosis.  --  Proximal RCA: There was a 70 % stenosis in-stent.  COMPLICATIONS: No complications occurred during the cath lab visit.  SUMMARY:  Summary: ADDENDUM 8/2/2019 --> Listed "5F Mynx " supply item used in  Failed Hemostasis procedure.  DIAGNOSTIC IMPRESSIONS: Small caliber RCA.  Severe ostial circumflex dsiease. For intervention, would have to land into  LM which will compromise ostial/proximal LAD.  DIAGNOSTIC RECOMMENDATIONS: CT surgery evaluation for CABG/valve surgery.  Prepared and signed by  Chang Beckham MD  Signed 08/02/2019 12:16:51    < end of copied text > [place] CARDIAC ELECTROPHYSIOLOGY  [facility]   Office: [address] [address]  Telephone: [phone] Fax:[phone]      79 year old Hungarian speaking female seen w/ son at bedside. Declines telephone  in favor of son translating. H/O HTN, DM, CAD s/p multiple remote stents & recent cardiac cath w/ , severe MR s/p attempted Chichi-clip c/b pericardial effusion, severe TR, persistent atrial fibrillation, now POD #3 s/p C2L, bio MVR, TV repair and NISSA clip. Post operatively, she was briefly noted to be in sinus rhythm w/ 1st degree AVB, she then reverted to atrial fibrillation with well controlled VR. Metoprolol was restarted 9/4 AM at 25mg, which was increased to 50mg 9/4PM. She received 2 additional dose of metoprolol 50mg 9/5AM & PM. She remained in AF, and 9/5PM was noted to have a period of junctional rhythm at 70s bpm. She subsequently had bradycardia to 30-40s bpm overnight, as well as 1 brief episode of bradycardia to ~40bpm this AM while sitting in the bedside chair. Isolated pauses of ~2 secs - no high risk pauses. She denies associated symptoms, including dizziness, near/true syncope, CP or SOB. She further denies dizziness or near/true syncope prior to admission. EP is consulted for AF w/ slow VR.    PAST MEDICAL & SURGICAL HISTORY:  CHF exacerbation  Atrial fibrillation  Diabetes  Hypertension  Stented coronary artery  Coronary stent restenosis, sequela  Afib  Hypertension  History of ankle surgery: right orif  Post PTCA: 8 stents  H/O hernia repair: 1970&#x27;s  Cataract  History of appendectomy    REVIEW OF SYSTEMS:  CONSTITUTIONAL: No fever, weight loss, or fatigue  EYES: No visual disturbances  NECK: No pain or stiffness  RESPIRATORY: No cough, wheezing, chills or hemoptysis  CARDIOVASCULAR: see HPI  GASTROINTESTINAL: No abdominal or epigastric pain. No nausea, vomiting, or hematemesis; No diarrhea or constipation. No melena or hematochezia.  NEUROLOGICAL: No headaches, memory loss, loss of strength, numbness, or tremors  SKIN: No itching, burning, rashes, or lesions   LYMPH NODES: No enlarged glands  ENDOCRINE: No heat or cold intolerance; No hair loss  MUSCULOSKELETAL: No joint pain or swelling; No muscle, back, or extremity pain  PSYCHIATRIC: No depression, anxiety, mood swings, or difficulty sleeping  HEME/LYMPH: No easy bruising, or bleeding gums  ALLERY AND IMMUNOLOGIC: No hives or eczema    MEDICATIONS  (STANDING):  amLODIPine   Tablet 5 milliGRAM(s) Oral daily  aspirin enteric coated 325 milliGRAM(s) Oral daily  atorvastatin 80 milliGRAM(s) Oral at bedtime  dextrose 50% Injectable 12.5 Gram(s) IV Push once  dextrose 50% Injectable 25 Gram(s) IV Push once  dextrose 50% Injectable 25 Gram(s) IV Push once  docusate sodium 100 milliGRAM(s) Oral three times a day  enoxaparin Injectable 40 milliGRAM(s) SubCutaneous daily  insulin glargine Injectable (LANTUS) 20 Unit(s) SubCutaneous at bedtime  insulin lispro (HumaLOG) corrective regimen sliding scale   SubCutaneous Before meals and at bedtime  insulin lispro Injectable (HumaLOG) 6 Unit(s) SubCutaneous three times a day before meals  methimazole 10 milliGRAM(s) Oral daily  pantoprazole    Tablet 40 milliGRAM(s) Oral daily  polyethylene glycol 3350 17 Gram(s) Oral daily  sodium chloride 0.9% lock flush 3 milliLiter(s) IV Push every 8 hours  warfarin 1 milliGRAM(s) Oral once    MEDICATIONS  (PRN):  dextrose 40% Gel 15 Gram(s) Oral once PRN Blood Glucose LESS THAN 70 milliGRAM(s)/deciLiter  oxyCODONE    5 mG/acetaminophen 325 mG 1 Tablet(s) Oral every 4 hours PRN Moderate Pain (4 - 6)  oxyCODONE    5 mG/acetaminophen 325 mG 2 Tablet(s) Oral every 4 hours PRN Severe Pain (7 - 10)    Allergies  No Known Allergies    SOCIAL HISTORY: , lives w/  and adult son    FAMILY HISTORY:  Family history of early CAD: brother mother  FHx: breast cancer: mother    Vital Signs Last 24 Hrs  T(C): 36.8 (06 Sep 2019 10:30), Max: 37.3 (06 Sep 2019 05:23)  T(F): 98.3 (06 Sep 2019 10:30), Max: 99.2 (06 Sep 2019 05:23)  HR: 86 (06 Sep 2019 10:30) (62 - 86)  BP: 145/80 (06 Sep 2019 10:30) (117/62 - 168/67)  BP(mean): 98 (05 Sep 2019 16:00) (94 - 98)  RR: 18 (06 Sep 2019 10:30) (16 - 25)  SpO2: 97% (06 Sep 2019 10:30) (92% - 99%)    Physical Exam:  Appearance: Normal, well nourished	  HEENT: Normal oral mucosa, PERRL, EOMI, sclera non-icteric	  Cardiovascular: Normal S1 S2, No JVD, II/VI systolic murmur at RUSB, No peripheral edema  Respiratory: Lungs clear to auscultation, no rhonchi/rales, wheezes  Psychiatry: A & O x 3, Mood & affect appropriate  Gastrointestinal:  Soft, Non-tender, + BS  Skin: No rashes, No ecchymoses, No cyanosis  Neurologic: Grossly non-focal with full strength in all four extremities  Extremities: Normal range of motion, No clubbing, cyanosis or edema, left leg wrapped    LABS:                        8.8    15.04 )-----------( 163      ( 06 Sep 2019 06:33 )             27.5   09-06    135  |  99  |  43.0<H>  ----------------------------<  162<H>  4.3   |  24.0  |  1.01    Ca    8.8      06 Sep 2019 06:33  Mg     2.2     09-06      PT/INR - ( 06 Sep 2019 06:33 )   PT: 13.7 sec;   INR: 1.19 ratio         RADIOLOGY & ADDITIONAL STUDIES:  < from: [facility] Echo Doppler (08.02.19 @ 17:08) >    Summary:   1. Left ventricular ejection fraction, by visual estimation, is 40 to 45%.   2. Mildly decreased global left ventricular systolic function.   3. Normal right ventricular size and function.   4. There is no evidence of pericardial effusion.   5. Moderate mitral valve regurgitation.   6. The mitral valve leaflets are tethered which is due to reduced systolic function and elevated LVDP.   7. Moderate-severe tricuspid regurgitation.   8. Color flow doppler and intravenous injection of agitated saline demonstrates the presence of an intact intra atrial septum.   9. Mitral valve mean gradient is 2.0 mmHg consistent with mild mitral stenosis.  10. No evidence of aortic stenosis.  11. No left atrial appendage thrombus.    Cath Lab 8/1/19:  VENTRICLES: No LV gram was performed; however, a recent echocardiogram demonstrated an EF of 40 %.  CORONARY VESSELS: The coronary circulation is co-dominant.  LM:   --  LM: Normal.  LAD:   --  Ostial LAD: There was a 40 % stenosis.  --  Proximal LAD: There was a 0 % stenosis in-stent.  CX:   --  Ostial circumflex: There was a 80 % stenosis.  RCA:   --  Ostial RCA: There was a 80 % stenosis.  --  Proximal RCA: There was a 70 % stenosis in-stent.  COMPLICATIONS: No complications occurred during the cath lab visit.    SUMMARY:  Summary: ADDENDUM 8/2/2019 --> Listed "5F [employer] " supply item used in  Failed Hemostasis procedure.  DIAGNOSTIC IMPRESSIONS: Small caliber RCA.  Severe ostial circumflex dsiease. For intervention, would have to land into  LM which will compromise ostial/proximal LAD.  DIAGNOSTIC RECOMMENDATIONS: CT surgery evaluation for CABG/valve surgery.  Prepared and signed by

## 2019-09-06 NOTE — PROGRESS NOTE ADULT - PROBLEM SELECTOR PLAN 1
Now s/p C2.  Continue ASA andLipitor.  Beta blocker held with yudy arrythmias.  Encourage CDBE/IS and increased mobilization.  DASH/TLC diet.  PW not functioning.

## 2019-09-06 NOTE — PROGRESS NOTE ADULT - SUBJECTIVE AND OBJECTIVE BOX
Subjective: Pt sitting up in chair.  Denies SOB or CP.  NAD noted. Interviewed and examined using telephone Romansh  service.      Tele:   Afib overnight with rate as low as 30's.  Pauses of 2-2.4 seconds noted also.  Currently Afib 50's.                      T(F): 99.2 (19 @ 05:23), Max: 99.2 (19 @ 05:23)  HR: 82 (19 @ 06:54) (62 - 86)  BP: 140/67 (19 @ 06:54) (117/62 - 171/72)  RR: 20 (19 @ 06:54) (16 - 35)  SpO2: 97% (19 @ 06:54) (92% - 99%)  on room air at rest.    Daily     Daily Weight in k.8 (06 Sep 2019 05:58)    LV EF: 50%    No Known Allergies          135  |  99  |  43.0<H>  ----------------------------<  162<H>  4.3   |  24.0  |  1.01    Ca    8.8      06 Sep 2019 06:33  Mg     2.2                                      8.8    15.04 )-----------( 163      ( 06 Sep 2019 06:33 )             27.5        PT/INR - ( 06 Sep 2019 06:33 )   PT: 13.7 sec;   INR: 1.19 ratio                CAPILLARY BLOOD GLUCOSE  POCT Blood Glucose.: 233 mg/dL (06 Sep 2019 08:04)  POCT Blood Glucose.: 148 mg/dL (06 Sep 2019 05:17)  POCT Blood Glucose.: 121 mg/dL (06 Sep 2019 00:11)  POCT Blood Glucose.: 64 mg/dL (05 Sep 2019 23:14)  POCT Blood Glucose.: 206 mg/dL (05 Sep 2019 17:12)  POCT Blood Glucose.: 132 mg/dL (05 Sep 2019 13:16)           CXR: pending report    I&O's Detail    05 Sep 2019 07:01  -  06 Sep 2019 07:00  --------------------------------------------------------  IN:    Oral Fluid: 260 mL    sodium chloride 0.9%: 30 mL    sodium chloride 0.9%: 15 mL    Solution: 50 mL    Solution: 250 mL  Total IN: 605 mL    OUT:    Chest Tube: 285 mL    Indwelling Catheter - Urethral: 1325 mL    Voided: 900 mL  Total OUT: 2510 mL    Total NET: -1905 mL      CHEST TUBE:  [x ] YES [ ] NO  OUTPUT:    235ml over the last hours    AIR LEAKS:  [ ] YES [x ] NO      ALICIA DRAIN:   [ ] YES [x ] NO  OUTPUT:     per 24 hours    EPICARDIAL WIRES:  [x ] YES [ ] NO ( Not Functioning)      BOWEL MOVEMENT:  [x ] YES [ ] NO        Active Medications:  amLODIPine   Tablet 5 milliGRAM(s) Oral daily  aspirin enteric coated 325 milliGRAM(s) Oral daily  atorvastatin 80 milliGRAM(s) Oral at bedtime  dextrose 40% Gel 15 Gram(s) Oral once PRN  dextrose 50% Injectable 12.5 Gram(s) IV Push once  dextrose 50% Injectable 25 Gram(s) IV Push once  dextrose 50% Injectable 25 Gram(s) IV Push once  docusate sodium 100 milliGRAM(s) Oral three times a day  enoxaparin Injectable 40 milliGRAM(s) SubCutaneous daily  insulin glargine Injectable (LANTUS) 20 Unit(s) SubCutaneous at bedtime  insulin lispro (HumaLOG) corrective regimen sliding scale   SubCutaneous Before meals and at bedtime  insulin lispro Injectable (HumaLOG) 6 Unit(s) SubCutaneous three times a day before meals  methimazole 10 milliGRAM(s) Oral daily  oxyCODONE    5 mG/acetaminophen 325 mG 1 Tablet(s) Oral every 4 hours PRN  oxyCODONE    5 mG/acetaminophen 325 mG 2 Tablet(s) Oral every 4 hours PRN  pantoprazole    Tablet 40 milliGRAM(s) Oral daily  polyethylene glycol 3350 17 Gram(s) Oral daily  sodium chloride 0.9% lock flush 3 milliLiter(s) IV Push every 8 hours  warfarin 1 milliGRAM(s) Oral once      Physical Exam:    Neuro: AAOX3.  Non focal.    Pulm: Diminished BLL.    CV: Irregular.  +S1+S2.    Abd: Soft/NT/ND.  +BS. +BM 9/4 per pt.    Extremities: No edema.  +pp.    Incision(s): MSI with mepilex dressing intact.  CT sites with dsd c/d/i.  +PW isolated (not functioning)                    PAST MEDICAL & SURGICAL HISTORY:  CHF exacerbation  Atrial fibrillation  Diabetes  Hypertension  Stented coronary artery  Coronary stent restenosis, sequela  Afib  Hypertension  History of ankle surgery: right orif  Post PTCA: 8 stents  H/O hernia repair: &#x27;s  Cataract  History of appendectomy

## 2019-09-06 NOTE — CONSULT NOTE ADULT - ASSESSMENT
79 year old Romanian speaking female seen w/ son at bedside. Declines telephone  in favor of son translating. H/O HTN, DM, CAD s/p multiple remote stents & recent cardiac cath w/ , severe MR s/p attempted Chichi-clip c/b pericardial effusion, severe TR, persistent atrial fibrillation. POD #3 s/p C2L, bio MVR, TV repair and NISSA clip, in AF now with asymptomatic slow VR to 30-40sbpm following up titration of metoprolol tartrate 50mg BID.     Recommendations:   Pt has clear indication for beta blocker therapy given extensive CAD, and previously tolerated lower dose metoprolol (on metoprolol succinate 50mg daily at home pre-op).   Would re-challenge w/ metoprolol tartrate at 12.5mg BID & continue observation.   If tolerated x 24 hours, would increase to 25mg BID w/ goal of d/c on pre-admission dose of metroprolol succ 50mg daily.   If pt is unable to tolerate low dose beta blocker, will need to discuss pacemaker implant.   Will d/w Dr. Pacheco; further recs to follow. 79 year old Hebrew speaking female seen w/ son at bedside. Declines telephone  in favor of son translating. H/O HTN, DM, CAD s/p multiple remote stents & recent cardiac cath w/ , severe MR s/p attempted Chichi-clip c/b pericardial effusion, severe TR, persistent atrial fibrillation. POD #3 s/p C2L, bio MVR, TV repair and NISSA clip, in AF now with asymptomatic slow VR to 30-40sbpm following up titration of metoprolol tartrate 50mg BID.     Recommendations:   Pt has clear indication for beta blocker therapy given extensive CAD, and previously tolerated lower dose metoprolol (on metoprolol succinate 50mg daily at home pre-op).   Would re-challenge w/ metoprolol tartrate at 12.5mg BID & continue observation.   If tolerated x 24 hours, would increase to 25mg BID w/ goal of d/c on pre-admission dose of metroprolol succ 50mg daily.   If pt is unable to tolerate low dose beta blocker, will need to discuss pacemaker implant.  Will d/w Dr. Pacheco; further recs to follow.

## 2019-09-06 NOTE — DISCHARGE NOTE NURSING/CASE MANAGEMENT/SOCIAL WORK - NSDCCRTYPESERV_GEN_ALL_CORE_FT
PT/INR HOME LAB DRAW ON MONDAYS AND THURSDAYS.   STARTING 9/9/19 PT/INR HOME LAB DRAW ON MONDAYS AND THURSDAYS.   STARTING 9/12/19

## 2019-09-06 NOTE — DISCHARGE NOTE NURSING/CASE MANAGEMENT/SOCIAL WORK - NSDCFUADDAPPT_GEN_ALL_CORE_FT
KORY DIXON, NURSE PRACTITIONER (CARE NAVIGATOR)  [phone]  SHE WILL CALL AND FOLLOW UP WITH YOU ON DISCHARGE

## 2019-09-06 NOTE — DISCHARGE NOTE NURSING/CASE MANAGEMENT/SOCIAL WORK - PATIENT PORTAL LINK FT
You can access the FollowMyHealth Patient Portal offered by [facility] by registering at the following website: [email or web address]. By joining [facility]’s FollowMyHealth portal, you will also be able to view your health information using other applications (apps) compatible with our system.

## 2019-09-06 NOTE — PROGRESS NOTE ADULT - PROBLEM SELECTOR PLAN 7
protonix for GI prophylaxis.  Lovenox for DVT prophylaxis.    Discussed with Dr. Fuller in AM rounds.

## 2019-09-06 NOTE — PROGRESS NOTE ADULT - SUBJECTIVE AND OBJECTIVE BOX
INTERVAL HPI/OVERNIGHT EVENTS:  follwo p T2DM   Pt reports to be feelign better-   family amember at bedside translating     seen by EP- for arrhthymias today       MEDICATIONS  (STANDING):  amLODIPine   Tablet 5 milliGRAM(s) Oral daily  aspirin enteric coated 325 milliGRAM(s) Oral daily  atorvastatin 80 milliGRAM(s) Oral at bedtime  dextrose 50% Injectable 12.5 Gram(s) IV Push once  dextrose 50% Injectable 25 Gram(s) IV Push once  dextrose 50% Injectable 25 Gram(s) IV Push once  docusate sodium 100 milliGRAM(s) Oral three times a day  enoxaparin Injectable 40 milliGRAM(s) SubCutaneous daily  insulin glargine Injectable (LANTUS) 20 Unit(s) SubCutaneous at bedtime  insulin lispro (HumaLOG) corrective regimen sliding scale   SubCutaneous Before meals and at bedtime  insulin lispro Injectable (HumaLOG) 6 Unit(s) SubCutaneous three times a day before meals  methimazole 10 milliGRAM(s) Oral daily  pantoprazole    Tablet 40 milliGRAM(s) Oral daily  polyethylene glycol 3350 17 Gram(s) Oral daily  sodium chloride 0.9% lock flush 3 milliLiter(s) IV Push every 8 hours    MEDICATIONS  (PRN):  dextrose 40% Gel 15 Gram(s) Oral once PRN Blood Glucose LESS THAN 70 milliGRAM(s)/deciLiter  oxyCODONE    5 mG/acetaminophen 325 mG 1 Tablet(s) Oral every 4 hours PRN Moderate Pain (4 - 6)  oxyCODONE    5 mG/acetaminophen 325 mG 2 Tablet(s) Oral every 4 hours PRN Severe Pain (7 - 10)      Allergies    No Known Allergies    Intolerances    OHS (Unknown)      Review of systems: as abvoe- no dyspena   no CP or pressure   no palpitioatn     Vital Signs Last 24 Hrs  T(C): 36.7 (06 Sep 2019 21:40), Max: 37.3 (06 Sep 2019 05:23)  T(F): 98.1 (06 Sep 2019 21:40), Max: 99.2 (06 Sep 2019 05:23)  HR: 51 (06 Sep 2019 23:49) (47 - 86)  BP: 142/50 (06 Sep 2019 23:49) (116/43 - 157/80)  BP(mean): --  RR: 20 (06 Sep 2019 23:49) (18 - 20)  SpO2: 97% (06 Sep 2019 23:49) (97% - 100%)    PHYSICAL EXAM:      Constitutional: NAD, well-groomed, well-developed  HEENT: PERRLA, EOMI, no exophthalmos  Respiratory: CTAB  Cardiovascular: S1 and S2, RRR, no M/G/R  Extremities: venodynes in place   Vascular: 2+ peripheral pulses  Neurological: A/O x 3, no focal deficits  Psychiatric: Normal mood, normal affect  Skin: No rashes, no acanthosis        LABS:                        8.8    15.04 )-----------( 163      ( 06 Sep 2019 06:33 )             27.5     09-06    135  |  99  |  43.0<H>  ----------------------------<  162<H>  4.3   |  24.0  |  1.01    Ca    8.8      06 Sep 2019 06:33  Mg     2.2     09-06        Thyroid Stimulating Hormone, Serum (08.26.19 @ 15:17)    Thyroid Stimulating Hormone, Serum: 0.45 uIU/mL        CAPILLARY BLOOD GLUCOSE    CAPILLARY BLOOD GLUCOSE      POCT Blood Glucose.: 206 mg/dL (06 Sep 2019 21:39)  POCT Blood Glucose.: 84 mg/dL (06 Sep 2019 17:12)  POCT Blood Glucose.: 221 mg/dL (06 Sep 2019 12:12)  POCT Blood Glucose.: 233 mg/dL (06 Sep 2019 08:04)  POCT Blood Glucose.: 148 mg/dL (06 Sep 2019 05:17)    RADIOLOGY & ADDITIONAL TESTS: INTERVAL HPI/OVERNIGHT EVENTS:  follwo p T2DM   Pt reports to be feelign better-   family amember at bedside translating     seen by EP- for arrhthymias today       MEDICATIONS  (STANDING):  amLODIPine   Tablet 5 milliGRAM(s) Oral daily  aspirin enteric coated 325 milliGRAM(s) Oral daily  atorvastatin 80 milliGRAM(s) Oral at bedtime  dextrose 50% Injectable 12.5 Gram(s) IV Push once  dextrose 50% Injectable 25 Gram(s) IV Push once  dextrose 50% Injectable 25 Gram(s) IV Push once  docusate sodium 100 milliGRAM(s) Oral three times a day  enoxaparin Injectable 40 milliGRAM(s) SubCutaneous daily  insulin glargine Injectable (LANTUS) 20 Unit(s) SubCutaneous at bedtime  insulin lispro (HumaLOG) corrective regimen sliding scale   SubCutaneous Before meals and at bedtime  insulin lispro Injectable (HumaLOG) 6 Unit(s) SubCutaneous three times a day before meals  methimazole 10 milliGRAM(s) Oral daily  pantoprazole    Tablet 40 milliGRAM(s) Oral daily  polyethylene glycol 3350 17 Gram(s) Oral daily  sodium chloride 0.9% lock flush 3 milliLiter(s) IV Push every 8 hours    MEDICATIONS  (PRN):  dextrose 40% Gel 15 Gram(s) Oral once PRN Blood Glucose LESS THAN 70 milliGRAM(s)/deciLiter  oxyCODONE    5 mG/acetaminophen 325 mG 1 Tablet(s) Oral every 4 hours PRN Moderate Pain (4 - 6)  oxyCODONE    5 mG/acetaminophen 325 mG 2 Tablet(s) Oral every 4 hours PRN Severe Pain (7 - 10)      Allergies    No Known Allergies    Intolerances    OHS (Unknown)      Review of systems: as abvoe- no dyspena   no CP or pressure   no palpitioatn     Vital Signs Last 24 Hrs  T(C): 36.7 (06 Sep 2019 21:40), Max: 37.3 (06 Sep 2019 05:23)  T(F): 98.1 (06 Sep 2019 21:40), Max: 99.2 (06 Sep 2019 05:23)  HR: 51 (06 Sep 2019 23:49) (47 - 86)  BP: 142/50 (06 Sep 2019 23:49) (116/43 - 157/80)  BP(mean): --  RR: 20 (06 Sep 2019 23:49) (18 - 20)  SpO2: 97% (06 Sep 2019 23:49) (97% - 100%)    PHYSICAL EXAM:      Constitutional: NAD, well-groomed, well-developed  HEENT: PERRLA, EOMI, no exophthalmos  Respiratory: CTAB  Cardiovascular: S1 and S2, RRR occaisional extra beat , no M/G/R  Extremities: venodynes in place   Vascular: 2+ peripheral pulses  Neurological: A/O x 3, no focal deficits  Psychiatric: Normal mood, normal affect  Skin: No rashes, no acanthosis        LABS:                        8.8    15.04 )-----------( 163      ( 06 Sep 2019 06:33 )             27.5     09-06    135  |  99  |  43.0<H>  ----------------------------<  162<H>  4.3   |  24.0  |  1.01    Ca    8.8      06 Sep 2019 06:33  Mg     2.2     09-06        Thyroid Stimulating Hormone, Serum (08.26.19 @ 15:17)    Thyroid Stimulating Hormone, Serum: 0.45 uIU/mL        CAPILLARY BLOOD GLUCOSE    CAPILLARY BLOOD GLUCOSE      POCT Blood Glucose.: 206 mg/dL (06 Sep 2019 21:39)  POCT Blood Glucose.: 84 mg/dL (06 Sep 2019 17:12)  POCT Blood Glucose.: 221 mg/dL (06 Sep 2019 12:12)  POCT Blood Glucose.: 233 mg/dL (06 Sep 2019 08:04)  POCT Blood Glucose.: 148 mg/dL (06 Sep 2019 05:17)    RADIOLOGY & ADDITIONAL TESTS:

## 2019-09-06 NOTE — CONSULT NOTE ADULT - ATTENDING COMMENTS
I have personally seen, examined, and participated in the care of this patient. I have reviewed all pertinent clinical information, including history, physical exam, plan, and the PA/NP's note and agree except as noted below.    Briefly, 79 year old Amharic speaking female seen w/ son at bedside. Declines telephone  in favor of son translating. H/O HTN, DM, CAD s/p multiple remote stents & recent cardiac cath w/ , severe MR s/p attempted Chichi-clip c/b pericardial effusion, severe TR, persistent atrial fibrillation. POD #3 s/p C2L, bio MVR, TV repair and NISSA clip, who was initially in NSR with long 1st degree AVB and Mobitz I block (suggestive of AVN disease) but now appears to be in AF with asymptomatic slow ventricular response in setting of increased Metoprolol dose.    Plan:  - Obtain ECG  - Trial lower dose BB -- Metoprolol 12.5mg BID  - If patient has symptomatic bradycardia or long pauses on telemetry (>5 seconds), then can consider PM    Melo Pacheco MD  Clinical Cardiac Electrophysiology

## 2019-09-06 NOTE — PROGRESS NOTE ADULT - ASSESSMENT
T2DM- Pre dinner BS  wa 84 then sher williamson dinner   - will incerae premeal HUmalog to 8 unti tid ac    can icnrease LAntus to 22 untis     Hyperthryoidsm - TFT wnl  preop on Methimazole - cont current RX T2DM- Pre dinner BS  was 84 then sher postdinner to 221   - will increase premeal HUmalog to 8 unit  tid ac    will increase LAntus to 22 untis     Hyperthryoidsm - TFT wnl  preop on Methimazole - cont current RX       s/p CABG NISSA  clip TV repair MV replaceent  afib

## 2019-09-07 DIAGNOSIS — D50.0 IRON DEFICIENCY ANEMIA SECONDARY TO BLOOD LOSS (CHRONIC): ICD-10-CM

## 2019-09-07 LAB
ALBUMIN SERPL ELPH-MCNC: 3.4 G/DL — SIGNIFICANT CHANGE UP (ref 3.3–5.2)
ALP SERPL-CCNC: 97 U/L — SIGNIFICANT CHANGE UP (ref 40–120)
ALT FLD-CCNC: 33 U/L — HIGH
ANION GAP SERPL CALC-SCNC: 12 MMOL/L — SIGNIFICANT CHANGE UP (ref 5–17)
AST SERPL-CCNC: 32 U/L — HIGH
BILIRUB SERPL-MCNC: 0.7 MG/DL — SIGNIFICANT CHANGE UP (ref 0.4–2)
BUN SERPL-MCNC: 50 MG/DL — HIGH (ref 8–20)
CALCIUM SERPL-MCNC: 8.5 MG/DL — LOW (ref 8.6–10.2)
CHLORIDE SERPL-SCNC: 98 MMOL/L — SIGNIFICANT CHANGE UP (ref 98–107)
CO2 SERPL-SCNC: 23 MMOL/L — SIGNIFICANT CHANGE UP (ref 22–29)
CREAT SERPL-MCNC: 0.99 MG/DL — SIGNIFICANT CHANGE UP (ref 0.5–1.3)
GLUCOSE BLDC GLUCOMTR-MCNC: 126 MG/DL — HIGH (ref 70–99)
GLUCOSE BLDC GLUCOMTR-MCNC: 141 MG/DL — HIGH (ref 70–99)
GLUCOSE BLDC GLUCOMTR-MCNC: 162 MG/DL — HIGH (ref 70–99)
GLUCOSE BLDC GLUCOMTR-MCNC: 223 MG/DL — HIGH (ref 70–99)
GLUCOSE SERPL-MCNC: 180 MG/DL — HIGH (ref 70–115)
HCT VFR BLD CALC: 25.4 % — LOW (ref 34.5–45)
HGB BLD-MCNC: 8.2 G/DL — LOW (ref 11.5–15.5)
INR BLD: 1.19 RATIO — HIGH (ref 0.88–1.16)
MAGNESIUM SERPL-MCNC: 2.1 MG/DL — SIGNIFICANT CHANGE UP (ref 1.6–2.6)
MCHC RBC-ENTMCNC: 29.8 PG — SIGNIFICANT CHANGE UP (ref 27–34)
MCHC RBC-ENTMCNC: 32.3 GM/DL — SIGNIFICANT CHANGE UP (ref 32–36)
MCV RBC AUTO: 92.4 FL — SIGNIFICANT CHANGE UP (ref 80–100)
PHOSPHATE SERPL-MCNC: 2.8 MG/DL — SIGNIFICANT CHANGE UP (ref 2.4–4.7)
PLATELET # BLD AUTO: 173 K/UL — SIGNIFICANT CHANGE UP (ref 150–400)
POTASSIUM SERPL-MCNC: 4.1 MMOL/L — SIGNIFICANT CHANGE UP (ref 3.5–5.3)
POTASSIUM SERPL-SCNC: 4.1 MMOL/L — SIGNIFICANT CHANGE UP (ref 3.5–5.3)
PROT SERPL-MCNC: 6.4 G/DL — LOW (ref 6.6–8.7)
PROTHROM AB SERPL-ACNC: 13.8 SEC — HIGH (ref 10–12.9)
RBC # BLD: 2.75 M/UL — LOW (ref 3.8–5.2)
RBC # FLD: 14.5 % — SIGNIFICANT CHANGE UP (ref 10.3–14.5)
SODIUM SERPL-SCNC: 133 MMOL/L — LOW (ref 135–145)
WBC # BLD: 12.11 K/UL — HIGH (ref 3.8–10.5)
WBC # FLD AUTO: 12.11 K/UL — HIGH (ref 3.8–10.5)

## 2019-09-07 PROCEDURE — 99233 SBSQ HOSP IP/OBS HIGH 50: CPT

## 2019-09-07 PROCEDURE — 71045 X-RAY EXAM CHEST 1 VIEW: CPT | Mod: 26

## 2019-09-07 PROCEDURE — 93010 ELECTROCARDIOGRAM REPORT: CPT

## 2019-09-07 PROCEDURE — 71045 X-RAY EXAM CHEST 1 VIEW: CPT | Mod: 26,77

## 2019-09-07 RX ORDER — INSULIN GLARGINE 100 [IU]/ML
24 INJECTION, SOLUTION SUBCUTANEOUS AT BEDTIME
Refills: 0 | Status: DISCONTINUED | OUTPATIENT
Start: 2019-09-07 | End: 2019-09-07

## 2019-09-07 RX ORDER — METOPROLOL TARTRATE 50 MG
12.5 TABLET ORAL
Refills: 0 | Status: DISCONTINUED | OUTPATIENT
Start: 2019-09-07 | End: 2019-09-07

## 2019-09-07 RX ORDER — INSULIN GLARGINE 100 [IU]/ML
22 INJECTION, SOLUTION SUBCUTANEOUS AT BEDTIME
Refills: 0 | Status: DISCONTINUED | OUTPATIENT
Start: 2019-09-07 | End: 2019-09-07

## 2019-09-07 RX ORDER — ASCORBIC ACID 60 MG
500 TABLET,CHEWABLE ORAL DAILY
Refills: 0 | Status: DISCONTINUED | OUTPATIENT
Start: 2019-09-07 | End: 2019-09-11

## 2019-09-07 RX ORDER — FERROUS SULFATE 325(65) MG
325 TABLET ORAL DAILY
Refills: 0 | Status: DISCONTINUED | OUTPATIENT
Start: 2019-09-07 | End: 2019-09-08

## 2019-09-07 RX ORDER — WARFARIN SODIUM 2.5 MG/1
1 TABLET ORAL ONCE
Refills: 0 | Status: COMPLETED | OUTPATIENT
Start: 2019-09-07 | End: 2019-09-07

## 2019-09-07 RX ORDER — FOLIC ACID 0.8 MG
1 TABLET ORAL DAILY
Refills: 0 | Status: DISCONTINUED | OUTPATIENT
Start: 2019-09-07 | End: 2019-09-11

## 2019-09-07 RX ORDER — INSULIN LISPRO 100/ML
8 VIAL (ML) SUBCUTANEOUS
Refills: 0 | Status: DISCONTINUED | OUTPATIENT
Start: 2019-09-07 | End: 2019-09-10

## 2019-09-07 RX ORDER — INSULIN GLARGINE 100 [IU]/ML
16 INJECTION, SOLUTION SUBCUTANEOUS AT BEDTIME
Refills: 0 | Status: DISCONTINUED | OUTPATIENT
Start: 2019-09-07 | End: 2019-09-08

## 2019-09-07 RX ADMIN — Medication 4: at 13:06

## 2019-09-07 RX ADMIN — OXYCODONE AND ACETAMINOPHEN 2 TABLET(S): 5; 325 TABLET ORAL at 15:50

## 2019-09-07 RX ADMIN — PANTOPRAZOLE SODIUM 40 MILLIGRAM(S): 20 TABLET, DELAYED RELEASE ORAL at 08:48

## 2019-09-07 RX ADMIN — Medication 325 MILLIGRAM(S): at 08:49

## 2019-09-07 RX ADMIN — Medication 100 MILLIGRAM(S): at 17:42

## 2019-09-07 RX ADMIN — SODIUM CHLORIDE 3 MILLILITER(S): 9 INJECTION INTRAMUSCULAR; INTRAVENOUS; SUBCUTANEOUS at 05:07

## 2019-09-07 RX ADMIN — Medication 500 MILLIGRAM(S): at 17:43

## 2019-09-07 RX ADMIN — Medication 12.5 MILLIGRAM(S): at 17:44

## 2019-09-07 RX ADMIN — Medication 100 MILLIGRAM(S): at 05:07

## 2019-09-07 RX ADMIN — POLYETHYLENE GLYCOL 3350 17 GRAM(S): 17 POWDER, FOR SOLUTION ORAL at 17:44

## 2019-09-07 RX ADMIN — OXYCODONE AND ACETAMINOPHEN 1 TABLET(S): 5; 325 TABLET ORAL at 00:56

## 2019-09-07 RX ADMIN — Medication 8 UNIT(S): at 13:06

## 2019-09-07 RX ADMIN — ATORVASTATIN CALCIUM 80 MILLIGRAM(S): 80 TABLET, FILM COATED ORAL at 21:30

## 2019-09-07 RX ADMIN — OXYCODONE AND ACETAMINOPHEN 1 TABLET(S): 5; 325 TABLET ORAL at 09:50

## 2019-09-07 RX ADMIN — Medication 100 MILLIGRAM(S): at 21:30

## 2019-09-07 RX ADMIN — Medication 8 UNIT(S): at 08:48

## 2019-09-07 RX ADMIN — INSULIN GLARGINE 16 UNIT(S): 100 INJECTION, SOLUTION SUBCUTANEOUS at 21:47

## 2019-09-07 RX ADMIN — Medication 1 MILLIGRAM(S): at 17:42

## 2019-09-07 RX ADMIN — ENOXAPARIN SODIUM 40 MILLIGRAM(S): 100 INJECTION SUBCUTANEOUS at 21:30

## 2019-09-07 RX ADMIN — Medication 8 UNIT(S): at 17:44

## 2019-09-07 RX ADMIN — SODIUM CHLORIDE 3 MILLILITER(S): 9 INJECTION INTRAMUSCULAR; INTRAVENOUS; SUBCUTANEOUS at 21:28

## 2019-09-07 RX ADMIN — Medication 2: at 08:48

## 2019-09-07 RX ADMIN — OXYCODONE AND ACETAMINOPHEN 2 TABLET(S): 5; 325 TABLET ORAL at 13:06

## 2019-09-07 RX ADMIN — SODIUM CHLORIDE 3 MILLILITER(S): 9 INJECTION INTRAMUSCULAR; INTRAVENOUS; SUBCUTANEOUS at 13:04

## 2019-09-07 RX ADMIN — WARFARIN SODIUM 1 MILLIGRAM(S): 2.5 TABLET ORAL at 21:30

## 2019-09-07 RX ADMIN — OXYCODONE AND ACETAMINOPHEN 1 TABLET(S): 5; 325 TABLET ORAL at 08:55

## 2019-09-07 RX ADMIN — Medication 325 MILLIGRAM(S): at 17:42

## 2019-09-07 RX ADMIN — AMLODIPINE BESYLATE 5 MILLIGRAM(S): 2.5 TABLET ORAL at 05:07

## 2019-09-07 NOTE — PROGRESS NOTE ADULT - ASSESSMENT
79 year old Albanian speaking female with hx of HTN, DM, CAD s/p multiple remote stents & recent cardiac cath w/ , severe MR s/p attempted Chichi-clip c/b pericardial effusion, severe TR, persistent atrial fibrillation. Now POD #4 s/p C2L, bio MVR, TV repair and NISSA clip, in AF now with asymptomatic slow ventricular rates.   beta blockers trialed overnight, but were stopped in setting of marked bradycardia. This has been asymptomatic.   HRs improved. Consider retrial of low dose beta blocker. If no HR improvement and unable to tolerate BB, may benefit from ppm implant monday.

## 2019-09-07 NOTE — PROGRESS NOTE ADULT - PROBLEM SELECTOR PLAN 5
Afib overnight with rate as low as 30's.  Continue Coumadin.  EP consult appreciated  Will re-trial beta blocker challenge  V wires nonfunctioning

## 2019-09-07 NOTE — PROGRESS NOTE ADULT - ASSESSMENT
79F h/o AF, severe MR, TR, HTN, HLD, chronic diastolic heart failure, DM, CAD s/p PCI, prior acute pericardial effusion d/t bleeding from mitral clip attempt which was aborted in May 2019. Patient presents again with continued complaints of SOB/CP for cath. Cath today revealed new ostial Cx lesion which will require a surgical evaluation. URIEL: moderate MR.     Procedure 9/3 C2L, MVR, TV repair, atrial clip. Intraop coagulopathy and acute blood loss anemia requiring blood and product transfusions    Postoperative courage notable for junctional rhythm on 9/4 which converted to AF on 9/6 with bradycardia in the 30s and long pauses. EP consult obtained and low dose BB trialed. 9/7 HR overnight dropped to 30s intermittently, BB stopped. 79F h/o AF, severe MR, TR, HTN, HLD, chronic diastolic heart failure, DM, CAD s/p PCI, prior acute pericardial effusion d/t bleeding from mitral clip attempt which was aborted in May 2019. Patient presents again with continued complaints of SOB/CP for cath. Cath today revealed new ostial Cx lesion which will require a surgical evaluation. URIEL: moderate MR.     Procedure 9/3 C2L, MVR, TV repair, atrial clip. Intraop coagulopathy and acute blood loss anemia requiring blood and product transfusions    Postoperative course notable for junctional rhythm on 9/4 which converted to AF on 9/6 with bradycardia in the 30s and long pauses. EP consult obtained and low dose BB trialed. 9/7 HR overnight dropped to 30s intermittently, BB stopped. EP requesting repeat BB challenge.

## 2019-09-07 NOTE — PROGRESS NOTE ADULT - SUBJECTIVE AND OBJECTIVE BOX
Subjective: Patient seen at the bedside with son. Patient prefers son interprets as the  service is "proper" Slovenian and she speaks "local" Slovenian. Patient complaining of constipation with abdominal cramping and inability to pass stool. Denies CP, SOB    VITAL SIGNS  Vital Signs Last 24 Hrs  T(C): 36.8 (19 @ 10:30), Max: 36.8 (19 @ 05:02)  T(F): 98.3 (19 @ 10:30), Max: 98.3 (19 @ 10:30)  HR: 56 (19 @ 10:30) (47 - 58)  BP: 136/81 (19 @ 10:30) (116/43 - 152/68)  RR: 18 (19 @ 10:30) (16 - 20)  SpO2: 99% (19 @ 10:30) (95% - 100%)  on O2             Telemetry/Alarms:  AF 60s, overnight yudy to 30s  LVEF: 50%    MEDICATIONS  amLODIPine   Tablet 5 milliGRAM(s) Oral daily  aspirin enteric coated 325 milliGRAM(s) Oral daily  atorvastatin 80 milliGRAM(s) Oral at bedtime  dextrose 40% Gel 15 Gram(s) Oral once PRN  dextrose 50% Injectable 12.5 Gram(s) IV Push once  dextrose 50% Injectable 25 Gram(s) IV Push once  dextrose 50% Injectable 25 Gram(s) IV Push once  docusate sodium 100 milliGRAM(s) Oral three times a day  enoxaparin Injectable 40 milliGRAM(s) SubCutaneous daily  insulin glargine Injectable (LANTUS) 22 Unit(s) SubCutaneous at bedtime  insulin lispro (HumaLOG) corrective regimen sliding scale   SubCutaneous Before meals and at bedtime  insulin lispro Injectable (HumaLOG) 8 Unit(s) SubCutaneous three times a day before meals  methimazole 10 milliGRAM(s) Oral daily  oxyCODONE    5 mG/acetaminophen 325 mG 1 Tablet(s) Oral every 4 hours PRN  oxyCODONE    5 mG/acetaminophen 325 mG 2 Tablet(s) Oral every 4 hours PRN  pantoprazole    Tablet 40 milliGRAM(s) Oral daily  polyethylene glycol 3350 17 Gram(s) Oral daily  sodium chloride 0.9% lock flush 3 milliLiter(s) IV Push every 8 hours      PHYSICAL EXAM  General: well nourished, well developed, no acute distress  Neurology: alert and oriented x 3, nonfocal, no gross deficits  Respiratory: clear to auscultation bilaterally  CV: regular rate and rhythm, normal S1, S2  Abdomen: soft, nontender, nondistended, positive hyperactive bowel sounds, last bowel movement preop  Extremities: warm, well perfused. no edema. + DP pulses  Incisions: midline sternal incision, + mepilex, c/d/i. sternum stable. LLE EVH  Chest tubes: minimal drainage, no air leak  epicardial wires isolated       @ 07:01  -   @ 07:00  --------------------------------------------------------  IN: 150 mL / OUT: 1120 mL / NET: -970 mL     @ 07:01  -   @ 14:12  --------------------------------------------------------  IN: 240 mL / OUT: 300 mL / NET: -60 mL        Weights:  Daily     Daily Weight in k.9 (07 Sep 2019 05:55)  Admit Wt: Drug Dosing Weight  Height (cm): 152.4 (03 Sep 2019 07:08)  Weight (kg): 61.8 (03 Sep 2019 07:08)  BMI (kg/m2): 26.6 (03 Sep 2019 07:08)  BSA (m2): 1.59 (03 Sep 2019 07:08)    All laboratory results, radiology and medications reviewed.    LABS      133<L>  |  98  |  50.0<H>  ----------------------------<  180<H>  4.1   |  23.0  |  0.99    Ca    8.5<L>      07 Sep 2019 06:16  Phos  2.8       Mg     2.1         TPro  6.4<L>  /  Alb  3.4  /  TBili  0.7  /  DBili  x   /  AST  32<H>  /  ALT  33<H>  /  AlkPhos  97                                   8.2    12.11 )-----------( 173      ( 07 Sep 2019 06:16 )             25.4          PT/INR - ( 07 Sep 2019 06:16 )   PT: 13.8 sec;   INR: 1.19 ratio           Bilirubin Total, Serum: 0.7 mg/dL ( @ 06:16)    CAPILLARY BLOOD GLUCOSE      POCT Blood Glucose.: 223 mg/dL (07 Sep 2019 12:30)  POCT Blood Glucose.: 162 mg/dL (07 Sep 2019 08:31)  POCT Blood Glucose.: 206 mg/dL (06 Sep 2019 21:39)  POCT Blood Glucose.: 84 mg/dL (06 Sep 2019 17:12)           Today's CXR:   < from: Xray Chest 1 View- PORTABLE-Routine (19 @ 05:56) >    FINDINGS:    LINES/TUBES: Unchanged left chest tube.  LUNGS/PLEURA: Small bilateral pleural effusions and perihilar opacities.   No pneumothorax.  MEDIASTINUM: Cardiac silhouette is enlarged. Mitral valve replacement.  OTHER: Sternotomy.    IMPRESSION:     Pulmonary edema.    < end of copied text >    Today's EKG: < from: 12 Lead ECG (19 @ 04:34) >    Ventricular Rate 77 BPM    Atrial Rate 77 BPM    P-R Interval 346 ms    QRS Duration 92 ms    Q-T Interval 388 ms    QTC Calculation(Bezet) 439 ms    P Axis 58 degrees    R Axis -9 degrees    T Axis 138 degrees    Diagnosis Line Sinus rhythm with 1st degree A-V block  Nonspecific T wave abnormality  Abnormal ECG    Confirmed by JAMARCUS SAMANO ([id number]) on 2019 8:06:49 AM    < end of copied text >      PAST MEDICAL & SURGICAL HISTORY:  CHF exacerbation  Atrial fibrillation  Diabetes  Hypertension  Stented coronary artery  Coronary stent restenosis, sequela  Afib  Hypertension  History of ankle surgery: right orif  Post PTCA: 8 stents  H/O hernia repair: &#x27;s  Cataract  History of appendectomy

## 2019-09-07 NOTE — PROGRESS NOTE ADULT - PROBLEM SELECTOR PLAN 7
Continue MARIANNA AC/HS, Lantus and premeal insulin.  Reasonably controlled  Was on Lantus as outpatient, plan for discharge on Lantus + humalog

## 2019-09-07 NOTE — PROGRESS NOTE ADULT - PROBLEM SELECTOR PLAN 1
s/p open heart surgery including C2L  Continue ASA and Lipitor.  Beta blocker held with yudy arrythmias > will re-trial dose per EP request  Encourage CDBE/IS and increased mobilization.  DASH/TLC diet.  PW not functioning > remove?  Dispo: to home, when heart rate stabilized  Remove chest tube today  D/W Dr Avila on AM rounds

## 2019-09-07 NOTE — CHART NOTE - NSCHARTNOTEFT_GEN_A_CORE
chest tube drainage low, no air leak. Chest tube removed without issue and occlusive dressing applied. Will check chest xray.

## 2019-09-07 NOTE — PROGRESS NOTE ADULT - SUBJECTIVE AND OBJECTIVE BOX
Subjective:  Pt feeling well. in bed, no complaints. reports mild chest tenderness at incision site. denies palpitation, lightheadedness, syncope.   TELE: remains in AF with HRs 40s-80s bpm (was reportedly in 30s bpm overnight) now predominantly 50s-60s bpm. BB held due to bradycardia overnight.     MEDICATIONS  (STANDING):  amLODIPine   Tablet 5 milliGRAM(s) Oral daily  aspirin enteric coated 325 milliGRAM(s) Oral daily  atorvastatin 80 milliGRAM(s) Oral at bedtime  dextrose 50% Injectable 12.5 Gram(s) IV Push once  dextrose 50% Injectable 25 Gram(s) IV Push once  dextrose 50% Injectable 25 Gram(s) IV Push once  docusate sodium 100 milliGRAM(s) Oral three times a day  enoxaparin Injectable 40 milliGRAM(s) SubCutaneous daily  insulin glargine Injectable (LANTUS) 22 Unit(s) SubCutaneous at bedtime  insulin lispro (HumaLOG) corrective regimen sliding scale   SubCutaneous Before meals and at bedtime  insulin lispro Injectable (HumaLOG) 8 Unit(s) SubCutaneous three times a day before meals  methimazole 10 milliGRAM(s) Oral daily  pantoprazole    Tablet 40 milliGRAM(s) Oral daily  polyethylene glycol 3350 17 Gram(s) Oral daily  sodium chloride 0.9% lock flush 3 milliLiter(s) IV Push every 8 hours    MEDICATIONS  (PRN):  dextrose 40% Gel 15 Gram(s) Oral once PRN Blood Glucose LESS THAN 70 milliGRAM(s)/deciLiter  oxyCODONE    5 mG/acetaminophen 325 mG 1 Tablet(s) Oral every 4 hours PRN Moderate Pain (4 - 6)  oxyCODONE    5 mG/acetaminophen 325 mG 2 Tablet(s) Oral every 4 hours PRN Severe Pain (7 - 10)      Allergies    No Known Allergies    Intolerances    OHS (Unknown)      Vital Signs Last 24 Hrs  T(C): 36.8 (07 Sep 2019 10:30), Max: 36.8 (07 Sep 2019 05:02)  T(F): 98.3 (07 Sep 2019 10:30), Max: 98.3 (07 Sep 2019 10:30)  HR: 56 (07 Sep 2019 10:30) (47 - 58)  BP: 136/81 (07 Sep 2019 10:30) (116/43 - 152/68)  BP(mean): --  RR: 18 (07 Sep 2019 10:30) (16 - 20)  SpO2: 99% (07 Sep 2019 10:30) (95% - 100%)    Physical Exam:  Constitutional: NAD, AAOx3  Cardiovascular: irregularly irregular. chest bandage in place.  Pulmonary: CTA b/l, unlabored  GI: soft NTND +BS  Extremities: no pedal edema, +distal pulses b/l  Neuro: non focal, BLANK x4    LABS:                        8.2    12.11 )-----------( 173      ( 07 Sep 2019 06:16 )             25.4     09-07    133<L>  |  98  |  50.0<H>  ----------------------------<  180<H>  4.1   |  23.0  |  0.99    Ca    8.5<L>      07 Sep 2019 06:16  Phos  2.8     09-07  Mg     2.1     09-07    TPro  6.4<L>  /  Alb  3.4  /  TBili  0.7  /  DBili  x   /  AST  32<H>  /  ALT  33<H>  /  AlkPhos  97  09-07    PT/INR - ( 07 Sep 2019 06:16 )   PT: 13.8 sec;   INR: 1.19 ratio               RADIOLOGY & ADDITIONAL TESTS:  < from: TTE Echo Complete w/Doppler (07.31.19 @ 11:40) >   1. Left ventricular ejection fraction, by visual estimation, is 40 to   45%.   2. Moderately decreased global left ventricular systolic function.   3. Basal and mid inferolateral wall and basal and mid inferior wall are   abnormal as described   4. Elevated mean left atrial pressure.   5. The mitral in-flow pattern reveals no discernable A-wave, therefore   no comment on diastolic function can be made.   6. The right ventricular size is mildly enlarged. RV systolic function   is low normal.   7. Moderately enlarged left atrium.   8. Moderately enlarged right atrium.   9. Sclerotic aortic valve with normal opening.  10. Thickening of the anterior and posterior mitral valve leaflets.  11. Moderate mitral valve regurgitation.  12. Mild to moderate mitral annular calcification.  13. Moderate tricuspid regurgitation.  14. Estimated pulmonary artery systolic pressure is 39.8 mmHg assuming a   right atrial pressure of 8 mmHg, which is consistent with borderline   pulmonary hypertension.  15. There is no evidence of pericardial effusion.    < end of copied text >

## 2019-09-08 DIAGNOSIS — I10 ESSENTIAL (PRIMARY) HYPERTENSION: ICD-10-CM

## 2019-09-08 DIAGNOSIS — E05.90 THYROTOXICOSIS, UNSPECIFIED WITHOUT THYROTOXIC CRISIS OR STORM: ICD-10-CM

## 2019-09-08 DIAGNOSIS — I48.91 UNSPECIFIED ATRIAL FIBRILLATION: ICD-10-CM

## 2019-09-08 LAB
ANION GAP SERPL CALC-SCNC: 12 MMOL/L — SIGNIFICANT CHANGE UP (ref 5–17)
BLD GP AB SCN SERPL QL: SIGNIFICANT CHANGE UP
BUN SERPL-MCNC: 43 MG/DL — HIGH (ref 8–20)
CALCIUM SERPL-MCNC: 8.4 MG/DL — LOW (ref 8.6–10.2)
CHLORIDE SERPL-SCNC: 100 MMOL/L — SIGNIFICANT CHANGE UP (ref 98–107)
CO2 SERPL-SCNC: 24 MMOL/L — SIGNIFICANT CHANGE UP (ref 22–29)
CREAT SERPL-MCNC: 0.89 MG/DL — SIGNIFICANT CHANGE UP (ref 0.5–1.3)
GLUCOSE BLDC GLUCOMTR-MCNC: 132 MG/DL — HIGH (ref 70–99)
GLUCOSE BLDC GLUCOMTR-MCNC: 199 MG/DL — HIGH (ref 70–99)
GLUCOSE BLDC GLUCOMTR-MCNC: 229 MG/DL — HIGH (ref 70–99)
GLUCOSE BLDC GLUCOMTR-MCNC: 266 MG/DL — HIGH (ref 70–99)
GLUCOSE SERPL-MCNC: 213 MG/DL — HIGH (ref 70–115)
HCT VFR BLD CALC: 24.1 % — LOW (ref 34.5–45)
HGB BLD-MCNC: 7.6 G/DL — LOW (ref 11.5–15.5)
INR BLD: 1.19 RATIO — HIGH (ref 0.88–1.16)
MAGNESIUM SERPL-MCNC: 2 MG/DL — SIGNIFICANT CHANGE UP (ref 1.6–2.6)
MCHC RBC-ENTMCNC: 29.5 PG — SIGNIFICANT CHANGE UP (ref 27–34)
MCHC RBC-ENTMCNC: 31.5 GM/DL — LOW (ref 32–36)
MCV RBC AUTO: 93.4 FL — SIGNIFICANT CHANGE UP (ref 80–100)
PLATELET # BLD AUTO: 180 K/UL — SIGNIFICANT CHANGE UP (ref 150–400)
POTASSIUM SERPL-MCNC: 4.5 MMOL/L — SIGNIFICANT CHANGE UP (ref 3.5–5.3)
POTASSIUM SERPL-SCNC: 4.5 MMOL/L — SIGNIFICANT CHANGE UP (ref 3.5–5.3)
PROTHROM AB SERPL-ACNC: 13.7 SEC — HIGH (ref 10–12.9)
RBC # BLD: 2.58 M/UL — LOW (ref 3.8–5.2)
RBC # FLD: 14.6 % — HIGH (ref 10.3–14.5)
SODIUM SERPL-SCNC: 136 MMOL/L — SIGNIFICANT CHANGE UP (ref 135–145)
WBC # BLD: 10.99 K/UL — HIGH (ref 3.8–10.5)
WBC # FLD AUTO: 10.99 K/UL — HIGH (ref 3.8–10.5)

## 2019-09-08 PROCEDURE — 99233 SBSQ HOSP IP/OBS HIGH 50: CPT

## 2019-09-08 PROCEDURE — 71045 X-RAY EXAM CHEST 1 VIEW: CPT | Mod: 26

## 2019-09-08 PROCEDURE — 93010 ELECTROCARDIOGRAM REPORT: CPT

## 2019-09-08 RX ORDER — WARFARIN SODIUM 2.5 MG/1
1 TABLET ORAL ONCE
Refills: 0 | Status: COMPLETED | OUTPATIENT
Start: 2019-09-08 | End: 2019-09-08

## 2019-09-08 RX ORDER — FERROUS SULFATE 325(65) MG
325 TABLET ORAL THREE TIMES A DAY
Refills: 0 | Status: DISCONTINUED | OUTPATIENT
Start: 2019-09-08 | End: 2019-09-11

## 2019-09-08 RX ORDER — METOPROLOL TARTRATE 50 MG
25 TABLET ORAL
Refills: 0 | Status: DISCONTINUED | OUTPATIENT
Start: 2019-09-08 | End: 2019-09-09

## 2019-09-08 RX ORDER — INSULIN GLARGINE 100 [IU]/ML
8 INJECTION, SOLUTION SUBCUTANEOUS AT BEDTIME
Refills: 0 | Status: COMPLETED | OUTPATIENT
Start: 2019-09-08 | End: 2019-09-08

## 2019-09-08 RX ADMIN — Medication 325 MILLIGRAM(S): at 08:48

## 2019-09-08 RX ADMIN — Medication 8 UNIT(S): at 17:23

## 2019-09-08 RX ADMIN — Medication 2: at 08:49

## 2019-09-08 RX ADMIN — Medication 6: at 17:23

## 2019-09-08 RX ADMIN — INSULIN GLARGINE 8 UNIT(S): 100 INJECTION, SOLUTION SUBCUTANEOUS at 22:17

## 2019-09-08 RX ADMIN — OXYCODONE AND ACETAMINOPHEN 1 TABLET(S): 5; 325 TABLET ORAL at 10:33

## 2019-09-08 RX ADMIN — Medication 325 MILLIGRAM(S): at 12:25

## 2019-09-08 RX ADMIN — OXYCODONE AND ACETAMINOPHEN 1 TABLET(S): 5; 325 TABLET ORAL at 00:15

## 2019-09-08 RX ADMIN — Medication 1 TABLET(S): at 12:25

## 2019-09-08 RX ADMIN — Medication 25 MILLIGRAM(S): at 13:26

## 2019-09-08 RX ADMIN — OXYCODONE AND ACETAMINOPHEN 1 TABLET(S): 5; 325 TABLET ORAL at 23:00

## 2019-09-08 RX ADMIN — Medication 4: at 12:26

## 2019-09-08 RX ADMIN — SODIUM CHLORIDE 3 MILLILITER(S): 9 INJECTION INTRAMUSCULAR; INTRAVENOUS; SUBCUTANEOUS at 22:19

## 2019-09-08 RX ADMIN — Medication 100 MILLIGRAM(S): at 05:23

## 2019-09-08 RX ADMIN — SODIUM CHLORIDE 3 MILLILITER(S): 9 INJECTION INTRAMUSCULAR; INTRAVENOUS; SUBCUTANEOUS at 05:24

## 2019-09-08 RX ADMIN — SODIUM CHLORIDE 3 MILLILITER(S): 9 INJECTION INTRAMUSCULAR; INTRAVENOUS; SUBCUTANEOUS at 12:21

## 2019-09-08 RX ADMIN — ATORVASTATIN CALCIUM 80 MILLIGRAM(S): 80 TABLET, FILM COATED ORAL at 22:17

## 2019-09-08 RX ADMIN — WARFARIN SODIUM 1 MILLIGRAM(S): 2.5 TABLET ORAL at 22:16

## 2019-09-08 RX ADMIN — Medication 500 MILLIGRAM(S): at 08:48

## 2019-09-08 RX ADMIN — Medication 100 MILLIGRAM(S): at 12:25

## 2019-09-08 RX ADMIN — Medication 8 UNIT(S): at 08:48

## 2019-09-08 RX ADMIN — Medication 1 MILLIGRAM(S): at 08:48

## 2019-09-08 RX ADMIN — Medication 100 MILLIGRAM(S): at 22:17

## 2019-09-08 RX ADMIN — OXYCODONE AND ACETAMINOPHEN 1 TABLET(S): 5; 325 TABLET ORAL at 01:15

## 2019-09-08 RX ADMIN — AMLODIPINE BESYLATE 5 MILLIGRAM(S): 2.5 TABLET ORAL at 05:23

## 2019-09-08 RX ADMIN — OXYCODONE AND ACETAMINOPHEN 1 TABLET(S): 5; 325 TABLET ORAL at 22:32

## 2019-09-08 RX ADMIN — OXYCODONE AND ACETAMINOPHEN 1 TABLET(S): 5; 325 TABLET ORAL at 08:51

## 2019-09-08 RX ADMIN — PANTOPRAZOLE SODIUM 40 MILLIGRAM(S): 20 TABLET, DELAYED RELEASE ORAL at 08:48

## 2019-09-08 RX ADMIN — OXYCODONE AND ACETAMINOPHEN 1 TABLET(S): 5; 325 TABLET ORAL at 17:23

## 2019-09-08 RX ADMIN — Medication 325 MILLIGRAM(S): at 22:17

## 2019-09-08 RX ADMIN — OXYCODONE AND ACETAMINOPHEN 1 TABLET(S): 5; 325 TABLET ORAL at 14:39

## 2019-09-08 RX ADMIN — OXYCODONE AND ACETAMINOPHEN 1 TABLET(S): 5; 325 TABLET ORAL at 13:22

## 2019-09-08 RX ADMIN — POLYETHYLENE GLYCOL 3350 17 GRAM(S): 17 POWDER, FOR SOLUTION ORAL at 22:16

## 2019-09-08 RX ADMIN — Medication 8 UNIT(S): at 12:25

## 2019-09-08 NOTE — PROGRESS NOTE ADULT - PROBLEM SELECTOR PLAN 1
Patient s/p C2L, NISSA Clip, MVR T, TVR 9/3   would benefit from beta blockers  Has had asymptomatic bradycardia 2/2 to beta blocker use  Was previously on hold, trial dose given last night and overnight patient was AFIB 70s   Recommend low dose beta blocker - start with metoprolol 25mg BID  If symptomatic will need PPM, will keep NPO @ [facility]

## 2019-09-08 NOTE — PROGRESS NOTE ADULT - SUBJECTIVE AND OBJECTIVE BOX
INTERVAL HPI/OVERNIGHT EVENTS:  follo wup T2Dm    pt has had good appetite-    on walk around the [place] today- developded some chest didiscomfort - now ok    MEDICATIONS  (STANDING):  amLODIPine   Tablet 5 milliGRAM(s) Oral daily  ascorbic acid 500 milliGRAM(s) Oral daily  aspirin enteric coated 325 milliGRAM(s) Oral daily  atorvastatin 80 milliGRAM(s) Oral at bedtime  dextrose 50% Injectable 12.5 Gram(s) IV Push once  dextrose 50% Injectable 25 Gram(s) IV Push once  dextrose 50% Injectable 25 Gram(s) IV Push once  docusate sodium 100 milliGRAM(s) Oral three times a day  ferrous    sulfate 325 milliGRAM(s) Oral three times a day  folic acid 1 milliGRAM(s) Oral daily  insulin glargine Injectable (LANTUS) 8 Unit(s) SubCutaneous at bedtime  insulin lispro (HumaLOG) corrective regimen sliding scale   SubCutaneous Before meals and at bedtime  insulin lispro Injectable (HumaLOG) 8 Unit(s) SubCutaneous three times a day before meals  methimazole 10 milliGRAM(s) Oral daily  metoprolol tartrate 25 milliGRAM(s) Oral two times a day  multivitamin 1 Tablet(s) Oral daily  pantoprazole    Tablet 40 milliGRAM(s) Oral daily  polyethylene glycol 3350 17 Gram(s) Oral daily  sodium chloride 0.9% lock flush 3 milliLiter(s) IV Push every 8 hours  warfarin 1 milliGRAM(s) Oral once    MEDICATIONS  (PRN):  bisacodyl Suppository 10 milliGRAM(s) Rectal daily PRN Constipation  dextrose 40% Gel 15 Gram(s) Oral once PRN Blood Glucose LESS THAN 70 milliGRAM(s)/deciLiter  oxyCODONE    5 mG/acetaminophen 325 mG 1 Tablet(s) Oral every 4 hours PRN Moderate Pain (4 - 6)  oxyCODONE    5 mG/acetaminophen 325 mG 2 Tablet(s) Oral every 4 hours PRN Severe Pain (7 - 10)  saline laxative (FLEET) Rectal Enema 1 Enema Rectal once PRN constipation      Allergies    No Known Allergies    Intolerances    OHS (Unknown)      Review of systems:  has been eatign well    no dysoena   walked around rthe floor- did get a little chest discomfort - now better     Vital Signs Last 24 Hrs  T(C): 36.8 (08 Sep 2019 16:38), Max: 36.9 (08 Sep 2019 13:00)  T(F): 98.3 (08 Sep 2019 16:38), Max: 98.5 (08 Sep 2019 13:00)  HR: 61 (08 Sep 2019 16:38) (61 - 78)  BP: 126/64 (08 Sep 2019 16:38) (126/64 - 148/50)  BP(mean): --  RR: 18 (08 Sep 2019 16:38) (16 - 20)  SpO2: 100% (08 Sep 2019 16:38) (96% - 100%)    PHYSICAL EXAM:      Constitutional: NAD, well-groomed, well-developed  HEENT: PERRLA, EOMI, no exophalmos  Neck: No LAD, No JVD, trachea midline, no thyroid enlargement  Back: Normal spine flexure, No CVA tenderness  Respiratory: CTAB  Cardiovascular: S1 and S2, RRR, no M/G/R  Gastrointestinal: BS+, soft, no organomeglag or mass  Extremities: No peripheral edema, no pedal lesions  Vascular: 2+ peripheral pulses          LABS:                        7.6    10.99 )-----------( 180      ( 08 Sep 2019 06:23 )             24.1     09-08    136  |  100  |  43.0<H>  ----------------------------<  213<H>  4.5   |  24.0  |  0.89    Ca    8.4<L>      08 Sep 2019 06:23  Phos  2.8     09-07  Mg     2.0     09-08    TPro  6.4<L>  /  Alb  3.4  /  TBili  0.7  /  DBili  x   /  AST  32<H>  /  ALT  33<H>  /  AlkPhos  97  09-07          CAPILLARY BLOOD GLUCOSE      POCT Blood Glucose.: 266 mg/dL (08 Sep 2019 17:15)  POCT Blood Glucose.: 229 mg/dL (08 Sep 2019 12:23)  POCT Blood Glucose.: 199 mg/dL (08 Sep 2019 08:27)    RADIOLOGY & ADDITIONAL TESTS: INTERVAL HPI/OVERNIGHT EVENTS:  follo wup T2Dm    pt has had good appetite-    on walk [place] today- developded some chest didiscomfort - now ok    MEDICATIONS  (STANDING):  amLODIPine   Tablet 5 milliGRAM(s) Oral daily  ascorbic acid 500 milliGRAM(s) Oral daily  aspirin enteric coated 325 milliGRAM(s) Oral daily  atorvastatin 80 milliGRAM(s) Oral at bedtime  dextrose 50% Injectable 12.5 Gram(s) IV Push once  dextrose 50% Injectable 25 Gram(s) IV Push once  dextrose 50% Injectable 25 Gram(s) IV Push once  docusate sodium 100 milliGRAM(s) Oral three times a day  ferrous    sulfate 325 milliGRAM(s) Oral three times a day  folic acid 1 milliGRAM(s) Oral daily  insulin glargine Injectable (LANTUS) 8 Unit(s) SubCutaneous at bedtime  insulin lispro (HumaLOG) corrective regimen sliding scale   SubCutaneous Before meals and at bedtime  insulin lispro Injectable (HumaLOG) 8 Unit(s) SubCutaneous three times a day before meals  methimazole 10 milliGRAM(s) Oral daily  metoprolol tartrate 25 milliGRAM(s) Oral two times a day  multivitamin 1 Tablet(s) Oral daily  pantoprazole    Tablet 40 milliGRAM(s) Oral daily  polyethylene glycol 3350 17 Gram(s) Oral daily  sodium chloride 0.9% lock flush 3 milliLiter(s) IV Push every 8 hours  warfarin 1 milliGRAM(s) Oral once    MEDICATIONS  (PRN):  bisacodyl Suppository 10 milliGRAM(s) Rectal daily PRN Constipation  dextrose 40% Gel 15 Gram(s) Oral once PRN Blood Glucose LESS THAN 70 milliGRAM(s)/deciLiter  oxyCODONE    5 mG/acetaminophen 325 mG 1 Tablet(s) Oral every 4 hours PRN Moderate Pain (4 - 6)  oxyCODONE    5 mG/acetaminophen 325 mG 2 Tablet(s) Oral every 4 hours PRN Severe Pain (7 - 10)  saline laxative (FLEET) Rectal Enema 1 Enema Rectal once PRN constipation      Allergies    No Known Allergies    Intolerances    OHS (Unknown)      Review of systems:  has been eatign well    no dysoena   walked around rthe floor- did get a little chest discomfort - now better     Vital Signs Last 24 Hrs  T(C): 36.8 (08 Sep 2019 16:38), Max: 36.9 (08 Sep 2019 13:00)  T(F): 98.3 (08 Sep 2019 16:38), Max: 98.5 (08 Sep 2019 13:00)  HR: 61 (08 Sep 2019 16:38) (61 - 78)  BP: 126/64 (08 Sep 2019 16:38) (126/64 - 148/50)  BP(mean): --  RR: 18 (08 Sep 2019 16:38) (16 - 20)  SpO2: 100% (08 Sep 2019 16:38) (96% - 100%)    PHYSICAL EXAM:      Constitutional: NAD, well-groomed, well-developed  HEENT: PERRLA, EOMI, no exophalmos  Back: Normal spine flexure, No CVA tenderness  Respiratory: CTAB  Cardiovascular: S1 and S2, RRR, no M/G/R  Extremities: No peripheral edema, no pedal lesions  Vascular: 2+ peripheral pulses          LABS:                        7.6    10.99 )-----------( 180      ( 08 Sep 2019 06:23 )             24.1     09-08    136  |  100  |  43.0<H>  ----------------------------<  213<H>  4.5   |  24.0  |  0.89    Ca    8.4<L>      08 Sep 2019 06:23  Phos  2.8     09-07  Mg     2.0     09-08    TPro  6.4<L>  /  Alb  3.4  /  TBili  0.7  /  DBili  x   /  AST  32<H>  /  ALT  33<H>  /  AlkPhos  97  09-07          CAPILLARY BLOOD GLUCOSE      POCT Blood Glucose.: 266 mg/dL (08 Sep 2019 17:15)  POCT Blood Glucose.: 229 mg/dL (08 Sep 2019 12:23)  POCT Blood Glucose.: 199 mg/dL (08 Sep 2019 08:27)    RADIOLOGY & ADDITIONAL TESTS:

## 2019-09-08 NOTE — PROGRESS NOTE ADULT - SUBJECTIVE AND OBJECTIVE BOX
CC:  Patient is a 79y old  Female who presents with a chief complaint of s/p surgery. EP following for bradycardia (07 Sep 2019 13:08)    HPI:  79 year old female presents with son Chava to PST , pt speaks [place]ian son interpreting . Pt c/o chest pain and history of heart murmurs . Son states pt had an episode of CHF and has history of MI with 8 stents in her heart . Increased dyspnea recently , denies CP at present . Scheduled for surgery with DR. Fuller for bypass and mitral and tricuspid valve repair. (26 Aug 2019 12:50)    ROS: All review of systems negative unless indicated otherwise below.     Lab Results:  CBC  CBC Full  -  ( 08 Sep 2019 06:23 )  WBC Count : 10.99 K/uL  RBC Count : 2.58 M/uL  Hemoglobin : 7.6 g/dL  Hematocrit : 24.1 %  Platelet Count - Automated : 180 K/uL  Mean Cell Volume : 93.4 fl  Mean Cell Hemoglobin : 29.5 pg  Mean Cell Hemoglobin Concentration : 31.5 gm/dL  Auto Neutrophil # : x  Auto Lymphocyte # : x  Auto Monocyte # : x  Auto Eosinophil # : x  Auto Basophil # : x  Auto Neutrophil % : x  Auto Lymphocyte % : x  Auto Monocyte % : x  Auto Eosinophil % : x  Auto Basophil % : x    Chemistry                        7.6    10.99 )-----------( 180      ( 08 Sep 2019 06:23 )             24.1     09-08    136  |  100  |  43.0<H>  ----------------------------<  213<H>  4.5   |  24.0  |  0.89    Ca    8.4<L>      08 Sep 2019 06:23  Phos  2.8     09-07  Mg     2.0     09-08    TPro  6.4<L>  /  Alb  3.4  /  TBili  0.7  /  DBili  x   /  AST  32<H>  /  ALT  33<H>  /  AlkPhos  97  09-07    LIVER FUNCTIONS - ( 07 Sep 2019 06:16 )  Alb: 3.4 g/dL / Pro: 6.4 g/dL / ALK PHOS: 97 U/L / ALT: 33 U/L / AST: 32 U/L / GGT: x           PT/INR - ( 08 Sep 2019 06:23 )   PT: 13.7 sec;   INR: 1.19 ratio      RADIOLOGY RESULTS: Personally visualized and reviewed  < from: Xray Chest 1 View- PORTABLE-Routine (09.08.19 @ 05:55) >  IMPRESSION: Mild increased pulmonary vascular congestion. Prominent   cardiac silhouette. Status post valve replacement.    < end of copied text >              CATH REPORT:     MEDICATIONS  (STANDING):  amLODIPine   Tablet 5 milliGRAM(s) Oral daily  ascorbic acid 500 milliGRAM(s) Oral daily  aspirin enteric coated 325 milliGRAM(s) Oral daily  atorvastatin 80 milliGRAM(s) Oral at bedtime  dextrose 50% Injectable 12.5 Gram(s) IV Push once  dextrose 50% Injectable 25 Gram(s) IV Push once  dextrose 50% Injectable 25 Gram(s) IV Push once  docusate sodium 100 milliGRAM(s) Oral three times a day  enoxaparin Injectable 40 milliGRAM(s) SubCutaneous daily  ferrous    sulfate 325 milliGRAM(s) Oral three times a day  folic acid 1 milliGRAM(s) Oral daily  insulin glargine Injectable (LANTUS) 16 Unit(s) SubCutaneous at bedtime  insulin lispro (HumaLOG) corrective regimen sliding scale   SubCutaneous Before meals and at bedtime  insulin lispro Injectable (HumaLOG) 8 Unit(s) SubCutaneous three times a day before meals  methimazole 10 milliGRAM(s) Oral daily  multivitamin 1 Tablet(s) Oral daily  pantoprazole    Tablet 40 milliGRAM(s) Oral daily  polyethylene glycol 3350 17 Gram(s) Oral daily  sodium chloride 0.9% lock flush 3 milliLiter(s) IV Push every 8 hours  warfarin 1 milliGRAM(s) Oral once    MEDICATIONS  (PRN):  bisacodyl Suppository 10 milliGRAM(s) Rectal daily PRN Constipation  dextrose 40% Gel 15 Gram(s) Oral once PRN Blood Glucose LESS THAN 70 milliGRAM(s)/deciLiter  oxyCODONE    5 mG/acetaminophen 325 mG 1 Tablet(s) Oral every 4 hours PRN Moderate Pain (4 - 6)  oxyCODONE    5 mG/acetaminophen 325 mG 2 Tablet(s) Oral every 4 hours PRN Severe Pain (7 - 10)  saline laxative (FLEET) Rectal Enema 1 Enema Rectal once PRN constipation      PHYSICAL EXAM:  Vital Signs Last 24 Hrs  T(C): 36.8 (08 Sep 2019 05:18), Max: 36.9 (07 Sep 2019 21:16)  T(F): 98.3 (08 Sep 2019 05:18), Max: 98.4 (07 Sep 2019 21:16)  HR: 62 (08 Sep 2019 05:18) (62 - 79)  BP: 130/60 (08 Sep 2019 05:18) (112/52 - 148/50)  BP(mean): --  RR: 16 (08 Sep 2019 05:18) (16 - 20)  SpO2: 100% (08 Sep 2019 05:18) (97% - 100%)  GENERAL: NAD, well-groomed, well-developed  HEAD:  Atraumatic, Normocephalic  NECK: Supple, No JVD  NERVOUS SYSTEM:  Alert & Oriented X3, Good concentration; Motor Strength 5/5 B/L upper and lower extremities, sensation intact  CHEST/LUNG: Clear to auscultation bilaterally, No rales, rhonchi, wheezing, or rubs  HEART: Regular rate and rhythm; s1 and s2 auscultated, No murmurs, rubs, or gallops  ABDOMEN: Soft, Nontender, Nondistended; Bowel sounds present and normoactive.   EXTREMITIES:  2+ Peripheral Pulses, No clubbing, cyanosis, or edema  SKIN: No rashes or lesions  CATH SITE:

## 2019-09-08 NOTE — PROGRESS NOTE ADULT - ASSESSMENT
79 female with PMH of AF, severe MR, TR, HTN, HLD, chronic diastolic heart failure, DM, CAD s/p PCI, prior acute pericardial effusion d/t bleeding from mitral clip attempt which was aborted in May 2019. Patient presents again with continued complaints of SOB/CP for cath. Cath revealed new ostial Cx lesion which will require a surgical evaluation. URIEL: moderate MR.     Procedure 9/3 C2L, MVR, TV repair, atrial clip. Intraop coagulopathy and acute blood loss anemia requiring blood and product transfusions    Postoperative course notable for junctional rhythm on 9/4 which converted to AF on 9/6 with bradycardia in the 30s and long pauses. EP consult obtained and low dose BB trialed. 9/7 HR overnight dropped to 30s intermittently, BB stopped. EP requesting repeat BB challenge.

## 2019-09-08 NOTE — PROGRESS NOTE ADULT - PROBLEM SELECTOR PLAN 4
Remains in slow afib  Coumadin dosed daily  Beta blocker held for yudy arrhythmias, EP following  Possible PPM tomorrow

## 2019-09-08 NOTE — PROGRESS NOTE ADULT - ATTENDING COMMENTS
HRs improved today off BB and pt without complaints. BB rechallenge today given benefit of this in setting of CAD and CMP. If HRs become again too low on beta blockers will need ppm tomorrow.   NPO p MD in anticipation of possible PPM  cont coumadin. avoid heparin.

## 2019-09-08 NOTE — PROGRESS NOTE ADULT - ASSESSMENT
79 year old Icelandic speaking female with hx of HTN, DM, CAD s/p multiple remote stents & recent cardiac cath w/ , severe MR s/p attempted [place]-clip c/b pericardial effusion, severe TR, persistent atrial fibrillation. Now POD #4 s/p C2L, bio MVR, TV repair and NISSA clip, in AF now with asymptomatic slow ventricular rates. beta blockers trialed overnight, but were stopped in setting of marked bradycardia. This has been asymptomatic.

## 2019-09-08 NOTE — PROGRESS NOTE ADULT - PROBLEM SELECTOR PLAN 1
s/p C2L, NISSA clip, MVR (T), TV repair on 9/3  Continue Percocet PRN for pain management.  Continue ASA & Lipitor for graft patency  Beta blocker on hold for yudy arrhythmias   Possible PPM tomorrow as per Dr. Girard  Encourage the use of I/S, CDBE, OOB to chair, daily PT, encourage mobilization  Discussed plan with Dr. Avila & CTS in morning rounds

## 2019-09-08 NOTE — PROGRESS NOTE ADULT - SUBJECTIVE AND OBJECTIVE BOX
Subjective: Pt. seen & examined, pt. OOB to chair, denies any SOB or chest pain, NAD noted, family at the bedside.    VITAL SIGNS  Vital Signs Last 24 Hrs  T(C): 36.8 (19 @ 05:18), Max: 36.9 (19 @ 21:16)  T(F): 98.3 (19 @ 05:18), Max: 98.4 (19 @ 21:16)  HR: 62 (19 @ 05:18) (56 - 79)  BP: 130/60 (19 @ 05:18) (112/52 - 148/50)  RR: 16 (19 @ 05:18) (16 - 20)  SpO2: 100% (19 @ 05:18) (97% - 100%)  on (O2)              Telemetry:  Slow afib 50's  LVEF: 50%    MEDICATIONS  amLODIPine   Tablet 5 milliGRAM(s) Oral daily  ascorbic acid 500 milliGRAM(s) Oral daily  aspirin enteric coated 325 milliGRAM(s) Oral daily  atorvastatin 80 milliGRAM(s) Oral at bedtime  bisacodyl Suppository 10 milliGRAM(s) Rectal daily PRN  dextrose 40% Gel 15 Gram(s) Oral once PRN  dextrose 50% Injectable 12.5 Gram(s) IV Push once  dextrose 50% Injectable 25 Gram(s) IV Push once  dextrose 50% Injectable 25 Gram(s) IV Push once  docusate sodium 100 milliGRAM(s) Oral three times a day  enoxaparin Injectable 40 milliGRAM(s) SubCutaneous daily  ferrous    sulfate 325 milliGRAM(s) Oral three times a day  folic acid 1 milliGRAM(s) Oral daily  insulin glargine Injectable (LANTUS) 16 Unit(s) SubCutaneous at bedtime  insulin lispro (HumaLOG) corrective regimen sliding scale   SubCutaneous Before meals and at bedtime  insulin lispro Injectable (HumaLOG) 8 Unit(s) SubCutaneous three times a day before meals  methimazole 10 milliGRAM(s) Oral daily  multivitamin 1 Tablet(s) Oral daily  oxyCODONE    5 mG/acetaminophen 325 mG 1 Tablet(s) Oral every 4 hours PRN  oxyCODONE    5 mG/acetaminophen 325 mG 2 Tablet(s) Oral every 4 hours PRN  pantoprazole    Tablet 40 milliGRAM(s) Oral daily  polyethylene glycol 3350 17 Gram(s) Oral daily  saline laxative (FLEET) Rectal Enema 1 Enema Rectal once PRN  sodium chloride 0.9% lock flush 3 milliLiter(s) IV Push every 8 hours  warfarin 1 milliGRAM(s) Oral once      PHYSICAL EXAM  General: well nourished, well developed, no acute distress  Neurology: alert and oriented x 3, nonfocal, no gross deficits  Respiratory: clear to auscultation bilaterally  CV: regular rate and rhythm, normal S1, S2  Abdomen: soft, nontender, nondistended, positive bowel sounds, last bowel movement 19  Extremities: warm, well perfused. no edema. + DP pulses  Incisions: Midline sternal incision, + Mepilex CDI,  sternum stable.  Epicardial Wires: PW isolated      I&O's Detail    07 Sep 2019 07:  -  08 Sep 2019 07:00  --------------------------------------------------------  IN:    Oral Fluid: 630 mL  Total IN: 630 mL    OUT:    Voided: 1750 mL  Total OUT: 1750 mL    Total NET: -1120 mL      08 Sep 2019 07:  -  08 Sep 2019 10:27  --------------------------------------------------------  IN:    Oral Fluid: 240 mL  Total IN: 240 mL    OUT:    Voided: 400 mL  Total OUT: 400 mL    Total NET: -160 mL          Weights:  Daily     Daily Weight in k.7 (08 Sep 2019 06:47)  Admit Wt: Drug Dosing Weight  Height (cm): 152.4 (03 Sep 2019 07:08)  Weight (kg): 61.8 (03 Sep 2019 07:08)  BMI (kg/m2): 26.6 (03 Sep 2019 07:08)  BSA (m2): 1.59 (03 Sep 2019 07:08)    LABS  -    136  |  100  |  43.0<H>  ----------------------------<  213<H>  4.5   |  24.0  |  0.89    Ca    8.4<L>      08 Sep 2019 06:23  Phos  2.8       Mg     2.0         TPro  6.4<L>  /  Alb  3.4  /  TBili  0.7  /  DBili  x   /  AST  32<H>  /  ALT  33<H>  /  AlkPhos  97                                   7.6    10.99 )-----------( 180      ( 08 Sep 2019 06:23 )             24.1          PT/INR - ( 08 Sep 2019 06:23 )   PT: 13.7 sec;   INR: 1.19 ratio                 CAPILLARY BLOOD GLUCOSE      POCT Blood Glucose.: 199 mg/dL (08 Sep 2019 08:27)  POCT Blood Glucose.: 141 mg/dL (07 Sep 2019 21:10)  POCT Blood Glucose.: 126 mg/dL (07 Sep 2019 16:44)  POCT Blood Glucose.: 223 mg/dL (07 Sep 2019 12:30)           Today's CXR:< from: Xray Chest 1 View- PORTABLE-Routine (19 @ 05:55) >  EXAM:  XR CHEST PORTABLE ROUTINE 1V                          PROCEDURE DATE:  2019          INTERPRETATION:  Chest radiograph (one view)     CPT 45781    CLINICAL INFORMATION:  Patient is unable to communicate. Status post open   heart surgery.  Follow-up.    TECHNIQUE:  Single frontal view of the chest was obtained.    FINDINGS:  Prior study dated 2019 was available for review.    The lungs demonstrate mild increased pulmonary vascular congestion. No   gross consolidation is seen. No pleural effusion is seen.    The heart is prominent in size. The patient status post median sternotomy   and valve replacement. Atrial clip is noted.      IMPRESSION: Mild increased pulmonary vascular congestion. Prominent   cardiac silhouette. Status post valve replacement.      ELDER FUNES M.D., ATTENDING RADIOLOGIST  This document has been electronically signed. Sep  8 2019  8:42AM    < end of copied text >      Today's EKG:< from: 12 Lead ECG (19 @ 07:57) >  Ventricular Rate 66 BPM    Atrial Rate 49 BPM    QRS Duration 96 ms    Q-T Interval 418 ms    QTC Calculation(Bezet) 438 ms    R Axis 10 degrees    T Axis 125 degrees    Diagnosis Line Atrial fibrillation  Nonspecific ST and T wave abnormality    Confirmed by JAMARCUS SAMANO ([id number]) on 2019 6:46:17 PM    < end of copied text >      PAST MEDICAL & SURGICAL HISTORY:  CHF exacerbation  Atrial fibrillation  Diabetes  Hypertension  Stented coronary artery  Coronary stent restenosis, sequela  Afib  Hypertension  History of ankle surgery: right orif  Post PTCA: 8 stents  H/O hernia repair: &#[id number]  Cataract  History of appendectomy

## 2019-09-08 NOTE — PROGRESS NOTE ADULT - SUBJECTIVE AND OBJECTIVE BOX
CC:  Patient is a 79y old  Female who presents with a chief complaint of s/p surgery. EP following for bradycardia (07 Sep 2019 13:08)    HPI:  79 year old female presents with son Chava to PST , pt speaks Turkish son interpreting . Pt c/o chest pain and history of heart murmurs . Son states pt had an episode of CHF and has history of MI with 8 stents in her heart . Increased dyspnea recently , denies CP at present . Scheduled for surgery with DR. Fuller for bypass and mitral and tricuspid valve repair. (26 Aug 2019 12:50)    ROS: All review of systems negative unless indicated otherwise below.     Lab Results:  CBC  CBC Full  -  ( 08 Sep 2019 06:23 )  WBC Count : 10.99 K/uL  RBC Count : 2.58 M/uL  Hemoglobin : 7.6 g/dL  Hematocrit : 24.1 %  Platelet Count - Automated : 180 K/uL  Mean Cell Volume : 93.4 fl  Mean Cell Hemoglobin : 29.5 pg  Mean Cell Hemoglobin Concentration : 31.5 gm/dL  Auto Neutrophil # : x  Auto Lymphocyte # : x  Auto Monocyte # : x  Auto Eosinophil # : x  Auto Basophil # : x  Auto Neutrophil % : x  Auto Lymphocyte % : x  Auto Monocyte % : x  Auto Eosinophil % : x  Auto Basophil % : x    Chemistry                        7.6    10.99 )-----------( 180      ( 08 Sep 2019 06:23 )             24.1     09-08    136  |  100  |  43.0<H>  ----------------------------<  213<H>  4.5   |  24.0  |  0.89    Ca    8.4<L>      08 Sep 2019 06:23  Phos  2.8     09-07  Mg     2.0     09-08    TPro  6.4<L>  /  Alb  3.4  /  TBili  0.7  /  DBili  x   /  AST  32<H>  /  ALT  33<H>  /  AlkPhos  97  09-07    LIVER FUNCTIONS - ( 07 Sep 2019 06:16 )  Alb: 3.4 g/dL / Pro: 6.4 g/dL / ALK PHOS: 97 U/L / ALT: 33 U/L / AST: 32 U/L / GGT: x           PT/INR - ( 08 Sep 2019 06:23 )   PT: 13.7 sec;   INR: 1.19 ratio      RADIOLOGY RESULTS: Personally visualized and reviewed  < from: Xray Chest 1 View- PORTABLE-Routine (09.08.19 @ 05:55) >  IMPRESSION: Mild increased pulmonary vascular congestion. Prominent   cardiac silhouette. Status post valve replacement.    < end of copied text >    MEDICATIONS  (STANDING):  amLODIPine   Tablet 5 milliGRAM(s) Oral daily  ascorbic acid 500 milliGRAM(s) Oral daily  aspirin enteric coated 325 milliGRAM(s) Oral daily  atorvastatin 80 milliGRAM(s) Oral at bedtime  dextrose 50% Injectable 12.5 Gram(s) IV Push once  dextrose 50% Injectable 25 Gram(s) IV Push once  dextrose 50% Injectable 25 Gram(s) IV Push once  docusate sodium 100 milliGRAM(s) Oral three times a day  enoxaparin Injectable 40 milliGRAM(s) SubCutaneous daily  ferrous    sulfate 325 milliGRAM(s) Oral three times a day  folic acid 1 milliGRAM(s) Oral daily  insulin glargine Injectable (LANTUS) 16 Unit(s) SubCutaneous at bedtime  insulin lispro (HumaLOG) corrective regimen sliding scale   SubCutaneous Before meals and at bedtime  insulin lispro Injectable (HumaLOG) 8 Unit(s) SubCutaneous three times a day before meals  methimazole 10 milliGRAM(s) Oral daily  metoprolol tartrate 25 milliGRAM(s) Oral two times a day  multivitamin 1 Tablet(s) Oral daily  pantoprazole    Tablet 40 milliGRAM(s) Oral daily  polyethylene glycol 3350 17 Gram(s) Oral daily  sodium chloride 0.9% lock flush 3 milliLiter(s) IV Push every 8 hours  warfarin 1 milliGRAM(s) Oral once    MEDICATIONS  (PRN):  bisacodyl Suppository 10 milliGRAM(s) Rectal daily PRN Constipation  dextrose 40% Gel 15 Gram(s) Oral once PRN Blood Glucose LESS THAN 70 milliGRAM(s)/deciLiter  oxyCODONE    5 mG/acetaminophen 325 mG 1 Tablet(s) Oral every 4 hours PRN Moderate Pain (4 - 6)  oxyCODONE    5 mG/acetaminophen 325 mG 2 Tablet(s) Oral every 4 hours PRN Severe Pain (7 - 10)  saline laxative (FLEET) Rectal Enema 1 Enema Rectal once PRN constipation    PHYSICAL EXAM:  Vital Signs Last 24 Hrs  T(C): 36.9 (08 Sep 2019 13:00), Max: 36.9 (07 Sep 2019 21:16)  T(F): 98.5 (08 Sep 2019 13:00), Max: 98.5 (08 Sep 2019 13:00)  HR: 68 (08 Sep 2019 13:00) (62 - 79)  BP: 126/68 (08 Sep 2019 13:00) (112/52 - 148/50)  BP(mean): --  RR: 18 (08 Sep 2019 13:00) (16 - 20)  SpO2: 96% (08 Sep 2019 13:00) (96% - 100%)  GENERAL: NAD, well-groomed, well-developed  HEAD:  Atraumatic, Normocephalic  NECK: Supple, No JVD  NERVOUS SYSTEM:  Alert & Oriented X3, Good concentration; Motor Strength 5/5 B/L upper and lower extremities, sensation intact  CHEST/LUNG: Clear to auscultation bilaterally, No rales, rhonchi, wheezing, or rubs,   STERNUM: Sternotomy site intact stable w/o clicks w/o s/s of infection or excessive drainage, op site dressing inact,   HEART: Regular rate and rhythm; s1 and s2 auscultated, No murmurs, rubs, or gallops  ABDOMEN: Soft, Nontender, Nondistended; Bowel sounds present and normoactive.   EXTREMITIES:  2+ Peripheral Pulses, No clubbing, cyanosis, or edema  SKIN: No rashes or lesions    TELEMETRY: Afib 70s

## 2019-09-08 NOTE — PROGRESS NOTE ADULT - ASSESSMENT
T2DM - suboptimal control   but for now   Lantus being decreased as pt   with bradycardia today   to have possible PPM in AM  dw Np  about CP -  will assess      hyperthyroidsm - cont MMI 10 mg   will cont to follow with you

## 2019-09-09 DIAGNOSIS — R74.8 ABNORMAL LEVELS OF OTHER SERUM ENZYMES: ICD-10-CM

## 2019-09-09 LAB
ALBUMIN SERPL ELPH-MCNC: 3.4 G/DL — SIGNIFICANT CHANGE UP (ref 3.3–5.2)
ALP SERPL-CCNC: 139 U/L — HIGH (ref 40–120)
ALT FLD-CCNC: 38 U/L — HIGH
ANION GAP SERPL CALC-SCNC: 12 MMOL/L — SIGNIFICANT CHANGE UP (ref 5–17)
APTT BLD: 25.8 SEC — LOW (ref 27.5–36.3)
AST SERPL-CCNC: 32 U/L — HIGH
BILIRUB SERPL-MCNC: 1.1 MG/DL — SIGNIFICANT CHANGE UP (ref 0.4–2)
BUN SERPL-MCNC: 37 MG/DL — HIGH (ref 8–20)
CALCIUM SERPL-MCNC: 8.8 MG/DL — SIGNIFICANT CHANGE UP (ref 8.6–10.2)
CHLORIDE SERPL-SCNC: 100 MMOL/L — SIGNIFICANT CHANGE UP (ref 98–107)
CO2 SERPL-SCNC: 25 MMOL/L — SIGNIFICANT CHANGE UP (ref 22–29)
CREAT SERPL-MCNC: 0.87 MG/DL — SIGNIFICANT CHANGE UP (ref 0.5–1.3)
GLUCOSE BLDC GLUCOMTR-MCNC: 164 MG/DL — HIGH (ref 70–99)
GLUCOSE BLDC GLUCOMTR-MCNC: 175 MG/DL — HIGH (ref 70–99)
GLUCOSE BLDC GLUCOMTR-MCNC: 176 MG/DL — HIGH (ref 70–99)
GLUCOSE BLDC GLUCOMTR-MCNC: 207 MG/DL — HIGH (ref 70–99)
GLUCOSE BLDC GLUCOMTR-MCNC: 293 MG/DL — HIGH (ref 70–99)
GLUCOSE SERPL-MCNC: 172 MG/DL — HIGH (ref 70–115)
HCT VFR BLD CALC: 25.2 % — LOW (ref 34.5–45)
HGB BLD-MCNC: 8.1 G/DL — LOW (ref 11.5–15.5)
INR BLD: 1.2 RATIO — HIGH (ref 0.88–1.16)
MAGNESIUM SERPL-MCNC: 2 MG/DL — SIGNIFICANT CHANGE UP (ref 1.6–2.6)
MCHC RBC-ENTMCNC: 30 PG — SIGNIFICANT CHANGE UP (ref 27–34)
MCHC RBC-ENTMCNC: 32.1 GM/DL — SIGNIFICANT CHANGE UP (ref 32–36)
MCV RBC AUTO: 93.3 FL — SIGNIFICANT CHANGE UP (ref 80–100)
PHOSPHATE SERPL-MCNC: 2.8 MG/DL — SIGNIFICANT CHANGE UP (ref 2.4–4.7)
PLATELET # BLD AUTO: 205 K/UL — SIGNIFICANT CHANGE UP (ref 150–400)
POTASSIUM SERPL-MCNC: 4.5 MMOL/L — SIGNIFICANT CHANGE UP (ref 3.5–5.3)
POTASSIUM SERPL-SCNC: 4.5 MMOL/L — SIGNIFICANT CHANGE UP (ref 3.5–5.3)
PROT SERPL-MCNC: 6.6 G/DL — SIGNIFICANT CHANGE UP (ref 6.6–8.7)
PROTHROM AB SERPL-ACNC: 13.9 SEC — HIGH (ref 10–12.9)
RBC # BLD: 2.7 M/UL — LOW (ref 3.8–5.2)
RBC # FLD: 14.5 % — SIGNIFICANT CHANGE UP (ref 10.3–14.5)
SODIUM SERPL-SCNC: 137 MMOL/L — SIGNIFICANT CHANGE UP (ref 135–145)
SURGICAL PATHOLOGY STUDY: SIGNIFICANT CHANGE UP
WBC # BLD: 10.39 K/UL — SIGNIFICANT CHANGE UP (ref 3.8–10.5)
WBC # FLD AUTO: 10.39 K/UL — SIGNIFICANT CHANGE UP (ref 3.8–10.5)

## 2019-09-09 PROCEDURE — 99232 SBSQ HOSP IP/OBS MODERATE 35: CPT

## 2019-09-09 PROCEDURE — 71045 X-RAY EXAM CHEST 1 VIEW: CPT | Mod: 26

## 2019-09-09 PROCEDURE — 93010 ELECTROCARDIOGRAM REPORT: CPT

## 2019-09-09 RX ORDER — INSULIN GLARGINE 100 [IU]/ML
16 INJECTION, SOLUTION SUBCUTANEOUS AT BEDTIME
Refills: 0 | Status: DISCONTINUED | OUTPATIENT
Start: 2019-09-09 | End: 2019-09-10

## 2019-09-09 RX ORDER — METOPROLOL TARTRATE 50 MG
25 TABLET ORAL ONCE
Refills: 0 | Status: COMPLETED | OUTPATIENT
Start: 2019-09-09 | End: 2019-09-09

## 2019-09-09 RX ORDER — WARFARIN SODIUM 2.5 MG/1
2 TABLET ORAL ONCE
Refills: 0 | Status: COMPLETED | OUTPATIENT
Start: 2019-09-09 | End: 2019-09-09

## 2019-09-09 RX ORDER — ONDANSETRON 8 MG/1
4 TABLET, FILM COATED ORAL ONCE
Refills: 0 | Status: COMPLETED | OUTPATIENT
Start: 2019-09-09 | End: 2019-09-09

## 2019-09-09 RX ORDER — FUROSEMIDE 40 MG
40 TABLET ORAL DAILY
Refills: 0 | Status: DISCONTINUED | OUTPATIENT
Start: 2019-09-09 | End: 2019-09-11

## 2019-09-09 RX ORDER — ENOXAPARIN SODIUM 100 MG/ML
40 INJECTION SUBCUTANEOUS DAILY
Refills: 0 | Status: DISCONTINUED | OUTPATIENT
Start: 2019-09-09 | End: 2019-09-11

## 2019-09-09 RX ORDER — ATORVASTATIN CALCIUM 80 MG/1
80 TABLET, FILM COATED ORAL AT BEDTIME
Refills: 0 | Status: DISCONTINUED | OUTPATIENT
Start: 2019-09-09 | End: 2019-09-11

## 2019-09-09 RX ORDER — SPIRONOLACTONE 25 MG/1
25 TABLET, FILM COATED ORAL DAILY
Refills: 0 | Status: DISCONTINUED | OUTPATIENT
Start: 2019-09-09 | End: 2019-09-11

## 2019-09-09 RX ORDER — METOPROLOL TARTRATE 50 MG
50 TABLET ORAL DAILY
Refills: 0 | Status: DISCONTINUED | OUTPATIENT
Start: 2019-09-10 | End: 2019-09-11

## 2019-09-09 RX ADMIN — Medication 4: at 09:02

## 2019-09-09 RX ADMIN — Medication 25 MILLIGRAM(S): at 05:33

## 2019-09-09 RX ADMIN — Medication 8 UNIT(S): at 17:49

## 2019-09-09 RX ADMIN — SODIUM CHLORIDE 3 MILLILITER(S): 9 INJECTION INTRAMUSCULAR; INTRAVENOUS; SUBCUTANEOUS at 21:34

## 2019-09-09 RX ADMIN — Medication 40 MILLIGRAM(S): at 09:43

## 2019-09-09 RX ADMIN — Medication 325 MILLIGRAM(S): at 22:26

## 2019-09-09 RX ADMIN — ATORVASTATIN CALCIUM 80 MILLIGRAM(S): 80 TABLET, FILM COATED ORAL at 22:11

## 2019-09-09 RX ADMIN — Medication 2: at 22:29

## 2019-09-09 RX ADMIN — Medication 2: at 17:49

## 2019-09-09 RX ADMIN — SODIUM CHLORIDE 3 MILLILITER(S): 9 INJECTION INTRAMUSCULAR; INTRAVENOUS; SUBCUTANEOUS at 05:29

## 2019-09-09 RX ADMIN — Medication 325 MILLIGRAM(S): at 05:33

## 2019-09-09 RX ADMIN — WARFARIN SODIUM 2 MILLIGRAM(S): 2.5 TABLET ORAL at 22:11

## 2019-09-09 RX ADMIN — OXYCODONE AND ACETAMINOPHEN 1 TABLET(S): 5; 325 TABLET ORAL at 23:12

## 2019-09-09 RX ADMIN — Medication 325 MILLIGRAM(S): at 12:28

## 2019-09-09 RX ADMIN — Medication 1 TABLET(S): at 09:04

## 2019-09-09 RX ADMIN — Medication 325 MILLIGRAM(S): at 12:27

## 2019-09-09 RX ADMIN — Medication 100 MILLIGRAM(S): at 05:33

## 2019-09-09 RX ADMIN — AMLODIPINE BESYLATE 5 MILLIGRAM(S): 2.5 TABLET ORAL at 05:33

## 2019-09-09 RX ADMIN — Medication 1 MILLIGRAM(S): at 09:04

## 2019-09-09 RX ADMIN — INSULIN GLARGINE 16 UNIT(S): 100 INJECTION, SOLUTION SUBCUTANEOUS at 22:28

## 2019-09-09 RX ADMIN — Medication 8 UNIT(S): at 12:27

## 2019-09-09 RX ADMIN — OXYCODONE AND ACETAMINOPHEN 1 TABLET(S): 5; 325 TABLET ORAL at 22:12

## 2019-09-09 RX ADMIN — Medication 100 MILLIGRAM(S): at 12:27

## 2019-09-09 RX ADMIN — Medication 8 UNIT(S): at 09:01

## 2019-09-09 RX ADMIN — SODIUM CHLORIDE 3 MILLILITER(S): 9 INJECTION INTRAMUSCULAR; INTRAVENOUS; SUBCUTANEOUS at 15:08

## 2019-09-09 RX ADMIN — Medication 6: at 12:27

## 2019-09-09 RX ADMIN — ENOXAPARIN SODIUM 40 MILLIGRAM(S): 100 INJECTION SUBCUTANEOUS at 18:42

## 2019-09-09 RX ADMIN — Medication 100 MILLIGRAM(S): at 22:12

## 2019-09-09 RX ADMIN — Medication 25 MILLIGRAM(S): at 09:43

## 2019-09-09 RX ADMIN — Medication 500 MILLIGRAM(S): at 09:04

## 2019-09-09 RX ADMIN — POLYETHYLENE GLYCOL 3350 17 GRAM(S): 17 POWDER, FOR SOLUTION ORAL at 12:49

## 2019-09-09 RX ADMIN — SPIRONOLACTONE 25 MILLIGRAM(S): 25 TABLET, FILM COATED ORAL at 09:43

## 2019-09-09 RX ADMIN — PANTOPRAZOLE SODIUM 40 MILLIGRAM(S): 20 TABLET, DELAYED RELEASE ORAL at 09:04

## 2019-09-09 NOTE — PROGRESS NOTE ADULT - SUBJECTIVE AND OBJECTIVE BOX
Subjective:    VITAL SIGNS  Vital Signs Last 24 Hrs  T(C): 37.2 (19 @ 05:31), Max: 37.2 (19 @ 05:31)  T(F): 98.9 (19 @ 05:31), Max: 98.9 (19 @ 05:31)  HR: 70 (19 @ 05:31) (61 - 84)  BP: 119/55 (19 @ 05:31) (119/55 - 135/52)  RR: 18 (19 @ 05:31) (16 - 18)  SpO2: 96% (19 @ 05:31) (96% - 100%)  on (O2)              Telemetry:    LVEF:     MEDICATIONS  amLODIPine   Tablet 5 milliGRAM(s) Oral daily  ascorbic acid 500 milliGRAM(s) Oral daily  aspirin enteric coated 325 milliGRAM(s) Oral daily  atorvastatin 80 milliGRAM(s) Oral at bedtime  bisacodyl Suppository 10 milliGRAM(s) Rectal daily PRN  dextrose 40% Gel 15 Gram(s) Oral once PRN  dextrose 50% Injectable 12.5 Gram(s) IV Push once  dextrose 50% Injectable 25 Gram(s) IV Push once  dextrose 50% Injectable 25 Gram(s) IV Push once  docusate sodium 100 milliGRAM(s) Oral three times a day  ferrous    sulfate 325 milliGRAM(s) Oral three times a day  folic acid 1 milliGRAM(s) Oral daily  insulin glargine Injectable (LANTUS) 16 Unit(s) SubCutaneous at bedtime  insulin lispro (HumaLOG) corrective regimen sliding scale   SubCutaneous Before meals and at bedtime  insulin lispro Injectable (HumaLOG) 8 Unit(s) SubCutaneous three times a day before meals  methimazole 10 milliGRAM(s) Oral daily  metoprolol tartrate 25 milliGRAM(s) Oral two times a day  multivitamin 1 Tablet(s) Oral daily  oxyCODONE    5 mG/acetaminophen 325 mG 1 Tablet(s) Oral every 4 hours PRN  oxyCODONE    5 mG/acetaminophen 325 mG 2 Tablet(s) Oral every 4 hours PRN  pantoprazole    Tablet 40 milliGRAM(s) Oral daily  polyethylene glycol 3350 17 Gram(s) Oral daily  saline laxative (FLEET) Rectal Enema 1 Enema Rectal once PRN  sodium chloride 0.9% lock flush 3 milliLiter(s) IV Push every 8 hours      PHYSICAL EXAM  General: well nourished, well developed, no acute distress  Neurology: alert and oriented x 3, nonfocal, no gross deficits  Respiratory: clear to auscultation bilaterally  CV: regular rate and rhythm, normal S1, S2  Abdomen: soft, nontender, nondistended, positive bowel sounds, last bowel movement   Extremities: warm, well perfused. no edema. + DP pulses  Incisions: midline sternal incision, + mepilex, c/d/i. sternum stable.  Chest tubes:   Epicardial Wires:    > EPM (settings) / isolated    I&O's Detail    08 Sep 2019 07:01  -  09 Sep 2019 07:00  --------------------------------------------------------  IN:    Oral Fluid: 720 mL  Total IN: 720 mL    OUT:    Voided: 1300 mL  Total OUT: 1300 mL    Total NET: -580 mL          Weights:  Daily     Daily Weight in k (09 Sep 2019 07:03)  Admit Wt: Drug Dosing Weight  Height (cm): 152.4 (03 Sep 2019 07:08)  Weight (kg): 61.8 (03 Sep 2019 07:08)  BMI (kg/m2): 26.6 (03 Sep 2019 07:08)  BSA (m2): 1.59 (03 Sep 2019 07:08)    LABS      137  |  100  |  37.0<H>  ----------------------------<  172<H>  4.5   |  25.0  |  0.87    Ca    8.8      09 Sep 2019 06:03  Phos  2.8       Mg     2.0         TPro  6.6  /  Alb  3.4  /  TBili  1.1  /  DBili  x   /  AST  32<H>  /  ALT  38<H>  /  AlkPhos  139<H>                                   8.1    10.39 )-----------( 205      ( 09 Sep 2019 06:03 )             25.2          PT/INR - ( 09 Sep 2019 06:03 )   PT: 13.9 sec;   INR: 1.20 ratio         PTT - ( 09 Sep 2019 06:03 )  PTT:25.8 sec      Bilirubin Total, Serum: 1.1 mg/dL ( @ 06:03)    CAPILLARY BLOOD GLUCOSE      POCT Blood Glucose.: 207 mg/dL (09 Sep 2019 08:06)  POCT Blood Glucose.: 175 mg/dL (09 Sep 2019 04:04)  POCT Blood Glucose.: 132 mg/dL (08 Sep 2019 21:51)  POCT Blood Glucose.: 266 mg/dL (08 Sep 2019 17:15)  POCT Blood Glucose.: 229 mg/dL (08 Sep 2019 12:23)           Today's CXR:    Today's EKG:    PAST MEDICAL & SURGICAL HISTORY:  CHF exacerbation  Atrial fibrillation  Diabetes  Hypertension  Stented coronary artery  Coronary stent restenosis, sequela  Afib  Hypertension  History of ankle surgery: right orif  Post PTCA: 8 stents  H/O hernia repair: 1970&#x27;s  Cataract  History of appendectomy Subjective: Pt. seen & examined, pacific  utilized [id number], Pt. reports feeling light headed yesterday afternoon & unable to ambulate, patient states she feels better today, denies any SOB or chest pain, NAD noted    VITAL SIGNS  Vital Signs Last 24 Hrs  T(C): 37.2 (19 @ 05:31), Max: 37.2 (19 @ 05:31)  T(F): 98.9 (19 @ 05:31), Max: 98.9 (19 @ 05:31)  HR: 70 (19 @ 05:31) (61 - 84)  BP: 119/55 (19 @ 05:31) (119/55 - 135/52)  RR: 18 (19 @ 05:31) (16 - 18)  SpO2: 96% (19 @ 05:31) (96% - 100%)  on (O2)              Telemetry: Afib   LVEF: 50%    MEDICATIONS  amLODIPine   Tablet 5 milliGRAM(s) Oral daily  ascorbic acid 500 milliGRAM(s) Oral daily  aspirin enteric coated 325 milliGRAM(s) Oral daily  atorvastatin 80 milliGRAM(s) Oral at bedtime  bisacodyl Suppository 10 milliGRAM(s) Rectal daily PRN  dextrose 40% Gel 15 Gram(s) Oral once PRN  dextrose 50% Injectable 12.5 Gram(s) IV Push once  dextrose 50% Injectable 25 Gram(s) IV Push once  dextrose 50% Injectable 25 Gram(s) IV Push once  docusate sodium 100 milliGRAM(s) Oral three times a day  ferrous    sulfate 325 milliGRAM(s) Oral three times a day  folic acid 1 milliGRAM(s) Oral daily  insulin glargine Injectable (LANTUS) 16 Unit(s) SubCutaneous at bedtime  insulin lispro (HumaLOG) corrective regimen sliding scale   SubCutaneous Before meals and at bedtime  insulin lispro Injectable (HumaLOG) 8 Unit(s) SubCutaneous three times a day before meals  methimazole 10 milliGRAM(s) Oral daily  metoprolol tartrate 25 milliGRAM(s) Oral two times a day  multivitamin 1 Tablet(s) Oral daily  oxyCODONE    5 mG/acetaminophen 325 mG 1 Tablet(s) Oral every 4 hours PRN  oxyCODONE    5 mG/acetaminophen 325 mG 2 Tablet(s) Oral every 4 hours PRN  pantoprazole    Tablet 40 milliGRAM(s) Oral daily  polyethylene glycol 3350 17 Gram(s) Oral daily  saline laxative (FLEET) Rectal Enema 1 Enema Rectal once PRN  sodium chloride 0.9% lock flush 3 milliLiter(s) IV Push every 8 hours      PHYSICAL EXAM  General: well nourished, well developed, no acute distress  Neurology: alert and oriented x 3, nonfocal, no gross deficits  Respiratory: clear to auscultation bilaterally  CV: regular rate and rhythm, normal S1, S2  Abdomen: soft, nontender, nondistended, positive bowel sounds, last bowel movement 19  Extremities: warm, well perfused. no edema. + DP pulses  Incisions: Midline sternal incision, + Mepilex CDI,  sternum stable. LLE SVG with +mepilex  Epicardial Wires: PW isolated      I&O's Detail    08 Sep 2019 07:01  -  09 Sep 2019 07:00  --------------------------------------------------------  IN:    Oral Fluid: 720 mL  Total IN: 720 mL    OUT:    Voided: 1300 mL  Total OUT: 1300 mL    Total NET: -580 mL          Weights:  Daily     Daily Weight in k (09 Sep 2019 07:03)  Admit Wt: Drug Dosing Weight  Height (cm): 152.4 (03 Sep 2019 07:08)  Weight (kg): 61.8 (03 Sep 2019 07:08)  BMI (kg/m2): 26.6 (03 Sep 2019 07:08)  BSA (m2): 1.59 (03 Sep 2019 07:08)    LABS      137  |  100  |  37.0<H>  ----------------------------<  172<H>  4.5   |  25.0  |  0.87    Ca    8.8      09 Sep 2019 06:03  Phos  2.8       Mg     2.0         TPro  6.6  /  Alb  3.4  /  TBili  1.1  /  DBili  x   /  AST  32<H>  /  ALT  38<H>  /  AlkPhos  139<H>                                   8.1    10.39 )-----------( 205      ( 09 Sep 2019 06:03 )             25.2          PT/INR - ( 09 Sep 2019 06:03 )   PT: 13.9 sec;   INR: 1.20 ratio         PTT - ( 09 Sep 2019 06:03 )  PTT:25.8 sec      Bilirubin Total, Serum: 1.1 mg/dL ( @ 06:03)    CAPILLARY BLOOD GLUCOSE      POCT Blood Glucose.: 207 mg/dL (09 Sep 2019 08:06)  POCT Blood Glucose.: 175 mg/dL (09 Sep 2019 04:04)  POCT Blood Glucose.: 132 mg/dL (08 Sep 2019 21:51)  POCT Blood Glucose.: 266 mg/dL (08 Sep 2019 17:15)  POCT Blood Glucose.: 229 mg/dL (08 Sep 2019 12:23)           Today's CXR:< from: Xray Chest 1 View- PORTABLE-Routine (19 @ 05:46) >  EXAM:  XR CHEST PORTABLE ROUTINE 1V                          PROCEDURE DATE:  2019          INTERPRETATION:  CHEST AP PORTABLE:    History: s/p OHS.     Date and time of exam: 2019 4:08 AM.    Technique: A single AP view of the chest wasobtained.    Comparison exam: 2019 5:09 AM.    Findings:  Cardio megaly. Pulmonary vascular congestion with perihilar pulmonary   edema, new since the prior study. Bilateral pleural effusions, also a new   finding..    Impression:  Evidence of congestive heart failure and bilateral pulmonary edema,   worsening since the prior day..                ANTON LORA M.D., ATTENDING RADIOLOGIST  This document has been electronically signed. Sep  9 2019  9:02AM        < end of copied text >      Today's EKG:< from: 12 Lead ECG (19 @ 08:49) >  Ventricular Rate 64 BPM    Atrial Rate 241 BPM    QRS Duration 96 ms    Q-T Interval 388 ms    QTC Calculation(Bezet) 400 ms    R Axis -10 degrees    T Axis 132 degrees    Diagnosis Line Atrial fibrillation  Nonspecific ST and T wave abnormality    Confirmed by JAMARCUS SAMANO ([id number]) on 2019 6:46:36 PM    < end of copied text >      PAST MEDICAL & SURGICAL HISTORY:  CHF exacerbation  Atrial fibrillation  Diabetes  Hypertension  Stented coronary artery  Coronary stent restenosis, sequela  Afib  Hypertension  History of ankle surgery: right orif  Post PTCA: 8 stents  H/O hernia repair: &#x27;s  Cataract  History of appendectomy

## 2019-09-09 NOTE — PROGRESS NOTE ADULT - PROBLEM SELECTOR PLAN 6
Continue consistent carb diet  Diabetic Educator following, Pt. needs to self inject prior to discharge to home.  Continue FS AC/HS, MARIANNA, pre-meal, & Lantus

## 2019-09-09 NOTE — PROGRESS NOTE ADULT - PROBLEM SELECTOR PLAN 4
Remains in slow afib  Tolerating Lopressor, will start Toprol XL 50mg daily tomorrow as per EP.  Coumadin dosed daily

## 2019-09-09 NOTE — PROGRESS NOTE ADULT - SUBJECTIVE AND OBJECTIVE BOX
Electrophysiology Attending Follow Up Note    Subjective: Patient states she had some dyspnea yesterday but otherwise feels OK. She had some lightheadness when standing but resolves quickly. Denies having chest discomfort. She received Metoprolol 25mg yesterday at 1:30 PM and this morning at 6 AM with appropriate rates.    TELE: AF rate controlled, HR range from 50-90s.    MEDICATIONS  (STANDING):  amLODIPine   Tablet 5 milliGRAM(s) Oral daily  ascorbic acid 500 milliGRAM(s) Oral daily  aspirin enteric coated 325 milliGRAM(s) Oral daily  atorvastatin 80 milliGRAM(s) Oral at bedtime  dextrose 50% Injectable 12.5 Gram(s) IV Push once  dextrose 50% Injectable 25 Gram(s) IV Push once  dextrose 50% Injectable 25 Gram(s) IV Push once  docusate sodium 100 milliGRAM(s) Oral three times a day  ferrous    sulfate 325 milliGRAM(s) Oral three times a day  folic acid 1 milliGRAM(s) Oral daily  furosemide    Tablet 40 milliGRAM(s) Oral daily  insulin glargine Injectable (LANTUS) 16 Unit(s) SubCutaneous at bedtime  insulin lispro (HumaLOG) corrective regimen sliding scale   SubCutaneous Before meals and at bedtime  insulin lispro Injectable (HumaLOG) 8 Unit(s) SubCutaneous three times a day before meals  methimazole 10 milliGRAM(s) Oral daily  multivitamin 1 Tablet(s) Oral daily  pantoprazole    Tablet 40 milliGRAM(s) Oral daily  polyethylene glycol 3350 17 Gram(s) Oral daily  sodium chloride 0.9% lock flush 3 milliLiter(s) IV Push every 8 hours  spironolactone 25 milliGRAM(s) Oral daily  warfarin 2 milliGRAM(s) Oral once    MEDICATIONS  (PRN):  bisacodyl Suppository 10 milliGRAM(s) Rectal daily PRN Constipation  dextrose 40% Gel 15 Gram(s) Oral once PRN Blood Glucose LESS THAN 70 milliGRAM(s)/deciLiter  oxyCODONE    5 mG/acetaminophen 325 mG 1 Tablet(s) Oral every 4 hours PRN Moderate Pain (4 - 6)  oxyCODONE    5 mG/acetaminophen 325 mG 2 Tablet(s) Oral every 4 hours PRN Severe Pain (7 - 10)  saline laxative (FLEET) Rectal Enema 1 Enema Rectal once PRN constipation    Allergies  No Known Allergies  Intolerances    Vital Signs Last 24 Hrs  T(C): 37.2 (09 Sep 2019 05:31), Max: 37.2 (09 Sep 2019 05:31)  T(F): 98.9 (09 Sep 2019 05:31), Max: 98.9 (09 Sep 2019 05:31)  HR: 79 (09 Sep 2019 09:48) (61 - 84)  BP: 122/72 (09 Sep 2019 09:48) (119/55 - 135/52)  BP(mean): --  RR: 18 (09 Sep 2019 09:48) (16 - 18)  SpO2: 100% (09 Sep 2019 09:48) (96% - 100%)    Physical Exam:  Constitutional: NAD, AAOx3  Cardiovascular: +S1S2 RRR, no murmur/rubs/gallops  Pulmonary: CTA b/l, unlabored  GI: soft NTND +BS  Extremities: no pedal edema, warm and well perfused  Neuro: non focal, BLANK x4    LABS:                        8.1    10.39 )-----------( 205      ( 09 Sep 2019 06:03 )             25.2     09-09    137  |  100  |  37.0<H>  ----------------------------<  172<H>  4.5   |  25.0  |  0.87    Ca    8.8      09 Sep 2019 06:03  Phos  2.8     09-09  Mg     2.0     09-09    TPro  6.6  /  Alb  3.4  /  TBili  1.1  /  DBili  x   /  AST  32<H>  /  ALT  38<H>  /  AlkPhos  139<H>  09-09    PT/INR - ( 09 Sep 2019 06:03 )   PT: 13.9 sec;   INR: 1.20 ratio         PTT - ( 09 Sep 2019 06:03 )  PTT:25.8 sec

## 2019-09-09 NOTE — DISCHARGE NOTE PROVIDER - NSDCHHATTENDCERT_GEN_ALL_CORE
"Anesthesia Transfer of Care Note    Patient: Allyssa Wright    Procedure(s) Performed: Procedure(s) (LRB):  ELECTROCONVULSIVE THERAPY (ECT) - SINGLE SEIZURE (N/A)    Patient location: PACU    Anesthesia Type: general    Transport from OR: Transported from OR on 6-10 L/min O2 by face mask with adequate spontaneous ventilation    Post pain: adequate analgesia    Post assessment: no apparent anesthetic complications    Post vital signs: stable    Level of consciousness: awake    Nausea/Vomiting: no nausea/vomiting    Complications: none    Transfer of care protocol was followed      Last vitals:   Visit Vitals  /69 (BP Location: Right arm, Patient Position: Lying)   Pulse 98   Temp 37.1 °C (98.8 °F) (Skin)   Resp 20   Ht 5' 5" (1.651 m)   Wt 65.8 kg (145 lb)   LMP  (LMP Unknown)   SpO2 99%   Breastfeeding? No   BMI 24.13 kg/m²     " My signature below certifies that the above stated patient is homebound and upon completion of the Face-To-Face encounter, has the need for intermittent skilled nursing, physical therapy and/or speech or occupational therapy services in their home for their current diagnosis as outlined in their initial plan of care. These services will continue to be monitored by myself or another physician.

## 2019-09-09 NOTE — PROGRESS NOTE ADULT - ASSESSMENT
79 female with PMH of AF, severe MR, TR, HTN, HLD, chronic diastolic heart failure, DM, CAD s/p PCI, prior acute pericardial effusion d/t bleeding from mitral clip attempt which was aborted in May 2019. Patient presents again with continued complaints of SOB/CP for cath. Cath revealed new ostial Cx lesion which will require a surgical evaluation. URIEL: moderate MR.   Procedure 9/3 C2L, MVR, TV repair, atrial clip  Postoperative course notable for junctional rhythm on 9/4 which converted to AF on 9/6 with bradycardia in the 30s and long pauses. EP consult obtained, no indication for PPM    1. T2DM comp by CAD - glucoses elevated today partly due to decreased Lantus dosing last night for possible OR today. No PPM needed and pt resumed PO diet  - Lantus dose increased tonight to 16 units, will reassess tomorrow  - cont prandial Humalog    2. Hyperthyroidism - cont Methimazole 10 mg daily    3. CAD s/p CABG - cont aspirin, statin, lopressor. cont management per primary team.

## 2019-09-09 NOTE — PROGRESS NOTE ADULT - SUBJECTIVE AND OBJECTIVE BOX
f/u T2DM  INTERVAL HPI/OVERNIGHT EVENTS: no events, no longer NPO. concerned about high sugars    MEDICATIONS  (STANDING):  amLODIPine   Tablet 5 milliGRAM(s) Oral daily  ascorbic acid 500 milliGRAM(s) Oral daily  aspirin enteric coated 325 milliGRAM(s) Oral daily  atorvastatin 80 milliGRAM(s) Oral at bedtime  dextrose 50% Injectable 12.5 Gram(s) IV Push once  dextrose 50% Injectable 25 Gram(s) IV Push once  dextrose 50% Injectable 25 Gram(s) IV Push once  docusate sodium 100 milliGRAM(s) Oral three times a day  ferrous    sulfate 325 milliGRAM(s) Oral three times a day  folic acid 1 milliGRAM(s) Oral daily  furosemide    Tablet 40 milliGRAM(s) Oral daily  insulin glargine Injectable (LANTUS) 16 Unit(s) SubCutaneous at bedtime  insulin lispro (HumaLOG) corrective regimen sliding scale   SubCutaneous Before meals and at bedtime  insulin lispro Injectable (HumaLOG) 8 Unit(s) SubCutaneous three times a day before meals  methimazole 10 milliGRAM(s) Oral daily  multivitamin 1 Tablet(s) Oral daily  pantoprazole    Tablet 40 milliGRAM(s) Oral daily  polyethylene glycol 3350 17 Gram(s) Oral daily  sodium chloride 0.9% lock flush 3 milliLiter(s) IV Push every 8 hours  spironolactone 25 milliGRAM(s) Oral daily  warfarin 2 milliGRAM(s) Oral once    MEDICATIONS  (PRN):  bisacodyl Suppository 10 milliGRAM(s) Rectal daily PRN Constipation  dextrose 40% Gel 15 Gram(s) Oral once PRN Blood Glucose LESS THAN 70 milliGRAM(s)/deciLiter  oxyCODONE    5 mG/acetaminophen 325 mG 1 Tablet(s) Oral every 4 hours PRN Moderate Pain (4 - 6)  oxyCODONE    5 mG/acetaminophen 325 mG 2 Tablet(s) Oral every 4 hours PRN Severe Pain (7 - 10)  saline laxative (FLEET) Rectal Enema 1 Enema Rectal once PRN constipation      Allergies  No Known Allergies    Intolerances  OHS (Unknown)    Review of systems: denies fever/chills. denies abd pain/N/V/D (+) constipation. denies CP/palp. mild SOB. no cough.      Vital Signs Last 24 Hrs  T(C): 37.2 (09 Sep 2019 05:31), Max: 37.2 (09 Sep 2019 05:31)  T(F): 98.9 (09 Sep 2019 05:31), Max: 98.9 (09 Sep 2019 05:31)  HR: 81 (09 Sep 2019 12:50) (61 - 84)  BP: 132/66 (09 Sep 2019 12:50) (102/52 - 135/52)  BP(mean): --  RR: 16 (09 Sep 2019 12:50) (16 - 18)  SpO2: 100% (09 Sep 2019 12:15) (96% - 100%)    PHYSICAL EXAM:  Constitutional: NAD, well-groomed, well-developed  HEENT: PERRLA, EOMI  Respiratory: CTAB no w/r/r  Cardiovascular: S1 and S2, RRR, no M/G/R  Gastrointestinal: BS+, soft, nontender  Extremities: No peripheral edema  Neurological: A/O x 3, no focal deficits  Psychiatric: Normal mood, normal affect  Skin: anterior chest wall incision site - clean        LABS:                        8.1    10.39 )-----------( 205      ( 09 Sep 2019 06:03 )             25.2     09-09    137  |  100  |  37.0<H>  ----------------------------<  172<H>  4.5   |  25.0  |  0.87    Ca    8.8      09 Sep 2019 06:03  Phos  2.8     09-09  Mg     2.0     09-09    TPro  6.6  /  Alb  3.4  /  TBili  1.1  /  DBili  x   /  AST  32<H>  /  ALT  38<H>  /  AlkPhos  139<H>  09-09    CAPILLARY BLOOD GLUCOSE  POCT Blood Glucose.: 293 mg/dL (09-09-19 @ 12:12)  POCT Blood Glucose.: 207 mg/dL (09-09-19 @ 08:06)  POCT Blood Glucose.: 175 mg/dL (09-09-19 @ 04:04)  POCT Blood Glucose.: 132 mg/dL (09-08-19 @ 21:51)  POCT Blood Glucose.: 266 mg/dL (09-08-19 @ 17:15)

## 2019-09-09 NOTE — PROGRESS NOTE ADULT - PROBLEM SELECTOR PLAN 1
s/p C2L, NISSA clip, MVR (T), TV repair on 9/3  Continue Percocet PRN for pain management.  Continue ASA & Lipitor for graft patency  Tolerating Lopressor 25mg BID, as per Dr. Pacheco start Toprol XL 50mg daily tomorrow.  PPM cancelled today, EP to follow.  Encourage the use of I/S, CDBE, OOB to chair, daily PT, encourage mobilization.  D/C PW's  Discussed plan with Dr. Fuller & CTS in morning rounds s/p C2L, NISSA clip, MVR (T), TV repair on 9/3  Continue Percocet PRN for pain management.  Continue ASA for graft patency  Tolerating Lopressor 25mg BID, as per Dr. Pacheco start Toprol XL 50mg daily tomorrow.  PPM cancelled today, EP to follow.  Lasix & aldactone ordered as per Dr. Fuller  Daily weights & strict I/O's.  Encourage the use of I/S, CDBE, OOB to chair, daily PT, encourage mobilization.  D/C PW's  Discussed plan with Dr. Fuller & CTS in morning rounds s/p C2L, NISSA clip, MVR (T), TV repair on 9/3  Continue Percocet PRN for pain management.  Continue ASA for graft patency  Tolerating Lopressor 25mg BID, as per Dr. Pacheco start Toprol XL 50mg daily tomorrow.  PPM cancelled today, EP to follow.  Lasix & aldactone ordered as per Dr. Fuller  Daily weights & strict I/O's.  Encourage the use of I/S, CDBE, OOB to chair, daily PT, encourage mobilization.  D/C PW's  Continue FeSO4, folate, & Vitamin C for post-op anemia, trend H/H  Discussed plan with Dr. Fuller & CTS in morning rounds s/p C2L, NISSA clip, MVR (T), TV repair on 9/3  Continue Percocet PRN for pain management.  Continue ASA & Lipitor for graft patency  Tolerating Lopressor 25mg BID, as per Dr. Pacheco start Toprol XL 50mg daily tomorrow.  PPM cancelled today, EP to follow.  Lasix & aldactone ordered as per Dr. Fuller  Daily weights & strict I/O's.  Encourage the use of I/S, CDBE, OOB to chair, daily PT, encourage mobilization.  D/C PW's  Continue FeSO4, folate, & Vitamin C for post-op anemia, trend H/H  Discussed plan with Dr. Fuller & CTS in morning rounds

## 2019-09-09 NOTE — PROGRESS NOTE ADULT - ASSESSMENT
79 year old Welsh speaking female with hx of HTN, DM, CAD s/p multiple remote stents & recent cardiac cath w/ , severe MR s/p attempted [place]-clip c/b pericardial effusion, severe TR, persistent atrial fibrillation who is s/p C2L, bio MVR, TV repair and NISSA clip and now in AF. She initially had asymptomatic slow ventricular rates precluding BB usage. She was challenged again on Metoprolol 25mg BID yesterday and her HR have been well controlled (50-90) without any significnat bradycardia. She remains largely asymptomatic.    Plan:  - No indication for pacemaker at this time  - Monitor on telemetry  - Change Metoprolol tartrate to Metoprolol Succinate 50mg daily tomorrow (continue tartrate BID for now)  - Continue warfarin for prevention of stroke CHADS2-VASc of 7 (CHF, HTN, Agex2, DM, CAD, Female)    Discussed with Jami Rodriguez NP of CT Surgery team.    Melo Pacheco MD  Clinical Cardiac Electrophysiology

## 2019-09-10 LAB
ALBUMIN SERPL ELPH-MCNC: 3.3 G/DL — SIGNIFICANT CHANGE UP (ref 3.3–5.2)
ALP SERPL-CCNC: 135 U/L — HIGH (ref 40–120)
ALT FLD-CCNC: 32 U/L — SIGNIFICANT CHANGE UP
ANION GAP SERPL CALC-SCNC: 9 MMOL/L — SIGNIFICANT CHANGE UP (ref 5–17)
AST SERPL-CCNC: 29 U/L — SIGNIFICANT CHANGE UP
BILIRUB SERPL-MCNC: 1 MG/DL — SIGNIFICANT CHANGE UP (ref 0.4–2)
BUN SERPL-MCNC: 28 MG/DL — HIGH (ref 8–20)
CALCIUM SERPL-MCNC: 8.9 MG/DL — SIGNIFICANT CHANGE UP (ref 8.6–10.2)
CHLORIDE SERPL-SCNC: 99 MMOL/L — SIGNIFICANT CHANGE UP (ref 98–107)
CO2 SERPL-SCNC: 27 MMOL/L — SIGNIFICANT CHANGE UP (ref 22–29)
CREAT SERPL-MCNC: 0.82 MG/DL — SIGNIFICANT CHANGE UP (ref 0.5–1.3)
GLUCOSE BLDC GLUCOMTR-MCNC: 115 MG/DL — HIGH (ref 70–99)
GLUCOSE BLDC GLUCOMTR-MCNC: 162 MG/DL — HIGH (ref 70–99)
GLUCOSE BLDC GLUCOMTR-MCNC: 221 MG/DL — HIGH (ref 70–99)
GLUCOSE BLDC GLUCOMTR-MCNC: 287 MG/DL — HIGH (ref 70–99)
GLUCOSE SERPL-MCNC: 145 MG/DL — HIGH (ref 70–115)
HCT VFR BLD CALC: 26.5 % — LOW (ref 34.5–45)
HGB BLD-MCNC: 8.3 G/DL — LOW (ref 11.5–15.5)
INR BLD: 1.25 RATIO — HIGH (ref 0.88–1.16)
MAGNESIUM SERPL-MCNC: 1.8 MG/DL — SIGNIFICANT CHANGE UP (ref 1.6–2.6)
MCHC RBC-ENTMCNC: 29.7 PG — SIGNIFICANT CHANGE UP (ref 27–34)
MCHC RBC-ENTMCNC: 31.3 GM/DL — LOW (ref 32–36)
MCV RBC AUTO: 95 FL — SIGNIFICANT CHANGE UP (ref 80–100)
PHOSPHATE SERPL-MCNC: 3.1 MG/DL — SIGNIFICANT CHANGE UP (ref 2.4–4.7)
PLATELET # BLD AUTO: 225 K/UL — SIGNIFICANT CHANGE UP (ref 150–400)
POTASSIUM SERPL-MCNC: 4.2 MMOL/L — SIGNIFICANT CHANGE UP (ref 3.5–5.3)
POTASSIUM SERPL-SCNC: 4.2 MMOL/L — SIGNIFICANT CHANGE UP (ref 3.5–5.3)
PROT SERPL-MCNC: 6.3 G/DL — LOW (ref 6.6–8.7)
PROTHROM AB SERPL-ACNC: 14.5 SEC — HIGH (ref 10–12.9)
RBC # BLD: 2.79 M/UL — LOW (ref 3.8–5.2)
RBC # FLD: 14.9 % — HIGH (ref 10.3–14.5)
SODIUM SERPL-SCNC: 135 MMOL/L — SIGNIFICANT CHANGE UP (ref 135–145)
WBC # BLD: 11.23 K/UL — HIGH (ref 3.8–10.5)
WBC # FLD AUTO: 11.23 K/UL — HIGH (ref 3.8–10.5)

## 2019-09-10 PROCEDURE — 71045 X-RAY EXAM CHEST 1 VIEW: CPT | Mod: 26

## 2019-09-10 PROCEDURE — 99232 SBSQ HOSP IP/OBS MODERATE 35: CPT

## 2019-09-10 PROCEDURE — 93010 ELECTROCARDIOGRAM REPORT: CPT

## 2019-09-10 RX ORDER — MAGNESIUM OXIDE 400 MG ORAL TABLET 241.3 MG
400 TABLET ORAL
Refills: 0 | Status: DISCONTINUED | OUTPATIENT
Start: 2019-09-10 | End: 2019-09-11

## 2019-09-10 RX ORDER — INSULIN LISPRO 100/ML
10 VIAL (ML) SUBCUTANEOUS
Refills: 0 | Status: DISCONTINUED | OUTPATIENT
Start: 2019-09-10 | End: 2019-09-11

## 2019-09-10 RX ORDER — WARFARIN SODIUM 2.5 MG/1
2 TABLET ORAL ONCE
Refills: 0 | Status: COMPLETED | OUTPATIENT
Start: 2019-09-10 | End: 2019-09-10

## 2019-09-10 RX ORDER — INSULIN GLARGINE 100 [IU]/ML
24 INJECTION, SOLUTION SUBCUTANEOUS AT BEDTIME
Refills: 0 | Status: DISCONTINUED | OUTPATIENT
Start: 2019-09-10 | End: 2019-09-11

## 2019-09-10 RX ADMIN — PANTOPRAZOLE SODIUM 40 MILLIGRAM(S): 20 TABLET, DELAYED RELEASE ORAL at 11:04

## 2019-09-10 RX ADMIN — Medication 40 MILLIGRAM(S): at 06:05

## 2019-09-10 RX ADMIN — SODIUM CHLORIDE 3 MILLILITER(S): 9 INJECTION INTRAMUSCULAR; INTRAVENOUS; SUBCUTANEOUS at 22:00

## 2019-09-10 RX ADMIN — OXYCODONE AND ACETAMINOPHEN 2 TABLET(S): 5; 325 TABLET ORAL at 07:09

## 2019-09-10 RX ADMIN — Medication 8 UNIT(S): at 12:20

## 2019-09-10 RX ADMIN — Medication 500 MILLIGRAM(S): at 12:18

## 2019-09-10 RX ADMIN — MAGNESIUM OXIDE 400 MG ORAL TABLET 400 MILLIGRAM(S): 241.3 TABLET ORAL at 17:19

## 2019-09-10 RX ADMIN — ATORVASTATIN CALCIUM 80 MILLIGRAM(S): 80 TABLET, FILM COATED ORAL at 21:27

## 2019-09-10 RX ADMIN — AMLODIPINE BESYLATE 5 MILLIGRAM(S): 2.5 TABLET ORAL at 06:04

## 2019-09-10 RX ADMIN — Medication 6: at 08:37

## 2019-09-10 RX ADMIN — Medication 50 MILLIGRAM(S): at 06:05

## 2019-09-10 RX ADMIN — Medication 325 MILLIGRAM(S): at 14:06

## 2019-09-10 RX ADMIN — SODIUM CHLORIDE 3 MILLILITER(S): 9 INJECTION INTRAMUSCULAR; INTRAVENOUS; SUBCUTANEOUS at 05:54

## 2019-09-10 RX ADMIN — Medication 8 UNIT(S): at 08:37

## 2019-09-10 RX ADMIN — Medication 325 MILLIGRAM(S): at 21:27

## 2019-09-10 RX ADMIN — OXYCODONE AND ACETAMINOPHEN 2 TABLET(S): 5; 325 TABLET ORAL at 06:09

## 2019-09-10 RX ADMIN — Medication 1 TABLET(S): at 11:03

## 2019-09-10 RX ADMIN — Medication 100 MILLIGRAM(S): at 14:06

## 2019-09-10 RX ADMIN — Medication 100 MILLIGRAM(S): at 06:05

## 2019-09-10 RX ADMIN — INSULIN GLARGINE 24 UNIT(S): 100 INJECTION, SOLUTION SUBCUTANEOUS at 21:27

## 2019-09-10 RX ADMIN — WARFARIN SODIUM 2 MILLIGRAM(S): 2.5 TABLET ORAL at 21:27

## 2019-09-10 RX ADMIN — Medication 4: at 12:20

## 2019-09-10 RX ADMIN — ENOXAPARIN SODIUM 40 MILLIGRAM(S): 100 INJECTION SUBCUTANEOUS at 11:03

## 2019-09-10 RX ADMIN — MAGNESIUM OXIDE 400 MG ORAL TABLET 400 MILLIGRAM(S): 241.3 TABLET ORAL at 12:18

## 2019-09-10 RX ADMIN — POLYETHYLENE GLYCOL 3350 17 GRAM(S): 17 POWDER, FOR SOLUTION ORAL at 11:04

## 2019-09-10 RX ADMIN — SPIRONOLACTONE 25 MILLIGRAM(S): 25 TABLET, FILM COATED ORAL at 06:07

## 2019-09-10 RX ADMIN — Medication 1 MILLIGRAM(S): at 11:03

## 2019-09-10 RX ADMIN — OXYCODONE AND ACETAMINOPHEN 2 TABLET(S): 5; 325 TABLET ORAL at 19:13

## 2019-09-10 RX ADMIN — SODIUM CHLORIDE 3 MILLILITER(S): 9 INJECTION INTRAMUSCULAR; INTRAVENOUS; SUBCUTANEOUS at 14:07

## 2019-09-10 RX ADMIN — Medication 325 MILLIGRAM(S): at 11:03

## 2019-09-10 RX ADMIN — Medication 10 UNIT(S): at 17:19

## 2019-09-10 RX ADMIN — Medication 2: at 21:28

## 2019-09-10 RX ADMIN — Medication 100 MILLIGRAM(S): at 21:27

## 2019-09-10 RX ADMIN — Medication 325 MILLIGRAM(S): at 06:05

## 2019-09-10 NOTE — PROGRESS NOTE ADULT - SUBJECTIVE AND OBJECTIVE BOX
f/u diabetes  INTERVAL HPI/OVERNIGHT EVENTS: no issues    MEDICATIONS  (STANDING):  amLODIPine   Tablet 5 milliGRAM(s) Oral daily  ascorbic acid 500 milliGRAM(s) Oral daily  aspirin enteric coated 325 milliGRAM(s) Oral daily  atorvastatin 80 milliGRAM(s) Oral at bedtime  dextrose 50% Injectable 12.5 Gram(s) IV Push once  dextrose 50% Injectable 25 Gram(s) IV Push once  dextrose 50% Injectable 25 Gram(s) IV Push once  docusate sodium 100 milliGRAM(s) Oral three times a day  enoxaparin Injectable 40 milliGRAM(s) SubCutaneous daily  ferrous    sulfate 325 milliGRAM(s) Oral three times a day  folic acid 1 milliGRAM(s) Oral daily  furosemide    Tablet 40 milliGRAM(s) Oral daily  insulin glargine Injectable (LANTUS) 24 Unit(s) SubCutaneous at bedtime  insulin lispro (HumaLOG) corrective regimen sliding scale   SubCutaneous Before meals and at bedtime  insulin lispro Injectable (HumaLOG) 8 Unit(s) SubCutaneous three times a day before meals  magnesium oxide 400 milliGRAM(s) Oral three times a day with meals  methimazole 10 milliGRAM(s) Oral daily  metoprolol succinate ER 50 milliGRAM(s) Oral daily  multivitamin 1 Tablet(s) Oral daily  pantoprazole    Tablet 40 milliGRAM(s) Oral daily  polyethylene glycol 3350 17 Gram(s) Oral daily  sodium chloride 0.9% lock flush 3 milliLiter(s) IV Push every 8 hours  spironolactone 25 milliGRAM(s) Oral daily  warfarin 2 milliGRAM(s) Oral once    MEDICATIONS  (PRN):  bisacodyl Suppository 10 milliGRAM(s) Rectal daily PRN Constipation  dextrose 40% Gel 15 Gram(s) Oral once PRN Blood Glucose LESS THAN 70 milliGRAM(s)/deciLiter  oxyCODONE    5 mG/acetaminophen 325 mG 1 Tablet(s) Oral every 4 hours PRN Moderate Pain (4 - 6)  oxyCODONE    5 mG/acetaminophen 325 mG 2 Tablet(s) Oral every 4 hours PRN Severe Pain (7 - 10)  saline laxative (FLEET) Rectal Enema 1 Enema Rectal once PRN constipation      Allergies  No Known Allergies    Intolerances  OHS (Unknown)      Vital Signs Last 24 Hrs  T(C): 36.8 (10 Sep 2019 10:46), Max: 37.6 (09 Sep 2019 16:14)  T(F): 98.2 (10 Sep 2019 10:46), Max: 99.6 (09 Sep 2019 16:14)  HR: 87 (10 Sep 2019 10:46) (72 - 90)  BP: 126/52 (10 Sep 2019 10:46) (120/50 - 126/52)  BP(mean): --  RR: 18 (10 Sep 2019 10:46) (16 - 18)  SpO2: 94% (10 Sep 2019 10:46) (94% - 100%)    PHYSICAL EXAM:  Constitutional: NAD, well-groomed, well-developed  HEENT: PERRLA, EOMI  Respiratory: CTAB no w/r/r  Cardiovascular: S1 and S2, RRR, no M/G/R  Gastrointestinal: BS+, soft, nontender  Extremities: No peripheral edema  Neurological: A/O x 3, no focal deficits  Psychiatric: Normal mood, normal affect      LABS:                        8.3    11.23 )-----------( 225      ( 10 Sep 2019 05:51 )             26.5     09-10    135  |  99  |  28.0<H>  ----------------------------<  145<H>  4.2   |  27.0  |  0.82    Ca    8.9      10 Sep 2019 05:51  Phos  3.1     09-10  Mg     1.8     09-10    TPro  6.3<L>  /  Alb  3.3  /  TBili  1.0  /  DBili  x   /  AST  29  /  ALT  32  /  AlkPhos  135<H>  09-10    Triiodothyronine, Total (T3 Total): 82 ng/dL [80 - 200] (08-26-19)  T4, Serum: 8.6 ug/dL [4.5 - 12.0] (08-26-19)  Thyroid Stimulating Hormone, Serum: 0.45 uIU/mL [0.27 - 4.20] (08-26-19)  Free Thyroxine, Serum: 1.5 ng/dL [0.9 - 1.8] (08-02-19)  Free Triiodothyronine, Serum: 2.60 pg/mL [1.80 - 4.60] (08-02-19)  Free Thyroxine, Serum: 1.4 ng/dL [0.9 - 1.8] (08-01-19)  Thyroid Stimulating Hormone, Serum: 0.24 uIU/mL [0.27 - 4.20] (08-01-19)      Hemoglobin A1C, Whole Blood: 7.7 % <H> [4.0 - 5.6] (08-26-19 @ 15:17)  Hemoglobin A1C, Whole Blood: 8.1 % <H> [4.0 - 5.6] (08-01-19 @ 13:17)  Hemoglobin A1C, Whole Blood: 7.9 % <H> [4.0 - 5.6] (08-01-19 @ 06:05)    CAPILLARY BLOOD GLUCOSE  POCT Blood Glucose.: 221 mg/dL (09-10-19 @ 11:51)  POCT Blood Glucose.: 287 mg/dL (09-10-19 @ 08:02)  POCT Blood Glucose.: 164 mg/dL (09-09-19 @ 22:28)  POCT Blood Glucose.: 176 mg/dL (09-09-19 @ 17:00)

## 2019-09-10 NOTE — PROGRESS NOTE ADULT - SUBJECTIVE AND OBJECTIVE BOX
Subjective: Patient speaks local Tajik, son at bedside to translate (refuses ). Patient complains today of feeling tired and some SOB after finishing a walk. Denies CP, N/V.    VITAL SIGNS  Vital Signs Last 24 Hrs  T(C): 36.8 (09-10-19 @ 10:46), Max: 37.6 (19 @ 16:14)  T(F): 98.2 (09-10-19 @ 10:46), Max: 99.6 (19 @ 16:14)  HR: 87 (09-10-19 @ 10:46) (72 - 90)  BP: 126/52 (09-10-19 @ 10:46) (120/50 - 132/66)  RR: 18 (09-10-19 @ 10:46) (16 - 18)  SpO2: 94% (09-10-19 @ 10:46) (94% - 100%)  on 2L NC            Telemetry/Alarms:  AF 80-90  LVEF: 50%    MEDICATIONS  amLODIPine   Tablet 5 milliGRAM(s) Oral daily  ascorbic acid 500 milliGRAM(s) Oral daily  aspirin enteric coated 325 milliGRAM(s) Oral daily  atorvastatin 80 milliGRAM(s) Oral at bedtime  bisacodyl Suppository 10 milliGRAM(s) Rectal daily PRN  dextrose 40% Gel 15 Gram(s) Oral once PRN  dextrose 50% Injectable 12.5 Gram(s) IV Push once  dextrose 50% Injectable 25 Gram(s) IV Push once  dextrose 50% Injectable 25 Gram(s) IV Push once  docusate sodium 100 milliGRAM(s) Oral three times a day  enoxaparin Injectable 40 milliGRAM(s) SubCutaneous daily  ferrous    sulfate 325 milliGRAM(s) Oral three times a day  folic acid 1 milliGRAM(s) Oral daily  furosemide    Tablet 40 milliGRAM(s) Oral daily  insulin glargine Injectable (LANTUS) 16 Unit(s) SubCutaneous at bedtime  insulin lispro (HumaLOG) corrective regimen sliding scale   SubCutaneous Before meals and at bedtime  insulin lispro Injectable (HumaLOG) 8 Unit(s) SubCutaneous three times a day before meals  magnesium oxide 400 milliGRAM(s) Oral three times a day with meals  methimazole 10 milliGRAM(s) Oral daily  metoprolol succinate ER 50 milliGRAM(s) Oral daily  multivitamin 1 Tablet(s) Oral daily  oxyCODONE    5 mG/acetaminophen 325 mG 1 Tablet(s) Oral every 4 hours PRN  oxyCODONE    5 mG/acetaminophen 325 mG 2 Tablet(s) Oral every 4 hours PRN  pantoprazole    Tablet 40 milliGRAM(s) Oral daily  polyethylene glycol 3350 17 Gram(s) Oral daily  saline laxative (FLEET) Rectal Enema 1 Enema Rectal once PRN  sodium chloride 0.9% lock flush 3 milliLiter(s) IV Push every 8 hours  spironolactone 25 milliGRAM(s) Oral daily  warfarin 2 milliGRAM(s) Oral once      PHYSICAL EXAM  General: well nourished, well developed, no acute distress, Bulgarian speaking, son at bedside  Neurology: alert and oriented x 3, nonfocal, no gross deficits  Respiratory: clear to auscultation bilaterally  CV: irregular rate and rhythm, normal S1, S2  Abdomen: soft, nontender, nondistended, positive bowel sounds, last bowel movement   Extremities: warm, well perfused. +1 edema. + DP pulses  Incisions: midline sternal incision, c/d/i. sternum stable. + tenderness, no erythema or instability. LLE EVH site with mild to moderate thigh swelling and ecchymosis + tenderness       @ 07:01  -  09-10 @ 07:00  --------------------------------------------------------  IN: 1400 mL / OUT: 1150 mL / NET: 250 mL        Weights:  Daily     Daily Weight in k.8 (10 Sep 2019 06:06)  Admit Wt: Drug Dosing Weight  Height (cm): 152.4 (03 Sep 2019 07:08)  Weight (kg): 61.8 (03 Sep 2019 07:08)  BMI (kg/m2): 26.6 (03 Sep 2019 07:08)  BSA (m2): 1.59 (03 Sep 2019 07:08)    All laboratory results, radiology and medications reviewed.    LABS  09-10    135  |  99  |  28.0<H>  ----------------------------<  145<H>  4.2   |  27.0  |  0.82    Ca    8.9      10 Sep 2019 05:51  Phos  3.1     09-10  Mg     1.8     09-10    TPro  6.3<L>  /  Alb  3.3  /  TBili  1.0  /  DBili  x   /  AST  29  /  ALT  32  /  AlkPhos  135<H>  09-10                                 8.3    11.23 )-----------( 225      ( 10 Sep 2019 05:51 )             26.5          PT/INR - ( 10 Sep 2019 05:51 )   PT: 14.5 sec;   INR: 1.25 ratio         PTT - ( 09 Sep 2019 06:03 )  PTT:25.8 sec  Bilirubin Total, Serum: 1.0 mg/dL (09-10 @ 05:51)    CAPILLARY BLOOD GLUCOSE      POCT Blood Glucose.: 221 mg/dL (10 Sep 2019 11:51)  POCT Blood Glucose.: 287 mg/dL (10 Sep 2019 08:02)  POCT Blood Glucose.: 164 mg/dL (09 Sep 2019 22:28)  POCT Blood Glucose.: 176 mg/dL (09 Sep 2019 17:00)           Today's CXR:   < from: Xray Chest 1 View- PORTABLE-Routine (09.10.19 @ 05:23) >    IMPRESSION:     Congestive heart failure    Similar to prior study    < end of copied text >    Today's EKG:   < from: 12 Lead ECG (19 @ 10:34) >    Diagnosis Line Atrial fibrillation  ST & T wave abnormality, consider lateralischemia    < end of copied text >    PAST MEDICAL & SURGICAL HISTORY:  CHF exacerbation  Atrial fibrillation  Diabetes  Hypertension  Stented coronary artery  Coronary stent restenosis, sequela  Afib  Hypertension  History of ankle surgery: right orif  Post PTCA: 8 stents  H/O hernia repair: &#x27;s  Cataract  History of appendectomy

## 2019-09-10 NOTE — PROGRESS NOTE ADULT - ASSESSMENT
79 female with PMH of AF, severe MR, TR, HTN, HLD, chronic diastolic heart failure, DM, CAD s/p PCI, prior acute pericardial effusion d/t bleeding from mitral clip attempt which was aborted in May 2019. Patient presents again with continued complaints of SOB/CP for cath. Cath revealed new ostial Cx lesion which will require a surgical evaluation. URIEL: moderate MR.     Procedure 9/3 C2L, MVR, TV repair, atrial clip. Intraop coagulopathy and acute blood loss anemia requiring blood and product transfusions    Postoperative course notable for junctional rhythm on 9/4 which converted to AF on 9/6 with bradycardia in the 30s and long pauses. EP consult obtained and low dose BB trialed. 9/7 HR overnight dropped to 30s intermittently, BB stopped. EP requesting repeat BB challenge.9/8 escalated BB dose > tolerated well.

## 2019-09-10 NOTE — PROGRESS NOTE ADULT - PROBLEM SELECTOR PLAN 1
s/p C2L, NISSA clip, MVR (T), TV repair on 9/3  Continue Percocet PRN for pain management.  Continue ASA & Lipitor for graft patency  Tolerating Lopressor 25mg BID, as per Dr. Pacheco started Toprol XL 50mg daily  Lasix & aldactone ordered as per Dr. Fuller  Daily weights & strict I/O's.  Encourage the use of I/S, CDBE, OOB to chair, daily PT, encourage mobilization.  Continue FeSO4, folate, & Vitamin C for post-op anemia, trend H/H  Family not prepared to take patient home today, in addition she was still on oxygen overnight  Wean off oxygen today and plan for discharge home tomorrow.  Will discuss with Dr Fuller

## 2019-09-10 NOTE — PROGRESS NOTE ADULT - PROBLEM SELECTOR PLAN 4
Remains in afib, rate improved  Tolerating Lopressor, started Toprol XL 50mg daily as per EP.  Coumadin dosed daily, low dose

## 2019-09-10 NOTE — PROGRESS NOTE ADULT - PROBLEM SELECTOR PROBLEM 1
Bradycardia
Coronary artery disease involving native coronary artery of native heart without angina pectoris
Diabetes
Diabetes

## 2019-09-10 NOTE — PROGRESS NOTE ADULT - ASSESSMENT
79 female with PMH of AF, severe MR, TR, HTN, HLD, chronic diastolic heart failure, DM, CAD s/p PCI, prior acute pericardial effusion d/t bleeding from mitral clip attempt which was aborted in May 2019. Patient presents again with continued complaints of SOB/CP for cath. Cath revealed new ostial Cx lesion which will require a surgical evaluation. URIEL: moderate MR.   Procedure 9/3 C2L, MVR, TV repair, atrial clip  Postoperative course notable for junctional rhythm on 9/4 which converted to AF on 9/6 with bradycardia in the 30s and long pauses. EP consult obtained, no indication for PPM    1. T2DM comp by CAD - glucoses still elevated  - Lantus dose increased tonight to 24 units  - increase prandial Humalog 10 units  - insulin teach prior to discharge    2. Hyperthyroidism - cont Methimazole 10 mg daily, repeat TFTs outpt    3. CAD s/p CABG - cont aspirin, statin, lopressor. cont management per primary team.

## 2019-09-10 NOTE — PROGRESS NOTE ADULT - PROBLEM SELECTOR PLAN 2
Rate controlled   monitor for bradycardia and pauses on metoprolol  Continue coumadin  no lovenox
BB, core measures including statin, aspirin
now s/p MVR  continue daily INR with coumadin dosing.  1mg today per Dr. Fuller.
s/p MVR (T) on 9/3  Continue plan as above
s/p MVR (T) on 9/3  Continue plan as above
s/p MVR (T) on 9/3  Continue plan as above  INRs set up as home draw on discharge
s/p open heart surgery including MVR (T) and TV repair  continue daily INR with coumadin dosing.  1mg today per Dr. Fuller.
will BB once off pressors, core measures including statin, aspirin

## 2019-09-10 NOTE — PROGRESS NOTE ADULT - PROBLEM SELECTOR PROBLEM 2
Afib
Non-rheumatic mitral regurgitation
Coronary artery disease involving native coronary artery of native heart without angina pectoris
Coronary artery disease involving native coronary artery of native heart without angina pectoris
MR (mitral regurgitation)
Non-rheumatic mitral regurgitation

## 2019-09-10 NOTE — PROGRESS NOTE ADULT - PROVIDER SPECIALTY LIST ADULT
CT Surgery
Cardiology
Cardiology
Electrophysiology
Endocrinology
Electrophysiology
Endocrinology
CT Surgery
Cardiology

## 2019-09-11 ENCOUNTER — TRANSCRIPTION ENCOUNTER (OUTPATIENT)
Age: 79
End: 2019-09-11

## 2019-09-11 VITALS
SYSTOLIC BLOOD PRESSURE: 102 MMHG | TEMPERATURE: 99 F | DIASTOLIC BLOOD PRESSURE: 54 MMHG | HEART RATE: 87 BPM | RESPIRATION RATE: 18 BRPM | OXYGEN SATURATION: 93 %

## 2019-09-11 PROBLEM — I50.9 HEART FAILURE, UNSPECIFIED: Chronic | Status: ACTIVE | Noted: 2019-08-26

## 2019-09-11 LAB
ANION GAP SERPL CALC-SCNC: 15 MMOL/L — SIGNIFICANT CHANGE UP (ref 5–17)
BUN SERPL-MCNC: 25 MG/DL — HIGH (ref 8–20)
CALCIUM SERPL-MCNC: 9.2 MG/DL — SIGNIFICANT CHANGE UP (ref 8.6–10.2)
CHLORIDE SERPL-SCNC: 94 MMOL/L — LOW (ref 98–107)
CO2 SERPL-SCNC: 25 MMOL/L — SIGNIFICANT CHANGE UP (ref 22–29)
CREAT SERPL-MCNC: 0.77 MG/DL — SIGNIFICANT CHANGE UP (ref 0.5–1.3)
GLUCOSE BLDC GLUCOMTR-MCNC: 215 MG/DL — HIGH (ref 70–99)
GLUCOSE BLDC GLUCOMTR-MCNC: 301 MG/DL — HIGH (ref 70–99)
GLUCOSE SERPL-MCNC: 199 MG/DL — HIGH (ref 70–115)
HCT VFR BLD CALC: 29.1 % — LOW (ref 34.5–45)
HGB BLD-MCNC: 9.2 G/DL — LOW (ref 11.5–15.5)
INR BLD: 1.64 RATIO — HIGH (ref 0.88–1.16)
MAGNESIUM SERPL-MCNC: 1.8 MG/DL — SIGNIFICANT CHANGE UP (ref 1.6–2.6)
MCHC RBC-ENTMCNC: 29.9 PG — SIGNIFICANT CHANGE UP (ref 27–34)
MCHC RBC-ENTMCNC: 31.6 GM/DL — LOW (ref 32–36)
MCV RBC AUTO: 94.5 FL — SIGNIFICANT CHANGE UP (ref 80–100)
PLATELET # BLD AUTO: 282 K/UL — SIGNIFICANT CHANGE UP (ref 150–400)
POTASSIUM SERPL-MCNC: 4.6 MMOL/L — SIGNIFICANT CHANGE UP (ref 3.5–5.3)
POTASSIUM SERPL-SCNC: 4.6 MMOL/L — SIGNIFICANT CHANGE UP (ref 3.5–5.3)
PROTHROM AB SERPL-ACNC: 19.2 SEC — HIGH (ref 10–12.9)
RBC # BLD: 3.08 M/UL — LOW (ref 3.8–5.2)
RBC # FLD: 15.3 % — HIGH (ref 10.3–14.5)
SODIUM SERPL-SCNC: 134 MMOL/L — LOW (ref 135–145)
WBC # BLD: 13.74 K/UL — HIGH (ref 3.8–10.5)
WBC # FLD AUTO: 13.74 K/UL — HIGH (ref 3.8–10.5)

## 2019-09-11 PROCEDURE — 94760 N-INVAS EAR/PLS OXIMETRY 1: CPT

## 2019-09-11 PROCEDURE — P9016: CPT

## 2019-09-11 PROCEDURE — 84132 ASSAY OF SERUM POTASSIUM: CPT

## 2019-09-11 PROCEDURE — P9017: CPT

## 2019-09-11 PROCEDURE — 31720 CLEARANCE OF AIRWAYS: CPT

## 2019-09-11 PROCEDURE — 88305 TISSUE EXAM BY PATHOLOGIST: CPT

## 2019-09-11 PROCEDURE — 97163 PT EVAL HIGH COMPLEX 45 MIN: CPT

## 2019-09-11 PROCEDURE — 82803 BLOOD GASES ANY COMBINATION: CPT

## 2019-09-11 PROCEDURE — 86901 BLOOD TYPING SEROLOGIC RH(D): CPT

## 2019-09-11 PROCEDURE — 71045 X-RAY EXAM CHEST 1 VIEW: CPT | Mod: 26

## 2019-09-11 PROCEDURE — 85610 PROTHROMBIN TIME: CPT

## 2019-09-11 PROCEDURE — 84295 ASSAY OF SERUM SODIUM: CPT

## 2019-09-11 PROCEDURE — 82330 ASSAY OF CALCIUM: CPT

## 2019-09-11 PROCEDURE — 82947 ASSAY GLUCOSE BLOOD QUANT: CPT

## 2019-09-11 PROCEDURE — C1889: CPT

## 2019-09-11 PROCEDURE — 82435 ASSAY OF BLOOD CHLORIDE: CPT

## 2019-09-11 PROCEDURE — 82962 GLUCOSE BLOOD TEST: CPT

## 2019-09-11 PROCEDURE — 93312 ECHO TRANSESOPHAGEAL: CPT

## 2019-09-11 PROCEDURE — 97116 GAIT TRAINING THERAPY: CPT

## 2019-09-11 PROCEDURE — 86923 COMPATIBILITY TEST ELECTRIC: CPT

## 2019-09-11 PROCEDURE — 82550 ASSAY OF CK (CPK): CPT

## 2019-09-11 PROCEDURE — 94002 VENT MGMT INPAT INIT DAY: CPT

## 2019-09-11 PROCEDURE — 97110 THERAPEUTIC EXERCISES: CPT

## 2019-09-11 PROCEDURE — 83735 ASSAY OF MAGNESIUM: CPT

## 2019-09-11 PROCEDURE — 84100 ASSAY OF PHOSPHORUS: CPT

## 2019-09-11 PROCEDURE — P9059: CPT

## 2019-09-11 PROCEDURE — 84484 ASSAY OF TROPONIN QUANT: CPT

## 2019-09-11 PROCEDURE — 94003 VENT MGMT INPAT SUBQ DAY: CPT

## 2019-09-11 PROCEDURE — 97530 THERAPEUTIC ACTIVITIES: CPT

## 2019-09-11 PROCEDURE — 85730 THROMBOPLASTIN TIME PARTIAL: CPT

## 2019-09-11 PROCEDURE — 85014 HEMATOCRIT: CPT

## 2019-09-11 PROCEDURE — 36415 COLL VENOUS BLD VENIPUNCTURE: CPT

## 2019-09-11 PROCEDURE — 80076 HEPATIC FUNCTION PANEL: CPT

## 2019-09-11 PROCEDURE — 82553 CREATINE MB FRACTION: CPT

## 2019-09-11 PROCEDURE — 93005 ELECTROCARDIOGRAM TRACING: CPT

## 2019-09-11 PROCEDURE — 36430 TRANSFUSION BLD/BLD COMPNT: CPT

## 2019-09-11 PROCEDURE — 86900 BLOOD TYPING SEROLOGIC ABO: CPT

## 2019-09-11 PROCEDURE — P9037: CPT

## 2019-09-11 PROCEDURE — 83605 ASSAY OF LACTIC ACID: CPT

## 2019-09-11 PROCEDURE — 85027 COMPLETE CBC AUTOMATED: CPT

## 2019-09-11 PROCEDURE — 71045 X-RAY EXAM CHEST 1 VIEW: CPT

## 2019-09-11 PROCEDURE — 80053 COMPREHEN METABOLIC PANEL: CPT

## 2019-09-11 PROCEDURE — 86850 RBC ANTIBODY SCREEN: CPT

## 2019-09-11 PROCEDURE — 80048 BASIC METABOLIC PNL TOTAL CA: CPT

## 2019-09-11 RX ORDER — METHIMAZOLE 10 MG/1
1 TABLET ORAL
Qty: 30 | Refills: 1
Start: 2019-09-11 | End: 2019-11-09

## 2019-09-11 RX ORDER — AMLODIPINE BESYLATE 2.5 MG/1
1 TABLET ORAL
Qty: 30 | Refills: 1
Start: 2019-09-11

## 2019-09-11 RX ORDER — INSULIN GLARGINE 100 [IU]/ML
30 INJECTION, SOLUTION SUBCUTANEOUS AT BEDTIME
Refills: 0 | Status: DISCONTINUED | OUTPATIENT
Start: 2019-09-11 | End: 2019-09-11

## 2019-09-11 RX ORDER — ENOXAPARIN SODIUM 100 MG/ML
30 INJECTION SUBCUTANEOUS
Qty: 100 | Refills: 4
Start: 2019-09-11

## 2019-09-11 RX ORDER — FOLIC ACID 0.8 MG
1 TABLET ORAL
Qty: 30 | Refills: 0
Start: 2019-09-11

## 2019-09-11 RX ORDER — INSULIN LISPRO 100/ML
10 VIAL (ML) SUBCUTANEOUS
Qty: 100 | Refills: 2
Start: 2019-09-11

## 2019-09-11 RX ORDER — MAGNESIUM OXIDE 400 MG ORAL TABLET 241.3 MG
1 TABLET ORAL
Qty: 60 | Refills: 1
Start: 2019-09-11

## 2019-09-11 RX ORDER — ALBUTEROL 90 UG/1
2 AEROSOL, METERED ORAL
Qty: 0 | Refills: 0 | DISCHARGE

## 2019-09-11 RX ORDER — WARFARIN SODIUM 2.5 MG/1
1 TABLET ORAL
Qty: 30 | Refills: 1
Start: 2019-09-11

## 2019-09-11 RX ORDER — ATORVASTATIN CALCIUM 80 MG/1
1 TABLET, FILM COATED ORAL
Qty: 30 | Refills: 1
Start: 2019-09-11 | End: 2019-11-09

## 2019-09-11 RX ORDER — MAGNESIUM SULFATE 500 MG/ML
2 VIAL (ML) INJECTION ONCE
Refills: 0 | Status: COMPLETED | OUTPATIENT
Start: 2019-09-11 | End: 2019-09-11

## 2019-09-11 RX ORDER — ASPIRIN/CALCIUM CARB/MAGNESIUM 324 MG
1 TABLET ORAL
Qty: 30 | Refills: 0
Start: 2019-09-11

## 2019-09-11 RX ORDER — METOPROLOL TARTRATE 50 MG
1 TABLET ORAL
Qty: 30 | Refills: 1
Start: 2019-09-11 | End: 2019-11-09

## 2019-09-11 RX ORDER — FERROUS SULFATE 325(65) MG
1 TABLET ORAL
Qty: 0 | Refills: 0 | DISCHARGE

## 2019-09-11 RX ORDER — FUROSEMIDE 40 MG
1 TABLET ORAL
Qty: 30 | Refills: 0
Start: 2019-09-11

## 2019-09-11 RX ORDER — DOCUSATE SODIUM 100 MG
1 CAPSULE ORAL
Qty: 0 | Refills: 0 | DISCHARGE
Start: 2019-09-11

## 2019-09-11 RX ORDER — ASPIRIN/CALCIUM CARB/MAGNESIUM 324 MG
1 TABLET ORAL
Qty: 0 | Refills: 0 | DISCHARGE
Start: 2019-09-11

## 2019-09-11 RX ORDER — WARFARIN SODIUM 2.5 MG/1
2 TABLET ORAL ONCE
Refills: 0 | Status: DISCONTINUED | OUTPATIENT
Start: 2019-09-11 | End: 2019-09-11

## 2019-09-11 RX ORDER — ASCORBIC ACID 60 MG
1 TABLET,CHEWABLE ORAL
Qty: 0 | Refills: 0 | DISCHARGE
Start: 2019-09-11

## 2019-09-11 RX ORDER — METFORMIN HYDROCHLORIDE 850 MG/1
0 TABLET ORAL
Qty: 0 | Refills: 0 | DISCHARGE

## 2019-09-11 RX ORDER — POTASSIUM CHLORIDE 20 MEQ
1 PACKET (EA) ORAL
Qty: 30 | Refills: 0
Start: 2019-09-11

## 2019-09-11 RX ADMIN — OXYCODONE AND ACETAMINOPHEN 1 TABLET(S): 5; 325 TABLET ORAL at 15:18

## 2019-09-11 RX ADMIN — Medication 10 UNIT(S): at 12:54

## 2019-09-11 RX ADMIN — Medication 325 MILLIGRAM(S): at 05:47

## 2019-09-11 RX ADMIN — Medication 500 MILLIGRAM(S): at 12:55

## 2019-09-11 RX ADMIN — Medication 50 GRAM(S): at 09:06

## 2019-09-11 RX ADMIN — Medication 1 MILLIGRAM(S): at 12:56

## 2019-09-11 RX ADMIN — Medication 10 UNIT(S): at 08:33

## 2019-09-11 RX ADMIN — Medication 4: at 12:55

## 2019-09-11 RX ADMIN — SODIUM CHLORIDE 3 MILLILITER(S): 9 INJECTION INTRAMUSCULAR; INTRAVENOUS; SUBCUTANEOUS at 05:38

## 2019-09-11 RX ADMIN — Medication 325 MILLIGRAM(S): at 13:30

## 2019-09-11 RX ADMIN — Medication 1 TABLET(S): at 12:56

## 2019-09-11 RX ADMIN — Medication 40 MILLIGRAM(S): at 05:47

## 2019-09-11 RX ADMIN — Medication 100 MILLIGRAM(S): at 05:47

## 2019-09-11 RX ADMIN — MAGNESIUM OXIDE 400 MG ORAL TABLET 400 MILLIGRAM(S): 241.3 TABLET ORAL at 12:57

## 2019-09-11 RX ADMIN — Medication 50 MILLIGRAM(S): at 05:47

## 2019-09-11 RX ADMIN — AMLODIPINE BESYLATE 5 MILLIGRAM(S): 2.5 TABLET ORAL at 05:47

## 2019-09-11 RX ADMIN — OXYCODONE AND ACETAMINOPHEN 1 TABLET(S): 5; 325 TABLET ORAL at 09:34

## 2019-09-11 RX ADMIN — OXYCODONE AND ACETAMINOPHEN 1 TABLET(S): 5; 325 TABLET ORAL at 09:06

## 2019-09-11 RX ADMIN — MAGNESIUM OXIDE 400 MG ORAL TABLET 400 MILLIGRAM(S): 241.3 TABLET ORAL at 08:46

## 2019-09-11 RX ADMIN — Medication 8: at 08:34

## 2019-09-11 RX ADMIN — PANTOPRAZOLE SODIUM 40 MILLIGRAM(S): 20 TABLET, DELAYED RELEASE ORAL at 12:57

## 2019-09-11 RX ADMIN — SPIRONOLACTONE 25 MILLIGRAM(S): 25 TABLET, FILM COATED ORAL at 05:47

## 2019-09-11 NOTE — DISCHARGE NOTE PROVIDER - NSDCCAREPROVSEEN_GEN_ALL_CORE_FT
Fer Walker  Patient assessment, examination, discharge planning, coordination of care, patient and family education and counseling took me 45 minutes to complete.   Pt discharged home

## 2019-09-11 NOTE — DISCHARGE NOTE PROVIDER - NSDCACTIVITY_GEN_ALL_CORE
Showering allowed/Walking - Outdoors allowed/Do not drive or operate machinery/Stairs allowed/Do not make important decisions/Walking - Indoors allowed/No heavy lifting/straining

## 2019-09-11 NOTE — DISCHARGE NOTE PROVIDER - NSDCHHATTENDCERT_GEN_ALL_CORE
----- Message from Jamil Mendez MD sent at 6/26/2017 10:45 PM CDT -----  Labs show mild anemia. Order ferritin TIBC serum iron B12 folic acid. Rest is fine.     My signature below certifies that the above stated patient is homebound and upon completion of the Face-To-Face encounter, has the need for intermittent skilled nursing, physical therapy and/or speech or occupational therapy services in their home for their current diagnosis as outlined in their initial plan of care. These services will continue to be monitored by myself or another physician.

## 2019-09-11 NOTE — DISCHARGE NOTE PROVIDER - NSDCFUADDINST_GEN_ALL_CORE_FT
A registered dietician can help you create a personalized plan for healthy eating. Make your calories count by choosin. Healthy carbohydrates such as fruits, vegetables, whole grains, legumes (beans, peas and lentils) and low-fat dairy products.  2. Fiber-rich foods such as vegetables, fruits, nuts, legumes, whole-wheat flour and wheat bran.  3. Heart-healthy fish at least twice a week such as cod, tuna and halibut, which are low-fat options as well as salmon, mackerel, tuna, sardines and bluefish, which are rich in omega-3 fatty acids. Avoid fish with high levels of mercury.  4. "Good" fats such as avocados, almonds, pecans, walnuts, olives and canola, olive and peanut oils.   No Ointments creams lotions to wounds

## 2019-09-11 NOTE — DISCHARGE NOTE PROVIDER - CARE PROVIDER_API CALL
Colton Fuller)  Surgery; Thoracic and Cardiac Surgery  [address] [place] [address]  Phone: [phone]  Fax: [phone]  Follow Up Time:

## 2019-09-11 NOTE — DISCHARGE NOTE PROVIDER - HOSPITAL COURSE
Subjective: PT in bed NAD.  Son at bedside.  PT states she feels good and wants to go home     VITAL SIGNS    T(C): 37.2 (19 @ 11:00), Max: 37.3 (19 @ 05:40)    HR: 82 (19 @ 06:49) (74 - 95)    BP: 122/66 (19 @ 11:00) (100/58 - 126/52)    RR: 18 (19 @ 11:00) (16 - 18)    SpO2: 99% (19 @ 11:00) (94% - 100%) RA            Daily       Daily Weight in k (11 Sep 2019 05:08)    Admit Wt: Drug Dosing Weight    Height (cm): 152.4 (03 Sep 2019 07:08)    Weight (kg): 61.8 (03 Sep 2019 07:08)    Telemetry:   SR     LVEF:  50%    MEDICATIONS    amLODIPine   Tablet 5 milliGRAM(s) Oral daily    ascorbic acid 500 milliGRAM(s) Oral daily    aspirin enteric coated 325 milliGRAM(s) Oral daily    atorvastatin 80 milliGRAM(s) Oral at bedtime    bisacodyl Suppository 10 milliGRAM(s) Rectal daily PRN    dextrose 40% Gel 15 Gram(s) Oral once PRN    dextrose 50% Injectable 12.5 Gram(s) IV Push once    dextrose 50% Injectable 25 Gram(s) IV Push once    dextrose 50% Injectable 25 Gram(s) IV Push once    docusate sodium 100 milliGRAM(s) Oral three times a day    enoxaparin Injectable 40 milliGRAM(s) SubCutaneous daily    ferrous    sulfate 325 milliGRAM(s) Oral three times a day    folic acid 1 milliGRAM(s) Oral daily    furosemide    Tablet 40 milliGRAM(s) Oral daily    insulin glargine Injectable (LANTUS) 30 Unit(s) SubCutaneous at bedtime    insulin lispro (HumaLOG) corrective regimen sliding scale   SubCutaneous Before meals and at bedtime    insulin lispro Injectable (HumaLOG) 10 Unit(s) SubCutaneous three times a day before meals    magnesium oxide 400 milliGRAM(s) Oral three times a day with meals    methimazole 10 milliGRAM(s) Oral daily    metoprolol succinate ER 50 milliGRAM(s) Oral daily    multivitamin 1 Tablet(s) Oral daily    oxyCODONE    5 mG/acetaminophen 325 mG 1 Tablet(s) Oral every 4 hours PRN    oxyCODONE    5 mG/acetaminophen 325 mG 2 Tablet(s) Oral every 4 hours PRN    pantoprazole    Tablet 40 milliGRAM(s) Oral daily    polyethylene glycol 3350 17 Gram(s) Oral daily    saline laxative (FLEET) Rectal Enema 1 Enema Rectal once PRN    sodium chloride 0.9% lock flush 3 milliLiter(s) IV Push every 8 hours    spironolactone 25 milliGRAM(s) Oral daily    warfarin 2 milliGRAM(s) Oral once    MEDICATIONS  (PRN):    bisacodyl Suppository 10 milliGRAM(s) Rectal daily PRN Constipation    dextrose 40% Gel 15 Gram(s) Oral once PRN Blood Glucose LESS THAN 70 milliGRAM(s)/deciLiter    oxyCODONE    5 mG/acetaminophen 325 mG 1 Tablet(s) Oral every 4 hours PRN Moderate Pain (4 - 6)    oxyCODONE    5 mG/acetaminophen 325 mG 2 Tablet(s) Oral every 4 hours PRN Severe Pain (7 - 10)    saline laxative (FLEET) Rectal Enema 1 Enema Rectal once PRN constipation    PHYSICAL EXAM    General: Alert Awake NAD     Respiratory:  decreased at bases     CV: RRR S1 S2     Abdomen: soft NT ND + BS     Extremities: trace edema b/l LE     Incisions: MSI C/D/I  stable sternum     I&O's Detail    10 Sep 2019 07:  -  11 Sep 2019 07:00    --------------------------------------------------------    IN:      Oral Fluid: 940 mL    Total IN: 940 mL    OUT:      Voided: 1960 mL    Total OUT: 1960 mL    Total NET: -1020 mL        11 Sep 2019 07:  -  11 Sep 2019 12:22    --------------------------------------------------------    IN:    Total IN: 0 mL    OUT:      Voided: 450 mL    Total OUT: 450 mL    Total NET: -450 mL    79 female with PMH of AF, severe MR, TR, HTN, HLD, chronic diastolic heart failure, DM, CAD s/p PCI, prior acute pericardial effusion d/t bleeding from mitral clip attempt which was aborted in May 2019. Patient presents again with continued complaints of SOB/CP for cath. Cath revealed new ostial Cx lesion which will require a surgical evaluation. URIEL: moderate MR. Procedure 9/3 C2L, MVR, TV repair, atrial clip. Intraop coagulopathy and acute blood loss anemia requiring blood and product transfusions    Postoperative course notable for junctional rhythm on  which converted to AF on  with bradycardia in the 30s and long pauses. EP consult obtained and low dose BB trialed.  HR overnight dropped to 30s intermittently, BB stopped. EP requesting repeat BB challenge. escalated BB dose > tolerated well.     ·  Problem: Coronary artery disease involving native coronary artery of native heart without angina pectoris.  Plan: s/p C2L, NISSA clip, MVR (T), TV repair on 9/3    Continue Percocet PRN for pain management.    Continue ASA & Lipitor for graft patency    Tolerating Lopressor 25mg BID, as per Dr. Pacheco started Toprol XL 50mg daily    Lasix & aldactone ordered as per Dr. Fuller    Daily weights & strict I/O's.    Encourage the use of I/S, CDBE, OOB to chair, daily PT, encourage mobilization.    Continue FeSO4, folate, & Vitamin C for post-op anemia, trend H/H    Family not prepared to take patient home today, in addition she was still on oxygen overnight    Wean off oxygen today and plan for discharge home today    Will discuss with Dr Fuller.

## 2019-09-11 NOTE — DISCHARGE NOTE PROVIDER - NSDCCPTREATMENT_GEN_ALL_CORE_FT
PRINCIPAL PROCEDURE  Procedure: CABG, with endoscopic vein procurement, mitral valve replacement, and tricuspid valve repair  Findings and Treatment:       SECONDARY PROCEDURE  Procedure: MVR (mitral valve replacement)  Findings and Treatment:

## 2019-09-11 NOTE — DISCHARGE NOTE PROVIDER - NSDCCPCAREPLAN_GEN_ALL_CORE_FT
PRINCIPAL DISCHARGE DIAGNOSIS  Diagnosis: Non-rheumatic mitral regurgitation  Assessment and Plan of Treatment: Non-rheumatic mitral regurgitation      SECONDARY DISCHARGE DIAGNOSES  Diagnosis: CAD (coronary artery disease)  Assessment and Plan of Treatment: s/p CABG

## 2019-09-11 NOTE — DISCHARGE NOTE PROVIDER - NSDCFUADDAPPT_GEN_ALL_CORE_FT
KORY DIXON, NURSE PRACTITIONER (CARE NAVIGATOR)  [phone]  SHE WILL CALL AND FOLLOW UP WITH YOU ON DISCHARGE  Follow up appointment with Dr Fuller on 9/25 at 1pm  Cardiac surgery office second floor at [facility]   Please follow up with your Cardiologist and Primary care Doctor 2 weeks from discharge

## 2019-09-13 ENCOUNTER — APPOINTMENT (OUTPATIENT)
Dept: CARDIOTHORACIC SURGERY | Facility: CLINIC | Age: 79
End: 2019-09-13
Payer: MEDICAID

## 2019-09-13 ENCOUNTER — NON-APPOINTMENT (OUTPATIENT)
Age: 79
End: 2019-09-13

## 2019-09-13 ENCOUNTER — APPOINTMENT (OUTPATIENT)
Dept: CARE COORDINATION | Facility: HOME HEALTH | Age: 79
End: 2019-09-13
Payer: MEDICARE

## 2019-09-13 ENCOUNTER — APPOINTMENT (OUTPATIENT)
Dept: CARE COORDINATION | Facility: HOME HEALTH | Age: 79
End: 2019-09-13

## 2019-09-13 VITALS
TEMPERATURE: 98.2 F | RESPIRATION RATE: 16 BRPM | WEIGHT: 147 LBS | BODY MASS INDEX: 28.71 KG/M2 | HEART RATE: 116 BPM | SYSTOLIC BLOOD PRESSURE: 140 MMHG | DIASTOLIC BLOOD PRESSURE: 80 MMHG | OXYGEN SATURATION: 94 %

## 2019-09-13 VITALS
WEIGHT: 147 LBS | RESPIRATION RATE: 15 BRPM | HEIGHT: 60 IN | HEART RATE: 108 BPM | DIASTOLIC BLOOD PRESSURE: 78 MMHG | SYSTOLIC BLOOD PRESSURE: 128 MMHG | OXYGEN SATURATION: 96 % | BODY MASS INDEX: 28.86 KG/M2

## 2019-09-13 PROCEDURE — 99024 POSTOP FOLLOW-UP VISIT: CPT

## 2019-09-13 RX ORDER — DIGOXIN 125 UG/1
125 TABLET ORAL
Refills: 0 | Status: DISCONTINUED | COMMUNITY
End: 2019-09-13

## 2019-09-13 RX ORDER — LOSARTAN POTASSIUM 100 MG/1
100 TABLET, FILM COATED ORAL
Refills: 0 | Status: DISCONTINUED | COMMUNITY
End: 2019-09-13

## 2019-09-13 RX ORDER — AMLODIPINE BESYLATE 5 MG/1
5 TABLET ORAL
Refills: 0 | Status: ACTIVE | COMMUNITY

## 2019-09-13 RX ORDER — FOLIC ACID 1 MG/1
1 TABLET ORAL
Refills: 0 | Status: ACTIVE | COMMUNITY

## 2019-09-13 RX ORDER — INSULIN LISPRO 100 [IU]/ML
100 INJECTION, SOLUTION INTRAVENOUS; SUBCUTANEOUS
Refills: 0 | Status: ACTIVE | COMMUNITY

## 2019-09-13 RX ORDER — METFORMIN ER 500 MG 500 MG/1
500 TABLET ORAL
Refills: 0 | Status: DISCONTINUED | COMMUNITY
End: 2019-09-13

## 2019-09-13 RX ORDER — FUROSEMIDE 40 MG/1
40 TABLET ORAL
Refills: 0 | Status: ACTIVE | COMMUNITY

## 2019-09-13 RX ORDER — ISOSORBIDE MONONITRATE 60 MG/1
60 TABLET, EXTENDED RELEASE ORAL
Refills: 0 | Status: DISCONTINUED | COMMUNITY
End: 2019-09-13

## 2019-09-13 RX ORDER — CALCIUM CARBONATE/VITAMIN D3 500-10/5ML
400 LIQUID (ML) ORAL
Refills: 0 | Status: ACTIVE | COMMUNITY

## 2019-09-13 RX ORDER — WARFARIN SODIUM 2 MG/1
2 TABLET ORAL
Refills: 0 | Status: ACTIVE | COMMUNITY

## 2019-09-13 RX ORDER — METHIMAZOLE 5 MG/1
5 TABLET ORAL
Refills: 0 | Status: ACTIVE | COMMUNITY

## 2019-09-13 RX ORDER — APIXABAN 5 MG/1
5 TABLET, FILM COATED ORAL
Refills: 0 | Status: DISCONTINUED | COMMUNITY
End: 2019-09-13

## 2019-09-13 RX ORDER — ASPIRIN 325 MG/1
325 TABLET, FILM COATED ORAL
Refills: 0 | Status: ACTIVE | COMMUNITY

## 2019-09-13 RX ORDER — MULTIVIT-MIN/FOLIC/VIT K/LYCOP 400-300MCG
500 TABLET ORAL
Refills: 0 | Status: ACTIVE | COMMUNITY

## 2019-09-13 RX ORDER — DOCUSATE SODIUM 100 MG/1
100 CAPSULE ORAL
Refills: 0 | Status: ACTIVE | COMMUNITY

## 2019-09-13 RX ORDER — NITROGLYCERIN 0.6 MG/1
TABLET SUBLINGUAL
Refills: 0 | Status: DISCONTINUED | COMMUNITY
End: 2019-09-13

## 2019-09-13 RX ORDER — POTASSIUM CHLORIDE 1500 MG/1
20 TABLET, FILM COATED, EXTENDED RELEASE ORAL
Refills: 0 | Status: ACTIVE | COMMUNITY

## 2019-09-13 RX ORDER — SENNOSIDES 8.6 MG TABLETS 8.6 MG/1
TABLET ORAL
Refills: 0 | Status: ACTIVE | COMMUNITY

## 2019-09-13 RX ORDER — INSULIN GLARGINE 100 [IU]/ML
100 INJECTION, SOLUTION SUBCUTANEOUS
Refills: 0 | Status: ACTIVE | COMMUNITY

## 2019-09-13 RX ORDER — METOPROLOL SUCCINATE 200 MG/1
200 TABLET, EXTENDED RELEASE ORAL
Refills: 0 | Status: DISCONTINUED | COMMUNITY
End: 2019-09-13

## 2019-09-13 RX ORDER — ASPIRIN 81 MG
81 TABLET, DELAYED RELEASE (ENTERIC COATED) ORAL
Refills: 0 | Status: DISCONTINUED | COMMUNITY
End: 2019-09-13

## 2019-09-13 RX ORDER — FUROSEMIDE 20 MG/1
20 TABLET ORAL
Refills: 0 | Status: DISCONTINUED | COMMUNITY
End: 2019-09-13

## 2019-09-13 RX ORDER — METOPROLOL SUCCINATE 50 MG/1
50 TABLET, EXTENDED RELEASE ORAL
Refills: 0 | Status: ACTIVE | COMMUNITY

## 2019-09-13 NOTE — COUNSELING
[Hygeine (Including Daily Shower)] : hygeine (including daily shower) [Importance of Regular Medical Follow-Up] : the importance of regular medical follow-up [No Heavy Lifting] : no heavy lifting (>15-20 lb. for 1 month or 25 lb. for 3 months from date of surgery) [S/S of infection] : signs and symptoms of infection (and to whom it should be reported) [Blood Pressure Control] : blood pressure control [Progressive Ambulation/Activity] : progressive ambulation/activity [Low Fat/Low Cholesterol Diet] : low fat/low cholesterol diet [Coumadin Management] : Coumadin management [Medication/Vitamin/Herb/Food Interaction] : medication/vitamin/herb/food interaction

## 2019-09-14 NOTE — REASON FOR VISIT
[de-identified] : MVR #29 [place], Tricuspid repair Florence annuloplasty suture, CABG x 2 (LIMA-Diag, SVG-OM), left atrial clip #35 atriacure [de-identified] : 09/03/19

## 2019-09-14 NOTE — CONSULT LETTER
[Dear  ___] : Dear  [unfilled], [Please see my note below.] : Please see my note below. [FreeTextEntry3] : Colton Fuller MD\par Chief, Cardiovascular Surgery at [facility]\par System Director of Surgical Heart Failure\par Professor, Cardiothoracic Surgery, [employer]\par \par  [Sincerely,] : Sincerely,

## 2019-09-14 NOTE — HISTORY OF PRESENT ILLNESS
[FreeTextEntry1] : 79 YOF with pmhx including HTN, DM, CHF, Afib, NARAYAN, hernia repair. Pt underwent a MVR, TV repair and NISSA  clip, C2l with Dr. Fuller on 9/3/19. Post op course included rhythm issues including junctional, Afib and bradycardia. Pt discharged home with support of sons and home care services. Initial [facility] visit. Unable to obtain  via [place] . Pt prefers to use son to translate for her. \par CC: "I'm ok, I feel I can't catch my breath" \par

## 2019-09-14 NOTE — REVIEW OF SYSTEMS
[Feeling Tired] : feeling tired [Chest Pain] : chest pain [Palpitations] : palpitations [Lower Ext Edema] : lower extremity edema [Shortness Of Breath] : shortness of breath [Orthopnea] : orthopnea [Constipation] : constipation [Negative] : Psychiatric [Fever] : no fever [Chills] : no chills [Heart Rate Is Fast] : the heart rate was not fast [Wheezing] : no wheezing [Leg Claudication] : no intermittent leg claudication [Cough] : no cough [Vomiting] : no vomiting [Diarrhea] : no diarrhea [Abdominal Pain] : no abdominal pain

## 2019-09-14 NOTE — ASSESSMENT
[FreeTextEntry1] : Pt recovering at home s/p OHS. Reviewed all medications and dosages with pt and pt son. Pt has all medications in home and is taking as prescribed. Pain controlled with current medication regimen of percocet. Reviewed bowel regimen, pt son to  colace and senna and initiate for constipation relief, pt endorses a BM yesterday but does state "It was very hard". Pt currently SOB with minimal exertion,son states this is worse now than in the hospital and worsened last night. Pt HR irregular likely in Afib, increased pedal edema, Pt appears "sleepy" during visit, falling asleep during examination. Advised son that pt will require a follow up visit with Dr. Fuller in the office today to ensure pt EKG is as per baseline as well as SOB. Pt son agrees with plan of care, spoke with Fer BAY in [facility] office, appt arranged, pt son confirmed transportation. \par \par \par Overall the patient is recovering well following a very significant open her surgery. Today we have increased his Lasix to 40 mg p.o. once a day. I told the patient and her son that if she doesn't improve over the next couple days to come back to the emergency room, as well as possibly have any concerns or the field and she's been getting better. Thank you for the opportunity to participate in the care of you patient.

## 2019-09-14 NOTE — CONSULT LETTER
[Dear  ___] : Dear  [unfilled], [Please see my note below.] : Please see my note below. [FreeTextEntry3] : Colton Fuller MD\par Chief, Cardiovascular Surgery at [facility]\par System Director of Surgical Heart Failure\par Professor, Cardiothoracic Surgery, [facility] [employer]\par \par  [Sincerely,] : Sincerely,

## 2019-09-14 NOTE — ASSESSMENT
[FreeTextEntry1] : Very pleasant 79-year-old lady following open her surgery. The patient recently went home and is recovering well at home. On exam her vital signs are stable, lungs are clear with decreased breath sounds at the bases, heart sounds are normal without any murmurs. The patient has significant chest discomfort which appears to be incisional pain. Her lower extremities have 2+ PT and edema. Overall the patient is recovering well, but needs more aggressive diureses. The today we have increased the Lasix 40 mg p.o. once a day. I have asked her to return if she doesn't improve over the next couple days, or to come to the emergency room if they have any concerns. Thank you for the opportunity to participate in the care of the patient.

## 2019-09-14 NOTE — REASON FOR VISIT
[Follow-Up: _____] : a [unfilled] follow-up visit [Family Member] : family member [FreeTextEntry1] : FOLLOW YOUR HEART- Transitional Care Management Program- [facility]\par \par

## 2019-09-14 NOTE — PHYSICAL EXAM
[Sclera] : the sclera and conjunctiva were normal [Neck Appearance] : the appearance of the neck was normal [Chest Visual Inspection Thoracic Asymmetry] : no chest asymmetry [1+] : left 1+ [Abdomen Soft] : soft [Abdomen Tenderness] : non-tender [Abnormal Walk] : normal gait [Musculoskeletal - Swelling] : no joint swelling seen [Skin Color & Pigmentation] : normal skin color and pigmentation [Skin Turgor] : normal skin turgor [Oriented To Time, Place, And Person] : oriented to person, place, and time [Impaired Insight] : insight and judgment were intact [Affect] : the affect was normal [] : no respiratory distress [Chest Palpation] : palpation of the chest revealed no abnormalities [Exaggerated Use Of Accessory Muscles For Inspiration] : no accessory muscle use [Apical Impulse] : the apical impulse was normal [Murmurs] : no murmurs [Heart Sounds] : normal S1 and S2 [FreeTextEntry2] : B/L LE with +2 edema, B/L calves soft, NT [FreeTextEntry1] : last BM yesterday, pt endorses constipation

## 2019-09-14 NOTE — PHYSICAL EXAM
[] : no respiratory distress [Respiration, Rhythm And Depth] : normal respiratory rhythm and effort [Apical Impulse] : the apical impulse was normal [Heart Sounds] : normal S1 and S2 [Murmurs] : no murmurs

## 2019-09-16 ENCOUNTER — APPOINTMENT (OUTPATIENT)
Dept: CARE COORDINATION | Facility: HOME HEALTH | Age: 79
End: 2019-09-16
Payer: MEDICARE

## 2019-09-16 VITALS
RESPIRATION RATE: 15 BRPM | DIASTOLIC BLOOD PRESSURE: 70 MMHG | BODY MASS INDEX: 28.86 KG/M2 | SYSTOLIC BLOOD PRESSURE: 118 MMHG | HEART RATE: 88 BPM | OXYGEN SATURATION: 97 % | HEIGHT: 60 IN | WEIGHT: 147 LBS

## 2019-09-16 PROCEDURE — 99024 POSTOP FOLLOW-UP VISIT: CPT

## 2019-09-16 NOTE — HISTORY OF PRESENT ILLNESS
[FreeTextEntry1] : 79 YOF with pmhx including HTN, DM, CHF, Afib, NARAYAN, hernia repair. Pt underwent a MVR, TV repair and NISSA  clip, C2l with Dr. Fuller on 9/3/19. Post op course included rhythm issues including junctional, Afib and bradycardia. Pt discharged home with support of sons and home care services. Initial [facility] visit. Unable to obtain  via [place] . Pt prefers to use son to translate for her. \par CC: "I'm good" \par

## 2019-09-16 NOTE — CONSULT LETTER
[Please see my note below.] : Please see my note below. [Sincerely,] : Sincerely, [FreeTextEntry3] : Colton Fuller MD\par Chief, Cardiovascular Surgery at [facility]\par System Director of Surgical Heart Failure\par Professor, Cardiothoracic Surgery, [facility] [employer]\par \par

## 2019-09-16 NOTE — ADDENDUM
[FreeTextEntry1] : 1700-Pt son states Dr. Paez's office has not received INR results. [facility] called and confirmed Dr. Paez's information, requested results to be sent again. Results sent, pt son to follow up tomorrow with Dr. Paez office. INR from 9/12 is 1.9 as per [facility]

## 2019-09-16 NOTE — REASON FOR VISIT
[Follow-Up: _____] : a [unfilled] follow-up visit [Family Member] : family member [FreeTextEntry1] : FOLLOW YOUR HEART- Transitional Care Management Program- [facility]\par \par

## 2019-09-16 NOTE — REVIEW OF SYSTEMS
[Feeling Tired] : feeling tired [Chest Pain] : chest pain [Palpitations] : palpitations [Shortness Of Breath] : shortness of breath [Lower Ext Edema] : lower extremity edema [Orthopnea] : orthopnea [Constipation] : constipation [Negative] : Psychiatric [Fever] : no fever [Chills] : no chills [Heart Rate Is Fast] : the heart rate was not fast [Leg Claudication] : no intermittent leg claudication [Wheezing] : no wheezing [Cough] : no cough [Abdominal Pain] : no abdominal pain [Vomiting] : no vomiting [Diarrhea] : no diarrhea

## 2019-09-16 NOTE — ASSESSMENT
[FreeTextEntry1] : Pt recovering at home s/p OHS. Reviewed all medications and dosages with pt and pt son. Pt has all medications in home and is taking as prescribed. Pain controlled with current medication regimen of percocet and Tylenol, Reviewed MDD with pt son. Reviewed bowel regimen, pt son to  colace and senna and initiate for constipation relief, pt endorses a BM yesterday but does state "It was very hard". Pt SOB improved, mild on exertion, resolves quickly with rest. No SOB at rest. Pt LE remain edematous, pt is to initiate aldactone 25 mg daily as ordered by Dr. Fuller. Advised to decrease Lasix to 40 mg daily while on aldactone as well. Son with understanding. Worsening symptoms reviewed pt son with understanding.

## 2019-09-16 NOTE — PHYSICAL EXAM
[Sclera] : the sclera and conjunctiva were normal [Neck Appearance] : the appearance of the neck was normal [Respiration, Rhythm And Depth] : normal respiratory rhythm and effort [Exaggerated Use Of Accessory Muscles For Inspiration] : no accessory muscle use [Apical Impulse] : the apical impulse was normal [Heart Sounds] : normal S1 and S2 [Murmurs] : no murmurs [Chest Visual Inspection Thoracic Asymmetry] : no chest asymmetry [1+] : left 1+ [Abdomen Soft] : soft [Abdomen Tenderness] : non-tender [Abnormal Walk] : normal gait [Skin Color & Pigmentation] : normal skin color and pigmentation [Musculoskeletal - Swelling] : no joint swelling seen [Skin Turgor] : normal skin turgor [] : no rash [Oriented To Time, Place, And Person] : oriented to person, place, and time [Affect] : the affect was normal [Impaired Insight] : insight and judgment were intact [FreeTextEntry2] : B/L LE with +2 edema, B/L calves soft, NT [FreeTextEntry1] : last BM yesterday, pt endorses constipation

## 2019-09-20 PROCEDURE — 93325 DOPPLER ECHO COLOR FLOW MAPG: CPT

## 2019-09-20 PROCEDURE — C1887: CPT

## 2019-09-20 PROCEDURE — 93458 L HRT ARTERY/VENTRICLE ANGIO: CPT

## 2019-09-20 PROCEDURE — C1760: CPT

## 2019-09-20 PROCEDURE — 97163 PT EVAL HIGH COMPLEX 45 MIN: CPT

## 2019-09-20 PROCEDURE — 84466 ASSAY OF TRANSFERRIN: CPT

## 2019-09-20 PROCEDURE — 93005 ELECTROCARDIOGRAM TRACING: CPT

## 2019-09-20 PROCEDURE — 73030 X-RAY EXAM OF SHOULDER: CPT

## 2019-09-20 PROCEDURE — 83540 ASSAY OF IRON: CPT

## 2019-09-20 PROCEDURE — 83690 ASSAY OF LIPASE: CPT

## 2019-09-20 PROCEDURE — 82728 ASSAY OF FERRITIN: CPT

## 2019-09-20 PROCEDURE — 85027 COMPLETE CBC AUTOMATED: CPT

## 2019-09-20 PROCEDURE — C1769: CPT

## 2019-09-20 PROCEDURE — 85730 THROMBOPLASTIN TIME PARTIAL: CPT

## 2019-09-20 PROCEDURE — 84443 ASSAY THYROID STIM HORMONE: CPT

## 2019-09-20 PROCEDURE — C1894: CPT

## 2019-09-20 PROCEDURE — 99153 MOD SED SAME PHYS/QHP EA: CPT

## 2019-09-20 PROCEDURE — 80053 COMPREHEN METABOLIC PANEL: CPT

## 2019-09-20 PROCEDURE — 86900 BLOOD TYPING SEROLOGIC ABO: CPT

## 2019-09-20 PROCEDURE — 99285 EMERGENCY DEPT VISIT HI MDM: CPT

## 2019-09-20 PROCEDURE — 86901 BLOOD TYPING SEROLOGIC RH(D): CPT

## 2019-09-20 PROCEDURE — 82962 GLUCOSE BLOOD TEST: CPT

## 2019-09-20 PROCEDURE — 84481 FREE ASSAY (FT-3): CPT

## 2019-09-20 PROCEDURE — 80048 BASIC METABOLIC PNL TOTAL CA: CPT

## 2019-09-20 PROCEDURE — 93312 ECHO TRANSESOPHAGEAL: CPT

## 2019-09-20 PROCEDURE — 83735 ASSAY OF MAGNESIUM: CPT

## 2019-09-20 PROCEDURE — 81003 URINALYSIS AUTO W/O SCOPE: CPT

## 2019-09-20 PROCEDURE — 83036 HEMOGLOBIN GLYCOSYLATED A1C: CPT

## 2019-09-20 PROCEDURE — 82550 ASSAY OF CK (CPK): CPT

## 2019-09-20 PROCEDURE — 84439 ASSAY OF FREE THYROXINE: CPT

## 2019-09-20 PROCEDURE — 85610 PROTHROMBIN TIME: CPT

## 2019-09-20 PROCEDURE — 93880 EXTRACRANIAL BILAT STUDY: CPT

## 2019-09-20 PROCEDURE — 84100 ASSAY OF PHOSPHORUS: CPT

## 2019-09-20 PROCEDURE — 83880 ASSAY OF NATRIURETIC PEPTIDE: CPT

## 2019-09-20 PROCEDURE — 71045 X-RAY EXAM CHEST 1 VIEW: CPT

## 2019-09-20 PROCEDURE — 86850 RBC ANTIBODY SCREEN: CPT

## 2019-09-20 PROCEDURE — 93320 DOPPLER ECHO COMPLETE: CPT

## 2019-09-20 PROCEDURE — 99152 MOD SED SAME PHYS/QHP 5/>YRS: CPT

## 2019-09-20 PROCEDURE — 80061 LIPID PANEL: CPT

## 2019-09-20 PROCEDURE — 36415 COLL VENOUS BLD VENIPUNCTURE: CPT

## 2019-09-20 PROCEDURE — 87086 URINE CULTURE/COLONY COUNT: CPT

## 2019-09-20 PROCEDURE — 84134 ASSAY OF PREALBUMIN: CPT

## 2019-09-20 PROCEDURE — 93306 TTE W/DOPPLER COMPLETE: CPT

## 2019-09-20 PROCEDURE — 84484 ASSAY OF TROPONIN QUANT: CPT

## 2019-09-20 PROCEDURE — 87640 STAPH A DNA AMP PROBE: CPT

## 2019-09-20 PROCEDURE — 83550 IRON BINDING TEST: CPT

## 2019-09-20 PROCEDURE — 94010 BREATHING CAPACITY TEST: CPT

## 2019-09-22 ENCOUNTER — INPATIENT (INPATIENT)
Facility: HOSPITAL | Age: 79
LOS: 7 days | Discharge: HOME | DRG: 291 | End: 2019-09-30
Attending: THORACIC SURGERY (CARDIOTHORACIC VASCULAR SURGERY) | Admitting: THORACIC SURGERY (CARDIOTHORACIC VASCULAR SURGERY)
Payer: MEDICARE

## 2019-09-22 VITALS
SYSTOLIC BLOOD PRESSURE: 123 MMHG | OXYGEN SATURATION: 95 % | WEIGHT: 147.93 LBS | HEIGHT: 55 IN | HEART RATE: 100 BPM | TEMPERATURE: 99 F | RESPIRATION RATE: 20 BRPM | DIASTOLIC BLOOD PRESSURE: 61 MMHG

## 2019-09-22 DIAGNOSIS — H26.9 UNSPECIFIED CATARACT: Chronic | ICD-10-CM

## 2019-09-22 DIAGNOSIS — R07.9 CHEST PAIN, UNSPECIFIED: ICD-10-CM

## 2019-09-22 DIAGNOSIS — Z98.890 OTHER SPECIFIED POSTPROCEDURAL STATES: Chronic | ICD-10-CM

## 2019-09-22 DIAGNOSIS — Z98.61 CORONARY ANGIOPLASTY STATUS: Chronic | ICD-10-CM

## 2019-09-22 DIAGNOSIS — Z90.49 ACQUIRED ABSENCE OF OTHER SPECIFIED PARTS OF DIGESTIVE TRACT: Chronic | ICD-10-CM

## 2019-09-22 LAB
ALBUMIN SERPL ELPH-MCNC: 3.7 G/DL — SIGNIFICANT CHANGE UP (ref 3.3–5.2)
ALP SERPL-CCNC: 139 U/L — HIGH (ref 40–120)
ALT FLD-CCNC: 24 U/L — SIGNIFICANT CHANGE UP
ANION GAP SERPL CALC-SCNC: 15 MMOL/L — SIGNIFICANT CHANGE UP (ref 5–17)
APPEARANCE UR: CLEAR — SIGNIFICANT CHANGE UP
APTT BLD: 36.8 SEC — HIGH (ref 27.5–36.3)
AST SERPL-CCNC: 37 U/L — HIGH
BACTERIA # UR AUTO: ABNORMAL
BILIRUB SERPL-MCNC: 0.6 MG/DL — SIGNIFICANT CHANGE UP (ref 0.4–2)
BILIRUB UR-MCNC: NEGATIVE — SIGNIFICANT CHANGE UP
BUN SERPL-MCNC: 28 MG/DL — HIGH (ref 8–20)
CALCIUM SERPL-MCNC: 9 MG/DL — SIGNIFICANT CHANGE UP (ref 8.6–10.2)
CHLORIDE SERPL-SCNC: 88 MMOL/L — LOW (ref 98–107)
CO2 SERPL-SCNC: 24 MMOL/L — SIGNIFICANT CHANGE UP (ref 22–29)
COLOR SPEC: YELLOW — SIGNIFICANT CHANGE UP
CREAT SERPL-MCNC: 0.95 MG/DL — SIGNIFICANT CHANGE UP (ref 0.5–1.3)
DIFF PNL FLD: NEGATIVE — SIGNIFICANT CHANGE UP
EPI CELLS # UR: SIGNIFICANT CHANGE UP
GLUCOSE SERPL-MCNC: 345 MG/DL — HIGH (ref 70–115)
GLUCOSE UR QL: NEGATIVE MG/DL — SIGNIFICANT CHANGE UP
HCT VFR BLD CALC: 29.7 % — LOW (ref 34.5–45)
HGB BLD-MCNC: 9.2 G/DL — LOW (ref 11.5–15.5)
INR BLD: 3.19 RATIO — HIGH (ref 0.88–1.16)
KETONES UR-MCNC: NEGATIVE — SIGNIFICANT CHANGE UP
LEUKOCYTE ESTERASE UR-ACNC: NEGATIVE — SIGNIFICANT CHANGE UP
MCHC RBC-ENTMCNC: 29.1 PG — SIGNIFICANT CHANGE UP (ref 27–34)
MCHC RBC-ENTMCNC: 31 GM/DL — LOW (ref 32–36)
MCV RBC AUTO: 94 FL — SIGNIFICANT CHANGE UP (ref 80–100)
NITRITE UR-MCNC: NEGATIVE — SIGNIFICANT CHANGE UP
PH UR: 6 — SIGNIFICANT CHANGE UP (ref 5–8)
PLATELET # BLD AUTO: 485 K/UL — HIGH (ref 150–400)
POTASSIUM SERPL-MCNC: 5 MMOL/L — SIGNIFICANT CHANGE UP (ref 3.5–5.3)
POTASSIUM SERPL-SCNC: 5 MMOL/L — SIGNIFICANT CHANGE UP (ref 3.5–5.3)
PROT SERPL-MCNC: 7.6 G/DL — SIGNIFICANT CHANGE UP (ref 6.6–8.7)
PROT UR-MCNC: 30 MG/DL
PROTHROM AB SERPL-ACNC: 38 SEC — HIGH (ref 10–12.9)
RBC # BLD: 3.16 M/UL — LOW (ref 3.8–5.2)
RBC # FLD: 16.1 % — HIGH (ref 10.3–14.5)
RBC CASTS # UR COMP ASSIST: NEGATIVE /HPF — SIGNIFICANT CHANGE UP (ref 0–4)
SODIUM SERPL-SCNC: 127 MMOL/L — LOW (ref 135–145)
SP GR SPEC: 1.01 — SIGNIFICANT CHANGE UP (ref 1.01–1.02)
TROPONIN T SERPL-MCNC: 0.07 NG/ML — HIGH (ref 0–0.06)
UROBILINOGEN FLD QL: NEGATIVE MG/DL — SIGNIFICANT CHANGE UP
WBC # BLD: 7.29 K/UL — SIGNIFICANT CHANGE UP (ref 3.8–10.5)
WBC # FLD AUTO: 7.29 K/UL — SIGNIFICANT CHANGE UP (ref 3.8–10.5)
WBC UR QL: SIGNIFICANT CHANGE UP

## 2019-09-22 PROCEDURE — 71045 X-RAY EXAM CHEST 1 VIEW: CPT | Mod: 26

## 2019-09-22 PROCEDURE — 99285 EMERGENCY DEPT VISIT HI MDM: CPT

## 2019-09-22 PROCEDURE — 71275 CT ANGIOGRAPHY CHEST: CPT | Mod: 26

## 2019-09-22 PROCEDURE — ZZZZZ: CPT

## 2019-09-22 RX ORDER — INSULIN LISPRO 100/ML
10 VIAL (ML) SUBCUTANEOUS
Refills: 0 | Status: DISCONTINUED | OUTPATIENT
Start: 2019-09-22 | End: 2019-09-26

## 2019-09-22 RX ORDER — ACETAMINOPHEN 500 MG
650 TABLET ORAL EVERY 6 HOURS
Refills: 0 | Status: DISCONTINUED | OUTPATIENT
Start: 2019-09-22 | End: 2019-09-24

## 2019-09-22 RX ORDER — FUROSEMIDE 40 MG
40 TABLET ORAL
Refills: 0 | Status: DISCONTINUED | OUTPATIENT
Start: 2019-09-22 | End: 2019-09-23

## 2019-09-22 RX ORDER — MAGNESIUM OXIDE 400 MG ORAL TABLET 241.3 MG
400 TABLET ORAL
Refills: 0 | Status: DISCONTINUED | OUTPATIENT
Start: 2019-09-22 | End: 2019-09-30

## 2019-09-22 RX ORDER — SODIUM CHLORIDE 9 MG/ML
3 INJECTION INTRAMUSCULAR; INTRAVENOUS; SUBCUTANEOUS EVERY 8 HOURS
Refills: 0 | Status: DISCONTINUED | OUTPATIENT
Start: 2019-09-22 | End: 2019-09-30

## 2019-09-22 RX ORDER — DOCUSATE SODIUM 100 MG
100 CAPSULE ORAL THREE TIMES A DAY
Refills: 0 | Status: DISCONTINUED | OUTPATIENT
Start: 2019-09-22 | End: 2019-09-30

## 2019-09-22 RX ORDER — FERROUS SULFATE 325(65) MG
325 TABLET ORAL
Refills: 0 | Status: DISCONTINUED | OUTPATIENT
Start: 2019-09-22 | End: 2019-09-30

## 2019-09-22 RX ORDER — INSULIN GLARGINE 100 [IU]/ML
20 INJECTION, SOLUTION SUBCUTANEOUS AT BEDTIME
Refills: 0 | Status: DISCONTINUED | OUTPATIENT
Start: 2019-09-22 | End: 2019-09-29

## 2019-09-22 RX ORDER — OXYCODONE AND ACETAMINOPHEN 5; 325 MG/1; MG/1
1 TABLET ORAL EVERY 6 HOURS
Refills: 0 | Status: DISCONTINUED | OUTPATIENT
Start: 2019-09-22 | End: 2019-09-29

## 2019-09-22 RX ORDER — ATORVASTATIN CALCIUM 80 MG/1
80 TABLET, FILM COATED ORAL AT BEDTIME
Refills: 0 | Status: DISCONTINUED | OUTPATIENT
Start: 2019-09-22 | End: 2019-09-30

## 2019-09-22 RX ORDER — AMLODIPINE BESYLATE 2.5 MG/1
5 TABLET ORAL DAILY
Refills: 0 | Status: DISCONTINUED | OUTPATIENT
Start: 2019-09-22 | End: 2019-09-24

## 2019-09-22 RX ORDER — ASPIRIN/CALCIUM CARB/MAGNESIUM 324 MG
325 TABLET ORAL DAILY
Refills: 0 | Status: DISCONTINUED | OUTPATIENT
Start: 2019-09-22 | End: 2019-09-23

## 2019-09-22 RX ORDER — ENOXAPARIN SODIUM 100 MG/ML
40 INJECTION SUBCUTANEOUS DAILY
Refills: 0 | Status: DISCONTINUED | OUTPATIENT
Start: 2019-09-22 | End: 2019-09-23

## 2019-09-22 RX ORDER — ASCORBIC ACID 60 MG
500 TABLET,CHEWABLE ORAL DAILY
Refills: 0 | Status: DISCONTINUED | OUTPATIENT
Start: 2019-09-22 | End: 2019-09-30

## 2019-09-22 RX ORDER — FOLIC ACID 0.8 MG
1 TABLET ORAL DAILY
Refills: 0 | Status: DISCONTINUED | OUTPATIENT
Start: 2019-09-22 | End: 2019-09-30

## 2019-09-22 RX ORDER — METOPROLOL TARTRATE 50 MG
50 TABLET ORAL DAILY
Refills: 0 | Status: DISCONTINUED | OUTPATIENT
Start: 2019-09-22 | End: 2019-09-25

## 2019-09-22 RX ORDER — SODIUM CHLORIDE 9 MG/ML
3 INJECTION INTRAMUSCULAR; INTRAVENOUS; SUBCUTANEOUS ONCE
Refills: 0 | Status: COMPLETED | OUTPATIENT
Start: 2019-09-22 | End: 2019-09-22

## 2019-09-22 RX ORDER — PANTOPRAZOLE SODIUM 20 MG/1
40 TABLET, DELAYED RELEASE ORAL
Refills: 0 | Status: DISCONTINUED | OUTPATIENT
Start: 2019-09-22 | End: 2019-09-30

## 2019-09-22 NOTE — ED ADULT NURSE NOTE - PSH
Cataract    H/O hernia repair  1970's  History of ankle surgery  right orif  History of appendectomy    Post PTCA  8 stents

## 2019-09-22 NOTE — ED PROVIDER NOTE - CLINICAL SUMMARY MEDICAL DECISION MAKING FREE TEXT BOX
78yo female with chest pain, recent CABG/valve replacement, will obtain labs, CTA r/o PE, troponin, EKG shows atrial fibrillation, reassess. Vickie Lin DO

## 2019-09-22 NOTE — ED ADULT NURSE NOTE - PMH
Afib    Atrial fibrillation    CHF exacerbation    Coronary stent restenosis, sequela    Diabetes    Hypertension    Hypertension    Stented coronary artery

## 2019-09-22 NOTE — ED PROVIDER NOTE - PHYSICAL EXAMINATION
Gen: NAD, chronically ill appearing  Head: NCAT  Lung: diminished breath sounds b/l bases, mildly tachypneic, no retractions  CV: normal s1/s2, rrr, no murmurs, Normal perfusion  Abd: soft, NTND  MSK: No edema, no visible deformities, full range of motion in all 4 extremities  Neuro: No focal neurologic deficits  Skin: No rash   Psych: normal affect

## 2019-09-22 NOTE — ED ADULT NURSE NOTE - ED STAT RN HANDOFF DETAILS
Report given to TONY Collier CTICU bed 8 Pt handed off to RN Nando in stable condition. No apparent distress noted at this time. Receiving RN without any questions or concerns at time of handoff.

## 2019-09-22 NOTE — PATIENT PROFILE ADULT - NSPROEDALEARNPREF_GEN_A_NUR
written material/verbal instruction/Pre op teaching surgical scrub pain management instructions given to pt via /individual instruction

## 2019-09-22 NOTE — ED ADULT NURSE NOTE - NSIMPLEMENTINTERV_GEN_ALL_ED
Implemented All Fall Risk Interventions:  [place] to call system. Call bell, personal items and telephone within reach. Instruct patient to call for assistance. Room bathroom lighting operational. Non-slip footwear when patient is off stretcher. Physically safe environment: no spills, clutter or unnecessary equipment. Stretcher in lowest position, wheels locked, appropriate side rails in place. Provide visual cue, wrist band, yellow gown, etc. Monitor gait and stability. Monitor for mental status changes and reorient to person, place, and time. Review medications for side effects contributing to fall risk. Reinforce activity limits and safety measures with patient and family.

## 2019-09-22 NOTE — ED CLERICAL - NS ED CLERK NOTE PRE-ARRIVAL INFORMATION; ADDITIONAL PRE-ARRIVAL INFORMATION
This patient is enrolled in the Follow Your Heart program and has undergone a cardiac surgery procedure and has active care navigation. This patient can be followed up by the care navigation team within 24 hours. To arrange close follow-up or to obtain additional clinical information about this patient, please call the contact number above.     Please call the cardiac surgery team once patient is registered at [phone] for consultation PRIOR to disposition decision.  The patient recently underwent a cardiac surgery procedure and the team can assist in acute medical management.

## 2019-09-22 NOTE — ED PROVIDER NOTE - OBJECTIVE STATEMENT
78yo F PMH atrial fibrillation on coumadin, congestive heart failure, HTN, Coronary Artery Disease s/p CABG x 2 and 2 valve replacements 2 weeks ago, presenting with chest pain and shortness of breath x 2 days, feels like tightness. No fevers/chills, no cough. No nausea/vomiting/diaphoresis.

## 2019-09-22 NOTE — ED STATDOCS - PROGRESS NOTE DETAILS
d/c on sept 11 after having open heart surgery. Here with chest pain. to main room for care/eval by another provider

## 2019-09-23 DIAGNOSIS — I10 ESSENTIAL (PRIMARY) HYPERTENSION: ICD-10-CM

## 2019-09-23 DIAGNOSIS — Z95.1 PRESENCE OF AORTOCORONARY BYPASS GRAFT: Chronic | ICD-10-CM

## 2019-09-23 DIAGNOSIS — I50.9 HEART FAILURE, UNSPECIFIED: ICD-10-CM

## 2019-09-23 DIAGNOSIS — I48.91 UNSPECIFIED ATRIAL FIBRILLATION: ICD-10-CM

## 2019-09-23 DIAGNOSIS — E11.9 TYPE 2 DIABETES MELLITUS WITHOUT COMPLICATIONS: ICD-10-CM

## 2019-09-23 DIAGNOSIS — Z29.9 ENCOUNTER FOR PROPHYLACTIC MEASURES, UNSPECIFIED: ICD-10-CM

## 2019-09-23 LAB
ANION GAP SERPL CALC-SCNC: 13 MMOL/L — SIGNIFICANT CHANGE UP (ref 5–17)
APTT BLD: 39.7 SEC — HIGH (ref 27.5–36.3)
BLD GP AB SCN SERPL QL: SIGNIFICANT CHANGE UP
BUN SERPL-MCNC: 24 MG/DL — HIGH (ref 8–20)
CALCIUM SERPL-MCNC: 9.2 MG/DL — SIGNIFICANT CHANGE UP (ref 8.6–10.2)
CHLORIDE SERPL-SCNC: 96 MMOL/L — LOW (ref 98–107)
CK SERPL-CCNC: 35 U/L — SIGNIFICANT CHANGE UP (ref 25–170)
CO2 SERPL-SCNC: 26 MMOL/L — SIGNIFICANT CHANGE UP (ref 22–29)
CREAT SERPL-MCNC: 0.88 MG/DL — SIGNIFICANT CHANGE UP (ref 0.5–1.3)
GAS PNL BLDA: SIGNIFICANT CHANGE UP
GLUCOSE BLDC GLUCOMTR-MCNC: 119 MG/DL — HIGH (ref 70–99)
GLUCOSE BLDC GLUCOMTR-MCNC: 321 MG/DL — HIGH (ref 70–99)
GLUCOSE SERPL-MCNC: 209 MG/DL — HIGH (ref 70–115)
HCT VFR BLD CALC: 29.2 % — LOW (ref 34.5–45)
HGB BLD-MCNC: 9.2 G/DL — LOW (ref 11.5–15.5)
INR BLD: 3.05 RATIO — HIGH (ref 0.88–1.16)
MAGNESIUM SERPL-MCNC: 1.9 MG/DL — SIGNIFICANT CHANGE UP (ref 1.8–2.6)
MAGNESIUM SERPL-MCNC: 2 MG/DL — SIGNIFICANT CHANGE UP (ref 1.6–2.6)
MCHC RBC-ENTMCNC: 29.6 PG — SIGNIFICANT CHANGE UP (ref 27–34)
MCHC RBC-ENTMCNC: 31.5 GM/DL — LOW (ref 32–36)
MCV RBC AUTO: 93.9 FL — SIGNIFICANT CHANGE UP (ref 80–100)
NT-PROBNP SERPL-SCNC: 3649 PG/ML — HIGH (ref 0–300)
PLATELET # BLD AUTO: 460 K/UL — HIGH (ref 150–400)
POTASSIUM SERPL-MCNC: 4.5 MMOL/L — SIGNIFICANT CHANGE UP (ref 3.5–5.3)
POTASSIUM SERPL-SCNC: 4.5 MMOL/L — SIGNIFICANT CHANGE UP (ref 3.5–5.3)
PROTHROM AB SERPL-ACNC: 36.3 SEC — HIGH (ref 10–12.9)
RBC # BLD: 3.11 M/UL — LOW (ref 3.8–5.2)
RBC # FLD: 16.2 % — HIGH (ref 10.3–14.5)
SODIUM SERPL-SCNC: 135 MMOL/L — SIGNIFICANT CHANGE UP (ref 135–145)
TROPONIN T SERPL-MCNC: 0.06 NG/ML — SIGNIFICANT CHANGE UP (ref 0–0.06)
WBC # BLD: 7.09 K/UL — SIGNIFICANT CHANGE UP (ref 3.8–10.5)
WBC # FLD AUTO: 7.09 K/UL — SIGNIFICANT CHANGE UP (ref 3.8–10.5)

## 2019-09-23 PROCEDURE — 32557 INSERT CATH PLEURA W/ IMAGE: CPT | Mod: 78

## 2019-09-23 PROCEDURE — 99223 1ST HOSP IP/OBS HIGH 75: CPT

## 2019-09-23 PROCEDURE — 99291 CRITICAL CARE FIRST HOUR: CPT

## 2019-09-23 PROCEDURE — 93306 TTE W/DOPPLER COMPLETE: CPT | Mod: 26

## 2019-09-23 PROCEDURE — 99222 1ST HOSP IP/OBS MODERATE 55: CPT | Mod: 25

## 2019-09-23 PROCEDURE — 71045 X-RAY EXAM CHEST 1 VIEW: CPT | Mod: 26,76

## 2019-09-23 RX ORDER — ASPIRIN/CALCIUM CARB/MAGNESIUM 324 MG
81 TABLET ORAL DAILY
Refills: 0 | Status: DISCONTINUED | OUTPATIENT
Start: 2019-09-23 | End: 2019-09-30

## 2019-09-23 RX ORDER — DEXTROSE 50 % IN WATER 50 %
25 SYRINGE (ML) INTRAVENOUS ONCE
Refills: 0 | Status: DISCONTINUED | OUTPATIENT
Start: 2019-09-23 | End: 2019-09-30

## 2019-09-23 RX ORDER — LIDOCAINE HCL 20 MG/ML
20 VIAL (ML) INJECTION ONCE
Refills: 0 | Status: COMPLETED | OUTPATIENT
Start: 2019-09-23 | End: 2019-09-23

## 2019-09-23 RX ORDER — LOSARTAN POTASSIUM 100 MG/1
25 TABLET, FILM COATED ORAL DAILY
Refills: 0 | Status: DISCONTINUED | OUTPATIENT
Start: 2019-09-23 | End: 2019-09-24

## 2019-09-23 RX ORDER — FUROSEMIDE 40 MG
40 TABLET ORAL
Refills: 0 | Status: DISCONTINUED | OUTPATIENT
Start: 2019-09-23 | End: 2019-09-23

## 2019-09-23 RX ORDER — INSULIN GLARGINE 100 [IU]/ML
20 INJECTION, SOLUTION SUBCUTANEOUS ONCE
Refills: 0 | Status: COMPLETED | OUTPATIENT
Start: 2019-09-23 | End: 2019-09-23

## 2019-09-23 RX ORDER — INSULIN LISPRO 100/ML
VIAL (ML) SUBCUTANEOUS
Refills: 0 | Status: DISCONTINUED | OUTPATIENT
Start: 2019-09-23 | End: 2019-09-30

## 2019-09-23 RX ORDER — FUROSEMIDE 40 MG
40 TABLET ORAL
Refills: 0 | Status: DISCONTINUED | OUTPATIENT
Start: 2019-09-23 | End: 2019-09-26

## 2019-09-23 RX ORDER — DEXTROSE 50 % IN WATER 50 %
12.5 SYRINGE (ML) INTRAVENOUS ONCE
Refills: 0 | Status: DISCONTINUED | OUTPATIENT
Start: 2019-09-23 | End: 2019-09-30

## 2019-09-23 RX ORDER — FUROSEMIDE 40 MG
40 TABLET ORAL ONCE
Refills: 0 | Status: COMPLETED | OUTPATIENT
Start: 2019-09-23 | End: 2019-09-23

## 2019-09-23 RX ORDER — FENTANYL CITRATE 50 UG/ML
12.5 INJECTION INTRAVENOUS ONCE
Refills: 0 | Status: DISCONTINUED | OUTPATIENT
Start: 2019-09-23 | End: 2019-09-23

## 2019-09-23 RX ADMIN — Medication 10 UNIT(S): at 16:59

## 2019-09-23 RX ADMIN — SODIUM CHLORIDE 3 MILLILITER(S): 9 INJECTION INTRAMUSCULAR; INTRAVENOUS; SUBCUTANEOUS at 14:20

## 2019-09-23 RX ADMIN — Medication 81 MILLIGRAM(S): at 12:01

## 2019-09-23 RX ADMIN — Medication 650 MILLIGRAM(S): at 11:00

## 2019-09-23 RX ADMIN — ATORVASTATIN CALCIUM 80 MILLIGRAM(S): 80 TABLET, FILM COATED ORAL at 21:07

## 2019-09-23 RX ADMIN — SODIUM CHLORIDE 3 MILLILITER(S): 9 INJECTION INTRAMUSCULAR; INTRAVENOUS; SUBCUTANEOUS at 05:38

## 2019-09-23 RX ADMIN — OXYCODONE AND ACETAMINOPHEN 1 TABLET(S): 5; 325 TABLET ORAL at 22:00

## 2019-09-23 RX ADMIN — SODIUM CHLORIDE 3 MILLILITER(S): 9 INJECTION INTRAMUSCULAR; INTRAVENOUS; SUBCUTANEOUS at 00:17

## 2019-09-23 RX ADMIN — OXYCODONE AND ACETAMINOPHEN 1 TABLET(S): 5; 325 TABLET ORAL at 11:32

## 2019-09-23 RX ADMIN — MAGNESIUM OXIDE 400 MG ORAL TABLET 400 MILLIGRAM(S): 241.3 TABLET ORAL at 07:49

## 2019-09-23 RX ADMIN — Medication 40 MILLIGRAM(S): at 01:25

## 2019-09-23 RX ADMIN — INSULIN GLARGINE 20 UNIT(S): 100 INJECTION, SOLUTION SUBCUTANEOUS at 21:28

## 2019-09-23 RX ADMIN — FENTANYL CITRATE 12.5 MICROGRAM(S): 50 INJECTION INTRAVENOUS at 15:00

## 2019-09-23 RX ADMIN — Medication 1 TABLET(S): at 11:34

## 2019-09-23 RX ADMIN — Medication 50 MILLIGRAM(S): at 08:07

## 2019-09-23 RX ADMIN — Medication 325 MILLIGRAM(S): at 11:33

## 2019-09-23 RX ADMIN — Medication 1 MILLIGRAM(S): at 11:34

## 2019-09-23 RX ADMIN — Medication 100 MILLIGRAM(S): at 16:59

## 2019-09-23 RX ADMIN — SODIUM CHLORIDE 3 MILLILITER(S): 9 INJECTION INTRAMUSCULAR; INTRAVENOUS; SUBCUTANEOUS at 21:12

## 2019-09-23 RX ADMIN — Medication 40 MILLIGRAM(S): at 06:00

## 2019-09-23 RX ADMIN — Medication 20 MILLILITER(S): at 14:52

## 2019-09-23 RX ADMIN — MAGNESIUM OXIDE 400 MG ORAL TABLET 400 MILLIGRAM(S): 241.3 TABLET ORAL at 17:00

## 2019-09-23 RX ADMIN — OXYCODONE AND ACETAMINOPHEN 1 TABLET(S): 5; 325 TABLET ORAL at 12:00

## 2019-09-23 RX ADMIN — Medication 4: at 07:50

## 2019-09-23 RX ADMIN — PANTOPRAZOLE SODIUM 40 MILLIGRAM(S): 20 TABLET, DELAYED RELEASE ORAL at 06:01

## 2019-09-23 RX ADMIN — Medication 500 MILLIGRAM(S): at 11:33

## 2019-09-23 RX ADMIN — Medication 100 MILLIGRAM(S): at 21:07

## 2019-09-23 RX ADMIN — AMLODIPINE BESYLATE 5 MILLIGRAM(S): 2.5 TABLET ORAL at 06:00

## 2019-09-23 RX ADMIN — OXYCODONE AND ACETAMINOPHEN 1 TABLET(S): 5; 325 TABLET ORAL at 21:07

## 2019-09-23 RX ADMIN — Medication 325 MILLIGRAM(S): at 07:49

## 2019-09-23 RX ADMIN — Medication 325 MILLIGRAM(S): at 17:00

## 2019-09-23 RX ADMIN — INSULIN GLARGINE 20 UNIT(S): 100 INJECTION, SOLUTION SUBCUTANEOUS at 02:09

## 2019-09-23 RX ADMIN — Medication 4: at 16:59

## 2019-09-23 RX ADMIN — Medication 40 MILLIGRAM(S): at 17:39

## 2019-09-23 RX ADMIN — Medication 100 MILLIGRAM(S): at 06:00

## 2019-09-23 RX ADMIN — FENTANYL CITRATE 12.5 MICROGRAM(S): 50 INJECTION INTRAVENOUS at 14:51

## 2019-09-23 RX ADMIN — Medication 650 MILLIGRAM(S): at 10:14

## 2019-09-23 RX ADMIN — Medication 10 UNIT(S): at 07:50

## 2019-09-23 NOTE — PROCEDURE NOTE - NSPROCDETAILS_GEN_ALL_CORE
secured in place/ultrasound assessment of fluid (location)/Seldinger technique/sterile dressing applied/ultrasound assessment of fluid (size)

## 2019-09-23 NOTE — H&P ADULT - ATTENDING COMMENTS
Above H& P reviewed and independently verified. S/P CABG, MV repair, TV repair 9/3 by Dr. Fuller. She presented to ER with shortness of breath. She has bilateral pleural effusion, and echo shows severe biventricular dysfunction. She is on coumadin and INR is >3.    She has been started on lasix. She also requires drainage of pleural effusion. Cardiology consult.     The plan was discussed in detail.

## 2019-09-23 NOTE — CONSULT NOTE ADULT - SUBJECTIVE AND OBJECTIVE BOX
[facility]                                                       [facility]                                                        Office: [address] [address]                                                       Telephone: [phone]. Fax:[phone]                                                              CARDIOLOGY CONSULTATION NOTE                                                                                             Consult requested by:  Dr. 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 Fuller 9/3/19.  Son states they had called Dr. Fuller during the week d/t increased SOB and edema, and Spironolactone was added on Friday,  to aide in diuresis, she was advised to present to ER in the event lower ext edema and SOB did not improve.  Found to have b/l pleural effusions L>R on CXR. Currently pt is s/p Left pigtail, removal of 600ml serosanguineous fluid.  Since IV Lasix and pigtail patient reports improvement in SOB, c/t c/o mid sternal chest pain at surgical site which is well approximated with no s/s of infection. Denies chest pain/pressure, palpitations, irregular and/or rapid heart beat, syncope/near syncope, dizziness, orthopnea, PND, cough, n/v/d, hematuria, or hematochezia.         PAST MEDICAL & SURGICAL HISTORY:  CHF exacerbation  Atrial fibrillation  Diabetes  Hypertension  Stented coronary artery  Coronary stent restenosis, sequela  Afib  Hypertension  History of ankle surgery: right orif  Post PTCA: 8 stents  H/O hernia repair: 1970&#x27;s  Cataract  History of appendectomy          REVIEW OF SYMPTOMS: Cardiovascular:  See HPI.   Respiratory:  as per HPI    Genitourinary:  No dysuria, no hematuria;   Gastrointestinal:  No nausea, no vomiting. No diarrhea.  No abdominal pain. No dark color stool, no melena ;   Neurological: No headache, no dizziness, no slurred speech;    Psychiatric: No agitation, no anxiety.    ALL OTHER REVIEW OF SYSTEMS ARE NEGATIVE.    ALLERGIES: Allergies    No Known Allergies    Intolerances    OHS (Unknown)        CURRENT MEDICATIONS:  amLODIPine   Tablet 5 milliGRAM(s) Oral daily  furosemide   Injectable 40 milliGRAM(s) IV Push two times a day  losartan 25 milliGRAM(s) Oral daily  metoprolol succinate ER 50 milliGRAM(s) Oral daily     docusate sodium  pantoprazole    Tablet  ascorbic acid  aspirin enteric coated  atorvastatin  dextrose 50% Injectable  dextrose 50% Injectable  dextrose 50% Injectable  ferrous sulfate Oral Tab/Cap - Peds  folic acid  insulin glargine Injectable (LANTUS)  insulin lispro (HumaLOG) corrective regimen sliding scale  insulin lispro Injectable (HumaLOG)  magnesium oxide  methimazole  multivitamin  sodium chloride 0.9% lock flush      HOME MEDICATIONS:  Home Medications:  acetaminophen 325 mg oral tablet: 2 tab(s) orally every 6 hours, As needed, Temp greater or equal to 38C (100.4F), Mild Pain (1 - 3) (03 Sep 2019 07:07)  ascorbic acid 500 mg oral tablet: 1 tab(s) orally once a day (11 Sep 2019 12:47)  docusate sodium 100 mg oral capsule: 1 cap(s) orally 3 times a day (11 Sep 2019 12:47)  ferrous sulfate 325 mg (65 mg elemental iron) oral tablet: 1 tab(s) orally 3 times a day (11 Sep 2019 12:47)  Multiple Vitamins oral tablet: 1 tab(s) orally once a day (11 Sep 2019 12:47)  PriLOSEC 40 mg oral delayed release capsule: 1 cap(s) orally once a day (03 Sep 2019 07:07)      PAST MEDICAL HISTORY  CHF exacerbation  Atrial fibrillation  Diabetes  Hypertension  Stented coronary artery  Coronary stent restenosis, sequela  Afib  Hypertension      PAST SURGICAL HISTORY  S/P CABG x 2  History of ankle surgery  Post PTCA  H/O hernia repair  Cataract  History of appendectomy      FAMILY HISTORY:  Family history of early CAD: brother mother  FHx: breast cancer: mother      SOCIAL HISTORY:  Denies smoking/alcohol/drugs    CIGARETTES:   denies    ALCOHOL: denies    DRUGS: denies    Vital Signs Last 24 Hrs  T(C): 36.7 (23 Sep 2019 16:00), Max: 37.5 (23 Sep 2019 00:46)  T(F): 98.1 (23 Sep 2019 16:00), Max: 99.5 (23 Sep 2019 00:46)  HR: 85 (23 Sep 2019 17:00) (82 - 128)  BP: 130/83 (23 Sep 2019 17:00) (90/55 - 148/80)  BP(mean): 94 (23 Sep 2019 17:00) (66 - 109)  RR: 20 (23 Sep 2019 16:00) (14 - 20)  SpO2: 95% (23 Sep 2019 17:00) (94% - 100%)      PHYSICAL EXAM:  Constitutional: Comfortable . No acute distress.   HEENT: Atraumatic and normocephalic , neck is supple . no JVD. No carotid bruit. PEERL   CNS: A&Ox3. No focal deficits. EOMI. Cranial nerves II-IX are intact.   Lymph Nodes: Cervical : Not palpable.  Respiratory: mild b/l LL crackles  Cardiovascular: S1S2 RRR. No murmur/rubs or gallop.  Gastrointestinal: Soft non-tender and non distended . +Bowel sounds. negative Steen's sign.  Extremities: 2+ BLLE edema R>L  Psychiatric: Calm . no agitation.  Skin: No skin rash/ulcers visualized to face, hands or feet.    Intake and output:    @ 07:  -   @ 07:00  --------------------------------------------------------  IN: 100 mL / OUT: 1350 mL / NET: -1250 mL     @ 07:  -   @ 17:14  --------------------------------------------------------  IN: 300 mL / OUT: 1785 mL / NET: -1485 mL        LABS:                        9.2    7.09  )-----------( 460      ( 23 Sep 2019 07:05 )             29.2         135  |  96<L>  |  24.0<H>  ----------------------------<  209<H>  4.5   |  26.0  |  0.88    Ca    9.2      23 Sep 2019 07:05  Mg     2.0         TPro  7.6  /  Alb  3.7  /  TBili  0.6  /  DBili  x   /  AST  37<H>  /  ALT  24  /  AlkPhos  139<H>      CARDIAC MARKERS ( 23 Sep 2019 07:05 )  x     / 0.06 ng/mL / 35 U/L / x     / x      CARDIAC MARKERS ( 22 Sep 2019 19:14 )  x     / 0.07 ng/mL / x     / x     / x        ;p-BNP=Serum Pro-Brain Natriuretic Peptide: 3649 pg/mL ( @ 07:05)    PT/INR - ( 23 Sep 2019 07:05 )   PT: 36.3 sec;   INR: 3.05 ratio         PTT - ( 23 Sep 2019 07:05 )  PTT:39.7 sec  Urinalysis Basic - ( 22 Sep 2019 20:40 )    Color: Yellow / Appearance: Clear / S.015 / pH: x  Gluc: x / Ketone: Negative  / Bili: Negative / Urobili: Negative mg/dL   Blood: x / Protein: 30 mg/dL / Nitrite: Negative   Leuk Esterase: Negative / RBC: Negative /HPF / WBC 0-2   Sq Epi: x / Non Sq Epi: Occasional / Bacteria: Occasional        INTERPRETATION OF TELEMETRY: Reviewed by me.   ECG: Reviewed by me.     RADIOLOGY & ADDITIONAL STUDIES:    X-ray:    < from: Xray Chest 1 View- PORTABLE-Urgent (19 @ 12:45) >   EXAM:  XR CHEST PORTABLE URGENT 1V                          PROCEDURE DATE:  2019          INTERPRETATION:  Chest one view    HISTORY: Post thoracentesis    COMPARISON STUDY: Earlier the same day    Frontal expiratory view of the chest showsthe heart to be similar in   size in size. Left pigtail catheter is present. The lungs show clearing   of the bases. Small left apical pneumothorax is present.    IMPRESSION:  Basilar clearing. Small apical pneumothorax.    < end of copied text >    ECHO FINDINGS:   < from: TTE Echo Complete w/Doppler (19 @ 00:04) >  Summary:   1. Moderately enlarged left atrium.   2. Left ventricular ejection fraction, by visual estimation, is <20%.   3. Right ventricular volume and pressure overload.   4. Moderately enlarged right atrium.   5. Severely enlarged right ventricle. Severely reduced RV systolic   function.   6. Mitral prosthesis is functioning normally.   7. Small pericardial effusion. No echo evidence of tamponade.   8. Large pleural effusion in both left and right lateral regions.    MD Marciano, RPVI Electronically signed on 2019 at 12:43:38   AM       < end of copied text >      CATHETERIZATION FINDINGS:    < from: Cardiac Cath Lab - Adult (19 @ 08:55) >  VENTRICLES: No LV gram was performed; however, a recent echocardiogram  demonstrated an EF of 40 %.  CORONARY VESSELS: The coronary circulation is co-dominant.  LM:   --  LM: Normal.  LAD:   --  Ostial LAD: There was a 40 % stenosis.  --  Proximal LAD: There was a 0 % stenosis in-stent.  CX:   --  Ostial circumflex: There was a 80 % stenosis.  RCA:   --  Ostial RCA: There was a 80 % stenosis.  --  Proximal RCA: There was a 70 % stenosis in-stent.  COMPLICATIONS: No complications occurred during the cath lab visit.  SUMMARY:  Summary: ADDENDUM 2019 --> Listed "5F [employer] " supply item used in  Failed Hemostasis procedure.  DIAGNOSTIC IMPRESSIONS: Small caliber RCA.  Severe ostial circumflex dsiease. For intervention, would have to land into  LM which will compromise ostial/proximal LAD.  DIAGNOSTIC RECOMMENDATIONS: CT surgery evaluation for CABG/valve surgery.  Prepared and signed by  Chang Beckham MD  Signed 2019 12:16:51    < end of copied text >

## 2019-09-23 NOTE — H&P ADULT - HISTORY OF PRESENT ILLNESS
Subjective:  78yo F mildly dyspneic, no c/o pain.    78yo Sinhala speaking F seen with 2 sons whom she prefers to do translation.  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 Fuller on Sept 3 with Dr. Fuller.  Pt presents to ER with c/o SOB and CP, found to have b/l pleural effusions L>R on CXR.  She denies nausea, vomiting, diarrhea, dizziness, fever or chills.      PAST MEDICAL & SURGICAL HISTORY:  CHF exacerbation  Atrial fibrillation  Diabetes  Hypertension  Stented coronary artery  Coronary stent restenosis, sequela  Afib  Hypertension  History of ankle surgery: right orif  Post PTCA: 8 stents  H/O hernia repair: &#x27;s  Cataract  History of appendectomy      MEDICATIONS  (STANDING):  amLODIPine   Tablet 5 milliGRAM(s) Oral daily  ascorbic acid 500 milliGRAM(s) Oral daily  aspirin enteric coated 81 milliGRAM(s) Oral daily  atorvastatin 80 milliGRAM(s) Oral at bedtime  dextrose 50% Injectable 12.5 Gram(s) IV Push once  dextrose 50% Injectable 25 Gram(s) IV Push once  dextrose 50% Injectable 25 Gram(s) IV Push once  docusate sodium 100 milliGRAM(s) Oral three times a day  ferrous sulfate Oral Tab/Cap - Peds 325 milliGRAM(s) Oral three times a day with meals  folic acid 1 milliGRAM(s) Oral daily  furosemide   Injectable 40 milliGRAM(s) IV Push two times a day  insulin glargine Injectable (LANTUS) 20 Unit(s) SubCutaneous at bedtime  insulin lispro (HumaLOG) corrective regimen sliding scale   SubCutaneous Before meals and at bedtime  insulin lispro Injectable (HumaLOG) 10 Unit(s) SubCutaneous three times a day before meals  magnesium oxide 400 milliGRAM(s) Oral two times a day with meals  methimazole 10 milliGRAM(s) Oral daily  metoprolol succinate ER 50 milliGRAM(s) Oral daily  multivitamin 1 Tablet(s) Oral daily  pantoprazole    Tablet 40 milliGRAM(s) Oral before breakfast  sodium chloride 0.9% lock flush 3 milliLiter(s) IV Push every 8 hours    MEDICATIONS  (PRN):  acetaminophen   Tablet .. 650 milliGRAM(s) Oral every 6 hours PRN Moderate Pain (4 - 6)  oxyCODONE    5 mG/acetaminophen 325 mG 1 Tablet(s) Oral every 6 hours PRN Moderate Pain (4 - 6)    Antiplatelet therapy:                           Last dose/amt:  Coumadin 4 mg on     Allergies: No Known Allergies  OHS (Unknown)      SOCIAL HISTORY:  Smoker: [ ] Yes  [x ] No        PACK YEARS:                         WHEN QUIT?  ETOH use: [ ] Yes  [x ] No              FREQUENCY / QUANTITY:  Ilicit Drug use:  [ ] Yes  [x ] No  Occupation:  Homemaker  Live with:  Son  Assist device use:  NO    Relevant Family History  FAMILY HISTORY:  Family history of early CAD: brother mother  FHx: breast cancer: mother      Review of Systems  GENERAL:  Fevers[] chills[] sweats[] fatigue[] weight loss[] weight gain []                                      [x ] NEGATIVE  NEURO:  parathesias[] seizures []  syncope []  confusion []                                                                [x ] NEGATIVE                 EYES: glasses[]  blurry vision[]  discharge[] pain[] glaucoma []                                                           [x ] NEGATIVE                 ENMT:  difficulty hearing []  vertigo[]  dysphagia[] epistaxis[] recent dental work []                     [x ] NEGATIVE                 CV:  chest pain[x] palpitations[] FLETCHER [] diaphoresis [] edema[x]                                                           [ ] NEGATIVE                                 RESPIRATORY:  wheezing[] SOB[x] cough [] sputum[] hemoptysis[]                                                   [ ] NEGATIVE               GI:  nausea[]  vomiting []  diarrhea[] constipation [] melena []                                                          [x ] NEGATIVE            : hematuria[ ]  dysuria[ ] urgency[] incontinence[]                                                                          [x ] NEGATIVE                    MUSKULOSKELETAL:  arthritis[ ]  joint swelling [ ] muscle weakness [ ]                                            [x ] NEGATIVE                     SKIN/BREAST:  rash[ ] itching [ ]  hair loss[ ] masses[ ]                                                                          [x ] NEGATIVE                     PSYCH:  dementia [ ] depression [ ] anxiety[ ]                                                                                          [x ] NEGATIVE                        HEME/LYMPH:  bruises easily[ ] enlarged lymph nodes[ ] tender lymph nodes[ ]                           [x ] NEGATIVE                      ENDOCRINE:  cold intolerance[ ] heat intolerance[ ] polydipsia[ ]                                                      [x ] NEGATIVE                                                                                                 Vital Signs Last 24 Hrs  T(C): 36.9 (23 Sep 2019 07:27), Max: 37.5 (23 Sep 2019 00:46)  T(F): 98.4 (23 Sep 2019 07:27), Max: 99.5 (23 Sep 2019 00:46)  HR: 82 (23 Sep 2019 07:00) (82 - 128)  BP: 103/54 (23 Sep 2019 07:00) (97/56 - 148/80)  BP(mean): 76 (23 Sep 2019 07:00) (71 - 109)  RR: 20 (23 Sep 2019 04:00) (18 - 20)  SpO2: 100% (23 Sep 2019 07:00) (94% - 100%)                                                          LABS:                        9.2    7.09  )-----------( 460      ( 23 Sep 2019 07:05 )             29.2     09-    135  |  96<L>  |  24.0<H>  ----------------------------<  209<H>  4.5   |  26.0  |  0.88    Ca    9.2      23 Sep 2019 07:05  Mg     2.0         TPro  7.6  /  Alb  3.7  /  TBili  0.6  /  DBili  x   /  AST  37<H>  /  ALT  24  /  AlkPhos  139<H>  09-22    PT/INR - ( 23 Sep 2019 07:05 )   PT: 36.3 sec;   INR: 3.05 ratio         PTT - ( 23 Sep 2019 07:05 )  PTT:39.7 sec  Urinalysis Basic - ( 22 Sep 2019 20:40 )    Color: Yellow / Appearance: Clear / S.015 / pH: x  Gluc: x / Ketone: Negative  / Bili: Negative / Urobili: Negative mg/dL   Blood: x / Protein: 30 mg/dL / Nitrite: Negative   Leuk Esterase: Negative / RBC: Negative /HPF / WBC 0-2   Sq Epi: x / Non Sq Epi: Occasional / Bacteria: Occasional      CARDIAC MARKERS ( 23 Sep 2019 07:05 )  x     / 0.06 ng/mL / 35 U/L / x     / x      CARDIAC MARKERS ( 22 Sep 2019 19:14 )  x     / 0.07 ng/mL / x     / x     / x              TTE:  Completed, results pending Subjective:  78yo F mildly dyspneic, no c/o pain.    78yo Italian speaking F seen with 2 sons whom she prefers to do translation.  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 Fuller on Sept 3 with Dr. Fuller.  Son states they had called Dr. Fuller during the week and Spironolactone was added on Friday,  to aide in diuresis, she was advised to present to ER in the event lower ext edema and SOB did not improve.    Pt presents to ER with c/o SOB and CP, found to have b/l pleural effusions L>R on CXR.  She denies nausea, vomiting, diarrhea, dizziness, fever or chills.      PAST MEDICAL & SURGICAL HISTORY:  CHF exacerbation  Atrial fibrillation  Diabetes  Hypertension  Stented coronary artery  Coronary stent restenosis, sequela  Afib  Hypertension  History of ankle surgery: right orif  Post PTCA: 8 stents  H/O hernia repair: &#x27;s  Cataract  History of appendectomy      MEDICATIONS  (STANDING):  amLODIPine   Tablet 5 milliGRAM(s) Oral daily  ascorbic acid 500 milliGRAM(s) Oral daily  aspirin enteric coated 81 milliGRAM(s) Oral daily  atorvastatin 80 milliGRAM(s) Oral at bedtime  dextrose 50% Injectable 12.5 Gram(s) IV Push once  dextrose 50% Injectable 25 Gram(s) IV Push once  dextrose 50% Injectable 25 Gram(s) IV Push once  docusate sodium 100 milliGRAM(s) Oral three times a day  ferrous sulfate Oral Tab/Cap - Peds 325 milliGRAM(s) Oral three times a day with meals  folic acid 1 milliGRAM(s) Oral daily  furosemide   Injectable 40 milliGRAM(s) IV Push two times a day  insulin glargine Injectable (LANTUS) 20 Unit(s) SubCutaneous at bedtime  insulin lispro (HumaLOG) corrective regimen sliding scale   SubCutaneous Before meals and at bedtime  insulin lispro Injectable (HumaLOG) 10 Unit(s) SubCutaneous three times a day before meals  magnesium oxide 400 milliGRAM(s) Oral two times a day with meals  methimazole 10 milliGRAM(s) Oral daily  metoprolol succinate ER 50 milliGRAM(s) Oral daily  multivitamin 1 Tablet(s) Oral daily  pantoprazole    Tablet 40 milliGRAM(s) Oral before breakfast  sodium chloride 0.9% lock flush 3 milliLiter(s) IV Push every 8 hours    MEDICATIONS  (PRN):  acetaminophen   Tablet .. 650 milliGRAM(s) Oral every 6 hours PRN Moderate Pain (4 - 6)  oxyCODONE    5 mG/acetaminophen 325 mG 1 Tablet(s) Oral every 6 hours PRN Moderate Pain (4 - 6)    Antiplatelet therapy:                           Last dose/amt:  Coumadin 4 mg on     Allergies: No Known Allergies  OHS (Unknown)      SOCIAL HISTORY:  Smoker: [ ] Yes  [x ] No        PACK YEARS:                         WHEN QUIT?  ETOH use: [ ] Yes  [x ] No              FREQUENCY / QUANTITY:  Ilicit Drug use:  [ ] Yes  [x ] No  Occupation:  Homemaker  Live with:  Son  Assist device use:  NO    Relevant Family History  FAMILY HISTORY:  Family history of early CAD: brother mother  FHx: breast cancer: mother      Review of Systems  GENERAL:  Fevers[] chills[] sweats[] fatigue[] weight loss[] weight gain []                                      [x ] NEGATIVE  NEURO:  parathesias[] seizures []  syncope []  confusion []                                                                [x ] NEGATIVE                 EYES: glasses[]  blurry vision[]  discharge[] pain[] glaucoma []                                                           [x ] NEGATIVE                 ENMT:  difficulty hearing []  vertigo[]  dysphagia[] epistaxis[] recent dental work []                     [x ] NEGATIVE                 CV:  chest pain[x] palpitations[] FLETCHER [] diaphoresis [] edema[x]                                                           [ ] NEGATIVE                                 RESPIRATORY:  wheezing[] SOB[x] cough [] sputum[] hemoptysis[]                                                   [ ] NEGATIVE               GI:  nausea[]  vomiting []  diarrhea[] constipation [] melena []                                                          [x ] NEGATIVE            : hematuria[ ]  dysuria[ ] urgency[] incontinence[]                                                                          [x ] NEGATIVE                    MUSKULOSKELETAL:  arthritis[ ]  joint swelling [ ] muscle weakness [ ]                                            [x ] NEGATIVE                     SKIN/BREAST:  rash[ ] itching [ ]  hair loss[ ] masses[ ]                                                                          [x ] NEGATIVE                     PSYCH:  dementia [ ] depression [ ] anxiety[ ]                                                                                          [x ] NEGATIVE                        HEME/LYMPH:  bruises easily[ ] enlarged lymph nodes[ ] tender lymph nodes[ ]                           [x ] NEGATIVE                      ENDOCRINE:  cold intolerance[ ] heat intolerance[ ] polydipsia[ ]                                                      [x ] NEGATIVE                                                                                                 Vital Signs Last 24 Hrs  T(C): 36.9 (23 Sep 2019 07:27), Max: 37.5 (23 Sep 2019 00:46)  T(F): 98.4 (23 Sep 2019 07:27), Max: 99.5 (23 Sep 2019 00:46)  HR: 82 (23 Sep 2019 07:00) (82 - 128)  BP: 103/54 (23 Sep 2019 07:00) (97/56 - 148/80)  BP(mean): 76 (23 Sep 2019 07:00) (71 - 109)  RR: 20 (23 Sep 2019 04:00) (18 - 20)  SpO2: 100% (23 Sep 2019 07:00) (94% - 100%)                                                          LABS:                        9.2    7.09  )-----------( 460      ( 23 Sep 2019 07:05 )             29.2     09-23    135  |  96<L>  |  24.0<H>  ----------------------------<  209<H>  4.5   |  26.0  |  0.88    Ca    9.2      23 Sep 2019 07:05  Mg     2.0     09-23    TPro  7.6  /  Alb  3.7  /  TBili  0.6  /  DBili  x   /  AST  37<H>  /  ALT  24  /  AlkPhos  139<H>  09-22    PT/INR - ( 23 Sep 2019 07:05 )   PT: 36.3 sec;   INR: 3.05 ratio         PTT - ( 23 Sep 2019 07:05 )  PTT:39.7 sec  Urinalysis Basic - ( 22 Sep 2019 20:40 )    Color: Yellow / Appearance: Clear / S.015 / pH: x  Gluc: x / Ketone: Negative  / Bili: Negative / Urobili: Negative mg/dL   Blood: x / Protein: 30 mg/dL / Nitrite: Negative   Leuk Esterase: Negative / RBC: Negative /HPF / WBC 0-2   Sq Epi: x / Non Sq Epi: Occasional / Bacteria: Occasional      CARDIAC MARKERS ( 23 Sep 2019 07:05 )  x     / 0.06 ng/mL / 35 U/L / x     / x      CARDIAC MARKERS ( 22 Sep 2019 19:14 )  x     / 0.07 ng/mL / x     / x     / x              TTE:  Completed, results pending

## 2019-09-23 NOTE — ED ADULT NURSE REASSESSMENT NOTE - NS ED NURSE REASSESS COMMENT FT1
Echo tech at bedside to preform exam at this time, pt to be transported to unit after testing is completed. Pt safety maintained, side rails up, stretcher in lowest position and wheels locked, call bell within reach and use reinforced by RN. Pt and family aware of plan of care.
Pt is resting comfortably in bedside chair again at this time, awaiting echo, no apparent distress noted. Pt denies any pain or discomforts at this time. Pt denies chest pain presently and denies shortness of breath, maintaining O2 sat at % on room air at this time, Respirations are spontaneous even and unlabored.  Pt safety maintained. Call bell within reach and RN reinforced use of call bell when needed.  RN continues to update pt on plan of care. Pt expresses understanding to RN. RN will continue to update pt regarding plan of care throughout ED stay.
Assumed pt care from RN GL, charting as noted. Pt sitting in bedside chair, family at bedside, VS stable as noted on flow sheet. Pt is A+OX4, offers no complaints of pain or discomfort though pt does complain of dyspnea on exertion and states chest pain starts when she becomes short of breath. Pt with surgical scar noted to sternum s/p open heart CABG x2, two weeks ago. Pt assisted back into stretcher from bed, safety maintained, side rails up, stretcher in lowest position and wheels locked, call bell within reach and use reinforced by RN. Pt educated on plan of care, able to successfully teach back plan of care to RN. RN will continue to update pt throughout ED stay.

## 2019-09-23 NOTE — H&P ADULT - ASSESSMENT
80yo Welsh speaking F seen with 2 sons whom she prefers to do translation.  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 Fuller on Sept 3 with Dr. Fuller.  Pt presents to ER with c/o SOB and CP, found to have b/l pleural effusions L>R on CXR.  She denies nausea, vomiting, diarrhea, dizziness, fever or chills.    Admit to CT - ICU.  Aggressive diuresis, repeat CXR in am, may require pigtail CT on left.  Echo completed, will review with team.  Case d/w Dr. Avila and agrees with above.      E/M TIME SPENT:   68 minutes of noncontinuous time spent   Evaluating/treating patient, reviewing data/labs/imaging, discussing case with multidisciplinary team, discussing plan/goals of care with patient/family about patient's condition . Non-inclusive of procedure time.   greater than 50% of time spent educating patient about condition, medication and prognosis.

## 2019-09-23 NOTE — CONSULT NOTE ADULT - ASSESSMENT
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 Fuller 9/3/19.  Son states they had called Dr. Fuller during the week d/t increased SOB and edema, and Spironolactone was added on Friday, 9/20 to aide in diuresis, she was advised to present to ER in the event lower ext edema and SOB did not improve.  Found to have b/l pleural effusions L>R on CXR. Currently pt is s/p Left pigtail, removal of 600ml serosanguineous fluid.  Since IV Lasix and pigtail patient reports improvement in SOB, c/t c/o mid sternal chest pain at surgical site which is well approximated with no s/s of infection.     1. HfrEF  - Monitor on telemetry    - TTE 9/23: EF <20%, biventricular HF, b/l large pleural effusion, small pericardial effusion  - BnP 3649  - c/w Lasix 40mg IV BID  - Strict I/O and daily standing weights (if possible)  - Keep K > 4, Mg > 2    - Monitor renal function with ongoing diuresis   - Digoxin 0.125mg PO QD  - Metolazone 2.5mg PO PRN  - likely post op cardiomyopathy, less likely ischemic event, re-eval w/TTE when more stable  - Lifevest on DC  - when more stable consider rhythm control structure  - c/w Losartan, Toprol    2. Persistent AFib  - daily coumadin dose adjustment for INR goal of 3  - add Digoxin  - c/w Toprol    3. HTN  - c/w Losartan, Toprol, Lasix  - DC amlodipine    4. CAD  - c/w ASA, BB, statin

## 2019-09-23 NOTE — H&P ADULT - NSHPPHYSICALEXAM_GEN_ALL_CORE
PHYSICAL EXAM  General: Well nourished, well developed, mild distress when ambulating.                                                         Neuro: Normal exam oriented to person/place & time with no focal motor or sensory  deficits.                     ENT: Normal exam of nasal/oral mucosa with absence of cyanosis.   Neck: Normal exam of jugular veins, trachea & thyroid.   Chest: Diminished BS b/l bases, absence of wheezes, rales, or rhonchi:                                                                          CV:  Auscultation: normal [x ] S3[ ] S4[ ] Irregular [ ] Rub[ ] Clicks[ ]  Murmurs none:[ ]systolic [ ]  diastolic [ ] holosystolic   Abdomen:  soft, nontender, nondistended, positive bowel sounds  Extremities:  warm, well perfused, 3+ edema b/l lower exts, +DP pulses

## 2019-09-23 NOTE — CONSULT NOTE ADULT - ATTENDING COMMENTS
severe BI v failure decompensated.. likely non ischemic EF < 20%>  Ct Aggressive diuresis. rate control for now. Rhythm control once compensated.   Incentive spirometer.  Lifevest on discharge.   d/c amlodipine. beta-blocker and ARB.  Critical care cardiology.  I have personally provided critical care time > 45 mins excluding time spent on separate procedures.

## 2019-09-23 NOTE — H&P ADULT - PROBLEM SELECTOR PLAN 1
Aggressive diuresis, Lasix 40 mg IV stat and BID, monitor closely, dose may need uptitrating.  Repeat CXR, consider placement of Left pigtail CT for pleural effusion  Cardiology consult for optimal medical management  Continue ICU Care for close monitoring  Daily labs, CXR, strict I/Os

## 2019-09-23 NOTE — CONSULT NOTE ADULT - SUBJECTIVE AND OBJECTIVE BOX
HPI:  Subjective:  80yo F mildly dyspneic, no c/o pain.    80yo Turkish speaking F seen with 2 sons whom she prefers to do translation.  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  Son states they had called Dr. Fuller during the week and Spironolactone was added recently and she was advised to present to ER in the event lower ext edema and SOB did not improve.    Pt presents to ER with c/o SOB and CP, found to have b/l pleural effusions L>R on CXR.  She denies nausea, vomiting, diarrhea, dizziness, fever or chills.      PAST MEDICAL & SURGICAL HISTORY:  CHF exacerbation  Atrial fibrillation  Diabetes  Hypertension  Stented coronary artery  Coronary stent restenosis, sequela  Afib  Hypertension  History of ankle surgery: right orif  Post PTCA: 8 stents  H/O hernia repair: &#x27;s  Cataract  History of appendectomy    MEDICATIONS  (STANDING):  amLODIPine   Tablet 5 milliGRAM(s) Oral daily  ascorbic acid 500 milliGRAM(s) Oral daily  aspirin enteric coated 81 milliGRAM(s) Oral daily  atorvastatin 80 milliGRAM(s) Oral at bedtime  dextrose 50% Injectable 12.5 Gram(s) IV Push once  dextrose 50% Injectable 25 Gram(s) IV Push once  dextrose 50% Injectable 25 Gram(s) IV Push once  docusate sodium 100 milliGRAM(s) Oral three times a day  ferrous sulfate Oral Tab/Cap - Peds 325 milliGRAM(s) Oral three times a day with meals  folic acid 1 milliGRAM(s) Oral daily  furosemide   Injectable 40 milliGRAM(s) IV Push two times a day  insulin glargine Injectable (LANTUS) 20 Unit(s) SubCutaneous at bedtime  insulin lispro (HumaLOG) corrective regimen sliding scale   SubCutaneous Before meals and at bedtime  insulin lispro Injectable (HumaLOG) 10 Unit(s) SubCutaneous three times a day before meals  magnesium oxide 400 milliGRAM(s) Oral two times a day with meals  methimazole 10 milliGRAM(s) Oral daily  metoprolol succinate ER 50 milliGRAM(s) Oral daily  multivitamin 1 Tablet(s) Oral daily  pantoprazole    Tablet 40 milliGRAM(s) Oral before breakfast  sodium chloride 0.9% lock flush 3 milliLiter(s) IV Push every 8 hours    MEDICATIONS  (PRN):  acetaminophen   Tablet .. 650 milliGRAM(s) Oral every 6 hours PRN Moderate Pain (4 - 6)  oxyCODONE    5 mG/acetaminophen 325 mG 1 Tablet(s) Oral every 6 hours PRN Moderate Pain (4 - 6)    Allergies: No Known Allergies  OHS (Unknown)    SOCIAL HISTORY:  Smoker: [ ] Yes  [x ] No        PACK YEARS:                         WHEN QUIT?  ETOH use: [ ] Yes  [x ] No              FREQUENCY / QUANTITY:  Ilicit Drug use:  [ ] Yes  [x ] No  Occupation:  Homemaker  Live with:  Son  Assist device use:  NO    Relevant Family History  FAMILY HISTORY:  Family history of early CAD: brother mother  FHx: breast cancer: mother      Review of Systems  GENERAL:  Fevers[] chills[] sweats[] fatigue[] weight loss[] weight gain []                                      [x ] NEGATIVE  NEURO:  parathesias[] seizures []  syncope []  confusion []                                                                [x ] NEGATIVE                 EYES: glasses[]  blurry vision[]  discharge[] pain[] glaucoma []                                                           [x ] NEGATIVE                 ENMT:  difficulty hearing []  vertigo[]  dysphagia[] epistaxis[] recent dental work []                     [x ] NEGATIVE                 CV:  chest pain[x] palpitations[] FLETCHER [] diaphoresis [] edema[x]                                                           [ ] NEGATIVE                                 RESPIRATORY:  wheezing[] SOB[x] cough [] sputum[] hemoptysis[]                                                   [ ] NEGATIVE               GI:  nausea[]  vomiting []  diarrhea[] constipation [] melena []                                                          [x ] NEGATIVE            : hematuria[ ]  dysuria[ ] urgency[] incontinence[]                                                                          [x ] NEGATIVE                    MUSKULOSKELETAL:  arthritis[ ]  joint swelling [ ] muscle weakness [ ]                                            [x ] NEGATIVE                     SKIN/BREAST:  rash[ ] itching [ ]  hair loss[ ] masses[ ]                                                                          [x ] NEGATIVE                     PSYCH:  dementia [ ] depression [ ] anxiety[ ]                                                                                          [x ] NEGATIVE                        HEME/LYMPH:  bruises easily[ ] enlarged lymph nodes[ ] tender lymph nodes[ ]                           [x ] NEGATIVE                      ENDOCRINE:  cold intolerance[ ] heat intolerance[ ] polydipsia[ ]                                                      [x ] NEGATIVE                                                                                              Vital Signs Last 24 Hrs  T(C): 36.9 (23 Sep 2019 07:27), Max: 37.5 (23 Sep 2019 00:46)  T(F): 98.4 (23 Sep 2019 07:27), Max: 99.5 (23 Sep 2019 00:46)  HR: 82 (23 Sep 2019 07:00) (82 - 128)  BP: 103/54 (23 Sep 2019 07:00) (97/56 - 148/80)  BP(mean): 76 (23 Sep 2019 07:00) (71 - 109)  RR: 20 (23 Sep 2019 04:00) (18 - 20)  SpO2: 100% (23 Sep 2019 07:00) (94% - 100%)                                                          LABS:                        9.2    7.09  )-----------( 460      ( 23 Sep 2019 07:05 )             29.2         135  |  96<L>  |  24.0<H>  ----------------------------<  209<H>  4.5   |  26.0  |  0.88    Ca    9.2      23 Sep 2019 07:05  Mg     2.0         TPro  7.6  /  Alb  3.7  /  TBili  0.6  /  DBili  x   /  AST  37<H>  /  ALT  24  /  AlkPhos  139<H>  09-22    PT/INR - ( 23 Sep 2019 07:05 )   PT: 36.3 sec;   INR: 3.05 ratio         PTT - ( 23 Sep 2019 07:05 )  PTT:39.7 sec  Urinalysis Basic - ( 22 Sep 2019 20:40 )    Color: Yellow / Appearance: Clear / S.015 / pH: x  Gluc: x / Ketone: Negative  / Bili: Negative / Urobili: Negative mg/dL   Blood: x / Protein: 30 mg/dL / Nitrite: Negative   Leuk Esterase: Negative / RBC: Negative /HPF / WBC 0-2   Sq Epi: x / Non Sq Epi: Occasional / Bacteria: Occasional      CARDIAC MARKERS ( 23 Sep 2019 07:05 )  x     / 0.06 ng/mL / 35 U/L / x     / x      CARDIAC MARKERS ( 22 Sep 2019 19:14 )  x     / 0.07 ng/mL / x     / x     / x        TTE:  Completed, results pending (23 Sep 2019 07:52)      PAST MEDICAL & SUrGICAL HISTORY:  CHF exacerbation  Atrial fibrillation  Diabetes  Hypertension  Stented coronary artery  Coronary stent restenosis, sequela  Afib  Hypertension  S/P CABG x 2: s/p C2L/TV Repair/MVR/ NISSA Clip  History of ankle surgery: right orif  Post PTCA: 8 stents  H/O hernia repair: &#x27;s  Cataract  History of appendectomy    FAMILY HISTORY:  Family history of early CAD: brother mother  FHx: breast cancer: mother    SOCIAL HISTORY: no etoh, no tobacco     REVIEW OF SYSTEMS:  Constitutional: No fever, no chills, no change in weight.  Eyes: No eye swelling,no  blurry vision, no redness, no loss of vision.  Neck: No neck pain, no change in voice.  Lungs: No shortness of breath, no wheezing, no cough  CV: No chest pain, no palpitations, no pain with walking.  GI: No nausea, no vomiting, no constipation, no diarrhea, no abdominal pain  : No urinary frequency, no blood in urine  Musculoskeletal: No muscle pain, no joint pain, no swelling.  Skin: No rash  Neurologic: No headaches, no weakness, no burning or pain in feet  Endocrine: No heat intolerance, no cold intolerance  Psych: No depression, no anxiety    MEDICATIONS  (STANDING):  amLODIPine   Tablet 5 milliGRAM(s) Oral daily  ascorbic acid 500 milliGRAM(s) Oral daily  aspirin enteric coated 81 milliGRAM(s) Oral daily  atorvastatin 80 milliGRAM(s) Oral at bedtime  dextrose 50% Injectable 12.5 Gram(s) IV Push once  dextrose 50% Injectable 25 Gram(s) IV Push once  dextrose 50% Injectable 25 Gram(s) IV Push once  docusate sodium 100 milliGRAM(s) Oral three times a day  ferrous sulfate Oral Tab/Cap - Peds 325 milliGRAM(s) Oral three times a day with meals  folic acid 1 milliGRAM(s) Oral daily  furosemide   Injectable 40 milliGRAM(s) IV Push two times a day  insulin glargine Injectable (LANTUS) 20 Unit(s) SubCutaneous at bedtime  insulin lispro (HumaLOG) corrective regimen sliding scale   SubCutaneous Before meals and at bedtime  insulin lispro Injectable (HumaLOG) 10 Unit(s) SubCutaneous three times a day before meals  magnesium oxide 400 milliGRAM(s) Oral two times a day with meals  methimazole 10 milliGRAM(s) Oral daily  metoprolol succinate ER 50 milliGRAM(s) Oral daily  multivitamin 1 Tablet(s) Oral daily  pantoprazole    Tablet 40 milliGRAM(s) Oral before breakfast  sodium chloride 0.9% lock flush 3 milliLiter(s) IV Push every 8 hours    MEDICATIONS  (PRN):  acetaminophen   Tablet .. 650 milliGRAM(s) Oral every 6 hours PRN Moderate Pain (4 - 6)  oxyCODONE    5 mG/acetaminophen 325 mG 1 Tablet(s) Oral every 6 hours PRN Moderate Pain (4 - 6)    Allergies  No Known Allergies    Intolerances  OHS (Unknown)    CAPILLARY BLOOD GLUCOSE  POCT Blood Glucose.: 321 mg/dL (23 Sep 2019 02:08)      PHYSICAL EXAM:    Vital Signs Last 24 Hrs  T(C): 36.9 (23 Sep 2019 12:45), Max: 37.5 (23 Sep 2019 00:46)  T(F): 98.5 (23 Sep 2019 12:45), Max: 99.5 (23 Sep 2019 00:46)  HR: 92 (23 Sep 2019 15:00) (82 - 128)  BP: 90/55 (23 Sep 2019 15:00) (90/55 - 148/80)  BP(mean): 66 (23 Sep 2019 15:00) (66 - 109)  RR: 15 (23 Sep 2019 15:00) (14 - 20)  SpO2: 94% (23 Sep 2019 15:00) (94% - 100%)    General appearance: Well developed, well nourished.  Eyes: Pupils equal and reactive to light. EOM full. No exophthalmos.  Neck: Trachea midline. No thyroid enlargement.  Lungs: Normal respiratory excursion. Lungs clear.  CV: Regular cardiac rhythm. No murmur. Carotid and pedal pulses intact.  Abdomen: Soft, non tender, no organomegaly or mass.  Musculoskeletal: No cyanosis, clubbing, or edema. No pedal lesions.  Skin: Warm and moist. No rash. No acanthosis.  Neuro: Cranial nerves intact. Normal motor and sensory function. DTR's normal.  Psych: Normal affect, good judgement.      LABS:                        9.2    7.09  )-----------( 460      ( 23 Sep 2019 07:05 )             29.2     09-23    135  |  96<L>  |  24.0<H>  ----------------------------<  209<H>  4.5   |  26.0  |  0.88    Ca    9.2      23 Sep 2019 07:05  Mg     2.0         TPro  7.6  /  Alb  3.7  /  TBili  0.6  /  DBili  x   /  AST  37<H>  /  ALT  24  /  AlkPhos  139<H>      Urinalysis Basic - ( 22 Sep 2019 20:40 )    Color: Yellow / Appearance: Clear / S.015 / pH: x  Gluc: x / Ketone: Negative  / Bili: Negative / Urobili: Negative mg/dL   Blood: x / Protein: 30 mg/dL / Nitrite: Negative   Leuk Esterase: Negative / RBC: Negative /HPF / WBC 0-2   Sq Epi: x / Non Sq Epi: Occasional / Bacteria: Occasional  LIVER FUNCTIONS - ( 22 Sep 2019 19:14 )  Alb: 3.7 g/dL / Pro: 7.6 g/dL / ALK PHOS: 139 U/L / ALT: 24 U/L / AST: 37 U/L / GGT: x

## 2019-09-23 NOTE — CONSULT NOTE ADULT - ASSESSMENT
78yo Belarusian speaking F seen with 2 sons whom she prefers to do translation.  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    Problem: CHF exacerbation.  Plan: Aggressive diuresis  Cardiology consult for optimal medical management  Continue ICU Care for close monitoring    Atrial fibrillation.  Plan: Continue Coumadin when acceptable practice in reference to procedures  Continue Lopressor and Amiodarone    Diabetes.  Plan: Home Lantus dose 30 uts at bedtime.    lantus 20 u Hs   continue humalog 10 u tID w meals  check bgs actid and hs   slliding SCale insulin coverage.     Hypertension.  Plan: Continue Lopressor, Norvasc and Amio at home doses. 80yo Yi speaking F seen with 2 sons whom she prefers to do translation.  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    Problem: CHF exacerbation.  Plan: Aggressive diuresis  Cardiology consult for optimal medical management  Continue ICU Care for close monitoring    Atrial fibrillation.  Plan: Continue Coumadin when acceptable practice in reference to procedures  Continue Lopressor and Amiodarone    Diabetes.  Plan: Home Lantus dose 30 uts at bedtime.    continue lantus 20 u Hs   continue humalog 10 u tID w meals. will see 24hr response to this regimen and consider adjusting doses   check bgs actid and hs   slliding SCale insulin coverage.     Hypertension.  Plan: Continue Lopressor, Norvasc and Amio at home doses.

## 2019-09-23 NOTE — ED ADULT NURSE REASSESSMENT NOTE - NEURO WDL
Alert and oriented to person, place and time, behavior appropriate to situation, PERRL.
Alert and oriented to person, place and time, behavior appropriate to situation.

## 2019-09-23 NOTE — PROCEDURE NOTE - ADDITIONAL PROCEDURE DETAILS
Pigtail placed on second attempt. Unable to pass wire into pleural space on first attempt despite proper needle position and fluid return.   Small PTX on left noted on post-procedure CXR, pigtail in good position. Will continue to monitor. Check repeat CXR in am.

## 2019-09-23 NOTE — H&P ADULT - PROBLEM SELECTOR PLAN 3
Home Lantus dose 30 uts at bedtime.  Son has kept log which reveals pt glucose in 60s last 8/10 mornings  Start Lantus at 20 uts at bedtime and observe  Continue 10 uts Humalog pre meal tid  Accuchecks ACHS  Sliding SCale insulin coverage.

## 2019-09-23 NOTE — H&P ADULT - PROBLEM SELECTOR PLAN 4
Continue Lopressor, Norvasc and Amio at home doses Continue Lopressor and Norvasc at home doses Continue Toprol XL and Norvasc at home doses

## 2019-09-23 NOTE — H&P ADULT - PROBLEM SELECTOR PLAN 2
Continue Coumadin when acceptable practice in reference to procedures  Continue Lopressor and Amiodarone  BMP daily, optimize K and Mg Continue Coumadin when acceptable practice in reference to procedures  Continue Toprol XL  BMP daily, optimize K and Mg

## 2019-09-24 LAB
ANION GAP SERPL CALC-SCNC: 13 MMOL/L — SIGNIFICANT CHANGE UP (ref 5–17)
ANION GAP SERPL CALC-SCNC: 14 MMOL/L — SIGNIFICANT CHANGE UP (ref 5–17)
APPEARANCE UR: CLEAR — SIGNIFICANT CHANGE UP
BILIRUB UR-MCNC: NEGATIVE — SIGNIFICANT CHANGE UP
BUN SERPL-MCNC: 31 MG/DL — HIGH (ref 8–20)
BUN SERPL-MCNC: 32 MG/DL — HIGH (ref 8–20)
CALCIUM SERPL-MCNC: 8.8 MG/DL — SIGNIFICANT CHANGE UP (ref 8.6–10.2)
CALCIUM SERPL-MCNC: 9.1 MG/DL — SIGNIFICANT CHANGE UP (ref 8.6–10.2)
CHLORIDE SERPL-SCNC: 91 MMOL/L — LOW (ref 98–107)
CHLORIDE SERPL-SCNC: 93 MMOL/L — LOW (ref 98–107)
CO2 SERPL-SCNC: 25 MMOL/L — SIGNIFICANT CHANGE UP (ref 22–29)
CO2 SERPL-SCNC: 29 MMOL/L — SIGNIFICANT CHANGE UP (ref 22–29)
COLOR SPEC: YELLOW — SIGNIFICANT CHANGE UP
CREAT SERPL-MCNC: 0.98 MG/DL — SIGNIFICANT CHANGE UP (ref 0.5–1.3)
CREAT SERPL-MCNC: 1.11 MG/DL — SIGNIFICANT CHANGE UP (ref 0.5–1.3)
DIFF PNL FLD: NEGATIVE — SIGNIFICANT CHANGE UP
GLUCOSE BLDC GLUCOMTR-MCNC: 204 MG/DL — HIGH (ref 70–99)
GLUCOSE SERPL-MCNC: 129 MG/DL — HIGH (ref 70–115)
GLUCOSE SERPL-MCNC: 163 MG/DL — HIGH (ref 70–115)
GLUCOSE UR QL: NEGATIVE MG/DL — SIGNIFICANT CHANGE UP
HBA1C BLD-MCNC: 6.1 % — HIGH (ref 4–5.6)
HCT VFR BLD CALC: 28.3 % — LOW (ref 34.5–45)
HGB BLD-MCNC: 8.7 G/DL — LOW (ref 11.5–15.5)
INR BLD: 2.5 RATIO — HIGH (ref 0.88–1.16)
KETONES UR-MCNC: NEGATIVE — SIGNIFICANT CHANGE UP
LEUKOCYTE ESTERASE UR-ACNC: NEGATIVE — SIGNIFICANT CHANGE UP
MAGNESIUM SERPL-MCNC: 2 MG/DL — SIGNIFICANT CHANGE UP (ref 1.6–2.6)
MAGNESIUM SERPL-MCNC: 2 MG/DL — SIGNIFICANT CHANGE UP (ref 1.6–2.6)
MCHC RBC-ENTMCNC: 28.9 PG — SIGNIFICANT CHANGE UP (ref 27–34)
MCHC RBC-ENTMCNC: 30.7 GM/DL — LOW (ref 32–36)
MCV RBC AUTO: 94 FL — SIGNIFICANT CHANGE UP (ref 80–100)
NITRITE UR-MCNC: NEGATIVE — SIGNIFICANT CHANGE UP
PH UR: 6.5 — SIGNIFICANT CHANGE UP (ref 5–8)
PLATELET # BLD AUTO: 433 K/UL — HIGH (ref 150–400)
POTASSIUM SERPL-MCNC: 4.2 MMOL/L — SIGNIFICANT CHANGE UP (ref 3.5–5.3)
POTASSIUM SERPL-MCNC: 4.3 MMOL/L — SIGNIFICANT CHANGE UP (ref 3.5–5.3)
POTASSIUM SERPL-SCNC: 4.2 MMOL/L — SIGNIFICANT CHANGE UP (ref 3.5–5.3)
POTASSIUM SERPL-SCNC: 4.3 MMOL/L — SIGNIFICANT CHANGE UP (ref 3.5–5.3)
PROT UR-MCNC: NEGATIVE MG/DL — SIGNIFICANT CHANGE UP
PROTHROM AB SERPL-ACNC: 29.6 SEC — HIGH (ref 10–12.9)
RBC # BLD: 3.01 M/UL — LOW (ref 3.8–5.2)
RBC # FLD: 16.3 % — HIGH (ref 10.3–14.5)
SODIUM SERPL-SCNC: 132 MMOL/L — LOW (ref 135–145)
SODIUM SERPL-SCNC: 133 MMOL/L — LOW (ref 135–145)
SP GR SPEC: 1 — LOW (ref 1.01–1.02)
TSH SERPL-MCNC: 1.22 UIU/ML — SIGNIFICANT CHANGE UP (ref 0.27–4.2)
UROBILINOGEN FLD QL: NEGATIVE MG/DL — SIGNIFICANT CHANGE UP
WBC # BLD: 6.7 K/UL — SIGNIFICANT CHANGE UP (ref 3.8–10.5)
WBC # FLD AUTO: 6.7 K/UL — SIGNIFICANT CHANGE UP (ref 3.8–10.5)

## 2019-09-24 PROCEDURE — 99291 CRITICAL CARE FIRST HOUR: CPT

## 2019-09-24 PROCEDURE — 99232 SBSQ HOSP IP/OBS MODERATE 35: CPT

## 2019-09-24 PROCEDURE — 99024 POSTOP FOLLOW-UP VISIT: CPT

## 2019-09-24 PROCEDURE — 71045 X-RAY EXAM CHEST 1 VIEW: CPT | Mod: 26

## 2019-09-24 RX ORDER — DIGOXIN 250 MCG
0.5 TABLET ORAL ONCE
Refills: 0 | Status: COMPLETED | OUTPATIENT
Start: 2019-09-24 | End: 2019-09-24

## 2019-09-24 RX ORDER — DIGOXIN 250 MCG
0.25 TABLET ORAL DAILY
Refills: 0 | Status: DISCONTINUED | OUTPATIENT
Start: 2019-09-24 | End: 2019-09-25

## 2019-09-24 RX ORDER — SACUBITRIL AND VALSARTAN 24; 26 MG/1; MG/1
1 TABLET, FILM COATED ORAL
Refills: 0 | Status: DISCONTINUED | OUTPATIENT
Start: 2019-09-24 | End: 2019-09-24

## 2019-09-24 RX ORDER — SACUBITRIL AND VALSARTAN 24; 26 MG/1; MG/1
1 TABLET, FILM COATED ORAL
Refills: 0 | Status: DISCONTINUED | OUTPATIENT
Start: 2019-09-25 | End: 2019-09-26

## 2019-09-24 RX ORDER — WARFARIN SODIUM 2.5 MG/1
1 TABLET ORAL ONCE
Refills: 0 | Status: COMPLETED | OUTPATIENT
Start: 2019-09-24 | End: 2019-09-24

## 2019-09-24 RX ORDER — SACUBITRIL AND VALSARTAN 24; 26 MG/1; MG/1
1 TABLET, FILM COATED ORAL
Qty: 60 | Refills: 1
Start: 2019-09-24 | End: 2019-11-22

## 2019-09-24 RX ORDER — DIGOXIN 250 MCG
0.25 TABLET ORAL EVERY 8 HOURS
Refills: 0 | Status: COMPLETED | OUTPATIENT
Start: 2019-09-24 | End: 2019-09-25

## 2019-09-24 RX ORDER — ACETAMINOPHEN 500 MG
650 TABLET ORAL EVERY 6 HOURS
Refills: 0 | Status: DISCONTINUED | OUTPATIENT
Start: 2019-09-24 | End: 2019-09-30

## 2019-09-24 RX ADMIN — Medication 2: at 07:46

## 2019-09-24 RX ADMIN — Medication 0.25 MILLIGRAM(S): at 19:50

## 2019-09-24 RX ADMIN — Medication 40 MILLIGRAM(S): at 06:32

## 2019-09-24 RX ADMIN — Medication 4: at 21:33

## 2019-09-24 RX ADMIN — Medication 10 UNIT(S): at 07:46

## 2019-09-24 RX ADMIN — Medication 1 MILLIGRAM(S): at 11:09

## 2019-09-24 RX ADMIN — Medication 81 MILLIGRAM(S): at 11:25

## 2019-09-24 RX ADMIN — Medication 650 MILLIGRAM(S): at 01:09

## 2019-09-24 RX ADMIN — LOSARTAN POTASSIUM 25 MILLIGRAM(S): 100 TABLET, FILM COATED ORAL at 06:32

## 2019-09-24 RX ADMIN — MAGNESIUM OXIDE 400 MG ORAL TABLET 400 MILLIGRAM(S): 241.3 TABLET ORAL at 17:24

## 2019-09-24 RX ADMIN — Medication 325 MILLIGRAM(S): at 07:46

## 2019-09-24 RX ADMIN — ATORVASTATIN CALCIUM 80 MILLIGRAM(S): 80 TABLET, FILM COATED ORAL at 21:33

## 2019-09-24 RX ADMIN — Medication 50 MILLIGRAM(S): at 06:31

## 2019-09-24 RX ADMIN — Medication 100 MILLIGRAM(S): at 21:33

## 2019-09-24 RX ADMIN — Medication 325 MILLIGRAM(S): at 17:25

## 2019-09-24 RX ADMIN — Medication 10 UNIT(S): at 11:10

## 2019-09-24 RX ADMIN — Medication 100 MILLIGRAM(S): at 17:24

## 2019-09-24 RX ADMIN — Medication 10 UNIT(S): at 17:24

## 2019-09-24 RX ADMIN — Medication 0.5 MILLIGRAM(S): at 11:09

## 2019-09-24 RX ADMIN — OXYCODONE AND ACETAMINOPHEN 1 TABLET(S): 5; 325 TABLET ORAL at 04:57

## 2019-09-24 RX ADMIN — Medication 500 MILLIGRAM(S): at 11:10

## 2019-09-24 RX ADMIN — OXYCODONE AND ACETAMINOPHEN 1 TABLET(S): 5; 325 TABLET ORAL at 06:00

## 2019-09-24 RX ADMIN — SODIUM CHLORIDE 3 MILLILITER(S): 9 INJECTION INTRAMUSCULAR; INTRAVENOUS; SUBCUTANEOUS at 21:03

## 2019-09-24 RX ADMIN — Medication 650 MILLIGRAM(S): at 02:00

## 2019-09-24 RX ADMIN — Medication 1 TABLET(S): at 11:09

## 2019-09-24 RX ADMIN — SODIUM CHLORIDE 3 MILLILITER(S): 9 INJECTION INTRAMUSCULAR; INTRAVENOUS; SUBCUTANEOUS at 13:08

## 2019-09-24 RX ADMIN — PANTOPRAZOLE SODIUM 40 MILLIGRAM(S): 20 TABLET, DELAYED RELEASE ORAL at 06:31

## 2019-09-24 RX ADMIN — WARFARIN SODIUM 1 MILLIGRAM(S): 2.5 TABLET ORAL at 21:33

## 2019-09-24 RX ADMIN — Medication 40 MILLIGRAM(S): at 17:25

## 2019-09-24 RX ADMIN — Medication 100 MILLIGRAM(S): at 06:32

## 2019-09-24 RX ADMIN — INSULIN GLARGINE 20 UNIT(S): 100 INJECTION, SOLUTION SUBCUTANEOUS at 21:33

## 2019-09-24 RX ADMIN — AMLODIPINE BESYLATE 5 MILLIGRAM(S): 2.5 TABLET ORAL at 06:31

## 2019-09-24 RX ADMIN — Medication 2: at 11:10

## 2019-09-24 RX ADMIN — MAGNESIUM OXIDE 400 MG ORAL TABLET 400 MILLIGRAM(S): 241.3 TABLET ORAL at 07:46

## 2019-09-24 RX ADMIN — Medication 325 MILLIGRAM(S): at 11:34

## 2019-09-24 RX ADMIN — SODIUM CHLORIDE 3 MILLILITER(S): 9 INJECTION INTRAMUSCULAR; INTRAVENOUS; SUBCUTANEOUS at 06:32

## 2019-09-24 NOTE — DIETITIAN INITIAL EVALUATION ADULT. - MALNUTRITION
NFPE conducted moderate muscle wasting in temple and clavicles, moderate fat loss noted in orbitals and buccals, pt likely meeting <75% estimated calorie-energy needs >1 mo Moderate Chronic

## 2019-09-24 NOTE — PROGRESS NOTE ADULT - ASSESSMENT
78yo Estonian speaking F seen with 2 sons whom she prefers to do translation.  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  Diabetes.  Plan: Home Lantus dose 30 uts at bedtime.  Bsg better controlled 119 -184.   continue lantus 20 u Hs   continue humalog 10 u tID w meals.   check bgs actid and hs   slliding SCale insulin coverage.     Hyperthyroidism TSh normal. Pt euthyroid clinically and biochemically.   continue current dose MMi     Problem: CHF exacerbation.  Plan: Aggressive diuresis  Cardiology consult for optimal medical management  Continue ICU Care for close monitoring    Atrial fibrillation.  Plan: Continue Coumadin when acceptable practice in reference to procedures  Continue Lopressor and Amiodarone      Hypertension.  Plan: Continue Lopressor, Norvasc and Amio at home doses.

## 2019-09-24 NOTE — PROGRESS NOTE ADULT - SUBJECTIVE AND OBJECTIVE BOX
INTERVAL HPI/OVERNIGHT EVENTS:  follow up for diabetes management.   Translation done by son and daughter in law.     MEDICATIONS  (STANDING):  ascorbic acid 500 milliGRAM(s) Oral daily  aspirin enteric coated 81 milliGRAM(s) Oral daily  atorvastatin 80 milliGRAM(s) Oral at bedtime  dextrose 50% Injectable 12.5 Gram(s) IV Push once  dextrose 50% Injectable 25 Gram(s) IV Push once  dextrose 50% Injectable 25 Gram(s) IV Push once  digoxin     Tablet 0.25 milliGRAM(s) Oral daily  digoxin  Injectable 0.25 milliGRAM(s) IV Push every 8 hours  docusate sodium 100 milliGRAM(s) Oral three times a day  ferrous sulfate Oral Tab/Cap - Peds 325 milliGRAM(s) Oral three times a day with meals  folic acid 1 milliGRAM(s) Oral daily  furosemide   Injectable 40 milliGRAM(s) IV Push two times a day  insulin glargine Injectable (LANTUS) 20 Unit(s) SubCutaneous at bedtime  insulin lispro (HumaLOG) corrective regimen sliding scale   SubCutaneous Before meals and at bedtime  insulin lispro Injectable (HumaLOG) 10 Unit(s) SubCutaneous three times a day before meals  losartan 25 milliGRAM(s) Oral daily  magnesium oxide 400 milliGRAM(s) Oral two times a day with meals  methimazole 10 milliGRAM(s) Oral daily  metoprolol succinate ER 50 milliGRAM(s) Oral daily  multivitamin 1 Tablet(s) Oral daily  pantoprazole    Tablet 40 milliGRAM(s) Oral before breakfast  sodium chloride 0.9% lock flush 3 milliLiter(s) IV Push every 8 hours  warfarin 1 milliGRAM(s) Oral once    MEDICATIONS  (PRN):  acetaminophen   Tablet .. 650 milliGRAM(s) Oral every 6 hours PRN Mild Pain (1 - 3)  oxyCODONE    5 mG/acetaminophen 325 mG 1 Tablet(s) Oral every 6 hours PRN Moderate Pain (4 - 6)    Allergies  No Known Allergies    Review of systems: she has some fatigue, no SOB at rest, no cough, no sputum production     Vital Signs Last 24 Hrs  T(C): 37.2 (24 Sep 2019 13:00), Max: 37.2 (24 Sep 2019 09:00)  T(F): 99 (24 Sep 2019 13:00), Max: 99 (24 Sep 2019 13:00)  HR: 79 (24 Sep 2019 15:00) (76 - 116)  BP: 139/60 (24 Sep 2019 15:00) (92/54 - 141/63)  BP(mean): 86 (24 Sep 2019 15:00) (69 - 95)  RR: 15 (24 Sep 2019 15:00) (14 - 26)  SpO2: 96% (24 Sep 2019 15:00) (91% - 100%)    PHYSICAL EXAM:  General appearance: Well developed, well nourished.  Eyes: Pupils equal and reactive to light. EOM full.   Neck: Trachea midline. No thyroid enlargement.  Lungs: Normal respiratory excursion. Lungs clear, decreased BS at bases B/l   CV: Regular cardiac rhythm. No murmur. Carotid and pedal pulses intact.  Abdomen: Soft, non tender, no organomegaly or mass.  Musculoskeletal: No cyanosis, clubbing. Pitting edema b/L 1+ getting better   Skin: Warm and moist. No rash.   Neuro: Cranial nerves intact. Normal motor and sensory function.   Psych: Normal affect, good judgement      LABS:                        8.7    6.70  )-----------( 433      ( 24 Sep 2019 04:14 )             28.3         132<L>  |  93<L>  |  31.0<H>  ----------------------------<  129<H>  4.2   |  25.0  |  1.11    Ca    8.8      24 Sep 2019 04:14  Mg     2.0         TPro  7.6  /  Alb  3.7  /  TBili  0.6  /  DBili  x   /  AST  37<H>  /  ALT  24  /  AlkPhos  139<H>      Urinalysis Basic - ( 24 Sep 2019 14:07 )    Color: Yellow / Appearance: Clear / S.005 / pH: x  Gluc: x / Ketone: Negative  / Bili: Negative / Urobili: Negative mg/dL   Blood: x / Protein: Negative mg/dL / Nitrite: Negative   Leuk Esterase: Negative / RBC: x / WBC x   Sq Epi: x / Non Sq Epi: x / Bacteria: x    Hemoglobin A1C, Whole Blood: 6.1 % ( @ 04:14)

## 2019-09-24 NOTE — PROGRESS NOTE ADULT - ASSESSMENT
A/P:  80yo Albanian speaking female, prefers sons Rocky and Chava for translation, with PMH HTN, DM, CAD, s/p multiple remote stents, Failed Mitral Clip complicated by tamponade requiring pericardiocentesis, severe TR, severe MR, persistent AFib, s/p C2L/TV Repair/MVR/NISSA clip with Dr. Fuller 9/3/19.  Son states they had called Dr. Fuller during the week d/t increased SOB and edema, and Spironolactone was added on Friday, 9/20 to aide in diuresis, she was advised to present to ER in the event lower ext edema and SOB did not improve.  Found to have b/l pleural effusions L>R on CXR. Currently pt is s/p Left pigtail, removal of 600ml serosanguineous fluid.  Since IV Lasix and pigtail patient reports improvement in SOB, c/t c/o mid sternal chest pain at surgical site which is well approximated with no s/s of infection.     1. HfrEF with RV dysfunction.    ct diuresis.  lasix.   - Metolazone 2.5mg PO PRN  - Lifevest on DC   dig, beta-blocker and entresto. Insurance authorisation needed.    ct dig.   aldactone 25 mg daily to be added tomorrow.     2. pleural effusion: incentive spirometer.  diuresis.   2. Persistent AFib  - daily coumadin dose adjustment for INR goal of 3  - Digoxin  - c/w Toprol    3. HTN   chf meds.     4. CAD  - c/w ASA, BB, statin

## 2019-09-24 NOTE — DIETITIAN INITIAL EVALUATION ADULT. - PROBLEM SELECTOR PLAN 2
Continue Coumadin when acceptable practice in reference to procedures  Continue Toprol XL  BMP daily, optimize K and Mg

## 2019-09-24 NOTE — DIETITIAN INITIAL EVALUATION ADULT. - OTHER INFO
80yo English speaking F. 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 Fuller on Sept 3 with Dr. Fuller. Pt presents to ER with c/o SOB and CP, found to have b/l pleural effusions L>R on CXR.  She denies nausea, vomiting, diarrhea, dizziness, fever or chills. 80yo Tajik speaking F. 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 Fuller on Sept 3 with Dr. Fuller. Pt presents to ER with c/o SOB and CP, found to have b/l pleural effusions L>R on CXR. Spoke with pt and son at bedside,     Pt denies n/v/d/c     Pt was recently admitted to [facility] 9/3/19, wt 134.4 lbs, pt current wt 145 lbs, pt noted with +4 edema R/L foot, could be indicative of 10 lb (7.4%) wt gain since last admission. 78yo English speaking F. 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 Fuller on Sept 3 with Dr. Fuller. Pt presents to ER with c/o SOB and CP, found to have b/l pleural effusions L>R on CXR. Spoke with pt and son at bedside, son stated pt within the last week he has been weighing her daily, it has been ~147 lbs, Pt was recently admitted to [facility] 9/3/19, wt 134.4 lbs, pt current wt 145 lbs, pt noted with +4 edema R/L foot, could be indicative of 10 lb (7.4%) wt gain since last admission. Pt denies n/v/d/c. Pt states pt does not follow a particular diet or watch her sodium intake. As per diet recall pt consumes 3 small meals throughout the day, for breakfast she consuming 1 egg and coffee, for lunch/dinner she eats meat and "something light like milk". Pt is likely meeting <75% estimated protein-calorie needs. Emphasized the importance of pt following a low salt diet and daily wts to son. Pt compliance seems questionable d/t son stating pt is not happy about limiting her salt. NFPE conducted moderate muscle wasting in temple and clavicles, moderate fat loss noted in orbitals and buccals.

## 2019-09-24 NOTE — PROGRESS NOTE ADULT - PROBLEM SELECTOR PLAN 3
Home Lantus dose 30 uts at bedtime.  Son has kept log which reveals pt glucose in 60s last 8/10 mornings  Start Lantus at 20 uts at bedtime and observe  Continue 10 uts Humalog pre meal tid  Accuchecks ACHS  Sliding SCale insulin coverage.  Appreciate endocrine consult, no change in insulin coverage at this time.

## 2019-09-24 NOTE — PROGRESS NOTE ADULT - SUBJECTIVE AND OBJECTIVE BOX
Subjective:  78yo F markedly improved today, resting comfortable, no c/o pain.      HPI:  Subjective:  78yo F mildly dyspneic, no c/o pain.    78yo Vietnamese speaking F seen with 2 sons whom she prefers to do translation.  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 Fuller on Sept 3 with Dr. Fuller.  Son states they had called Dr. Fuller during the week and Spironolactone was added on Friday,  to aide in diuresis, she was advised to present to ER in the event lower ext edema and SOB did not improve.    Pt presents to ER with c/o SOB and CP, found to have b/l pleural effusions L>R on CXR.  She denies nausea, vomiting, diarrhea, dizziness, fever or chills.      PAST MEDICAL & SURGICAL HISTORY:  CHF exacerbation  Atrial fibrillation  Diabetes  Hypertension  Stented coronary artery  Coronary stent restenosis, sequela  Afib  Hypertension  History of ankle surgery: right orif  Post PTCA: 8 stents  H/O hernia repair: &#x27;s  Cataract  History of appendectomy      MEDICATIONS  (STANDING):  amLODIPine   Tablet 5 milliGRAM(s) Oral daily  ascorbic acid 500 milliGRAM(s) Oral daily  aspirin enteric coated 81 milliGRAM(s) Oral daily  atorvastatin 80 milliGRAM(s) Oral at bedtime  dextrose 50% Injectable 12.5 Gram(s) IV Push once  dextrose 50% Injectable 25 Gram(s) IV Push once  dextrose 50% Injectable 25 Gram(s) IV Push once  docusate sodium 100 milliGRAM(s) Oral three times a day  ferrous sulfate Oral Tab/Cap - Peds 325 milliGRAM(s) Oral three times a day with meals  folic acid 1 milliGRAM(s) Oral daily  furosemide   Injectable 40 milliGRAM(s) IV Push two times a day  insulin glargine Injectable (LANTUS) 20 Unit(s) SubCutaneous at bedtime  insulin lispro (HumaLOG) corrective regimen sliding scale   SubCutaneous Before meals and at bedtime  insulin lispro Injectable (HumaLOG) 10 Unit(s) SubCutaneous three times a day before meals  magnesium oxide 400 milliGRAM(s) Oral two times a day with meals  methimazole 10 milliGRAM(s) Oral daily  metoprolol succinate ER 50 milliGRAM(s) Oral daily  multivitamin 1 Tablet(s) Oral daily  pantoprazole    Tablet 40 milliGRAM(s) Oral before breakfast  sodium chloride 0.9% lock flush 3 milliLiter(s) IV Push every 8 hours    MEDICATIONS  (PRN):  acetaminophen   Tablet .. 650 milliGRAM(s) Oral every 6 hours PRN Moderate Pain (4 - 6)  oxyCODONE    5 mG/acetaminophen 325 mG 1 Tablet(s) Oral every 6 hours PRN Moderate Pain (4 - 6)    Antiplatelet therapy:                           Last dose/amt:  Coumadin 4 mg on     Allergies: No Known Allergies  OHS (Unknown)      SOCIAL HISTORY:  Smoker: [ ] Yes  [x ] No        PACK YEARS:                         WHEN QUIT?  ETOH use: [ ] Yes  [x ] No              FREQUENCY / QUANTITY:  Ilicit Drug use:  [ ] Yes  [x ] No  Occupation:  Homemaker  Live with:  Son  Assist device use:  NO    Relevant Family History  FAMILY HISTORY:  Family history of early CAD: brother mother  FHx: breast cancer: mother      Review of Systems  GENERAL:  Fevers[] chills[] sweats[] fatigue[] weight loss[] weight gain []                                      [x ] NEGATIVE  NEURO:  parathesias[] seizures []  syncope []  confusion []                                                                [x ] NEGATIVE                 EYES: glasses[]  blurry vision[]  discharge[] pain[] glaucoma []                                                           [x ] NEGATIVE                 ENMT:  difficulty hearing []  vertigo[]  dysphagia[] epistaxis[] recent dental work []                     [x ] NEGATIVE                 CV:  chest pain[x] palpitations[] FLETCHER [] diaphoresis [] edema[x]                                                           [ ] NEGATIVE                                 RESPIRATORY:  wheezing[] SOB[x] cough [] sputum[] hemoptysis[]                                                   [ ] NEGATIVE               GI:  nausea[]  vomiting []  diarrhea[] constipation [] melena []                                                          [x ] NEGATIVE            : hematuria[ ]  dysuria[ ] urgency[] incontinence[]                                                                          [x ] NEGATIVE                    MUSKULOSKELETAL:  arthritis[ ]  joint swelling [ ] muscle weakness [ ]                                            [x ] NEGATIVE                     SKIN/BREAST:  rash[ ] itching [ ]  hair loss[ ] masses[ ]                                                                          [x ] NEGATIVE                     PSYCH:  dementia [ ] depression [ ] anxiety[ ]                                                                                          [x ] NEGATIVE                        HEME/LYMPH:  bruises easily[ ] enlarged lymph nodes[ ] tender lymph nodes[ ]                           [x ] NEGATIVE                      ENDOCRINE:  cold intolerance[ ] heat intolerance[ ] polydipsia[ ]                                                      [x ] NEGATIVE                                                                                                 Vital Signs Last 24 Hrs  T(C): 36.9 (23 Sep 2019 07:27), Max: 37.5 (23 Sep 2019 00:46)  T(F): 98.4 (23 Sep 2019 07:27), Max: 99.5 (23 Sep 2019 00:46)  HR: 82 (23 Sep 2019 07:00) (82 - 128)  BP: 103/54 (23 Sep 2019 07:00) (97/56 - 148/80)  BP(mean): 76 (23 Sep 2019 07:00) (71 - 109)  RR: 20 (23 Sep 2019 04:00) (18 - 20)  SpO2: 100% (23 Sep 2019 07:00) (94% - 100%)                                                          LABS:                        9.2    7.09  )-----------( 460      ( 23 Sep 2019 07:05 )             29.2     09    135  |  96<L>  |  24.0<H>  ----------------------------<  209<H>  4.5   |  26.0  |  0.88    Ca    9.2      23 Sep 2019 07:05  Mg     2.0     -    TPro  7.6  /  Alb  3.7  /  TBili  0.6  /  DBili  x   /  AST  37<H>  /  ALT  24  /  AlkPhos  139<H>  09-22    PT/INR - ( 23 Sep 2019 07:05 )   PT: 36.3 sec;   INR: 3.05 ratio         PTT - ( 23 Sep 2019 07:05 )  PTT:39.7 sec  Urinalysis Basic - ( 22 Sep 2019 20:40 )    Color: Yellow / Appearance: Clear / S.015 / pH: x  Gluc: x / Ketone: Negative  / Bili: Negative / Urobili: Negative mg/dL   Blood: x / Protein: 30 mg/dL / Nitrite: Negative   Leuk Esterase: Negative / RBC: Negative /HPF / WBC 0-2   Sq Epi: x / Non Sq Epi: Occasional / Bacteria: Occasional      CARDIAC MARKERS ( 23 Sep 2019 07:05 )  x     / 0.06 ng/mL / 35 U/L / x     / x      CARDIAC MARKERS ( 22 Sep 2019 19:14 )  x     / 0.07 ng/mL / x     / x     / x              TTE:  Completed, results pending (23 Sep 2019 07:52)          PAST MEDICAL & SURGICAL HISTORY:  CHF exacerbation  Atrial fibrillation  Diabetes  Hypertension  Stented coronary artery  Coronary stent restenosis, sequela  Afib  Hypertension  S/P CABG x 2: s/p C2L/TV Repair/MVR/ NISSA Clip  History of ankle surgery: right orif  Post PTCA: 8 stents  H/O hernia repair: 1970&#x27;s  Cataract  History of appendectomy          MEDICATIONS  (STANDING):  amLODIPine   Tablet 5 milliGRAM(s) Oral daily  ascorbic acid 500 milliGRAM(s) Oral daily  aspirin enteric coated 81 milliGRAM(s) Oral daily  atorvastatin 80 milliGRAM(s) Oral at bedtime  dextrose 50% Injectable 12.5 Gram(s) IV Push once  dextrose 50% Injectable 25 Gram(s) IV Push once  dextrose 50% Injectable 25 Gram(s) IV Push once  docusate sodium 100 milliGRAM(s) Oral three times a day  ferrous sulfate Oral Tab/Cap - Peds 325 milliGRAM(s) Oral three times a day with meals  folic acid 1 milliGRAM(s) Oral daily  furosemide   Injectable 40 milliGRAM(s) IV Push two times a day  insulin glargine Injectable (LANTUS) 20 Unit(s) SubCutaneous at bedtime  insulin lispro (HumaLOG) corrective regimen sliding scale   SubCutaneous Before meals and at bedtime  insulin lispro Injectable (HumaLOG) 10 Unit(s) SubCutaneous three times a day before meals  losartan 25 milliGRAM(s) Oral daily  magnesium oxide 400 milliGRAM(s) Oral two times a day with meals  methimazole 10 milliGRAM(s) Oral daily  metoprolol succinate ER 50 milliGRAM(s) Oral daily  multivitamin 1 Tablet(s) Oral daily  pantoprazole    Tablet 40 milliGRAM(s) Oral before breakfast  sodium chloride 0.9% lock flush 3 milliLiter(s) IV Push every 8 hours    MEDICATIONS  (PRN):  acetaminophen   Tablet .. 650 milliGRAM(s) Oral every 6 hours PRN Moderate Pain (4 - 6)  oxyCODONE    5 mG/acetaminophen 325 mG 1 Tablet(s) Oral every 6 hours PRN Moderate Pain (4 - 6)          Allergies    No Known Allergies    Intolerances    OHS (Unknown)        WEIGHTS:  Daily     Daily   Admit Wt: Drug Dosing Weight  Height (cm): 121.9 (23 Sep 2019 00:46)  Weight (kg): 65.8 (23 Sep 2019 00:46)  BMI (kg/m2): 44.3 (23 Sep 2019 00:46)  BSA (m2): 1.39 (23 Sep 2019 00:46)  I&O's Summary    22 Sep 2019 07:  -  23 Sep 2019 07:00  --------------------------------------------------------  IN: 100 mL / OUT: 1350 mL / NET: -1250 mL    23 Sep 2019 07:01  -  24 Sep 2019 02:48  --------------------------------------------------------  IN: 695 mL / OUT: 3205 mL / NET: -2510 mL        VITAL SIGNS:  ICU Vital Signs Last 24 Hrs  T(C): 36.4 (24 Sep 2019 00:28), Max: 36.9 (23 Sep 2019 07:27)  T(F): 97.6 (24 Sep 2019 00:28), Max: 98.5 (23 Sep 2019 12:45)  HR: 83 (24 Sep 2019 02:00) (82 - 116)  BP: 106/53 (24 Sep 2019 02:00) (90/55 - 148/76)  BP(mean): 77 (24 Sep 2019 02:00) (66 - 106)  ABP: --  ABP(mean): --  RR: 17 (24 Sep 2019 02:00) (14 - 26)  SpO2: 96% (24 Sep 2019 02:00) (93% - 100%)        All laboratory results, radiology and medications reviewed.    LABS:      135  |  96<L>  |  24.0<H>  ----------------------------<  209<H>  4.5   |  26.0  |  0.88    Ca    9.2      23 Sep 2019 07:05  Mg     2.0         TPro  7.6  /  Alb  3.7  /  TBili  0.6  /  DBili  x   /  AST  37<H>  /  ALT  24  /  AlkPhos  139<H>                                   9.2    7.09  )-----------( 460      ( 23 Sep 2019 07:05 )             29.2          PT/INR - ( 23 Sep 2019 07:05 )   PT: 36.3 sec;   INR: 3.05 ratio         PTT - ( 23 Sep 2019 07:05 )  PTT:39.7 sec    ABG - ( 23 Sep 2019 01:34 )  pH, Arterial: 7.49  pH, Blood: x     /  pCO2: 37    /  pO2: 63    / HCO3: 29    / Base Excess: 4.8   /  SaO2: 93                CARDIAC MARKERS ( 23 Sep 2019 07:05 )  x     / 0.06 ng/mL / 35 U/L / x     / x      CARDIAC MARKERS ( 22 Sep 2019 19:14 )  x     / 0.07 ng/mL / x     / x     / x          CAPILLARY BLOOD GLUCOSE      POCT Blood Glucose.: 119 mg/dL (23 Sep 2019 21:11)             PHYSICAL EXAM:  General:  well nourished, no acute distress  Neurology:  alert and oriented X 4, nonfocal, no gross deficits  Respiratory:  clear to auscultation, with diminished BS b/l bases  CV:  Chronic A Fib  Abdomen:  soft, nontender, nondistended, positive bowel sounds  Extremities:  warm, well perfused, trace edema +DP pulses  Incisions:  midline sternal incision, c/d/i, sternum stable

## 2019-09-24 NOTE — PROGRESS NOTE ADULT - SUBJECTIVE AND OBJECTIVE BOX
[facility]                                                        Office: [address] [address]                                                       Telephone: [phone]. Fax:[phone]                                                                             PROGRESS NOTE   Reason for follow up: severe cardiomyopathy , heart failure. plerual effusion                             Overnight: No new events.   Update: patient feels better.  working on incentive spirometry.   Family at bedside.  Used as .   Subjective: "  i am feeling better _"   Complains of:  dyspnea improved.    Review of symptoms: Cardiac:  No chest pain. +  dyspnea. No palpitations.  Respiratory:no cough.+ dyspnea  Gastrointestinal: No diarrhea. No abdominal pain. No bleeding.     Past medical history: No updates.   Chronic conditions:  Hypertension: controlled. CHF:  decompensated CAD: Stable ischemic heart disease.   	  Vitals:  T(C): 37.3 (09-24-19 @ 22:00), Max: 37.3 (09-24-19 @ 22:00)  HR: 82 (09-24-19 @ 22:00) (76 - 116)  BP: 164/70 (09-24-19 @ 22:00) (92/54 - 164/70)  RR: 25 (09-24-19 @ 22:00) (14 - 25)  SpO2: 98% (09-24-19 @ 22:00) (91% - 100%)  Wt(kg): --  I&O's Summary    23 Sep 2019 07:01  -  24 Sep 2019 07:00  --------------------------------------------------------  IN: 695 mL / OUT: 3495 mL / NET: -2800 mL    24 Sep 2019 07:01  -  24 Sep 2019 23:43  --------------------------------------------------------  IN: 800 mL / OUT: 3520 mL / NET: -2720 mL      Weight (kg): 65.8 (09-23 @ 00:46)    PHYSICAL EXAM:  Appearance: Comfortable. No acute distress  HEENT:  Head and neck: Atraumatic. Normocephalic.  Normal oral mucosa, PERRL, Neck is supple.    Neurologic: A & O x 3, no focal deficits. EOMI   Lymphatic: No cervical lymphadenopathy  Cardiovascular: irregular  S1 S2, No murmur, + rubs.  rubsno gallops.   Respiratory: bilateral based decrease breath sounds and bronchal breath sounds.   Gastrointestinal:  Soft, Non-tender, + BS  Lower Extremities: 1= bilateral  edema  Psychiatry: Patient is calm. No agitation. Mood & affect appropriate  Skin: No rashes/ ecchymoses/cyanosis/ulcers visualized on the face, hands or feet.    CURRENT MEDICATIONS:  digoxin     Tablet 0.25 milliGRAM(s) Oral daily  digoxin  Injectable 0.25 milliGRAM(s) IV Push every 8 hours  furosemide   Injectable 40 milliGRAM(s) IV Push two times a day  metoprolol succinate ER 50 milliGRAM(s) Oral daily    docusate sodium  pantoprazole    Tablet  atorvastatin  dextrose 50% Injectable  dextrose 50% Injectable  dextrose 50% Injectable  insulin glargine Injectable (LANTUS)  insulin lispro (HumaLOG) corrective regimen sliding scale  insulin lispro Injectable (HumaLOG)  methimazole  ascorbic acid  aspirin enteric coated  ferrous sulfate Oral Tab/Cap - Peds  folic acid  magnesium oxide  multivitamin  sodium chloride 0.9% lock flush      LABS:	 	  CARDIAC MARKERS ( 23 Sep 2019 07:05 )  x     / 0.06 ng/mL / 35 U/L / x     / x      p-BNP 23 Sep 2019 07:05: 3649 pg/mL, CARDIAC MARKERS ( 22 Sep 2019 19:14 )  x     / 0.07 ng/mL / x     / x     / x      p-BNP 22 Sep 2019 19:14: x                                8.7    6.70  )-----------( 433      ( 24 Sep 2019 04:14 )             28.3     09-24    133<L>  |  91<L>  |  32.0<H>  ----------------------------<  163<H>  4.3   |  29.0  |  0.98    Ca    9.1      24 Sep 2019 22:42  Mg     2.0     09-24      proBNP: Serum Pro-Brain Natriuretic Peptide: 3649 pg/mL (09-23 @ 07:05)    Lipid Profile:   HgA1c: Hemoglobin A1C, Whole Blood: 6.1 %  TSH: Thyroid Stimulating Hormone, Serum: 1.22 uIU/mL      TELEMETRY: Reviewed  atrial fibrillation.   ECG:  Reviewed by me. 	    DIAGNOSTIC TESTING:  [ ] Echocardiogram: LVEF < 20%.  Severely enlarged RV and severely reduced RV function. normal mitral proshessis. Large pleural effusion bilaterally.  small pericardial effusion.    <

## 2019-09-24 NOTE — DIETITIAN INITIAL EVALUATION ADULT. - PERTINENT LABORATORY DATA
09-24 Na132 mmol/L<L> Glu 129 mg/dL<H> K+ 4.2 mmol/L Cr  1.11 mg/dL BUN 31.0 mg/dL<H> Phos n/a   Alb n/a   PAB n/a

## 2019-09-24 NOTE — DIETITIAN INITIAL EVALUATION ADULT. - ETIOLOGY
inability to meet sufficient protein-calorie needs 2/2 advanced age inadequate protein-energy intake in setting of advanced age with chronic combined systolic and diastolic CHF, advanced age and new pleural effusion

## 2019-09-24 NOTE — PROGRESS NOTE ADULT - PROBLEM SELECTOR PLAN 1
Continue aggressive diuresis, Lasix 40 mg IV BID, monitor closely, dose may need uptitrating.  Left pigtail CT placed for pleural effusion, drained 650cc.  Appreciate cardiology consult, recommend start Dig, stop Norvasc, and pt will require life vest prior to d/c home.  Daily labs, CXR, strict I/Os

## 2019-09-24 NOTE — DIETITIAN INITIAL EVALUATION ADULT. - ADD RECOMMEND
Monitor po intake/ wts, continue MVI daily, continue folic acid daily Monitor po intake/ daily wts, continue MVI/ folic acid daily, Provide Glucerna BID.

## 2019-09-24 NOTE — PROGRESS NOTE ADULT - ASSESSMENT
80yo Latvian speaking F seen with 2 sons whom she prefers to do translation.  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 Fuller on Sept 3 with Dr. Fuller.  Pt presents to ER with c/o SOB and CP, found to have b/l pleural effusions L>R on CXR.  She denies nausea, vomiting, diarrhea, dizziness, fever or chills.    9/23 Left pigtail CT placed without event.  TTE: revealed moderately enlarged left atrium, LVEF < 20%, severely enlarged RV with RV dysfxn, MARIELY, small pericardial effusion.

## 2019-09-25 ENCOUNTER — APPOINTMENT (OUTPATIENT)
Dept: CARDIOTHORACIC SURGERY | Facility: CLINIC | Age: 79
End: 2019-09-25

## 2019-09-25 LAB
ANION GAP SERPL CALC-SCNC: 14 MMOL/L — SIGNIFICANT CHANGE UP (ref 5–17)
ANION GAP SERPL CALC-SCNC: 16 MMOL/L — SIGNIFICANT CHANGE UP (ref 5–17)
APTT BLD: 32 SEC — SIGNIFICANT CHANGE UP (ref 27.5–36.3)
BUN SERPL-MCNC: 28 MG/DL — HIGH (ref 8–20)
BUN SERPL-MCNC: 31 MG/DL — HIGH (ref 8–20)
CALCIUM SERPL-MCNC: 8.8 MG/DL — SIGNIFICANT CHANGE UP (ref 8.6–10.2)
CALCIUM SERPL-MCNC: 9.1 MG/DL — SIGNIFICANT CHANGE UP (ref 8.6–10.2)
CHLORIDE SERPL-SCNC: 92 MMOL/L — LOW (ref 98–107)
CHLORIDE SERPL-SCNC: 95 MMOL/L — LOW (ref 98–107)
CO2 SERPL-SCNC: 24 MMOL/L — SIGNIFICANT CHANGE UP (ref 22–29)
CO2 SERPL-SCNC: 28 MMOL/L — SIGNIFICANT CHANGE UP (ref 22–29)
CREAT SERPL-MCNC: 0.95 MG/DL — SIGNIFICANT CHANGE UP (ref 0.5–1.3)
CREAT SERPL-MCNC: 0.97 MG/DL — SIGNIFICANT CHANGE UP (ref 0.5–1.3)
GLUCOSE BLDC GLUCOMTR-MCNC: 140 MG/DL — HIGH (ref 70–99)
GLUCOSE BLDC GLUCOMTR-MCNC: 158 MG/DL — HIGH (ref 70–99)
GLUCOSE BLDC GLUCOMTR-MCNC: 73 MG/DL — SIGNIFICANT CHANGE UP (ref 70–99)
GLUCOSE BLDC GLUCOMTR-MCNC: 85 MG/DL — SIGNIFICANT CHANGE UP (ref 70–99)
GLUCOSE SERPL-MCNC: 177 MG/DL — HIGH (ref 70–115)
GLUCOSE SERPL-MCNC: 93 MG/DL — SIGNIFICANT CHANGE UP (ref 70–115)
HCT VFR BLD CALC: 32.7 % — LOW (ref 34.5–45)
HGB BLD-MCNC: 10 G/DL — LOW (ref 11.5–15.5)
INR BLD: 1.69 RATIO — HIGH (ref 0.88–1.16)
MAGNESIUM SERPL-MCNC: 1.9 MG/DL — SIGNIFICANT CHANGE UP (ref 1.6–2.6)
MAGNESIUM SERPL-MCNC: 2.1 MG/DL — SIGNIFICANT CHANGE UP (ref 1.6–2.6)
MCHC RBC-ENTMCNC: 28.8 PG — SIGNIFICANT CHANGE UP (ref 27–34)
MCHC RBC-ENTMCNC: 30.6 GM/DL — LOW (ref 32–36)
MCV RBC AUTO: 94.2 FL — SIGNIFICANT CHANGE UP (ref 80–100)
PLATELET # BLD AUTO: 462 K/UL — HIGH (ref 150–400)
POTASSIUM SERPL-MCNC: 4 MMOL/L — SIGNIFICANT CHANGE UP (ref 3.5–5.3)
POTASSIUM SERPL-MCNC: 4.2 MMOL/L — SIGNIFICANT CHANGE UP (ref 3.5–5.3)
POTASSIUM SERPL-SCNC: 4 MMOL/L — SIGNIFICANT CHANGE UP (ref 3.5–5.3)
POTASSIUM SERPL-SCNC: 4.2 MMOL/L — SIGNIFICANT CHANGE UP (ref 3.5–5.3)
PROTHROM AB SERPL-ACNC: 19.8 SEC — HIGH (ref 10–12.9)
RBC # BLD: 3.47 M/UL — LOW (ref 3.8–5.2)
RBC # FLD: 16.2 % — HIGH (ref 10.3–14.5)
SODIUM SERPL-SCNC: 132 MMOL/L — LOW (ref 135–145)
SODIUM SERPL-SCNC: 137 MMOL/L — SIGNIFICANT CHANGE UP (ref 135–145)
WBC # BLD: 7.27 K/UL — SIGNIFICANT CHANGE UP (ref 3.8–10.5)
WBC # FLD AUTO: 7.27 K/UL — SIGNIFICANT CHANGE UP (ref 3.8–10.5)

## 2019-09-25 PROCEDURE — 99291 CRITICAL CARE FIRST HOUR: CPT

## 2019-09-25 PROCEDURE — 71045 X-RAY EXAM CHEST 1 VIEW: CPT | Mod: 26,77

## 2019-09-25 PROCEDURE — 99232 SBSQ HOSP IP/OBS MODERATE 35: CPT

## 2019-09-25 PROCEDURE — 71045 X-RAY EXAM CHEST 1 VIEW: CPT | Mod: 26

## 2019-09-25 RX ORDER — DIGOXIN 250 MCG
0.12 TABLET ORAL DAILY
Refills: 0 | Status: DISCONTINUED | OUTPATIENT
Start: 2019-09-25 | End: 2019-09-25

## 2019-09-25 RX ORDER — POTASSIUM CHLORIDE 20 MEQ
20 PACKET (EA) ORAL
Refills: 0 | Status: DISCONTINUED | OUTPATIENT
Start: 2019-09-25 | End: 2019-09-25

## 2019-09-25 RX ORDER — WARFARIN SODIUM 2.5 MG/1
3 TABLET ORAL ONCE
Refills: 0 | Status: COMPLETED | OUTPATIENT
Start: 2019-09-25 | End: 2019-09-25

## 2019-09-25 RX ORDER — MAGNESIUM SULFATE 500 MG/ML
2 VIAL (ML) INJECTION ONCE
Refills: 0 | Status: COMPLETED | OUTPATIENT
Start: 2019-09-25 | End: 2019-09-25

## 2019-09-25 RX ORDER — CARVEDILOL PHOSPHATE 80 MG/1
3.12 CAPSULE, EXTENDED RELEASE ORAL EVERY 12 HOURS
Refills: 0 | Status: DISCONTINUED | OUTPATIENT
Start: 2019-09-26 | End: 2019-09-29

## 2019-09-25 RX ORDER — DIGOXIN 250 MCG
0.12 TABLET ORAL DAILY
Refills: 0 | Status: DISCONTINUED | OUTPATIENT
Start: 2019-09-26 | End: 2019-09-30

## 2019-09-25 RX ORDER — ACETAMINOPHEN 500 MG
1000 TABLET ORAL ONCE
Refills: 0 | Status: COMPLETED | OUTPATIENT
Start: 2019-09-25 | End: 2019-09-25

## 2019-09-25 RX ADMIN — Medication 325 MILLIGRAM(S): at 12:53

## 2019-09-25 RX ADMIN — Medication 1000 MILLIGRAM(S): at 15:34

## 2019-09-25 RX ADMIN — SODIUM CHLORIDE 3 MILLILITER(S): 9 INJECTION INTRAMUSCULAR; INTRAVENOUS; SUBCUTANEOUS at 21:31

## 2019-09-25 RX ADMIN — Medication 1 MILLIGRAM(S): at 12:53

## 2019-09-25 RX ADMIN — Medication 400 MILLIGRAM(S): at 14:45

## 2019-09-25 RX ADMIN — Medication 10 UNIT(S): at 12:52

## 2019-09-25 RX ADMIN — Medication 100 MILLIGRAM(S): at 06:42

## 2019-09-25 RX ADMIN — SACUBITRIL AND VALSARTAN 1 TABLET(S): 24; 26 TABLET, FILM COATED ORAL at 06:42

## 2019-09-25 RX ADMIN — WARFARIN SODIUM 3 MILLIGRAM(S): 2.5 TABLET ORAL at 08:40

## 2019-09-25 RX ADMIN — Medication 2: at 17:30

## 2019-09-25 RX ADMIN — OXYCODONE AND ACETAMINOPHEN 1 TABLET(S): 5; 325 TABLET ORAL at 01:28

## 2019-09-25 RX ADMIN — Medication 10 UNIT(S): at 07:31

## 2019-09-25 RX ADMIN — Medication 50 GRAM(S): at 17:35

## 2019-09-25 RX ADMIN — MAGNESIUM OXIDE 400 MG ORAL TABLET 400 MILLIGRAM(S): 241.3 TABLET ORAL at 07:31

## 2019-09-25 RX ADMIN — Medication 2: at 07:31

## 2019-09-25 RX ADMIN — MAGNESIUM OXIDE 400 MG ORAL TABLET 400 MILLIGRAM(S): 241.3 TABLET ORAL at 17:34

## 2019-09-25 RX ADMIN — Medication 100 MILLIGRAM(S): at 21:25

## 2019-09-25 RX ADMIN — Medication 325 MILLIGRAM(S): at 17:34

## 2019-09-25 RX ADMIN — Medication 50 MILLIGRAM(S): at 06:42

## 2019-09-25 RX ADMIN — Medication 100 MILLIGRAM(S): at 13:08

## 2019-09-25 RX ADMIN — SODIUM CHLORIDE 3 MILLILITER(S): 9 INJECTION INTRAMUSCULAR; INTRAVENOUS; SUBCUTANEOUS at 05:53

## 2019-09-25 RX ADMIN — Medication 10 UNIT(S): at 17:30

## 2019-09-25 RX ADMIN — PANTOPRAZOLE SODIUM 40 MILLIGRAM(S): 20 TABLET, DELAYED RELEASE ORAL at 06:42

## 2019-09-25 RX ADMIN — Medication 40 MILLIGRAM(S): at 17:31

## 2019-09-25 RX ADMIN — SODIUM CHLORIDE 3 MILLILITER(S): 9 INJECTION INTRAMUSCULAR; INTRAVENOUS; SUBCUTANEOUS at 13:04

## 2019-09-25 RX ADMIN — Medication 20 MILLIEQUIVALENT(S): at 17:34

## 2019-09-25 RX ADMIN — ATORVASTATIN CALCIUM 80 MILLIGRAM(S): 80 TABLET, FILM COATED ORAL at 21:25

## 2019-09-25 RX ADMIN — Medication 40 MILLIGRAM(S): at 06:42

## 2019-09-25 RX ADMIN — OXYCODONE AND ACETAMINOPHEN 1 TABLET(S): 5; 325 TABLET ORAL at 00:28

## 2019-09-25 RX ADMIN — Medication 500 MILLIGRAM(S): at 12:53

## 2019-09-25 RX ADMIN — Medication 325 MILLIGRAM(S): at 07:31

## 2019-09-25 RX ADMIN — INSULIN GLARGINE 20 UNIT(S): 100 INJECTION, SOLUTION SUBCUTANEOUS at 21:25

## 2019-09-25 RX ADMIN — Medication 1 TABLET(S): at 12:53

## 2019-09-25 RX ADMIN — Medication 81 MILLIGRAM(S): at 12:53

## 2019-09-25 NOTE — PROGRESS NOTE ADULT - SUBJECTIVE AND OBJECTIVE BOX
Subjective:  78yo F on RA with no c/o SOB, ambulated today with PT      HPI:  Subjective:  78yo F mildly dyspneic, no c/o pain.    78yo Persian speaking F seen with 2 sons whom she prefers to do translation.  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 Fuller on Sept 3 with Dr. Fuller.  Son states they had called Dr. Fuller during the week and Spironolactone was added on Friday,  to aide in diuresis, she was advised to present to ER in the event lower ext edema and SOB did not improve.    Pt presents to ER with c/o SOB and CP, found to have b/l pleural effusions L>R on CXR.  She denies nausea, vomiting, diarrhea, dizziness, fever or chills.      PAST MEDICAL & SURGICAL HISTORY:  CHF exacerbation  Atrial fibrillation  Diabetes  Hypertension  Stented coronary artery  Coronary stent restenosis, sequela  Afib  Hypertension  History of ankle surgery: right orif  Post PTCA: 8 stents  H/O hernia repair: &#x27;s  Cataract  History of appendectomy      MEDICATIONS  (STANDING):  amLODIPine   Tablet 5 milliGRAM(s) Oral daily  ascorbic acid 500 milliGRAM(s) Oral daily  aspirin enteric coated 81 milliGRAM(s) Oral daily  atorvastatin 80 milliGRAM(s) Oral at bedtime  dextrose 50% Injectable 12.5 Gram(s) IV Push once  dextrose 50% Injectable 25 Gram(s) IV Push once  dextrose 50% Injectable 25 Gram(s) IV Push once  docusate sodium 100 milliGRAM(s) Oral three times a day  ferrous sulfate Oral Tab/Cap - Peds 325 milliGRAM(s) Oral three times a day with meals  folic acid 1 milliGRAM(s) Oral daily  furosemide   Injectable 40 milliGRAM(s) IV Push two times a day  insulin glargine Injectable (LANTUS) 20 Unit(s) SubCutaneous at bedtime  insulin lispro (HumaLOG) corrective regimen sliding scale   SubCutaneous Before meals and at bedtime  insulin lispro Injectable (HumaLOG) 10 Unit(s) SubCutaneous three times a day before meals  magnesium oxide 400 milliGRAM(s) Oral two times a day with meals  methimazole 10 milliGRAM(s) Oral daily  metoprolol succinate ER 50 milliGRAM(s) Oral daily  multivitamin 1 Tablet(s) Oral daily  pantoprazole    Tablet 40 milliGRAM(s) Oral before breakfast  sodium chloride 0.9% lock flush 3 milliLiter(s) IV Push every 8 hours    MEDICATIONS  (PRN):  acetaminophen   Tablet .. 650 milliGRAM(s) Oral every 6 hours PRN Moderate Pain (4 - 6)  oxyCODONE    5 mG/acetaminophen 325 mG 1 Tablet(s) Oral every 6 hours PRN Moderate Pain (4 - 6)    Antiplatelet therapy:                           Last dose/amt:  Coumadin 4 mg on     Allergies: No Known Allergies  OHS (Unknown)      SOCIAL HISTORY:  Smoker: [ ] Yes  [x ] No        PACK YEARS:                         WHEN QUIT?  ETOH use: [ ] Yes  [x ] No              FREQUENCY / QUANTITY:  Ilicit Drug use:  [ ] Yes  [x ] No  Occupation:  Homemaker  Live with:  Son  Assist device use:  NO    Relevant Family History  FAMILY HISTORY:  Family history of early CAD: brother mother  FHx: breast cancer: mother      Review of Systems  GENERAL:  Fevers[] chills[] sweats[] fatigue[] weight loss[] weight gain []                                      [x ] NEGATIVE  NEURO:  parathesias[] seizures []  syncope []  confusion []                                                                [x ] NEGATIVE                 EYES: glasses[]  blurry vision[]  discharge[] pain[] glaucoma []                                                           [x ] NEGATIVE                 ENMT:  difficulty hearing []  vertigo[]  dysphagia[] epistaxis[] recent dental work []                     [x ] NEGATIVE                 CV:  chest pain[x] palpitations[] FLETCHER [] diaphoresis [] edema[x]                                                           [ ] NEGATIVE                                 RESPIRATORY:  wheezing[] SOB[x] cough [] sputum[] hemoptysis[]                                                   [ ] NEGATIVE               GI:  nausea[]  vomiting []  diarrhea[] constipation [] melena []                                                          [x ] NEGATIVE            : hematuria[ ]  dysuria[ ] urgency[] incontinence[]                                                                          [x ] NEGATIVE                    MUSKULOSKELETAL:  arthritis[ ]  joint swelling [ ] muscle weakness [ ]                                            [x ] NEGATIVE                     SKIN/BREAST:  rash[ ] itching [ ]  hair loss[ ] masses[ ]                                                                          [x ] NEGATIVE                     PSYCH:  dementia [ ] depression [ ] anxiety[ ]                                                                                          [x ] NEGATIVE                        HEME/LYMPH:  bruises easily[ ] enlarged lymph nodes[ ] tender lymph nodes[ ]                           [x ] NEGATIVE                      ENDOCRINE:  cold intolerance[ ] heat intolerance[ ] polydipsia[ ]                                                      [x ] NEGATIVE                                                                                                 Vital Signs Last 24 Hrs  T(C): 36.9 (23 Sep 2019 07:27), Max: 37.5 (23 Sep 2019 00:46)  T(F): 98.4 (23 Sep 2019 07:27), Max: 99.5 (23 Sep 2019 00:46)  HR: 82 (23 Sep 2019 07:00) (82 - 128)  BP: 103/54 (23 Sep 2019 07:00) (97/56 - 148/80)  BP(mean): 76 (23 Sep 2019 07:00) (71 - 109)  RR: 20 (23 Sep 2019 04:00) (18 - 20)  SpO2: 100% (23 Sep 2019 07:00) (94% - 100%)                                                          LABS:                        9.2    7.09  )-----------( 460      ( 23 Sep 2019 07:05 )             29.2     09-    135  |  96<L>  |  24.0<H>  ----------------------------<  209<H>  4.5   |  26.0  |  0.88    Ca    9.2      23 Sep 2019 07:05  Mg     2.0     -    TPro  7.6  /  Alb  3.7  /  TBili  0.6  /  DBili  x   /  AST  37<H>  /  ALT  24  /  AlkPhos  139<H>  09-22    PT/INR - ( 23 Sep 2019 07:05 )   PT: 36.3 sec;   INR: 3.05 ratio         PTT - ( 23 Sep 2019 07:05 )  PTT:39.7 sec  Urinalysis Basic - ( 22 Sep 2019 20:40 )    Color: Yellow / Appearance: Clear / S.015 / pH: x  Gluc: x / Ketone: Negative  / Bili: Negative / Urobili: Negative mg/dL   Blood: x / Protein: 30 mg/dL / Nitrite: Negative   Leuk Esterase: Negative / RBC: Negative /HPF / WBC 0-2   Sq Epi: x / Non Sq Epi: Occasional / Bacteria: Occasional      CARDIAC MARKERS ( 23 Sep 2019 07:05 )  x     / 0.06 ng/mL / 35 U/L / x     / x      CARDIAC MARKERS ( 22 Sep 2019 19:14 )  x     / 0.07 ng/mL / x     / x     / x              TTE:  Completed, results pending (23 Sep 2019 07:52)          PAST MEDICAL & SURGICAL HISTORY:  CHF exacerbation  Atrial fibrillation  Diabetes  Hypertension  Stented coronary artery  Coronary stent restenosis, sequela  Afib  Hypertension  S/P CABG x 2: s/p C2L/TV Repair/MVR/ NISSA Clip  History of ankle surgery: right orif  Post PTCA: 8 stents  H/O hernia repair: 1970&#x27;s  Cataract  History of appendectomy          MEDICATIONS  (STANDING):  ascorbic acid 500 milliGRAM(s) Oral daily  aspirin enteric coated 81 milliGRAM(s) Oral daily  atorvastatin 80 milliGRAM(s) Oral at bedtime  dextrose 50% Injectable 12.5 Gram(s) IV Push once  dextrose 50% Injectable 25 Gram(s) IV Push once  dextrose 50% Injectable 25 Gram(s) IV Push once  digoxin     Tablet 0.25 milliGRAM(s) Oral daily  digoxin  Injectable 0.25 milliGRAM(s) IV Push every 8 hours  docusate sodium 100 milliGRAM(s) Oral three times a day  ferrous sulfate Oral Tab/Cap - Peds 325 milliGRAM(s) Oral three times a day with meals  folic acid 1 milliGRAM(s) Oral daily  furosemide   Injectable 40 milliGRAM(s) IV Push two times a day  insulin glargine Injectable (LANTUS) 20 Unit(s) SubCutaneous at bedtime  insulin lispro (HumaLOG) corrective regimen sliding scale   SubCutaneous Before meals and at bedtime  insulin lispro Injectable (HumaLOG) 10 Unit(s) SubCutaneous three times a day before meals  magnesium oxide 400 milliGRAM(s) Oral two times a day with meals  methimazole 10 milliGRAM(s) Oral daily  metoprolol succinate ER 50 milliGRAM(s) Oral daily  multivitamin 1 Tablet(s) Oral daily  pantoprazole    Tablet 40 milliGRAM(s) Oral before breakfast  sacubitril 24 mG/valsartan 26 mG 1 Tablet(s) Oral two times a day  sodium chloride 0.9% lock flush 3 milliLiter(s) IV Push every 8 hours    MEDICATIONS  (PRN):  acetaminophen   Tablet .. 650 milliGRAM(s) Oral every 6 hours PRN Mild Pain (1 - 3)  oxyCODONE    5 mG/acetaminophen 325 mG 1 Tablet(s) Oral every 6 hours PRN Moderate Pain (4 - 6)          Allergies    No Known Allergies    Intolerances    OHS (Unknown)        WEIGHTS:  Daily     Daily Weight in k.2 (24 Sep 2019 10:46)  Admit Wt: Drug Dosing Weight  Height (cm): 121.9 (23 Sep 2019 00:46)  Weight (kg): 65.8 (23 Sep 2019 00:46)  BMI (kg/m2): 44.3 (23 Sep 2019 00:46)  BSA (m2): 1.39 (23 Sep 2019 00:46)  I&O's Summary    23 Sep 2019 07:01  -  24 Sep 2019 07:00  --------------------------------------------------------  IN: 695 mL / OUT: 3495 mL / NET: -2800 mL    24 Sep 2019 07:01  -  25 Sep 2019 03:55  --------------------------------------------------------  IN: 1280 mL / OUT: 4170 mL / NET: -2890 mL        VITAL SIGNS:  ICU Vital Signs Last 24 Hrs  T(C): 37.3 (24 Sep 2019 22:00), Max: 37.3 (24 Sep 2019 22:00)  T(F): 99.2 (24 Sep 2019 22:00), Max: 99.2 (24 Sep 2019 22:00)  HR: 64 (25 Sep 2019 02:00) (64 - 97)  BP: 129/63 (25 Sep 2019 02:00) (92/54 - 164/70)  BP(mean): 90 (25 Sep 2019 02:00) (69 - 101)  ABP: --  ABP(mean): --  RR: 18 (25 Sep 2019 02:00) (14 - 38)  SpO2: 100% (25 Sep 2019 02:00) (91% - 100%)        All laboratory results, radiology and medications reviewed.    LABS:      133<L>  |  91<L>  |  32.0<H>  ----------------------------<  163<H>  4.3   |  29.0  |  0.98    Ca    9.1      24 Sep 2019 22:42  Mg     2.0     -                                   8.7    6.70  )-----------( 433      ( 24 Sep 2019 04:14 )             28.3          PT/INR - ( 24 Sep 2019 04:14 )   PT: 29.6 sec;   INR: 2.50 ratio         PTT - ( 23 Sep 2019 07:05 )  PTT:39.7 sec      CARDIAC MARKERS ( 23 Sep 2019 07:05 )  x     / 0.06 ng/mL / 35 U/L / x     / x          CAPILLARY BLOOD GLUCOSE      POCT Blood Glucose.: 204 mg/dL (24 Sep 2019 21:31)             PHYSICAL EXAM:  General:  well nourished, no acute distress  Neurology:  alert and oriented X 4, nonfocal, no gross deficits, Luxembourgish speaking  Respiratory:  clear to auscultation bilaterally  CV:  A Fib, rate controlled  Abdomen:  soft, nontender, nondistended, positive bowel sounds  Extremities:  warm, well perfused, 1 edema +DP pulses  Incisions:  midline sternal incision, c/d/i, sternum stable

## 2019-09-25 NOTE — PROGRESS NOTE ADULT - PROBLEM SELECTOR PLAN 3
Lantus 20 uts at bedtime, blood sugars appear better controlled  Continue 10 uts Humalog pre meal tid  Accuchecks ACHS  Sliding SCale insulin coverage.  Appreciate endocrine consult, no change in insulin coverage at this time. Continue Lantus 20 uts at bedtime, blood sugars appear better controlled  Continue 10 uts Humalog pre meal tid  Accuchecks ACHS  Sliding SCale insulin coverage.  Appreciate endocrine consult, no change in insulin coverage at this time.

## 2019-09-25 NOTE — PROGRESS NOTE ADULT - SUBJECTIVE AND OBJECTIVE BOX
INTERVAL HPI/OVERNIGHT EVENTS:  follow up for dm management.     MEDICATIONS  (STANDING):  ascorbic acid 500 milliGRAM(s) Oral daily  aspirin enteric coated 81 milliGRAM(s) Oral daily  atorvastatin 80 milliGRAM(s) Oral at bedtime  dextrose 50% Injectable 12.5 Gram(s) IV Push once  dextrose 50% Injectable 25 Gram(s) IV Push once  dextrose 50% Injectable 25 Gram(s) IV Push once  docusate sodium 100 milliGRAM(s) Oral three times a day  ferrous sulfate Oral Tab/Cap - Peds 325 milliGRAM(s) Oral three times a day with meals  folic acid 1 milliGRAM(s) Oral daily  furosemide   Injectable 40 milliGRAM(s) IV Push two times a day  insulin glargine Injectable (LANTUS) 20 Unit(s) SubCutaneous at bedtime  insulin lispro (HumaLOG) corrective regimen sliding scale   SubCutaneous Before meals and at bedtime  insulin lispro Injectable (HumaLOG) 10 Unit(s) SubCutaneous three times a day before meals  magnesium oxide 400 milliGRAM(s) Oral two times a day with meals  magnesium sulfate  IVPB 2 Gram(s) IV Intermittent once  methimazole 10 milliGRAM(s) Oral daily  multivitamin 1 Tablet(s) Oral daily  pantoprazole    Tablet 40 milliGRAM(s) Oral before breakfast  potassium chloride    Tablet ER 20 milliEquivalent(s) Oral two times a day  sacubitril 24 mG/valsartan 26 mG 1 Tablet(s) Oral two times a day  sodium chloride 0.9% lock flush 3 milliLiter(s) IV Push every 8 hours    MEDICATIONS  (PRN):  acetaminophen   Tablet .. 650 milliGRAM(s) Oral every 6 hours PRN Mild Pain (1 - 3)  oxyCODONE    5 mG/acetaminophen 325 mG 1 Tablet(s) Oral every 6 hours PRN Moderate Pain (4 - 6)    Allergies  No Known Allergies    Review of systems:sligthly confused and tired, no cp, no sob , has LE swelling     Vital Signs Last 24 Hrs  T(C): 37.2 (25 Sep 2019 12:00), Max: 37.5 (25 Sep 2019 09:00)  T(F): 99 (25 Sep 2019 12:00), Max: 99.5 (25 Sep 2019 09:00)  HR: 76 (25 Sep 2019 15:00) (64 - 86)  BP: 132/61 (25 Sep 2019 15:00) (109/57 - 164/70)  BP(mean): 88 (25 Sep 2019 15:00) (74 - 118)  RR: 19 (25 Sep 2019 15:00) (14 - 38)  SpO2: 98% (25 Sep 2019 15:00) (91% - 100%)    PHYSICAL EXAM:  General appearance: Well developed, well nourished.  Eyes: Pupils equal and reactive to light  Neck: Trachea midline.   Lungs: Normal respiratory excursion. Lungs clear, decreased BS at bases B/l   CV: Regular cardiac rhythm. No murmur. Carotid and pedal pulses intact.  Abdomen: Soft, non tender, no organomegaly or mass.  Musculoskeletal: No cyanosis, clubbing. Pitting edema b/L 1+ getting better   Skin: Warm and moist. No rash.   Neuro: Cranial nerves intact. Normal motor and sensory function.   Psych: Normal affect, good judgement      LABS:                        10.0   7.27  )-----------( 462      ( 25 Sep 2019 05:30 )             32.7     09-25    132<L>  |  92<L>  |  31.0<H>  ----------------------------<  177<H>  4.0   |  24.0  |  0.97    Ca    8.8      25 Sep 2019 15:00  Mg     1.9           Urinalysis Basic - ( 24 Sep 2019 14:07 )    Color: Yellow / Appearance: Clear / S.005 / pH: x  Gluc: x / Ketone: Negative  / Bili: Negative / Urobili: Negative mg/dL   Blood: x / Protein: Negative mg/dL / Nitrite: Negative   Leuk Esterase: Negative / RBC: x / WBC x   Sq Epi: x / Non Sq Epi: x / Bacteria: x          CAPILLARY BLOOD GLUCOSE      RADIOLOGY & ADDITIONAL TESTS:

## 2019-09-25 NOTE — PROGRESS NOTE ADULT - ASSESSMENT
80yo Urdu speaking F seen with 2 sons whom she prefers to do translation.  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  Diabetes.  Plan: Home Lantus dose 30 units at bedtime.  Bsg better controlled .   continue lantus 20 u Hs   continue humalog 10 u tID w meals.   check bgs actid and hs   sliding SCale insulin coverage.     Hyperthyroidism TSh normal. Pt euthyroid clinically and biochemically.   continue current dose MMi     Problem: CHF exacerbation.  Plan: Aggressive diuresis  Cardiology consult for optimal medical management  Continue ICU Care for close monitoring    Atrial fibrillation.  Plan: Continue Coumadin when acceptable practice in reference to procedures  Continue Lopressor and Amiodarone      Hypertension.  Plan: Continue Lopressor, Norvasc and Amio at home doses.

## 2019-09-25 NOTE — PROGRESS NOTE ADULT - ASSESSMENT
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 Fuller 9/3/19.  Son states they had called Dr. Fuller during the week d/t increased SOB and edema, and Spironolactone was added on Friday, 9/20 to aide in diuresis, she was advised to present to ER in the event lower ext edema and SOB did not improve.  Found to have b/l pleural effusions L>R on CXR. Currently pt is s/p Left pigtail, removal of 600ml serosanguineous fluid.  Since IV Lasix and pigtail patient reports improvement in SOB, c/t c/o mid sternal chest pain at surgical site which is well approximated with no s/s of infection.     1. HfrEF with RV dysfunction.    ct diuresis.  lasix.   - Metolazone 2.5mg PO PRN  - Lifevest on DC   dig, restart beta-blocker and entresto. Insurance authorisation needed.    ct dig.   aldactone 25 mg daily to be added tomorrow.     2. pleural effusion: incentive spirometer.  diuresis.   2. Persistent AFib  - daily coumadin dose adjustment for INR goal of 3  - Digoxin  - c/w Toprol    3. HTN   chf meds.     4. CAD  - c/w ASA, BB, statin

## 2019-09-25 NOTE — PROGRESS NOTE ADULT - ATTENDING COMMENTS
Critical care cardiology.  I have personally provided critical care time > 45 mins excluding time spent on separate procedures.
Critical care cardiology.  I have personally provided critical care time > 45 mins excluding time spent on separate procedures.

## 2019-09-25 NOTE — PROGRESS NOTE ADULT - SUBJECTIVE AND OBJECTIVE BOX
[facility]                                                       [facility]                                                        Office: [address] [address]                                                       Telephone: [phone]. Fax:[phone]                                                                             PROGRESS NOTE   Reason for follow up: severe cardiomyopathy , heart failure. plerual effusion                             Overnight: No new events.   Update: feels ok.     Subjective: "  its ok "   Complains of:  dyspnea improved.    Review of symptoms: Cardiac:  No chest pain. +  dyspnea. No palpitations.  Respiratory:no cough.+ dyspnea  Gastrointestinal: No diarrhea. No abdominal pain. No bleeding.     Past medical history: No updates.   Chronic conditions:  Hypertension: controlled. CHF:  decompensated CAD: Stable ischemic heart disease.   	  Vitals:  Vital Signs Last 24 Hrs  T(C): 37.5 (09-25-19 @ 09:00), Max: 37.5 (09-25-19 @ 09:00)  T(F): 99.5 (09-25-19 @ 09:00), Max: 99.5 (09-25-19 @ 09:00)  HR: 71 (09-25-19 @ 11:00) (64 - 86)  BP: 138/60 (09-25-19 @ 11:00) (107/54 - 164/70)  BP(mean): 87 (09-25-19 @ 11:00) (78 - 118)  RR: 20 (09-25-19 @ 11:00) (14 - 38)  SpO2: 100% (09-25-19 @ 11:00) (91% - 100%)      PHYSICAL EXAM:  Appearance: Comfortable. No acute distress  HEENT:  Head and neck: Atraumatic. Normocephalic.  Normal oral mucosa, PERRL, Neck is supple.    Neurologic: A & O x 3, no focal deficits. EOMI   Lymphatic: No cervical lymphadenopathy  Cardiovascular: irregular  S1 S2, No murmur, + rubs.  rubsno gallops.   Respiratory: bilateral based decrease breath sounds and bronchal breath sounds.   Gastrointestinal:  Soft, Non-tender, + BS  Lower Extremities: 1= bilateral  edema  Psychiatry: Patient is calm. No agitation. Mood & affect appropriate  Skin: No rashes/ ecchymoses/cyanosis/ulcers visualized on the face, hands or feet.    CURRENT MEDICATIONS:  MEDICATIONS  (STANDING):  furosemide   Injectable 40 milliGRAM(s) IV Push two times a day  sacubitril 24 mG/valsartan 26 mG 1 Tablet(s) Oral two times a day    docusate sodium  pantoprazole    Tablet  ascorbic acid  aspirin enteric coated  atorvastatin  dextrose 50% Injectable  dextrose 50% Injectable  dextrose 50% Injectable  ferrous sulfate Oral Tab/Cap - Peds  folic acid  insulin glargine Injectable (LANTUS)  insulin lispro (HumaLOG) corrective regimen sliding scale  insulin lispro Injectable (HumaLOG)  magnesium oxide  methimazole  multivitamin  sodium chloride 0.9% lock flush    PRN: acetaminophen   Tablet .. PRN  oxyCODONE    5 mG/acetaminophen 325 mG PRN      LABS:	 	                        10.0   7.27  )-----------( 462      ( 25 Sep 2019 05:30 )             32.7   N=x    ; L=x        25 Sep 2019 05:30    137    |  95     |  28.0   ----------------------------<  93     4.2     |  28.0   |  0.95     Ca    9.1        25 Sep 2019 05:30  Mg     2.1       25 Sep 2019 05:30    -23 @ 07:05)    Lipid Profile:   HgA1c: Hemoglobin A1C, Whole Blood: 6.1 %  TSH: Thyroid Stimulating Hormone, Serum: 1.22 uIU/mL      TELEMETRY: Reviewed  atrial fibrillation.   ECG:  Reviewed by me. 	    DIAGNOSTIC TESTING:  [ ] Echocardiogram: LVEF < 20%.  Severely enlarged RV and severely reduced RV function. normal mitral proshessis. Large pleural effusion bilaterally.  small pericardial effusion.    <

## 2019-09-25 NOTE — PROGRESS NOTE ADULT - PROBLEM SELECTOR PLAN 1
Continue aggressive diuresis, Lasix 40 mg IV BID, monitor closely, dose may need uptitrating.  Left pigtail CT placed for pleural effusion, drained approx 900cc since placement in 24 hrs.  Appreciate cardiology consult, Norvasc d/c'd, Digoxin load and daily dose started, Losartan d/c'd, Entresto started   Plan for Toprol to be transitioned to Coreg tomorrow 9/26  Pt will require life vest prior to d/c home.  Daily labs, CXR, strict I/Os Continue aggressive diuresis, Lasix 40 mg IV BID, monitor closely, dose may need uptitrating.  Left pigtail CT placed for pleural effusion, drained approx 900cc since placement in 48 hrs.  Appreciate cardiology consult, Norvasc d/c'd, Digoxin load and daily dose started, Losartan d/c'd, Entresto started   Plan for Toprol to be transitioned to Coreg tomorrow 9/26  Pt will require life vest prior to d/c home.  Daily labs, CXR, strict I/Os

## 2019-09-25 NOTE — PROGRESS NOTE ADULT - ASSESSMENT
80yo Spanish speaking F seen with 2 sons whom she prefers to do translation.  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 Fuller on Sept 3 with Dr. Fuller.  Pt presents to ER with c/o SOB and CP, found to have b/l pleural effusions L>R on CXR.  She denies nausea, vomiting, diarrhea, dizziness, fever or chills.    9/23 Left pigtail CT placed without event.  TTE: revealed moderately enlarged left atrium, LVEF < 20%, severely enlarged RV with RV dysfxn, MARIELY, small pericardial effusion.  9/25 Cardiac meds adjusted for heart failure, pt diuresing well on Lasix.

## 2019-09-26 LAB
ANION GAP SERPL CALC-SCNC: 13 MMOL/L — SIGNIFICANT CHANGE UP (ref 5–17)
BUN SERPL-MCNC: 33 MG/DL — HIGH (ref 8–20)
CALCIUM SERPL-MCNC: 8.8 MG/DL — SIGNIFICANT CHANGE UP (ref 8.6–10.2)
CHLORIDE SERPL-SCNC: 94 MMOL/L — LOW (ref 98–107)
CO2 SERPL-SCNC: 27 MMOL/L — SIGNIFICANT CHANGE UP (ref 22–29)
CREAT SERPL-MCNC: 0.99 MG/DL — SIGNIFICANT CHANGE UP (ref 0.5–1.3)
GLUCOSE BLDC GLUCOMTR-MCNC: 134 MG/DL — HIGH (ref 70–99)
GLUCOSE BLDC GLUCOMTR-MCNC: 167 MG/DL — HIGH (ref 70–99)
GLUCOSE BLDC GLUCOMTR-MCNC: 241 MG/DL — HIGH (ref 70–99)
GLUCOSE BLDC GLUCOMTR-MCNC: 89 MG/DL — SIGNIFICANT CHANGE UP (ref 70–99)
GLUCOSE SERPL-MCNC: 294 MG/DL — HIGH (ref 70–115)
HCT VFR BLD CALC: 31.8 % — LOW (ref 34.5–45)
HGB BLD-MCNC: 9.8 G/DL — LOW (ref 11.5–15.5)
INR BLD: 1.8 RATIO — HIGH (ref 0.88–1.16)
MAGNESIUM SERPL-MCNC: 2 MG/DL — SIGNIFICANT CHANGE UP (ref 1.6–2.6)
MCHC RBC-ENTMCNC: 28.8 PG — SIGNIFICANT CHANGE UP (ref 27–34)
MCHC RBC-ENTMCNC: 30.8 GM/DL — LOW (ref 32–36)
MCV RBC AUTO: 93.5 FL — SIGNIFICANT CHANGE UP (ref 80–100)
NT-PROBNP SERPL-SCNC: 2960 PG/ML — HIGH (ref 0–300)
PLATELET # BLD AUTO: 449 K/UL — HIGH (ref 150–400)
POTASSIUM SERPL-MCNC: 4.3 MMOL/L — SIGNIFICANT CHANGE UP (ref 3.5–5.3)
POTASSIUM SERPL-SCNC: 4.3 MMOL/L — SIGNIFICANT CHANGE UP (ref 3.5–5.3)
PROTHROM AB SERPL-ACNC: 21.1 SEC — HIGH (ref 10–12.9)
RBC # BLD: 3.4 M/UL — LOW (ref 3.8–5.2)
RBC # FLD: 16.1 % — HIGH (ref 10.3–14.5)
SODIUM SERPL-SCNC: 134 MMOL/L — LOW (ref 135–145)
WBC # BLD: 5.51 K/UL — SIGNIFICANT CHANGE UP (ref 3.8–10.5)
WBC # FLD AUTO: 5.51 K/UL — SIGNIFICANT CHANGE UP (ref 3.8–10.5)

## 2019-09-26 PROCEDURE — 93010 ELECTROCARDIOGRAM REPORT: CPT

## 2019-09-26 PROCEDURE — 71045 X-RAY EXAM CHEST 1 VIEW: CPT | Mod: 26

## 2019-09-26 PROCEDURE — 99233 SBSQ HOSP IP/OBS HIGH 50: CPT

## 2019-09-26 PROCEDURE — 99232 SBSQ HOSP IP/OBS MODERATE 35: CPT

## 2019-09-26 RX ORDER — SPIRONOLACTONE 25 MG/1
25 TABLET, FILM COATED ORAL DAILY
Refills: 0 | Status: DISCONTINUED | OUTPATIENT
Start: 2019-09-26 | End: 2019-09-30

## 2019-09-26 RX ORDER — SACUBITRIL AND VALSARTAN 24; 26 MG/1; MG/1
1 TABLET, FILM COATED ORAL
Refills: 0 | Status: DISCONTINUED | OUTPATIENT
Start: 2019-09-26 | End: 2019-09-30

## 2019-09-26 RX ORDER — INSULIN LISPRO 100/ML
8 VIAL (ML) SUBCUTANEOUS
Refills: 0 | Status: DISCONTINUED | OUTPATIENT
Start: 2019-09-26 | End: 2019-09-30

## 2019-09-26 RX ORDER — WARFARIN SODIUM 2.5 MG/1
1 TABLET ORAL ONCE
Refills: 0 | Status: COMPLETED | OUTPATIENT
Start: 2019-09-26 | End: 2019-09-26

## 2019-09-26 RX ORDER — FUROSEMIDE 40 MG
40 TABLET ORAL
Refills: 0 | Status: DISCONTINUED | OUTPATIENT
Start: 2019-09-26 | End: 2019-09-30

## 2019-09-26 RX ADMIN — MAGNESIUM OXIDE 400 MG ORAL TABLET 400 MILLIGRAM(S): 241.3 TABLET ORAL at 17:23

## 2019-09-26 RX ADMIN — INSULIN GLARGINE 20 UNIT(S): 100 INJECTION, SOLUTION SUBCUTANEOUS at 21:42

## 2019-09-26 RX ADMIN — Medication 325 MILLIGRAM(S): at 12:11

## 2019-09-26 RX ADMIN — SACUBITRIL AND VALSARTAN 1 TABLET(S): 24; 26 TABLET, FILM COATED ORAL at 05:37

## 2019-09-26 RX ADMIN — WARFARIN SODIUM 1 MILLIGRAM(S): 2.5 TABLET ORAL at 21:44

## 2019-09-26 RX ADMIN — SODIUM CHLORIDE 3 MILLILITER(S): 9 INJECTION INTRAMUSCULAR; INTRAVENOUS; SUBCUTANEOUS at 05:33

## 2019-09-26 RX ADMIN — Medication 1 MILLIGRAM(S): at 12:12

## 2019-09-26 RX ADMIN — Medication 40 MILLIGRAM(S): at 05:36

## 2019-09-26 RX ADMIN — Medication 325 MILLIGRAM(S): at 08:56

## 2019-09-26 RX ADMIN — Medication 1 TABLET(S): at 12:12

## 2019-09-26 RX ADMIN — PANTOPRAZOLE SODIUM 40 MILLIGRAM(S): 20 TABLET, DELAYED RELEASE ORAL at 05:45

## 2019-09-26 RX ADMIN — CARVEDILOL PHOSPHATE 3.12 MILLIGRAM(S): 80 CAPSULE, EXTENDED RELEASE ORAL at 17:23

## 2019-09-26 RX ADMIN — ATORVASTATIN CALCIUM 80 MILLIGRAM(S): 80 TABLET, FILM COATED ORAL at 21:43

## 2019-09-26 RX ADMIN — SACUBITRIL AND VALSARTAN 1 TABLET(S): 24; 26 TABLET, FILM COATED ORAL at 21:44

## 2019-09-26 RX ADMIN — Medication 325 MILLIGRAM(S): at 17:23

## 2019-09-26 RX ADMIN — CARVEDILOL PHOSPHATE 3.12 MILLIGRAM(S): 80 CAPSULE, EXTENDED RELEASE ORAL at 05:37

## 2019-09-26 RX ADMIN — Medication 100 MILLIGRAM(S): at 21:44

## 2019-09-26 RX ADMIN — Medication 10 UNIT(S): at 13:01

## 2019-09-26 RX ADMIN — Medication 2: at 21:42

## 2019-09-26 RX ADMIN — Medication 100 MILLIGRAM(S): at 13:03

## 2019-09-26 RX ADMIN — SODIUM CHLORIDE 3 MILLILITER(S): 9 INJECTION INTRAMUSCULAR; INTRAVENOUS; SUBCUTANEOUS at 21:40

## 2019-09-26 RX ADMIN — Medication 81 MILLIGRAM(S): at 12:12

## 2019-09-26 RX ADMIN — Medication 40 MILLIGRAM(S): at 13:05

## 2019-09-26 RX ADMIN — Medication 0.12 MILLIGRAM(S): at 05:37

## 2019-09-26 RX ADMIN — Medication 10 UNIT(S): at 08:37

## 2019-09-26 RX ADMIN — Medication 500 MILLIGRAM(S): at 12:10

## 2019-09-26 RX ADMIN — Medication 4: at 08:37

## 2019-09-26 RX ADMIN — MAGNESIUM OXIDE 400 MG ORAL TABLET 400 MILLIGRAM(S): 241.3 TABLET ORAL at 08:56

## 2019-09-26 RX ADMIN — SODIUM CHLORIDE 3 MILLILITER(S): 9 INJECTION INTRAMUSCULAR; INTRAVENOUS; SUBCUTANEOUS at 12:34

## 2019-09-26 RX ADMIN — Medication 100 MILLIGRAM(S): at 05:37

## 2019-09-26 NOTE — PROGRESS NOTE ADULT - SUBJECTIVE AND OBJECTIVE BOX
Subjective: "better" Patient seen at the bedside with son. Patient does not speak English but prefers son to interpret. Denies CP, SOB but reports some weakness overall.    VITAL SIGNS  Vital Signs Last 24 Hrs  T(C): 36.7 (19 @ 05:29), Max: 37.1 (19 @ 16:01)  T(F): 98 (19 @ 05:29), Max: 98.7 (19 @ 16:01)  HR: 76 (19 @ 05:29) (76 - 83)  BP: 122/56 (19 @ 05:29) (110/60 - 132/61)  RR: 16 (19 @ 05:29) (16 - 20)  SpO2: 98% (19 @ 05:29) (98% - 98%)  on (O2)              Telemetry/Alarms:  AD PVC 80  LVEF: < 20%    MEDICATIONS  acetaminophen   Tablet .. 650 milliGRAM(s) Oral every 6 hours PRN  ascorbic acid 500 milliGRAM(s) Oral daily  aspirin enteric coated 81 milliGRAM(s) Oral daily  atorvastatin 80 milliGRAM(s) Oral at bedtime  carvedilol 3.125 milliGRAM(s) Oral every 12 hours  dextrose 50% Injectable 12.5 Gram(s) IV Push once  dextrose 50% Injectable 25 Gram(s) IV Push once  dextrose 50% Injectable 25 Gram(s) IV Push once  digoxin     Tablet 0.125 milliGRAM(s) Oral daily  docusate sodium 100 milliGRAM(s) Oral three times a day  ferrous sulfate Oral Tab/Cap - Peds 325 milliGRAM(s) Oral three times a day with meals  folic acid 1 milliGRAM(s) Oral daily  furosemide    Tablet 40 milliGRAM(s) Oral <User Schedule>  insulin glargine Injectable (LANTUS) 20 Unit(s) SubCutaneous at bedtime  insulin lispro (HumaLOG) corrective regimen sliding scale   SubCutaneous Before meals and at bedtime  insulin lispro Injectable (HumaLOG) 10 Unit(s) SubCutaneous three times a day before meals  magnesium oxide 400 milliGRAM(s) Oral two times a day with meals  methimazole 10 milliGRAM(s) Oral daily  multivitamin 1 Tablet(s) Oral daily  oxyCODONE    5 mG/acetaminophen 325 mG 1 Tablet(s) Oral every 6 hours PRN  pantoprazole    Tablet 40 milliGRAM(s) Oral before breakfast  sacubitril 24 mG/valsartan 26 mG 1 Tablet(s) Oral <User Schedule>  sodium chloride 0.9% lock flush 3 milliLiter(s) IV Push every 8 hours  warfarin 1 milliGRAM(s) Oral once      PHYSICAL EXAM  General: well nourished, well developed, no acute distress  Neurology: alert and oriented x 3, nonfocal, no gross deficits  Respiratory: few crackles left base  CV: irregular rate and rhythm, normal S1, S2  Abdomen: soft, nontender, nondistended, positive bowel sounds   Extremities: warm, well perfused. moderate +1 edema LLE and + R trace edema. + DP pulses  Incisions: midline sternal incision, c/d/i. sternum stable.        @ 07:01  -   @ 07:00  --------------------------------------------------------  IN: 450 mL / OUT: 1461 mL / NET: -1011 mL        Weights:  Daily     Daily Weight in k.8 (26 Sep 2019 06:46)  Admit Wt: Drug Dosing Weight  Height (cm): 121.9 (23 Sep 2019 00:46)  Weight (kg): 65.8 (23 Sep 2019 00:46)  BMI (kg/m2): 44.3 (23 Sep 2019 00:46)  BSA (m2): 1.39 (23 Sep 2019 00:46)    All laboratory results, radiology and medications reviewed.    LABS      134<L>  |  94<L>  |  33.0<H>  ----------------------------<  294<H>  4.3   |  27.0  |  0.99    Ca    8.8      26 Sep 2019 06:01  Mg     2.0                                        9.8    5.51  )-----------( 449      ( 26 Sep 2019 06:01 )             31.8          PT/INR - ( 26 Sep 2019 06:01 )   PT: 21.1 sec;   INR: 1.80 ratio         PTT - ( 25 Sep 2019 06:32 )  PTT:32.0 sec    CAPILLARY BLOOD GLUCOSE      POCT Blood Glucose.: 134 mg/dL (26 Sep 2019 12:08)  POCT Blood Glucose.: 241 mg/dL (26 Sep 2019 08:03)  POCT Blood Glucose.: 85 mg/dL (25 Sep 2019 21:19)  POCT Blood Glucose.: 73 mg/dL (25 Sep 2019 21:06)  POCT Blood Glucose.: 158 mg/dL (25 Sep 2019 17:18)           Today's CXR: left base atelectasis and cm    PAST MEDICAL & SURGICAL HISTORY:  CHF exacerbation  Atrial fibrillation  Diabetes  Hypertension  Stented coronary artery  Coronary stent restenosis, sequela  Afib  Hypertension  S/P CABG x 2: s/p C2L/TV Repair/MVR/ NISSA Clip  History of ankle surgery: right orif  Post PTCA: 8 stents  H/O hernia repair: &#x27;s  Cataract  History of appendectomy

## 2019-09-26 NOTE — PROGRESS NOTE ADULT - SUBJECTIVE AND OBJECTIVE BOX
[facility]                                                       [facility]                                                        Office: [address] [address]                                                       Telephone: [phone]. Fax:[phone]                                                                             PROGRESS NOTE   Reason for follow up: severe cardiomyopathy , heart failure. plerual effusion                             Overnight: No new events.   Update:  does not feel good. something not right.     Subjective: " no good"   Complains of:  dyspnea improved.   but did not have bowel movement yet.   Review of symptoms: Cardiac:  No chest pain. +  dyspnea. No palpitations.  Respiratory:no cough.+ dyspnea  Gastrointestinal: No diarrhea. No abdominal pain. No bleeding.     Past medical history: No updates.   Chronic conditions:  Hypertension: controlled. CHF:  decompensated CAD: Stable ischemic heart disease.   	  Vitals:  reviwed.     PHYSICAL EXAM:  Appearance: Comfortable. No acute distress  HEENT:  Head and neck: Atraumatic. Normocephalic.  Normal oral mucosa, PERRL, Neck is supple.    Neurologic: A & O x 3, no focal deficits. EOMI   Lymphatic: No cervical lymphadenopathy  Cardiovascular: irregular  S1 S2, No murmur, + rubs.  rubsno gallops.   Respiratory: bilateral based decrease breath sounds and bronchal breath sounds.   Gastrointestinal:  Soft, Non-tender, + BS  Lower Extremities: 1= bilateral  edema  Psychiatry: Patient is calm. No agitation. Mood & affect appropriate  Skin: No rashes/ ecchymoses/cyanosis/ulcers visualized on the face, hands or feet.    CURRENT MEDICATIONS:  MEDICATIONS  (STANDING):  furosemide   Injectable 40 milliGRAM(s) IV Push two times a day  sacubitril 24 mG/valsartan 26 mG 1 Tablet(s) Oral two times a day    docusate sodium  pantoprazole    Tablet  ascorbic acid  aspirin enteric coated  atorvastatin  dextrose 50% Injectable  dextrose 50% Injectable  dextrose 50% Injectable  ferrous sulfate Oral Tab/Cap - Peds  folic acid  insulin glargine Injectable (LANTUS)  insulin lispro (HumaLOG) corrective regimen sliding scale  insulin lispro Injectable (HumaLOG)  magnesium oxide  methimazole  multivitamin  sodium chloride 0.9% lock flush    PRN: acetaminophen   Tablet .. PRN  oxyCODONE    5 mG/acetaminophen 325 mG PRN      LABS:	 	                     reviewed.     Lipid Profile:   HgA1c: Hemoglobin A1C, Whole Blood: 6.1 %  TSH: Thyroid Stimulating Hormone, Serum: 1.22 uIU/mL      TELEMETRY: Reviewed  atrial fibrillation.   ECG:  Reviewed by me. 	    DIAGNOSTIC TESTING:  [ ] Echocardiogram: LVEF < 20%.  Severely enlarged RV and severely reduced RV function. normal mitral proshessis. Large pleural effusion bilaterally.  small pericardial effusion.    <

## 2019-09-26 NOTE — PHYSICAL THERAPY INITIAL EVALUATION ADULT - RANGE OF MOTION EXAMINATION, REHAB EVAL
Right shoulder flexion, abduction and scaption limited to approx 30-40 degrees 2*2 pain with active movement. Pt also compensating with shoulder shrug and lateral trunk movement. RN made aware, requesting MD or PA to assess shoulder after "sleeping on it wrong"/Left UE ROM was WFL (within functional limits)/bilateral lower extremity ROM was WFL (within functional limits)

## 2019-09-26 NOTE — PROGRESS NOTE ADULT - ASSESSMENT
78yo English speaking F seen with 2 sons whom she prefers to do translation.  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  Diabetes.  Plan: Home Lantus dose 30 units at bedtime. Patient had hyperG in am.   continue lantus 20 u Hs   continue humalog 10 u tID w meals.   check bgs actid and hs   sliding Scale insulin coverage.     Hyperthyroidism TSh normal. Pt euthyroid clinically and biochemically.   continue current dose MMi     Problem: CHF exacerbation.  continue diuresis     Atrial fibrillation.  Plan: Continue Coumadin when acceptable practice in reference to procedures  Continue Lopressor and Amiodarone      Hypertension.  Plan: Continue Lopressor, Norvasc and Amio at home doses.

## 2019-09-26 NOTE — PROGRESS NOTE ADULT - ASSESSMENT
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 Fuller 9/3/19.  Son states they had called Dr. Fuller during the week d/t increased SOB and edema, and Spironolactone was added on Friday, 9/20 to aide in diuresis, she was advised to present to ER in the event lower ext edema and SOB did not improve.  Found to have b/l pleural effusions L>R on CXR. Currently pt is s/p Left pigtail, removal of 600ml serosanguineous fluid.  Since IV Lasix and pigtail patient reports improvement in SOB, c/t c/o mid sternal chest pain at surgical site which is well approximated with no s/s of infection.     1. HfrEF with RV dysfunction.    ct diuresis.  lasix.   - Metolazone 2.5mg PO PRN  - EF improved.  No life vest as no inidation for life vest.    dig,  ct t beta-blocker and entresto. ct dig.   aldactone 25 mg daily    2. pleural effusion: incentive spirometer.  diuresis.   2. Persistent AFib  - daily coumadin dose adjustment for INR goal of 3  - Digoxin  - c/w Toprol    3. HTN   chf meds.     4. CAD  - c/w ASA, BB, statin  5) GI: Constipation: Gi regimen. Need bowel movement.

## 2019-09-26 NOTE — PHYSICAL THERAPY INITIAL EVALUATION ADULT - GENERAL OBSERVATIONS, REHAB EVAL
Pt received seated in bedside chair, + telemetry + IV + Venous Compression Boots + hemovac, c/o ?/10 pain, pt agreeable to PT

## 2019-09-26 NOTE — PROGRESS NOTE ADULT - ASSESSMENT
78yo Estonian speaking F seen with 2 sons whom she prefers to do translation.  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 Fuller on Sept 3 with Dr. Fuller.  Pt presents to ER with c/o SOB and CP, found to have b/l pleural effusions L>R on CXR.  She denies nausea, vomiting, diarrhea, dizziness, fever or chills.    9/23 Left pigtail CT placed without event.  TTE: revealed moderately enlarged left atrium, LVEF < 20%, severely enlarged RV with RV dysfxn, MARIELY, small pericardial effusion.  9/25 Cardiac meds adjusted for heart failure, pt diuresing well on Lasix. L pigtail d/c'd.     Plan  Echo today  PT eval  Possible discharge home soon  D/W Dr Avila in AM rounds 80yo Slovenian speaking F seen with 2 sons whom she prefers to do translation.  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 Fuller on Sept 3 with Dr. Fuller.  Pt presents to ER with c/o SOB and CP, found to have b/l pleural effusions L>R on CXR.  She denies nausea, vomiting, diarrhea, dizziness, fever or chills.    9/23 Left pigtail CT placed without event.  TTE: revealed moderately enlarged left atrium, LVEF < 20%, severely enlarged RV with RV dysfxn, MARIELY, small pericardial effusion.  9/25 Cardiac meds adjusted for heart failure, pt diuresing well on Lasix. L pigtail d/c'd.     Plan  Echo today  PT eval  May need life vest  Possible discharge home soon   D/W Dr Avila in AM rounds

## 2019-09-26 NOTE — PROGRESS NOTE ADULT - PROBLEM SELECTOR PLAN 1
Diuretics switched to oral BID dosing. BNP improving.  Left pigtail CT placed for pleural effusion, drained approx 900cc and now removed  Appreciate cardiology consult, Norvasc d/c'd, Digoxin load and daily dose started, Losartan d/c'd, Entresto started   Plan for Toprol to be transitioned to Coreg today  Pt will require life vest prior to d/c home.  Daily labs, CXR, strict I/Os

## 2019-09-26 NOTE — PHYSICAL THERAPY INITIAL EVALUATION ADULT - ASSISTIVE DEVICE FOR TRANSFER: GAIT, REHAB EVAL
10 feet ambulated with RW however, pt requesting to ambulate without assist device. Pt required supervision 2*2 impaired balance (stepping strategies, narrow HUMBERTO, reaching in malin for support) with 1 LOB noted requiring Min A to recover. Pt with improved balance with RW.

## 2019-09-26 NOTE — PHYSICAL THERAPY INITIAL EVALUATION ADULT - ADDITIONAL COMMENTS
Hx taken from pt's son Dylan at bedside. Pt lives in a private home with her youngest son who works full time. Pt's older son visits during the day to assist as needed. 3 steps to enter with handrails, 5 steps inside with handrails. Pt was independent PTA without assist device. Pt owns RW.

## 2019-09-26 NOTE — PROGRESS NOTE ADULT - PROBLEM SELECTOR PLAN 3
Continue Lantus 20 uts at bedtime, blood sugars appear better controlled  Continue 10 uts Humalog pre meal tid  Accuchecks ACHS  Sliding Scale insulin coverage.  Appreciate endocrine consult, no change in insulin coverage at this time.

## 2019-09-26 NOTE — PHYSICAL THERAPY INITIAL EVALUATION ADULT - DISCHARGE DISPOSITION, PT EVAL
home w/ assist/home w/ home PT/Pt may benefit from Outpatient Cardiac rehab once deemed appropriate by CTS team.

## 2019-09-26 NOTE — PROGRESS NOTE ADULT - SUBJECTIVE AND OBJECTIVE BOX
INTERVAL HPI/OVERNIGHT EVENTS:  follow up for dm2 management. feeling better, walking around today with minimal SOb with exertion     MEDICATIONS  (STANDING):  ascorbic acid 500 milliGRAM(s) Oral daily  aspirin enteric coated 81 milliGRAM(s) Oral daily  atorvastatin 80 milliGRAM(s) Oral at bedtime  carvedilol 3.125 milliGRAM(s) Oral every 12 hours  dextrose 50% Injectable 12.5 Gram(s) IV Push once  dextrose 50% Injectable 25 Gram(s) IV Push once  dextrose 50% Injectable 25 Gram(s) IV Push once  digoxin     Tablet 0.125 milliGRAM(s) Oral daily  docusate sodium 100 milliGRAM(s) Oral three times a day  ferrous sulfate Oral Tab/Cap - Peds 325 milliGRAM(s) Oral three times a day with meals  folic acid 1 milliGRAM(s) Oral daily  furosemide    Tablet 40 milliGRAM(s) Oral <User Schedule>  insulin glargine Injectable (LANTUS) 20 Unit(s) SubCutaneous at bedtime  insulin lispro (HumaLOG) corrective regimen sliding scale   SubCutaneous Before meals and at bedtime  insulin lispro Injectable (HumaLOG) 10 Unit(s) SubCutaneous three times a day before meals  magnesium oxide 400 milliGRAM(s) Oral two times a day with meals  methimazole 10 milliGRAM(s) Oral daily  multivitamin 1 Tablet(s) Oral daily  pantoprazole    Tablet 40 milliGRAM(s) Oral before breakfast  sacubitril 24 mG/valsartan 26 mG 1 Tablet(s) Oral <User Schedule>  sodium chloride 0.9% lock flush 3 milliLiter(s) IV Push every 8 hours  warfarin 1 milliGRAM(s) Oral once    MEDICATIONS  (PRN):  acetaminophen   Tablet .. 650 milliGRAM(s) Oral every 6 hours PRN Mild Pain (1 - 3)  oxyCODONE    5 mG/acetaminophen 325 mG 1 Tablet(s) Oral every 6 hours PRN Moderate Pain (4 - 6)      Allergies  No Known Allergies    Review of systems: noCP, some SOb with exertion, no cough, no N/V    Vital Signs Last 24 Hrs  T(C): 36.7 (26 Sep 2019 05:29), Max: 37.1 (25 Sep 2019 16:01)  T(F): 98 (26 Sep 2019 05:29), Max: 98.7 (25 Sep 2019 16:01)  HR: 76 (26 Sep 2019 05:29) (76 - 83)  BP: 122/56 (26 Sep 2019 05:29) (110/60 - 124/56)  BP(mean): --  RR: 16 (26 Sep 2019 05:29) (16 - 20)  SpO2: 98% (26 Sep 2019 05:29) (98% - 98%)    PHYSICAL EXAM:  General appearance: Well developed, well nourished.  Eyes: Pupils equal and reactive to light  Neck: Trachea midline.   Lungs: Normal respiratory excursion. Lungs clear, decreased BS at bases B/l   CV: Regular cardiac rhythm. No murmur. Carotid and pedal pulses intact.  Abdomen: Soft, non tender, no organomegaly or mass.  Musculoskeletal: No cyanosis, clubbing. Pitting edema b/L 1+ getting better   Skin: Warm and moist. No rash.  Neuro: Normal motor and sensory function.  Psych: Normal affect    LABS:                        9.8    5.51  )-----------( 449      ( 26 Sep 2019 06:01 )             31.8     09-26    134<L>  |  94<L>  |  33.0<H>  ----------------------------<  294<H>  4.3   |  27.0  |  0.99    Ca    8.8      26 Sep 2019 06:01  Mg     2.0     09-26

## 2019-09-26 NOTE — PHYSICAL THERAPY INITIAL EVALUATION ADULT - PERTINENT HX OF CURRENT PROBLEM, REHAB EVAL
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 Fuller on Sept 3. Pt presents to ER with c/o SOB and CP, found to have b/l pleural effusions L>R on CXR. Pt also with c/o BLE edema, Admitted for CHF exacerbation.

## 2019-09-26 NOTE — PHYSICAL THERAPY INITIAL EVALUATION ADULT - CRITERIA FOR SKILLED THERAPEUTIC INTERVENTIONS
Home with RW, Home PT, Pt may benefit from Outpatient Cardiac rehab once deemed appropriate by CTS team./impairments found/anticipated equipment needs at discharge/anticipated discharge recommendation

## 2019-09-27 LAB
ALBUMIN SERPL ELPH-MCNC: 3.1 G/DL — LOW (ref 3.3–5.2)
ALP SERPL-CCNC: 95 U/L — SIGNIFICANT CHANGE UP (ref 40–120)
ALT FLD-CCNC: 19 U/L — SIGNIFICANT CHANGE UP
ANION GAP SERPL CALC-SCNC: 14 MMOL/L — SIGNIFICANT CHANGE UP (ref 5–17)
AST SERPL-CCNC: 23 U/L — SIGNIFICANT CHANGE UP
BILIRUB SERPL-MCNC: 0.4 MG/DL — SIGNIFICANT CHANGE UP (ref 0.4–2)
BUN SERPL-MCNC: 26 MG/DL — HIGH (ref 8–20)
CALCIUM SERPL-MCNC: 8.8 MG/DL — SIGNIFICANT CHANGE UP (ref 8.6–10.2)
CHLORIDE SERPL-SCNC: 97 MMOL/L — LOW (ref 98–107)
CO2 SERPL-SCNC: 24 MMOL/L — SIGNIFICANT CHANGE UP (ref 22–29)
CREAT SERPL-MCNC: 0.92 MG/DL — SIGNIFICANT CHANGE UP (ref 0.5–1.3)
GLUCOSE BLDC GLUCOMTR-MCNC: 136 MG/DL — HIGH (ref 70–99)
GLUCOSE BLDC GLUCOMTR-MCNC: 137 MG/DL — HIGH (ref 70–99)
GLUCOSE BLDC GLUCOMTR-MCNC: 138 MG/DL — HIGH (ref 70–99)
GLUCOSE BLDC GLUCOMTR-MCNC: 185 MG/DL — HIGH (ref 70–99)
GLUCOSE SERPL-MCNC: 171 MG/DL — HIGH (ref 70–115)
HCT VFR BLD CALC: 31.4 % — LOW (ref 34.5–45)
HGB BLD-MCNC: 9.9 G/DL — LOW (ref 11.5–15.5)
INR BLD: 1.74 RATIO — HIGH (ref 0.88–1.16)
MAGNESIUM SERPL-MCNC: 1.8 MG/DL — SIGNIFICANT CHANGE UP (ref 1.6–2.6)
MCHC RBC-ENTMCNC: 29.2 PG — SIGNIFICANT CHANGE UP (ref 27–34)
MCHC RBC-ENTMCNC: 31.5 GM/DL — LOW (ref 32–36)
MCV RBC AUTO: 92.6 FL — SIGNIFICANT CHANGE UP (ref 80–100)
PLATELET # BLD AUTO: 437 K/UL — HIGH (ref 150–400)
POTASSIUM SERPL-MCNC: 3.9 MMOL/L — SIGNIFICANT CHANGE UP (ref 3.5–5.3)
POTASSIUM SERPL-SCNC: 3.9 MMOL/L — SIGNIFICANT CHANGE UP (ref 3.5–5.3)
PROT SERPL-MCNC: 6.5 G/DL — LOW (ref 6.6–8.7)
PROTHROM AB SERPL-ACNC: 20.3 SEC — HIGH (ref 10–12.9)
RBC # BLD: 3.39 M/UL — LOW (ref 3.8–5.2)
RBC # FLD: 15.9 % — HIGH (ref 10.3–14.5)
SODIUM SERPL-SCNC: 135 MMOL/L — SIGNIFICANT CHANGE UP (ref 135–145)
WBC # BLD: 6.23 K/UL — SIGNIFICANT CHANGE UP (ref 3.8–10.5)
WBC # FLD AUTO: 6.23 K/UL — SIGNIFICANT CHANGE UP (ref 3.8–10.5)

## 2019-09-27 PROCEDURE — 74018 RADEX ABDOMEN 1 VIEW: CPT | Mod: 26

## 2019-09-27 PROCEDURE — 99233 SBSQ HOSP IP/OBS HIGH 50: CPT

## 2019-09-27 RX ORDER — MAGNESIUM SULFATE 500 MG/ML
2 VIAL (ML) INJECTION ONCE
Refills: 0 | Status: COMPLETED | OUTPATIENT
Start: 2019-09-27 | End: 2019-09-27

## 2019-09-27 RX ORDER — WARFARIN SODIUM 2.5 MG/1
2 TABLET ORAL ONCE
Refills: 0 | Status: COMPLETED | OUTPATIENT
Start: 2019-09-27 | End: 2019-09-27

## 2019-09-27 RX ORDER — POTASSIUM CHLORIDE 20 MEQ
20 PACKET (EA) ORAL ONCE
Refills: 0 | Status: COMPLETED | OUTPATIENT
Start: 2019-09-27 | End: 2019-09-27

## 2019-09-27 RX ADMIN — INSULIN GLARGINE 20 UNIT(S): 100 INJECTION, SOLUTION SUBCUTANEOUS at 21:44

## 2019-09-27 RX ADMIN — MAGNESIUM OXIDE 400 MG ORAL TABLET 400 MILLIGRAM(S): 241.3 TABLET ORAL at 09:12

## 2019-09-27 RX ADMIN — Medication 40 MILLIGRAM(S): at 06:43

## 2019-09-27 RX ADMIN — Medication 650 MILLIGRAM(S): at 09:12

## 2019-09-27 RX ADMIN — WARFARIN SODIUM 2 MILLIGRAM(S): 2.5 TABLET ORAL at 21:43

## 2019-09-27 RX ADMIN — CARVEDILOL PHOSPHATE 3.12 MILLIGRAM(S): 80 CAPSULE, EXTENDED RELEASE ORAL at 16:30

## 2019-09-27 RX ADMIN — PANTOPRAZOLE SODIUM 40 MILLIGRAM(S): 20 TABLET, DELAYED RELEASE ORAL at 06:34

## 2019-09-27 RX ADMIN — Medication 8 UNIT(S): at 09:09

## 2019-09-27 RX ADMIN — SODIUM CHLORIDE 3 MILLILITER(S): 9 INJECTION INTRAMUSCULAR; INTRAVENOUS; SUBCUTANEOUS at 21:47

## 2019-09-27 RX ADMIN — SODIUM CHLORIDE 3 MILLILITER(S): 9 INJECTION INTRAMUSCULAR; INTRAVENOUS; SUBCUTANEOUS at 14:00

## 2019-09-27 RX ADMIN — Medication 1 TABLET(S): at 12:56

## 2019-09-27 RX ADMIN — SODIUM CHLORIDE 3 MILLILITER(S): 9 INJECTION INTRAMUSCULAR; INTRAVENOUS; SUBCUTANEOUS at 06:44

## 2019-09-27 RX ADMIN — Medication 8 UNIT(S): at 17:42

## 2019-09-27 RX ADMIN — Medication 50 GRAM(S): at 16:29

## 2019-09-27 RX ADMIN — SACUBITRIL AND VALSARTAN 1 TABLET(S): 24; 26 TABLET, FILM COATED ORAL at 21:43

## 2019-09-27 RX ADMIN — Medication 10 MILLIGRAM(S): at 20:49

## 2019-09-27 RX ADMIN — Medication 2: at 09:10

## 2019-09-27 RX ADMIN — Medication 8 UNIT(S): at 12:57

## 2019-09-27 RX ADMIN — Medication 0.12 MILLIGRAM(S): at 06:34

## 2019-09-27 RX ADMIN — Medication 650 MILLIGRAM(S): at 09:47

## 2019-09-27 RX ADMIN — Medication 325 MILLIGRAM(S): at 12:56

## 2019-09-27 RX ADMIN — SPIRONOLACTONE 25 MILLIGRAM(S): 25 TABLET, FILM COATED ORAL at 06:34

## 2019-09-27 RX ADMIN — ATORVASTATIN CALCIUM 80 MILLIGRAM(S): 80 TABLET, FILM COATED ORAL at 21:43

## 2019-09-27 RX ADMIN — Medication 650 MILLIGRAM(S): at 22:30

## 2019-09-27 RX ADMIN — Medication 100 MILLIGRAM(S): at 21:44

## 2019-09-27 RX ADMIN — CARVEDILOL PHOSPHATE 3.12 MILLIGRAM(S): 80 CAPSULE, EXTENDED RELEASE ORAL at 06:34

## 2019-09-27 RX ADMIN — Medication 81 MILLIGRAM(S): at 12:56

## 2019-09-27 RX ADMIN — OXYCODONE AND ACETAMINOPHEN 1 TABLET(S): 5; 325 TABLET ORAL at 23:49

## 2019-09-27 RX ADMIN — Medication 100 MILLIGRAM(S): at 06:34

## 2019-09-27 RX ADMIN — Medication 500 MILLIGRAM(S): at 12:56

## 2019-09-27 RX ADMIN — Medication 40 MILLIGRAM(S): at 16:30

## 2019-09-27 RX ADMIN — Medication 20 MILLIEQUIVALENT(S): at 16:29

## 2019-09-27 RX ADMIN — MAGNESIUM OXIDE 400 MG ORAL TABLET 400 MILLIGRAM(S): 241.3 TABLET ORAL at 16:29

## 2019-09-27 RX ADMIN — Medication 325 MILLIGRAM(S): at 16:32

## 2019-09-27 RX ADMIN — Medication 1 MILLIGRAM(S): at 12:56

## 2019-09-27 RX ADMIN — Medication 325 MILLIGRAM(S): at 09:13

## 2019-09-27 RX ADMIN — Medication 650 MILLIGRAM(S): at 23:49

## 2019-09-27 NOTE — PROGRESS NOTE ADULT - PROBLEM SELECTOR PLAN 1
Diuretics switched to oral BID dosing. BNP improving.  Left pigtail CT placed for pleural effusion, drained approx 900cc and now removed  Appreciate cardiology consult,   On Digoxin, Entresto and Coreg as well as Lasix 40 mg PO BID (negative fluid balance yesterday)  ACE wrapped B/L LE  No indication for Lifevest at this time (improved LVEF)  Daily labs, CXR, strict I/Os

## 2019-09-27 NOTE — PROGRESS NOTE ADULT - ASSESSMENT
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 Fuller 9/3/19.  Son states they had called Dr. Fuller during the week d/t increased SOB and edema, and Spironolactone was added on Friday, 9/20 to aide in diuresis, she was advised to present to ER in the event lower ext edema and SOB did not improve.  Found to have b/l pleural effusions L>R on CXR. Currently pt is s/p Left pigtail, removal of 600ml serosanguineous fluid.  Since IV Lasix and pigtail patient reports improvement in SOB, c/t c/o mid sternal chest pain at surgical site which is well approximated with no s/s of infection.     1. HfrEF with RV dysfunction.    PO diuretics. discharge plan.    2) GI: Costipation: Xray abdomen.     2. pleural effusion: incentive spirometer.  diuresis.   2. Persistent AFib  - Digoxin  - c/w Toprol    3. HTN   chf meds.     4. CAD  - c/w ASA, BB, statin  5) GI: Constipation: Gi regimen. Need bowel movement.   No further in-patient cardiac work-up/management is needed.  Follow-up in cardiology office in 2 weeks.

## 2019-09-27 NOTE — PROGRESS NOTE ADULT - SUBJECTIVE AND OBJECTIVE BOX
[place] [facility]                                                       [facility]                                                        Office: [address] [address]                                                       Telephone: [phone]. Fax:[phone]                                                                             PROGRESS NOTE   Reason for follow up: severe cardiomyopathy , heart failure. plerual effusion                             Overnight: No new events.   Update:   had diarrhea today but no bowel movement yet.     Subjective: " ok"   Complains of:  diarrhea.   Review of symptoms: Cardiac:  No chest pain. +  dyspnea. No palpitations.  Respiratory:no cough.+ dyspnea  Gastrointestinal: No diarrhea. No abdominal pain. No bleeding.     Past medical history: No updates.   Chronic conditions:  Hypertension: controlled. CHF:  decompensated CAD: Stable ischemic heart disease.   	  Vitals:  reviewed     PHYSICAL EXAM:  Appearance: Comfortable. No acute distress  HEENT:  Head and neck: Atraumatic. Normocephalic.  Normal oral mucosa, PERRL, Neck is supple.    Neurologic: A & O x 3, no focal deficits. EOMI   Lymphatic: No cervical lymphadenopathy  Cardiovascular: irregular  S1 S2, No murmur, + rubs.  rubsno gallops.   Respiratory: bilateral based decrease breath sounds and bronchal breath sounds.   Gastrointestinal:  Soft, Non-tender, + BS  Lower Extremities: 1= bilateral  edema  Psychiatry: Patient is calm. No agitation. Mood & affect appropriate  Skin: No rashes/ ecchymoses/cyanosis/ulcers visualized on the face, hands or feet.    CURRENT MEDICATIONS:  MEDICATIONS  (STANDING):  carvedilol 3.125 milliGRAM(s) Oral every 12 hours  digoxin     Tablet 0.125 milliGRAM(s) Oral daily  furosemide    Tablet 40 milliGRAM(s) Oral <User Schedule>  sacubitril 24 mG/valsartan 26 mG 1 Tablet(s) Oral <User Schedule>  spironolactone 25 milliGRAM(s) Oral daily    bisacodyl Suppository  docusate sodium  pantoprazole    Tablet  ascorbic acid  aspirin enteric coated  atorvastatin  dextrose 50% Injectable  dextrose 50% Injectable  dextrose 50% Injectable  ferrous sulfate Oral Tab/Cap - Peds  folic acid  insulin glargine Injectable (LANTUS)  insulin lispro (HumaLOG) corrective regimen sliding scale  insulin lispro Injectable (HumaLOG)  magnesium oxide  methimazole  multivitamin  sodium chloride 0.9% lock flush    PRN: acetaminophen   Tablet .. PRN  oxyCODONE    5 mG/acetaminophen 325 mG PRN    PRN: acetaminophen   Tablet .. PRN  oxyCODONE    5 mG/acetaminophen 325 mG PRN      LABS:	 	                        9.9    6.23  )-----------( 437      ( 27 Sep 2019 05:57 )             31.4   N=x    ; L=x        27 Sep 2019 05:57    135    |  97     |  26.0   ----------------------------<  171    3.9     |  24.0   |  0.92     Ca    8.8        27 Sep 2019 05:57  Mg     1.8       27 Sep 2019 05:57    TPro  6.5    /  Alb  3.1    /  TBili  0.4    /  DBili  x      /  AST  23     /  ALT  19     /  AlkPhos  95     27 Sep 2019 05:57      Hepatic panel: 27 Sep 2019 05:57  6.5   | 3.1                            0.4   | 0.4  /x                              23    | 19                                /95   \par                                   Lipid Profile:   HgA1c: Hemoglobin A1C, Whole Blood: 6.1 %  TSH: Thyroid Stimulating Hormone, Serum: 1.22 uIU/mL      TELEMETRY: Reviewed  atrial fibrillation.   ECG:  Reviewed by me. 	    DIAGNOSTIC TESTING:  [ ] Echocardiogram: LVEF < 20%.  Severely enlarged RV and severely reduced RV function. normal mitral proshessis. Large pleural effusion bilaterally.  small pericardial effusion.    <

## 2019-09-27 NOTE — PROGRESS NOTE ADULT - ASSESSMENT
80yo Turkish speaking F seen with 2 sons whom she prefers to do translation.  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 Fuller on Sept 3 with Dr. Fuller.  Pt presents to ER with c/o SOB and CP, found to have b/l pleural effusions L>R on CXR.  She denies nausea, vomiting, diarrhea, dizziness, fever or chills.    9/23 Left pigtail CT placed without event.  TTE: revealed moderately enlarged left atrium, LVEF < 20%, severely enlarged RV with RV dysfxn, MARIELY, small pericardial effusion.  9/25 Cardiac meds adjusted for heart failure, pt diuresing well on Lasix. L pigtail d/c'd. Echo 9/26 revealed improved EF 35-40%, moderate RV dysfunction (no life vest indicated).     Plan  Ideally would try to discharge today but has some abdominal /diarrhea complaints.   AXR nonspecific  May need laxatives, rectal exam or GI eval  Coumadin 2 mg tonight  Supplement K/Mg  Patient will need to follow up with Dr Richardson within a week from discharge  D/W CTS team in AM rounds

## 2019-09-27 NOTE — PROGRESS NOTE ADULT - PROBLEM SELECTOR PLAN 3
Continue Lantus 20 uts at bedtime, blood sugars appear better controlled  Continue 8 uts Humalog pre meal tid (decreased yesterday slightly for borderline hypoglycemia)  Accuchecks ACHS  Sliding Scale insulin coverage.  Appreciate endocrine consult, no change in insulin coverage at this time.

## 2019-09-27 NOTE — PROGRESS NOTE ADULT - SUBJECTIVE AND OBJECTIVE BOX
Subjective: Patient seen at the bedside earlier, indicating headache and not feeling well. When son arrived, he relayed that the patient is overall not feeling well and is having small amounts of watery diarrhea several times this morning. Denies CP, SOB.     VITAL SIGNS  Vital Signs Last 24 Hrs  T(C): 37 (19 @ 06:32), Max: 37.2 (19 @ 16:18)  T(F): 98.6 (19 @ 06:32), Max: 99 (19 @ 16:18)  HR: 75 (19 @ 08:00) (75 - 91)  BP: 92/58 (19 @ 08:00) (92/58 - 118/59)  RR: 18 (19 @ 08:00) (16 - 18)  SpO2: 100% (19 @ 08:00) (96% - 100%)  on RA             Telemetry/Alarms:  AF 75, PVCs  LVEF: 35-40    MEDICATIONS  acetaminophen   Tablet .. 650 milliGRAM(s) Oral every 6 hours PRN  ascorbic acid 500 milliGRAM(s) Oral daily  aspirin enteric coated 81 milliGRAM(s) Oral daily  atorvastatin 80 milliGRAM(s) Oral at bedtime  carvedilol 3.125 milliGRAM(s) Oral every 12 hours  dextrose 50% Injectable 12.5 Gram(s) IV Push once  dextrose 50% Injectable 25 Gram(s) IV Push once  dextrose 50% Injectable 25 Gram(s) IV Push once  digoxin     Tablet 0.125 milliGRAM(s) Oral daily  docusate sodium 100 milliGRAM(s) Oral three times a day  ferrous sulfate Oral Tab/Cap - Peds 325 milliGRAM(s) Oral three times a day with meals  folic acid 1 milliGRAM(s) Oral daily  furosemide    Tablet 40 milliGRAM(s) Oral <User Schedule>  insulin glargine Injectable (LANTUS) 20 Unit(s) SubCutaneous at bedtime  insulin lispro (HumaLOG) corrective regimen sliding scale   SubCutaneous Before meals and at bedtime  insulin lispro Injectable (HumaLOG) 8 Unit(s) SubCutaneous three times a day before meals  magnesium oxide 400 milliGRAM(s) Oral two times a day with meals  methimazole 10 milliGRAM(s) Oral daily  multivitamin 1 Tablet(s) Oral daily  oxyCODONE    5 mG/acetaminophen 325 mG 1 Tablet(s) Oral every 6 hours PRN  pantoprazole    Tablet 40 milliGRAM(s) Oral before breakfast  sacubitril 24 mG/valsartan 26 mG 1 Tablet(s) Oral <User Schedule>  sodium chloride 0.9% lock flush 3 milliLiter(s) IV Push every 8 hours  spironolactone 25 milliGRAM(s) Oral daily  warfarin 2 milliGRAM(s) Oral once      PHYSICAL EXAM  General: well nourished, well developed, no acute distress  Neurology: alert and oriented x 3, nonfocal, no gross deficits  Respiratory: clear to auscultation bilaterally  CV: irregular rate and rhythm, normal S1, S2  Abdomen: soft, nontender, nondistended, positive bowel sounds, last bowel movement today small loose/yellow watery  Extremities: warm, well perfused. mild-moderate edema. + DP pulses, ACE wraps b/l LE  Incisions: midline sternal incision, c/d/i. sternum stable. L [facility]        @ 07: @ 07:00  --------------------------------------------------------  IN: 180 mL / OUT: 800 mL / NET: -620 mL     @ 07: @ 14:49  --------------------------------------------------------  IN: 480 mL / OUT: 0 mL / NET: 480 mL        Weights:  Daily     Daily Weight in k (27 Sep 2019 06:51)  Admit Wt: Drug Dosing Weight  Height (cm): 121.9 (23 Sep 2019 00:46)  Weight (kg): 65.8 (23 Sep 2019 00:46)  BMI (kg/m2): 44.3 (23 Sep 2019 00:46)  BSA (m2): 1.39 (23 Sep 2019 00:46)    All laboratory results, radiology and medications reviewed.    LABS      135  |  97<L>  |  26.0<H>  ----------------------------<  171<H>  3.9   |  24.0  |  0.92    Ca    8.8      27 Sep 2019 05:57  Mg     1.8         TPro  6.5<L>  /  Alb  3.1<L>  /  TBili  0.4  /  DBili  x   /  AST  23  /  ALT  19  /  AlkPhos  95                                   9.9    6.23  )-----------( 437      ( 27 Sep 2019 05:57 )             31.4          PT/INR - ( 27 Sep 2019 05:57 )   PT: 20.3 sec;   INR: 1.74 ratio           Bilirubin Total, Serum: 0.4 mg/dL ( @ 05:57)    CAPILLARY BLOOD GLUCOSE      POCT Blood Glucose.: 138 mg/dL (27 Sep 2019 12:03)  POCT Blood Glucose.: 185 mg/dL (27 Sep 2019 08:34)  POCT Blood Glucose.: 167 mg/dL (26 Sep 2019 21:31)  POCT Blood Glucose.: 89 mg/dL (26 Sep 2019 16:49)           Today's CXR: < from: Xray Chest 1 View- PORTABLE-Routine (19 @ 06:53) >    Findings:  Cardiomegaly. Post cardiac surgery changes. Mild pulmonary vascular   congestion, unchanged. Decreasing bilateral pleural effusions since the   prior day. No evidence of pneumothorax..    Impression:  Decreasing bilateral pleural effusions, otherwise stable.      < end of copied text >      Today's EKG: < from: 12 Lead ECG (19 @ 07:41) >    Diagnosis Line Atrial fibrillation  Nonspecific T wave abnormality  Abnormal ECG    < end of copied text >      PAST MEDICAL & SURGICAL HISTORY:  CHF exacerbation  Atrial fibrillation  Diabetes  Hypertension  Stented coronary artery  Coronary stent restenosis, sequela  Afib  Hypertension  S/P CABG x 2: s/p C2L/TV Repair/MVR/ NISSA Clip  History of ankle surgery: right orif  Post PTCA: 8 stents  H/O hernia repair: &#x27;s  Cataract  History of appendectomy Subjective: Patient seen at the bedside earlier, indicating headache and not feeling well. When son arrived, he relayed that the patient is overall not feeling well and is having small amounts of watery diarrhea several times this morning. Denies CP, SOB.     VITAL SIGNS  Vital Signs Last 24 Hrs  T(C): 37 (19 @ 06:32), Max: 37.2 (19 @ 16:18)  T(F): 98.6 (19 @ 06:32), Max: 99 (19 @ 16:18)  HR: 75 (19 @ 08:00) (75 - 91)  BP: 92/58 (19 @ 08:00) (92/58 - 118/59)  RR: 18 (19 @ 08:00) (16 - 18)  SpO2: 100% (19 @ 08:00) (96% - 100%)  on RA             Telemetry/Alarms:  AF 75, PVCs  LVEF: 35-40    MEDICATIONS  acetaminophen   Tablet .. 650 milliGRAM(s) Oral every 6 hours PRN  ascorbic acid 500 milliGRAM(s) Oral daily  aspirin enteric coated 81 milliGRAM(s) Oral daily  atorvastatin 80 milliGRAM(s) Oral at bedtime  carvedilol 3.125 milliGRAM(s) Oral every 12 hours  dextrose 50% Injectable 12.5 Gram(s) IV Push once  dextrose 50% Injectable 25 Gram(s) IV Push once  dextrose 50% Injectable 25 Gram(s) IV Push once  digoxin     Tablet 0.125 milliGRAM(s) Oral daily  docusate sodium 100 milliGRAM(s) Oral three times a day  ferrous sulfate Oral Tab/Cap - Peds 325 milliGRAM(s) Oral three times a day with meals  folic acid 1 milliGRAM(s) Oral daily  furosemide    Tablet 40 milliGRAM(s) Oral <User Schedule>  insulin glargine Injectable (LANTUS) 20 Unit(s) SubCutaneous at bedtime  insulin lispro (HumaLOG) corrective regimen sliding scale   SubCutaneous Before meals and at bedtime  insulin lispro Injectable (HumaLOG) 8 Unit(s) SubCutaneous three times a day before meals  magnesium oxide 400 milliGRAM(s) Oral two times a day with meals  methimazole 10 milliGRAM(s) Oral daily  multivitamin 1 Tablet(s) Oral daily  oxyCODONE    5 mG/acetaminophen 325 mG 1 Tablet(s) Oral every 6 hours PRN  pantoprazole    Tablet 40 milliGRAM(s) Oral before breakfast  sacubitril 24 mG/valsartan 26 mG 1 Tablet(s) Oral <User Schedule>  sodium chloride 0.9% lock flush 3 milliLiter(s) IV Push every 8 hours  spironolactone 25 milliGRAM(s) Oral daily  warfarin 2 milliGRAM(s) Oral once      PHYSICAL EXAM  General: well nourished, well developed, no acute distress  Neurology: alert and oriented x 3, nonfocal, no gross deficits  Respiratory: clear to auscultation bilaterally  CV: irregular rate and rhythm, normal S1, S2  Abdomen: soft, nontender, nondistended, positive bowel sounds, last bowel movement today small loose/yellow watery  Extremities: warm, well perfused. mild-moderate edema. + DP pulses, ACE wraps b/l LE  Incisions: midline sternal incision, c/d/i. sternum stable. L [facility]        @ 07: @ 07:00  --------------------------------------------------------  IN: 180 mL / OUT: 800 mL / NET: -620 mL     @ 07: @ 14:49  --------------------------------------------------------  IN: 480 mL / OUT: 0 mL / NET: 480 mL        Weights:  Daily     Daily Weight in k (27 Sep 2019 06:51)  Admit Wt: Drug Dosing Weight  Height (cm): 121.9 (23 Sep 2019 00:46)  Weight (kg): 65.8 (23 Sep 2019 00:46)  BMI (kg/m2): 44.3 (23 Sep 2019 00:46)  BSA (m2): 1.39 (23 Sep 2019 00:46)    All laboratory results, radiology and medications reviewed.    LABS      135  |  97<L>  |  26.0<H>  ----------------------------<  171<H>  3.9   |  24.0  |  0.92    Ca    8.8      27 Sep 2019 05:57  Mg     1.8         TPro  6.5<L>  /  Alb  3.1<L>  /  TBili  0.4  /  DBili  x   /  AST  23  /  ALT  19  /  AlkPhos  95                                   9.9    6.23  )-----------( 437      ( 27 Sep 2019 05:57 )             31.4          PT/INR - ( 27 Sep 2019 05:57 )   PT: 20.3 sec;   INR: 1.74 ratio           Bilirubin Total, Serum: 0.4 mg/dL ( @ 05:57)    CAPILLARY BLOOD GLUCOSE      POCT Blood Glucose.: 138 mg/dL (27 Sep 2019 12:03)  POCT Blood Glucose.: 185 mg/dL (27 Sep 2019 08:34)  POCT Blood Glucose.: 167 mg/dL (26 Sep 2019 21:31)  POCT Blood Glucose.: 89 mg/dL (26 Sep 2019 16:49)           Today's CXR: < from: Xray Chest 1 View- PORTABLE-Routine (19 @ 06:53) >    Findings:  Cardiomegaly. Post cardiac surgery changes. Mild pulmonary vascular   congestion, unchanged. Decreasing bilateral pleural effusions since the   prior day. No evidence of pneumothorax..    Impression:  Decreasing bilateral pleural effusions, otherwise stable.      < end of copied text >      Today's EKG: < from: 12 Lead ECG (19 @ 07:41) >    Diagnosis Line Atrial fibrillation  Nonspecific T wave abnormality  Abnormal ECG    < end of copied text >    < from: Xray Abdomen 1 View PORTABLE -Urgent (19 @ 14:38) >    The bowel gas pattern is within normal limits.  There is no free intra-abdominal air.  There are no obvious intra-abdominal masses.  There are no abnormal calcifications.   The osseous structures are intact.     IMPRESSION:    No acute intra-abdominal findings.      < end of copied text >    PAST MEDICAL & SURGICAL HISTORY:  CHF exacerbation  Atrial fibrillation  Diabetes  Hypertension  Stented coronary artery  Coronary stent restenosis, sequela  Afib  Hypertension  S/P CABG x 2: s/p C2L/TV Repair/MVR/ NISSA Clip  History of ankle surgery: right orif  Post PTCA: 8 stents  H/O hernia repair: &#x27;s  Cataract  History of appendectomy

## 2019-09-28 LAB
ANION GAP SERPL CALC-SCNC: 11 MMOL/L — SIGNIFICANT CHANGE UP (ref 5–17)
BUN SERPL-MCNC: 28 MG/DL — HIGH (ref 8–20)
CALCIUM SERPL-MCNC: 8.9 MG/DL — SIGNIFICANT CHANGE UP (ref 8.6–10.2)
CHLORIDE SERPL-SCNC: 98 MMOL/L — SIGNIFICANT CHANGE UP (ref 98–107)
CO2 SERPL-SCNC: 27 MMOL/L — SIGNIFICANT CHANGE UP (ref 22–29)
CREAT SERPL-MCNC: 1.03 MG/DL — SIGNIFICANT CHANGE UP (ref 0.5–1.3)
GLUCOSE BLDC GLUCOMTR-MCNC: 128 MG/DL — HIGH (ref 70–99)
GLUCOSE BLDC GLUCOMTR-MCNC: 130 MG/DL — HIGH (ref 70–99)
GLUCOSE BLDC GLUCOMTR-MCNC: 189 MG/DL — HIGH (ref 70–99)
GLUCOSE BLDC GLUCOMTR-MCNC: 278 MG/DL — HIGH (ref 70–99)
GLUCOSE BLDC GLUCOMTR-MCNC: 67 MG/DL — LOW (ref 70–99)
GLUCOSE SERPL-MCNC: 158 MG/DL — HIGH (ref 70–115)
HCT VFR BLD CALC: 31 % — LOW (ref 34.5–45)
HGB BLD-MCNC: 9.6 G/DL — LOW (ref 11.5–15.5)
INR BLD: 1.63 RATIO — HIGH (ref 0.88–1.16)
MAGNESIUM SERPL-MCNC: 2.1 MG/DL — SIGNIFICANT CHANGE UP (ref 1.6–2.6)
MCHC RBC-ENTMCNC: 28.7 PG — SIGNIFICANT CHANGE UP (ref 27–34)
MCHC RBC-ENTMCNC: 31 GM/DL — LOW (ref 32–36)
MCV RBC AUTO: 92.5 FL — SIGNIFICANT CHANGE UP (ref 80–100)
PLATELET # BLD AUTO: 411 K/UL — HIGH (ref 150–400)
POTASSIUM SERPL-MCNC: 4.4 MMOL/L — SIGNIFICANT CHANGE UP (ref 3.5–5.3)
POTASSIUM SERPL-SCNC: 4.4 MMOL/L — SIGNIFICANT CHANGE UP (ref 3.5–5.3)
PROTHROM AB SERPL-ACNC: 19 SEC — HIGH (ref 10–12.9)
RBC # BLD: 3.35 M/UL — LOW (ref 3.8–5.2)
RBC # FLD: 15.7 % — HIGH (ref 10.3–14.5)
SODIUM SERPL-SCNC: 136 MMOL/L — SIGNIFICANT CHANGE UP (ref 135–145)
WBC # BLD: 6.93 K/UL — SIGNIFICANT CHANGE UP (ref 3.8–10.5)
WBC # FLD AUTO: 6.93 K/UL — SIGNIFICANT CHANGE UP (ref 3.8–10.5)

## 2019-09-28 PROCEDURE — 71045 X-RAY EXAM CHEST 1 VIEW: CPT | Mod: 26

## 2019-09-28 PROCEDURE — 71045 X-RAY EXAM CHEST 1 VIEW: CPT | Mod: 26,77

## 2019-09-28 PROCEDURE — 99232 SBSQ HOSP IP/OBS MODERATE 35: CPT

## 2019-09-28 RX ORDER — WARFARIN SODIUM 2.5 MG/1
3 TABLET ORAL ONCE
Refills: 0 | Status: COMPLETED | OUTPATIENT
Start: 2019-09-28 | End: 2019-09-28

## 2019-09-28 RX ORDER — WARFARIN SODIUM 2.5 MG/1
2.5 TABLET ORAL ONCE
Refills: 0 | Status: DISCONTINUED | OUTPATIENT
Start: 2019-09-28 | End: 2019-09-28

## 2019-09-28 RX ORDER — SORBITOL SOLUTION 70 %
30 SOLUTION, ORAL MISCELLANEOUS ONCE
Refills: 0 | Status: COMPLETED | OUTPATIENT
Start: 2019-09-28 | End: 2019-09-28

## 2019-09-28 RX ORDER — POLYETHYLENE GLYCOL 3350 17 G/17G
17 POWDER, FOR SOLUTION ORAL
Refills: 0 | Status: DISCONTINUED | OUTPATIENT
Start: 2019-09-28 | End: 2019-09-30

## 2019-09-28 RX ADMIN — SACUBITRIL AND VALSARTAN 1 TABLET(S): 24; 26 TABLET, FILM COATED ORAL at 21:31

## 2019-09-28 RX ADMIN — Medication 325 MILLIGRAM(S): at 08:58

## 2019-09-28 RX ADMIN — OXYCODONE AND ACETAMINOPHEN 1 TABLET(S): 5; 325 TABLET ORAL at 00:35

## 2019-09-28 RX ADMIN — Medication 325 MILLIGRAM(S): at 13:01

## 2019-09-28 RX ADMIN — OXYCODONE AND ACETAMINOPHEN 1 TABLET(S): 5; 325 TABLET ORAL at 08:56

## 2019-09-28 RX ADMIN — ATORVASTATIN CALCIUM 80 MILLIGRAM(S): 80 TABLET, FILM COATED ORAL at 21:31

## 2019-09-28 RX ADMIN — Medication 325 MILLIGRAM(S): at 18:07

## 2019-09-28 RX ADMIN — OXYCODONE AND ACETAMINOPHEN 1 TABLET(S): 5; 325 TABLET ORAL at 09:24

## 2019-09-28 RX ADMIN — Medication 8 UNIT(S): at 18:08

## 2019-09-28 RX ADMIN — Medication 1 MILLIGRAM(S): at 08:57

## 2019-09-28 RX ADMIN — MAGNESIUM OXIDE 400 MG ORAL TABLET 400 MILLIGRAM(S): 241.3 TABLET ORAL at 18:07

## 2019-09-28 RX ADMIN — CARVEDILOL PHOSPHATE 3.12 MILLIGRAM(S): 80 CAPSULE, EXTENDED RELEASE ORAL at 18:07

## 2019-09-28 RX ADMIN — Medication 30 MILLILITER(S): at 19:01

## 2019-09-28 RX ADMIN — Medication 8 UNIT(S): at 08:35

## 2019-09-28 RX ADMIN — OXYCODONE AND ACETAMINOPHEN 1 TABLET(S): 5; 325 TABLET ORAL at 18:14

## 2019-09-28 RX ADMIN — Medication 650 MILLIGRAM(S): at 23:22

## 2019-09-28 RX ADMIN — OXYCODONE AND ACETAMINOPHEN 1 TABLET(S): 5; 325 TABLET ORAL at 18:44

## 2019-09-28 RX ADMIN — SPIRONOLACTONE 25 MILLIGRAM(S): 25 TABLET, FILM COATED ORAL at 06:14

## 2019-09-28 RX ADMIN — Medication 100 MILLIGRAM(S): at 21:31

## 2019-09-28 RX ADMIN — Medication 8 UNIT(S): at 13:02

## 2019-09-28 RX ADMIN — PANTOPRAZOLE SODIUM 40 MILLIGRAM(S): 20 TABLET, DELAYED RELEASE ORAL at 06:14

## 2019-09-28 RX ADMIN — Medication 2: at 08:36

## 2019-09-28 RX ADMIN — Medication 500 MILLIGRAM(S): at 08:57

## 2019-09-28 RX ADMIN — MAGNESIUM OXIDE 400 MG ORAL TABLET 400 MILLIGRAM(S): 241.3 TABLET ORAL at 08:57

## 2019-09-28 RX ADMIN — WARFARIN SODIUM 3 MILLIGRAM(S): 2.5 TABLET ORAL at 21:31

## 2019-09-28 RX ADMIN — Medication 100 MILLIGRAM(S): at 13:02

## 2019-09-28 RX ADMIN — SODIUM CHLORIDE 3 MILLILITER(S): 9 INJECTION INTRAMUSCULAR; INTRAVENOUS; SUBCUTANEOUS at 21:30

## 2019-09-28 RX ADMIN — Medication 81 MILLIGRAM(S): at 08:58

## 2019-09-28 RX ADMIN — Medication 1 TABLET(S): at 08:59

## 2019-09-28 RX ADMIN — Medication 650 MILLIGRAM(S): at 22:12

## 2019-09-28 RX ADMIN — Medication 100 MILLIGRAM(S): at 06:14

## 2019-09-28 RX ADMIN — Medication 6: at 13:02

## 2019-09-28 RX ADMIN — SODIUM CHLORIDE 3 MILLILITER(S): 9 INJECTION INTRAMUSCULAR; INTRAVENOUS; SUBCUTANEOUS at 06:14

## 2019-09-28 RX ADMIN — Medication 0.12 MILLIGRAM(S): at 06:14

## 2019-09-28 RX ADMIN — Medication 40 MILLIGRAM(S): at 13:02

## 2019-09-28 RX ADMIN — SODIUM CHLORIDE 3 MILLILITER(S): 9 INJECTION INTRAMUSCULAR; INTRAVENOUS; SUBCUTANEOUS at 13:06

## 2019-09-28 RX ADMIN — CARVEDILOL PHOSPHATE 3.12 MILLIGRAM(S): 80 CAPSULE, EXTENDED RELEASE ORAL at 06:14

## 2019-09-28 RX ADMIN — Medication 40 MILLIGRAM(S): at 06:14

## 2019-09-28 NOTE — PROGRESS NOTE ADULT - ASSESSMENT
80yo Uzbek speaking F seen with 2 sons whom she prefers to do translation.  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  Diabetes.  Plan: Home Lantus dose 30 units at bedtime. Patient had hyperG in am.   continue lantus 20 u Hs   continue humalog 10 u tID w meals.   check bgs actid and hs   sliding Scale insulin coverage.     Hyperthyroidism TSh normal. Pt euthyroid clinically and biochemically.   continue current dose MMi     Problem: CHF exacerbation.  continue diuresis     Atrial fibrillation.  Plan: Continue Coumadin when acceptable practice in reference to procedures  Continue Lopressor and Amiodarone      Hypertension.  Plan: Continue Lopressor, Norvasc and Amio at home doses.

## 2019-09-28 NOTE — PROGRESS NOTE ADULT - ASSESSMENT
78yo Yakut speaking F seen with 2 sons whom she prefers to do translation.  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 Fuller on Sept 3 with Dr. Fuller.  Pt presents to ER with c/o SOB and CP, found to have b/l pleural effusions L>R on CXR.  She denies nausea, vomiting, diarrhea, dizziness, fever or chills.    9/23 Left pigtail CT placed without event.  TTE: revealed moderately enlarged left atrium, LVEF < 20%, severely enlarged RV with RV dysfxn, MARIELY, small pericardial effusion.  9/25 Cardiac meds adjusted for heart failure, pt diuresing well on Lasix. L pigtail d/c'd. Echo 9/26 revealed improved EF 35-40%, moderate RV dysfunction (no life vest indicated).     Plan  Awaiting BM  AXR nonspecific  Starting laxatives  Coumadin 3 mg tonight  Patient will need to follow up with Dr Richardson within a week from discharge  Hope to discharge soon.  D/W CTS team in AM rounds

## 2019-09-28 NOTE — PROGRESS NOTE ADULT - PROBLEM SELECTOR PLAN 1
Diuretics to continue oral BID dosing. BNP improving, will recheck tomorrow.  Left pigtail CT placed for pleural effusion, drained approx 900cc and now removed  Appreciate cardiology consult,   On Digoxin, Entresto and Coreg as well as Lasix 40 mg PO BID (negative fluid balance yesterday)  ACE wrapped B/L LE  No indication for Lifevest at this time (improved LVEF)  Daily labs, CXR, strict I/Os

## 2019-09-28 NOTE — PROGRESS NOTE ADULT - SUBJECTIVE AND OBJECTIVE BOX
Subjective: Patient seen and examined at bedside with son and daughter. Patient has been complaining of some headaches and constipation after few episodes of diarrhea yesterday. She has just not felt well. She is mildly short of breath on exertion but denies CP, N/V. She also states that she is hungry.    VITAL SIGNS  Vital Signs Last 24 Hrs  T(C): 36.9 (19 @ 15:30), Max: 37.1 (19 @ 10:26)  T(F): 98.4 (19 @ 15:30), Max: 98.7 (19 @ 10:26)  HR: 93 (19 @ 15:30) (70 - 93)  BP: 120/65 (19 @ 15:30) (89/43 - 148/93)  RR: 18 (19 @ 15:30) (16 - 18)  SpO2: 97% (19 @ 15:30) (96% - 100%)  on RA)              Telemetry/Alarms:  AF 80  LVEF: < 20    MEDICATIONS  acetaminophen   Tablet .. 650 milliGRAM(s) Oral every 6 hours PRN  ascorbic acid 500 milliGRAM(s) Oral daily  aspirin enteric coated 81 milliGRAM(s) Oral daily  atorvastatin 80 milliGRAM(s) Oral at bedtime  carvedilol 3.125 milliGRAM(s) Oral every 12 hours  dextrose 50% Injectable 12.5 Gram(s) IV Push once  dextrose 50% Injectable 25 Gram(s) IV Push once  dextrose 50% Injectable 25 Gram(s) IV Push once  digoxin     Tablet 0.125 milliGRAM(s) Oral daily  docusate sodium 100 milliGRAM(s) Oral three times a day  ferrous sulfate Oral Tab/Cap - Peds 325 milliGRAM(s) Oral three times a day with meals  folic acid 1 milliGRAM(s) Oral daily  furosemide    Tablet 40 milliGRAM(s) Oral <User Schedule>  insulin glargine Injectable (LANTUS) 20 Unit(s) SubCutaneous at bedtime  insulin lispro (HumaLOG) corrective regimen sliding scale   SubCutaneous Before meals and at bedtime  insulin lispro Injectable (HumaLOG) 8 Unit(s) SubCutaneous three times a day before meals  magnesium oxide 400 milliGRAM(s) Oral two times a day with meals  methimazole 10 milliGRAM(s) Oral daily  multivitamin 1 Tablet(s) Oral daily  oxyCODONE    5 mG/acetaminophen 325 mG 1 Tablet(s) Oral every 6 hours PRN  pantoprazole    Tablet 40 milliGRAM(s) Oral before breakfast  polyethylene glycol 3350 17 Gram(s) Oral two times a day  sacubitril 24 mG/valsartan 26 mG 1 Tablet(s) Oral <User Schedule>  saline laxative (FLEET) Rectal Enema 1 Enema Rectal daily PRN  sodium chloride 0.9% lock flush 3 milliLiter(s) IV Push every 8 hours  sorbitol 70% Solution 30 milliLiter(s) Oral once  spironolactone 25 milliGRAM(s) Oral daily  warfarin 3 milliGRAM(s) Oral once      PHYSICAL EXAM  General: well nourished, well developed, no acute distress  Neurology: alert and oriented x 3, nonfocal, no gross deficits  Respiratory: essentially clear  CV: irregular rate and rhythm, normal S1, S2  Abdomen: soft, nontender, mildly distended, positive bowel sounds, last bowel movement  solid, yesterday small amounts of diarrhea  Extremities: warm, well perfused. +1 edema. + DP pulses  Incisions: midline sternal incision, c/d/i. sternum stable.  Rectal exam: no stool in vault       @ 07:  -   @ 07:00  --------------------------------------------------------  IN: 480 mL / OUT: 550 mL / NET: -70 mL        Weights:  Daily     Daily Weight in k.8 (28 Sep 2019 05:40)  Admit Wt: Drug Dosing Weight  Height (cm): 121.9 (23 Sep 2019 00:46)  Weight (kg): 65.8 (23 Sep 2019 00:46)  BMI (kg/m2): 44.3 (23 Sep 2019 00:46)  BSA (m2): 1.39 (23 Sep 2019 00:46)    All laboratory results, radiology and medications reviewed.    LABS      136  |  98  |  28.0<H>  ----------------------------<  158<H>  4.4   |  27.0  |  1.03    Ca    8.9      28 Sep 2019 06:31  Mg     2.1         TPro  6.5<L>  /  Alb  3.1<L>  /  TBili  0.4  /  DBili  x   /  AST  23  /  ALT  19  /  AlkPhos  95                                   9.6    6.93  )-----------( 411      ( 28 Sep 2019 06:31 )             31.0          PT/INR - ( 28 Sep 2019 10:33 )   PT: 19.0 sec;   INR: 1.63 ratio             CAPILLARY BLOOD GLUCOSE      POCT Blood Glucose.: 130 mg/dL (28 Sep 2019 17:06)  POCT Blood Glucose.: 278 mg/dL (28 Sep 2019 12:07)  POCT Blood Glucose.: 189 mg/dL (28 Sep 2019 08:09)  POCT Blood Glucose.: 136 mg/dL (27 Sep 2019 21:24)           Today's CXR: < from: Xray Chest 1 View- PORTABLE-Routine (19 @ 06:07) >    IMPRESSION: Mild increased pulmonary vascular congestion noted. Small  left pleural effusion noted unchanged from prior study dated 2019.     Status post CABG and valve replacement.     < end of copied text >      Last EKG: < from: 12 Lead ECG (19 @ 07:41) >    Diagnosis Line Atrial fibrillation  Nonspecific T wave abnormality  Abnormal ECG    < end of copied text >      PAST MEDICAL & SURGICAL HISTORY:  CHF exacerbation  Atrial fibrillation  Diabetes  Hypertension  Stented coronary artery  Coronary stent restenosis, sequela  Afib  Hypertension  S/P CABG x 2: s/p C2L/TV Repair/MVR/ NISSA Clip  History of ankle surgery: right orif  Post PTCA: 8 stents  H/O hernia repair: &#x27;s  Cataract  History of appendectomy

## 2019-09-29 DIAGNOSIS — K59.01 SLOW TRANSIT CONSTIPATION: ICD-10-CM

## 2019-09-29 LAB
ALBUMIN SERPL ELPH-MCNC: 3.4 G/DL — SIGNIFICANT CHANGE UP (ref 3.3–5.2)
ALP SERPL-CCNC: 107 U/L — SIGNIFICANT CHANGE UP (ref 40–120)
ALT FLD-CCNC: 25 U/L — SIGNIFICANT CHANGE UP
AMYLASE P1 CFR SERPL: 85 U/L — SIGNIFICANT CHANGE UP (ref 36–128)
ANION GAP SERPL CALC-SCNC: 14 MMOL/L — SIGNIFICANT CHANGE UP (ref 5–17)
AST SERPL-CCNC: 31 U/L — SIGNIFICANT CHANGE UP
BILIRUB SERPL-MCNC: 0.3 MG/DL — LOW (ref 0.4–2)
BUN SERPL-MCNC: 29 MG/DL — HIGH (ref 8–20)
CALCIUM SERPL-MCNC: 9.3 MG/DL — SIGNIFICANT CHANGE UP (ref 8.6–10.2)
CHLORIDE SERPL-SCNC: 94 MMOL/L — LOW (ref 98–107)
CO2 SERPL-SCNC: 27 MMOL/L — SIGNIFICANT CHANGE UP (ref 22–29)
CREAT SERPL-MCNC: 1.14 MG/DL — SIGNIFICANT CHANGE UP (ref 0.5–1.3)
GLUCOSE BLDC GLUCOMTR-MCNC: 133 MG/DL — HIGH (ref 70–99)
GLUCOSE BLDC GLUCOMTR-MCNC: 135 MG/DL — HIGH (ref 70–99)
GLUCOSE BLDC GLUCOMTR-MCNC: 163 MG/DL — HIGH (ref 70–99)
GLUCOSE BLDC GLUCOMTR-MCNC: 265 MG/DL — HIGH (ref 70–99)
GLUCOSE SERPL-MCNC: 247 MG/DL — HIGH (ref 70–115)
INR BLD: 1.71 RATIO — HIGH (ref 0.88–1.16)
LIDOCAIN IGE QN: 33 U/L — SIGNIFICANT CHANGE UP (ref 22–51)
MAGNESIUM SERPL-MCNC: 2.1 MG/DL — SIGNIFICANT CHANGE UP (ref 1.6–2.6)
NT-PROBNP SERPL-SCNC: 2399 PG/ML — HIGH (ref 0–300)
PHOSPHATE SERPL-MCNC: 3.8 MG/DL — SIGNIFICANT CHANGE UP (ref 2.4–4.7)
POTASSIUM SERPL-MCNC: 4.6 MMOL/L — SIGNIFICANT CHANGE UP (ref 3.5–5.3)
POTASSIUM SERPL-SCNC: 4.6 MMOL/L — SIGNIFICANT CHANGE UP (ref 3.5–5.3)
PROT SERPL-MCNC: 6.6 G/DL — SIGNIFICANT CHANGE UP (ref 6.6–8.7)
PROTHROM AB SERPL-ACNC: 20 SEC — HIGH (ref 10–12.9)
SODIUM SERPL-SCNC: 135 MMOL/L — SIGNIFICANT CHANGE UP (ref 135–145)

## 2019-09-29 PROCEDURE — 99024 POSTOP FOLLOW-UP VISIT: CPT

## 2019-09-29 RX ORDER — INSULIN GLARGINE 100 [IU]/ML
30 INJECTION, SOLUTION SUBCUTANEOUS AT BEDTIME
Refills: 0 | Status: DISCONTINUED | OUTPATIENT
Start: 2019-09-29 | End: 2019-09-29

## 2019-09-29 RX ORDER — CARVEDILOL PHOSPHATE 80 MG/1
3.12 CAPSULE, EXTENDED RELEASE ORAL ONCE
Refills: 0 | Status: COMPLETED | OUTPATIENT
Start: 2019-09-29 | End: 2019-09-29

## 2019-09-29 RX ORDER — INSULIN GLARGINE 100 [IU]/ML
25 INJECTION, SOLUTION SUBCUTANEOUS AT BEDTIME
Refills: 0 | Status: DISCONTINUED | OUTPATIENT
Start: 2019-09-29 | End: 2019-09-30

## 2019-09-29 RX ORDER — CARVEDILOL PHOSPHATE 80 MG/1
3.12 CAPSULE, EXTENDED RELEASE ORAL EVERY 12 HOURS
Refills: 0 | Status: DISCONTINUED | OUTPATIENT
Start: 2019-09-29 | End: 2019-09-30

## 2019-09-29 RX ORDER — WARFARIN SODIUM 2.5 MG/1
4 TABLET ORAL ONCE
Refills: 0 | Status: COMPLETED | OUTPATIENT
Start: 2019-09-29 | End: 2019-09-29

## 2019-09-29 RX ORDER — CARVEDILOL PHOSPHATE 80 MG/1
6.25 CAPSULE, EXTENDED RELEASE ORAL EVERY 12 HOURS
Refills: 0 | Status: DISCONTINUED | OUTPATIENT
Start: 2019-09-29 | End: 2019-09-29

## 2019-09-29 RX ORDER — LACTULOSE 10 G/15ML
30 SOLUTION ORAL EVERY 6 HOURS
Refills: 0 | Status: DISCONTINUED | OUTPATIENT
Start: 2019-09-29 | End: 2019-09-29

## 2019-09-29 RX ADMIN — CARVEDILOL PHOSPHATE 3.12 MILLIGRAM(S): 80 CAPSULE, EXTENDED RELEASE ORAL at 08:53

## 2019-09-29 RX ADMIN — Medication 81 MILLIGRAM(S): at 13:01

## 2019-09-29 RX ADMIN — Medication 650 MILLIGRAM(S): at 22:19

## 2019-09-29 RX ADMIN — Medication 8 UNIT(S): at 08:50

## 2019-09-29 RX ADMIN — Medication 100 MILLIGRAM(S): at 15:47

## 2019-09-29 RX ADMIN — Medication 1 TABLET(S): at 13:01

## 2019-09-29 RX ADMIN — Medication 650 MILLIGRAM(S): at 05:46

## 2019-09-29 RX ADMIN — Medication 325 MILLIGRAM(S): at 17:51

## 2019-09-29 RX ADMIN — MAGNESIUM OXIDE 400 MG ORAL TABLET 400 MILLIGRAM(S): 241.3 TABLET ORAL at 17:51

## 2019-09-29 RX ADMIN — Medication 40 MILLIGRAM(S): at 15:47

## 2019-09-29 RX ADMIN — MAGNESIUM OXIDE 400 MG ORAL TABLET 400 MILLIGRAM(S): 241.3 TABLET ORAL at 08:54

## 2019-09-29 RX ADMIN — CARVEDILOL PHOSPHATE 3.12 MILLIGRAM(S): 80 CAPSULE, EXTENDED RELEASE ORAL at 05:46

## 2019-09-29 RX ADMIN — SODIUM CHLORIDE 3 MILLILITER(S): 9 INJECTION INTRAMUSCULAR; INTRAVENOUS; SUBCUTANEOUS at 17:52

## 2019-09-29 RX ADMIN — OXYCODONE AND ACETAMINOPHEN 1 TABLET(S): 5; 325 TABLET ORAL at 04:06

## 2019-09-29 RX ADMIN — Medication 100 MILLIGRAM(S): at 05:47

## 2019-09-29 RX ADMIN — Medication 2: at 13:04

## 2019-09-29 RX ADMIN — SACUBITRIL AND VALSARTAN 1 TABLET(S): 24; 26 TABLET, FILM COATED ORAL at 17:51

## 2019-09-29 RX ADMIN — LACTULOSE 20 GRAM(S): 10 SOLUTION ORAL at 13:02

## 2019-09-29 RX ADMIN — SPIRONOLACTONE 25 MILLIGRAM(S): 25 TABLET, FILM COATED ORAL at 05:46

## 2019-09-29 RX ADMIN — Medication 0.12 MILLIGRAM(S): at 05:46

## 2019-09-29 RX ADMIN — WARFARIN SODIUM 4 MILLIGRAM(S): 2.5 TABLET ORAL at 21:49

## 2019-09-29 RX ADMIN — Medication 650 MILLIGRAM(S): at 07:00

## 2019-09-29 RX ADMIN — Medication 325 MILLIGRAM(S): at 13:03

## 2019-09-29 RX ADMIN — CARVEDILOL PHOSPHATE 3.12 MILLIGRAM(S): 80 CAPSULE, EXTENDED RELEASE ORAL at 17:55

## 2019-09-29 RX ADMIN — OXYCODONE AND ACETAMINOPHEN 1 TABLET(S): 5; 325 TABLET ORAL at 05:15

## 2019-09-29 RX ADMIN — ATORVASTATIN CALCIUM 80 MILLIGRAM(S): 80 TABLET, FILM COATED ORAL at 21:49

## 2019-09-29 RX ADMIN — POLYETHYLENE GLYCOL 3350 17 GRAM(S): 17 POWDER, FOR SOLUTION ORAL at 05:46

## 2019-09-29 RX ADMIN — SODIUM CHLORIDE 3 MILLILITER(S): 9 INJECTION INTRAMUSCULAR; INTRAVENOUS; SUBCUTANEOUS at 05:10

## 2019-09-29 RX ADMIN — Medication 40 MILLIGRAM(S): at 05:47

## 2019-09-29 RX ADMIN — INSULIN GLARGINE 25 UNIT(S): 100 INJECTION, SOLUTION SUBCUTANEOUS at 21:55

## 2019-09-29 RX ADMIN — Medication 8 UNIT(S): at 17:49

## 2019-09-29 RX ADMIN — Medication 500 MILLIGRAM(S): at 13:01

## 2019-09-29 RX ADMIN — Medication 100 MILLIGRAM(S): at 21:49

## 2019-09-29 RX ADMIN — Medication 1 MILLIGRAM(S): at 13:03

## 2019-09-29 RX ADMIN — PANTOPRAZOLE SODIUM 40 MILLIGRAM(S): 20 TABLET, DELAYED RELEASE ORAL at 05:46

## 2019-09-29 RX ADMIN — Medication 325 MILLIGRAM(S): at 08:54

## 2019-09-29 RX ADMIN — Medication 650 MILLIGRAM(S): at 21:49

## 2019-09-29 RX ADMIN — SODIUM CHLORIDE 3 MILLILITER(S): 9 INJECTION INTRAMUSCULAR; INTRAVENOUS; SUBCUTANEOUS at 21:48

## 2019-09-29 RX ADMIN — SACUBITRIL AND VALSARTAN 1 TABLET(S): 24; 26 TABLET, FILM COATED ORAL at 21:49

## 2019-09-29 RX ADMIN — Medication 6: at 08:49

## 2019-09-29 RX ADMIN — Medication 8 UNIT(S): at 13:05

## 2019-09-29 NOTE — PROGRESS NOTE ADULT - ASSESSMENT
80yo Sinhala speaking F seen with 2 sons whom she prefers to do translation.  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 Fuller on Sept 3 with Dr. Fuller.  Pt presents to ER with c/o SOB and CP, found to have b/l pleural effusions L>R on CXR.  She denies nausea, vomiting, diarrhea, dizziness, fever or chills.    9/23 Left pigtail CT placed without event.  TTE: revealed moderately enlarged left atrium, LVEF < 20%, severely enlarged RV with RV dysfxn, MARIELY, small pericardial effusion.  9/25 Cardiac meds adjusted for heart failure, pt diuresing well on Lasix. L pigtail d/c'd. Echo 9/26 revealed improved EF 35-40%, moderate RV dysfunction (no life vest indicated).

## 2019-09-29 NOTE — PROGRESS NOTE ADULT - SUBJECTIVE AND OBJECTIVE BOX
Subjective: Pt sitting up in chair eating breakfast.  Denies CP or SOB.  NAD noted.      Tele: SR                          T(F): 98.2 (09-29-19 @ 05:39), Max: 98.7 (09-28-19 @ 10:26)  HR: 76 (09-29-19 @ 05:39) (76 - 93)  BP: 123/56 (09-29-19 @ 05:39) (89/43 - 128/61)  RR: 18 (09-29-19 @ 05:39) (18 - 20)  SpO2: 97% (09-29-19 @ 05:39) (96% - 97%) on room air at rest.      LV EF: 20%>>35-40% on repeat TTE 9/26      No Known Allergies        09-29    135  |  94<L>  |  29.0<H>  ----------------------------<  247<H>  4.6   |  27.0  |  1.14    Ca    9.3      29 Sep 2019 06:07  Phos  3.8     09-29  Mg     2.1     09-29    TPro  6.6  /  Alb  3.4  /  TBili  0.3<L>  /  DBili  x   /  AST  31  /  ALT  25  /  AlkPhos  107  09-29                               9.6    6.93  )-----------( 411      ( 28 Sep 2019 06:31 )             31.0        PT/INR - ( 29 Sep 2019 06:07 )   PT: 20.0 sec;   INR: 1.71 ratio                CAPILLARY BLOOD GLUCOSE  POCT Blood Glucose.: 265 mg/dL (29 Sep 2019 08:10)  POCT Blood Glucose.: 128 mg/dL (28 Sep 2019 22:04)  POCT Blood Glucose.: 67 mg/dL (28 Sep 2019 21:25)  POCT Blood Glucose.: 130 mg/dL (28 Sep 2019 17:06)  POCT Blood Glucose.: 278 mg/dL (28 Sep 2019 12:07)           CXR: < from: Xray Chest 1 View- PORTABLE-Routine (09.28.19 @ 06:07) >  EXAM:  XR CHEST PORTABLE ROUTINE 1V                          PROCEDURE DATE:  09/28/2019      INTERPRETATION:  Chest radiograph (one view)     CPT 52881    CLINICAL INFORMATION:  Patient is unable to communicate. Status post CABG   and mitral valve replacement.  Follow-up.    TECHNIQUE:  Single frontal view of the chest was obtained.    FINDINGS:  Prior study dated 9/26/2019 available for review.    The lungs demonstrate mild increased pulmonary vascular congestion. Small   left pleural effusion is seen. The heart is difficult to evaluate. The   patient is status post median sternotomy, coronary artery bypass graft,   and valve replacement. Atrial clip is noted.    IMPRESSION: Mild increased pulmonary vascular congestion noted. Small  left pleural effusion noted unchanged from prior study dated 9/26/2019.     Status post CABG and valve replacement.     ELDER FUNES M.D., ATTENDING RADIOLOGIST  This document has been electronically signed. Sep 28 2019  8:45AM    < end of copied text >      I&O's Detail        Active Medications:  acetaminophen   Tablet .. 650 milliGRAM(s) Oral every 6 hours PRN  ascorbic acid 500 milliGRAM(s) Oral daily  aspirin enteric coated 81 milliGRAM(s) Oral daily  atorvastatin 80 milliGRAM(s) Oral at bedtime  carvedilol 6.25 milliGRAM(s) Oral every 12 hours  dextrose 50% Injectable 12.5 Gram(s) IV Push once  dextrose 50% Injectable 25 Gram(s) IV Push once  dextrose 50% Injectable 25 Gram(s) IV Push once  digoxin     Tablet 0.125 milliGRAM(s) Oral daily  docusate sodium 100 milliGRAM(s) Oral three times a day  ferrous sulfate Oral Tab/Cap - Peds 325 milliGRAM(s) Oral three times a day with meals  folic acid 1 milliGRAM(s) Oral daily  furosemide    Tablet 40 milliGRAM(s) Oral <User Schedule>  insulin glargine Injectable (LANTUS) 25 Unit(s) SubCutaneous at bedtime  insulin lispro (HumaLOG) corrective regimen sliding scale   SubCutaneous Before meals and at bedtime  insulin lispro Injectable (HumaLOG) 8 Unit(s) SubCutaneous three times a day before meals  lactulose Syrup 30 Gram(s) Oral every 6 hours  magnesium oxide 400 milliGRAM(s) Oral two times a day with meals  methimazole 10 milliGRAM(s) Oral daily  multivitamin 1 Tablet(s) Oral daily  pantoprazole    Tablet 40 milliGRAM(s) Oral before breakfast  polyethylene glycol 3350 17 Gram(s) Oral two times a day  sacubitril 24 mG/valsartan 26 mG 1 Tablet(s) Oral <User Schedule>  saline laxative (FLEET) Rectal Enema 1 Enema Rectal daily PRN  sodium chloride 0.9% lock flush 3 milliLiter(s) IV Push every 8 hours  spironolactone 25 milliGRAM(s) Oral daily  warfarin 4 milliGRAM(s) Oral once      Physical Exam:    Neuro: AAOX3.  Non focal.    Pulm: Diminished BLL.    CV: RRR.  +S1+S2.    Abd: Soft/NT/ND.  +BS. +flatus.  -BM.    Extremities: Trace edema BLE.  +pp.    Incision(s): MSI healed well. Sternum stable.                      PAST MEDICAL & SURGICAL HISTORY:  CHF exacerbation  Atrial fibrillation  Diabetes  Hypertension  Stented coronary artery  Coronary stent restenosis, sequela  Afib  Hypertension  S/P CABG x 2: s/p C2L/TV Repair/MVR/ NISSA Clip  History of ankle surgery: right orif  Post PTCA: 8 stents  H/O hernia repair: 1970&#x27;s  Cataract  History of appendectomy

## 2019-09-29 NOTE — PROGRESS NOTE ADULT - PROBLEM SELECTOR PROBLEM 1
CHF exacerbation

## 2019-09-29 NOTE — PROGRESS NOTE ADULT - PROBLEM SELECTOR PLAN 1
Diuretics to continue oral BID dosing. BNP improving.  Left pigtail CT placed for pleural effusion, drained approx 900cc and now removed  Appreciate cardiology input.   On Digoxin, Entresto and Coreg as well as Lasix 40 mg PO BID (negative fluid balance yesterday)  Increase Coreg to 6.25 BID per Dr. Olson.  ACE wrapped B/L LE  No indication for Lifevest at this time (improved LVEF)  Daily labs, CXR, strict I/Os

## 2019-09-29 NOTE — PROGRESS NOTE ADULT - PROBLEM SELECTOR PROBLEM 5
Prophylactic measure
Prophylactic measure
Constipation by delayed colonic transit
Prophylactic measure

## 2019-09-29 NOTE — PROGRESS NOTE ADULT - PROBLEM SELECTOR PLAN 6
SCDs for DVT prophylaxis  Coumadin for AFib   Pantoprazole for GI prophylaxis    Discussed with Dr. Olson in AM rounds.

## 2019-09-29 NOTE — PROGRESS NOTE ADULT - PROBLEM SELECTOR PLAN 2
Continue Coumadin, maintain INR 3.0  Cardiac meds as above.  BMP daily, optimize K and Mg
Continue Coumadin, maintain INR 2-3  Digoxin, Coreg
Continue Coumadin, maintain INR 2-3  Digoxin, Coreg  BMP daily, optimize K and Mg
Continue Coumadin, maintain INR 2-3  Digoxin, Coreg  BMP daily, optimize K and Mg
Continue Coumadin when appropriate INR  Continue Toprol XL   BMP daily, optimize K and Mg
Continue Coumadin, maintain INR 3.0  Cardiac meds as above.  BMP daily, optimize K and Mg

## 2019-09-29 NOTE — PROGRESS NOTE ADULT - PROBLEM SELECTOR PLAN 5
SCDs for DVT prophylaxis  Coumadin for AFib   Pantoprazole for GI prophylaxis
SCDs for DVT prophylaxis  Coumadin for AFib   Pantoprazole for GI prophylaxis
Last BM Wednesday.  Continue bowel regimen.  Rectal exam done yesterday and no stool appreciated.  Start Lactulose ATC today until pt has BM per Dr. Olson.
SCDs for DVT prophylaxis  Coumadin for AFib   Pantoprazole for GI prophylaxis

## 2019-09-29 NOTE — CHART NOTE - NSCHARTNOTEFT_GEN_A_CORE
Pt seen and examined in ER.  Presents with SOB and fluid overload.  HDs stable.  Significant Left Pleural Effusion on CXR, 3+ pitting edema b/l lower extremities.    Case d/w Dr. Avila.  Plan for admission, echo, aggressive diuresis and possible left pigtail CT.   Full H+P to follow.
Received patient from [facility].  AAOX3.  Son at bedside.  Denies CP or SOB.  NAD noted.
Upon Nutritional Assessment by the Registered Dietitian your patient was determined to meet criteria / has evidence of the following diagnosis/diagnoses:          [ ]  Mild Protein Calorie Malnutrition        [x ]  Moderate Protein Calorie Malnutrition        [ ] Severe Protein Calorie Malnutrition        [ ] Unspecified Protein Calorie Malnutrition        [ ] Underweight / BMI <19        [ ] Morbid Obesity / BMI > 40      Findings as based on:  •  Comprehensive nutrition assessment and consultation  •  Calorie counts (nutrient intake analysis)  •  Food acceptance and intake status from observations by staff  •  Follow up  •  Patient education  •  Intervention secondary to interdisciplinary rounds  •   concerns      Treatment:    The following diet has been recommended:    Glucerna shake TID    PROVIDER Section:     By signing this assessment you are acknowledging and agree with the diagnosis/diagnoses assigned by the Registered Dietitian    Comments:
Source: Patient [ ]  Family [ ]   other [x] EMR    Current Diet: Diet, DASH/TLC:   Sodium & Cholesterol Restricted (09-22-19 @ 23:00)      Patient reports [ ] nausea  [ ] vomiting [ ] diarrhea [ ] constipation  [ ]chewing problems [ ] swallowing issues  [ ] other:     PO intake:  < 50% [ ]   50-75%  [ ]   %  [x]  other :    Source for PO intake [ ] Patient [ ] family [x] chart [ ] staff [x] other: visual observation    Current Weight:   (9/28) 140.6 lbs  (9/27) 143.3 lbs  (9/26) 138.4 lbs  (9/23) 145 lbs  No edema documented     Pertinent Medications: MEDICATIONS  (STANDING):  ascorbic acid 500 milliGRAM(s) Oral daily  aspirin enteric coated 81 milliGRAM(s) Oral daily  atorvastatin 80 milliGRAM(s) Oral at bedtime  carvedilol 3.125 milliGRAM(s) Oral every 12 hours  dextrose 50% Injectable 12.5 Gram(s) IV Push once  dextrose 50% Injectable 25 Gram(s) IV Push once  dextrose 50% Injectable 25 Gram(s) IV Push once  digoxin     Tablet 0.125 milliGRAM(s) Oral daily  docusate sodium 100 milliGRAM(s) Oral three times a day  ferrous sulfate Oral Tab/Cap - Peds 325 milliGRAM(s) Oral three times a day with meals  folic acid 1 milliGRAM(s) Oral daily  furosemide    Tablet 40 milliGRAM(s) Oral <User Schedule>  insulin glargine Injectable (LANTUS) 25 Unit(s) SubCutaneous at bedtime  insulin lispro (HumaLOG) corrective regimen sliding scale   SubCutaneous Before meals and at bedtime  insulin lispro Injectable (HumaLOG) 8 Unit(s) SubCutaneous three times a day before meals  lactulose Syrup 30 Gram(s) Oral every 6 hours  magnesium oxide 400 milliGRAM(s) Oral two times a day with meals  methimazole 10 milliGRAM(s) Oral daily  multivitamin 1 Tablet(s) Oral daily  pantoprazole    Tablet 40 milliGRAM(s) Oral before breakfast  polyethylene glycol 3350 17 Gram(s) Oral two times a day  sacubitril 24 mG/valsartan 26 mG 1 Tablet(s) Oral <User Schedule>  sodium chloride 0.9% lock flush 3 milliLiter(s) IV Push every 8 hours  spironolactone 25 milliGRAM(s) Oral daily  warfarin 4 milliGRAM(s) Oral once    MEDICATIONS  (PRN):  acetaminophen   Tablet .. 650 milliGRAM(s) Oral every 6 hours PRN Mild Pain (1 - 3)  saline laxative (FLEET) Rectal Enema 1 Enema Rectal daily PRN constipation    Pertinent Labs: CBC Full  -  ( 28 Sep 2019 06:31 )  WBC Count : 6.93 K/uL  RBC Count : 3.35 M/uL  Hemoglobin : 9.6 g/dL  Hematocrit : 31.0 %  Platelet Count - Automated : 411 K/uL  Mean Cell Volume : 92.5 fl  Mean Cell Hemoglobin : 28.7 pg  Mean Cell Hemoglobin Concentration : 31.0 gm/dL  Auto Neutrophil # : x  Auto Lymphocyte # : x  Auto Monocyte # : x  Auto Eosinophil # : x  Auto Basophil # : x  Auto Neutrophil % : x  Auto Lymphocyte % : x  Auto Monocyte % : x  Auto Eosinophil % : x  Auto Basophil % : x    09-29 Na135 mmol/L Glu 247 mg/dL<H> K+ 4.6 mmol/L Cr  1.14 mg/dL BUN 29.0 mg/dL<H> Phos 3.8 mg/dL Alb 3.4 g/dL PAB n/a       Skin: Surgical incision to left leg and left back, s/p C2L/TVR/MVR/ NISSA clip    Nutrition focused physical exam previously conducted - found signs of malnutrition [ ]absent [x]present    Subcutaneous fat loss: [x] Orbital fat pads region, [x]Buccal fat region, [ ]Triceps region,  [ ]Ribs region    Muscle wasting: [x]Temples region, [x]Clavicle region, [ ]Shoulder region, [ ]Scapula region, [ ]Interosseous region,  [ ]thigh region, [ ]Calf region    Estimated Needs:   [x] no change since previous assessment  [ ] recalculated:     Current Nutrition Diagnosis: Pt presents at high nutrition risk secondary to malnutrition (moderate, chronic) inadequate protein-energy intake in setting of advanced age with chronic combined systolic and diastolic CHF, advanced age and new pleural effusion as evidenced by moderate muscle wasting, and fat loss, likely meeting <75% estimated needs >1 mo PTA. Pt off unit at time of interview. Lunch tray observed at bedside, 100% consumed per plate waste. RD to follow up as feasible.       Recommendations:   1) Add CCHO to diet rx.   2) Add Glucerna BID.  3) Continue MVI and vitamin C.  4) Encourage po intake.  5) Obtain daily weights to monitor trends.    Monitoring and Evaluation:   [x] PO intake [x] Tolerance to diet prescription [X] Weights  [X] Follow up per protocol [X] Labs

## 2019-09-29 NOTE — PROGRESS NOTE ADULT - PROBLEM SELECTOR PLAN 4
Continue Digoxin, Entresto and Toprol   Plan to transition Toprol to Coreg tomorrow 9/26  Continue to hold Norvasc and Losartan
Continue Digoxin, Entresto and Toprol   Transitioned Toprol to Coreg   Continue to hold Norvasc and Losartan
Continue Digoxin, Entresto and Coreg  Well controlled
Continue Toprol XL and Norvasc at home doses  Consider stopping Norvasc and starting Digoxin as per Cardiology recommendations.  Will d/w team at am rounds.

## 2019-09-29 NOTE — PROGRESS NOTE ADULT - PROBLEM SELECTOR PLAN 3
Increase Lantus to 25units (home dose is 30).  AM glucose 247.  Continue 8 uts Humalog pre meal tid   Accuchecks ACHS with MARIANNA  Endocrine following.

## 2019-09-30 ENCOUNTER — TRANSCRIPTION ENCOUNTER (OUTPATIENT)
Age: 79
End: 2019-09-30

## 2019-09-30 VITALS
OXYGEN SATURATION: 100 % | HEART RATE: 79 BPM | SYSTOLIC BLOOD PRESSURE: 101 MMHG | RESPIRATION RATE: 18 BRPM | DIASTOLIC BLOOD PRESSURE: 45 MMHG | TEMPERATURE: 98 F

## 2019-09-30 LAB
ALBUMIN SERPL ELPH-MCNC: 3.5 G/DL — SIGNIFICANT CHANGE UP (ref 3.3–5.2)
ALP SERPL-CCNC: 106 U/L — SIGNIFICANT CHANGE UP (ref 40–120)
ALT FLD-CCNC: 29 U/L — SIGNIFICANT CHANGE UP
ANION GAP SERPL CALC-SCNC: 13 MMOL/L — SIGNIFICANT CHANGE UP (ref 5–17)
APPEARANCE UR: CLEAR — SIGNIFICANT CHANGE UP
AST SERPL-CCNC: 33 U/L — HIGH
BACTERIA # UR AUTO: ABNORMAL
BILIRUB SERPL-MCNC: 0.3 MG/DL — LOW (ref 0.4–2)
BILIRUB UR-MCNC: NEGATIVE — SIGNIFICANT CHANGE UP
BUN SERPL-MCNC: 29 MG/DL — HIGH (ref 8–20)
CALCIUM SERPL-MCNC: 9.2 MG/DL — SIGNIFICANT CHANGE UP (ref 8.6–10.2)
CHLORIDE SERPL-SCNC: 94 MMOL/L — LOW (ref 98–107)
CO2 SERPL-SCNC: 27 MMOL/L — SIGNIFICANT CHANGE UP (ref 22–29)
COLOR SPEC: YELLOW — SIGNIFICANT CHANGE UP
CREAT SERPL-MCNC: 1.09 MG/DL — SIGNIFICANT CHANGE UP (ref 0.5–1.3)
DIFF PNL FLD: ABNORMAL
EPI CELLS # UR: SIGNIFICANT CHANGE UP
GLUCOSE BLDC GLUCOMTR-MCNC: 244 MG/DL — HIGH (ref 70–99)
GLUCOSE SERPL-MCNC: 258 MG/DL — HIGH (ref 70–115)
GLUCOSE UR QL: NEGATIVE MG/DL — SIGNIFICANT CHANGE UP
HCT VFR BLD CALC: 31.4 % — LOW (ref 34.5–45)
HGB BLD-MCNC: 9.8 G/DL — LOW (ref 11.5–15.5)
INR BLD: 2.48 RATIO — HIGH (ref 0.88–1.16)
KETONES UR-MCNC: NEGATIVE — SIGNIFICANT CHANGE UP
LEUKOCYTE ESTERASE UR-ACNC: NEGATIVE — SIGNIFICANT CHANGE UP
MAGNESIUM SERPL-MCNC: 2 MG/DL — SIGNIFICANT CHANGE UP (ref 1.6–2.6)
MCHC RBC-ENTMCNC: 28.7 PG — SIGNIFICANT CHANGE UP (ref 27–34)
MCHC RBC-ENTMCNC: 31.2 GM/DL — LOW (ref 32–36)
MCV RBC AUTO: 91.8 FL — SIGNIFICANT CHANGE UP (ref 80–100)
NITRITE UR-MCNC: NEGATIVE — SIGNIFICANT CHANGE UP
PH UR: 7 — SIGNIFICANT CHANGE UP (ref 5–8)
PHOSPHATE SERPL-MCNC: 3.8 MG/DL — SIGNIFICANT CHANGE UP (ref 2.4–4.7)
PLATELET # BLD AUTO: 386 K/UL — SIGNIFICANT CHANGE UP (ref 150–400)
POTASSIUM SERPL-MCNC: 4.3 MMOL/L — SIGNIFICANT CHANGE UP (ref 3.5–5.3)
POTASSIUM SERPL-SCNC: 4.3 MMOL/L — SIGNIFICANT CHANGE UP (ref 3.5–5.3)
PROT SERPL-MCNC: 6.7 G/DL — SIGNIFICANT CHANGE UP (ref 6.6–8.7)
PROT UR-MCNC: NEGATIVE MG/DL — SIGNIFICANT CHANGE UP
PROTHROM AB SERPL-ACNC: 29.3 SEC — HIGH (ref 10–12.9)
RBC # BLD: 3.42 M/UL — LOW (ref 3.8–5.2)
RBC # FLD: 15.5 % — HIGH (ref 10.3–14.5)
RBC CASTS # UR COMP ASSIST: SIGNIFICANT CHANGE UP /HPF (ref 0–4)
SODIUM SERPL-SCNC: 134 MMOL/L — LOW (ref 135–145)
SP GR SPEC: 1.01 — SIGNIFICANT CHANGE UP (ref 1.01–1.02)
UROBILINOGEN FLD QL: NEGATIVE MG/DL — SIGNIFICANT CHANGE UP
WBC # BLD: 6.11 K/UL — SIGNIFICANT CHANGE UP (ref 3.8–10.5)
WBC # FLD AUTO: 6.11 K/UL — SIGNIFICANT CHANGE UP (ref 3.8–10.5)
WBC UR QL: SIGNIFICANT CHANGE UP

## 2019-09-30 PROCEDURE — 83880 ASSAY OF NATRIURETIC PEPTIDE: CPT

## 2019-09-30 PROCEDURE — 93306 TTE W/DOPPLER COMPLETE: CPT

## 2019-09-30 PROCEDURE — 82435 ASSAY OF BLOOD CHLORIDE: CPT

## 2019-09-30 PROCEDURE — 97110 THERAPEUTIC EXERCISES: CPT

## 2019-09-30 PROCEDURE — 81003 URINALYSIS AUTO W/O SCOPE: CPT

## 2019-09-30 PROCEDURE — 80053 COMPREHEN METABOLIC PANEL: CPT

## 2019-09-30 PROCEDURE — 97530 THERAPEUTIC ACTIVITIES: CPT

## 2019-09-30 PROCEDURE — 83036 HEMOGLOBIN GLYCOSYLATED A1C: CPT

## 2019-09-30 PROCEDURE — 84132 ASSAY OF SERUM POTASSIUM: CPT

## 2019-09-30 PROCEDURE — 83735 ASSAY OF MAGNESIUM: CPT

## 2019-09-30 PROCEDURE — 82803 BLOOD GASES ANY COMBINATION: CPT

## 2019-09-30 PROCEDURE — 97163 PT EVAL HIGH COMPLEX 45 MIN: CPT

## 2019-09-30 PROCEDURE — 84100 ASSAY OF PHOSPHORUS: CPT

## 2019-09-30 PROCEDURE — 82947 ASSAY GLUCOSE BLOOD QUANT: CPT

## 2019-09-30 PROCEDURE — 85014 HEMATOCRIT: CPT

## 2019-09-30 PROCEDURE — 84484 ASSAY OF TROPONIN QUANT: CPT

## 2019-09-30 PROCEDURE — 86850 RBC ANTIBODY SCREEN: CPT

## 2019-09-30 PROCEDURE — C1729: CPT

## 2019-09-30 PROCEDURE — 85610 PROTHROMBIN TIME: CPT

## 2019-09-30 PROCEDURE — 82550 ASSAY OF CK (CPK): CPT

## 2019-09-30 PROCEDURE — 85027 COMPLETE CBC AUTOMATED: CPT

## 2019-09-30 PROCEDURE — 93005 ELECTROCARDIOGRAM TRACING: CPT

## 2019-09-30 PROCEDURE — 71275 CT ANGIOGRAPHY CHEST: CPT

## 2019-09-30 PROCEDURE — 82962 GLUCOSE BLOOD TEST: CPT

## 2019-09-30 PROCEDURE — 82150 ASSAY OF AMYLASE: CPT

## 2019-09-30 PROCEDURE — 36415 COLL VENOUS BLD VENIPUNCTURE: CPT

## 2019-09-30 PROCEDURE — 99233 SBSQ HOSP IP/OBS HIGH 50: CPT

## 2019-09-30 PROCEDURE — 84295 ASSAY OF SERUM SODIUM: CPT

## 2019-09-30 PROCEDURE — 71045 X-RAY EXAM CHEST 1 VIEW: CPT

## 2019-09-30 PROCEDURE — 86901 BLOOD TYPING SEROLOGIC RH(D): CPT

## 2019-09-30 PROCEDURE — 99285 EMERGENCY DEPT VISIT HI MDM: CPT

## 2019-09-30 PROCEDURE — 83690 ASSAY OF LIPASE: CPT

## 2019-09-30 PROCEDURE — 74018 RADEX ABDOMEN 1 VIEW: CPT

## 2019-09-30 PROCEDURE — 97116 GAIT TRAINING THERAPY: CPT

## 2019-09-30 PROCEDURE — 71045 X-RAY EXAM CHEST 1 VIEW: CPT | Mod: 26

## 2019-09-30 PROCEDURE — 80048 BASIC METABOLIC PNL TOTAL CA: CPT

## 2019-09-30 PROCEDURE — 83605 ASSAY OF LACTIC ACID: CPT

## 2019-09-30 PROCEDURE — 36600 WITHDRAWAL OF ARTERIAL BLOOD: CPT

## 2019-09-30 PROCEDURE — 85730 THROMBOPLASTIN TIME PARTIAL: CPT

## 2019-09-30 PROCEDURE — 81001 URINALYSIS AUTO W/SCOPE: CPT

## 2019-09-30 PROCEDURE — 86900 BLOOD TYPING SEROLOGIC ABO: CPT

## 2019-09-30 PROCEDURE — 84443 ASSAY THYROID STIM HORMONE: CPT

## 2019-09-30 PROCEDURE — 82330 ASSAY OF CALCIUM: CPT

## 2019-09-30 RX ORDER — SACUBITRIL AND VALSARTAN 24; 26 MG/1; MG/1
1 TABLET, FILM COATED ORAL
Qty: 30 | Refills: 0
Start: 2019-09-30 | End: 2019-10-29

## 2019-09-30 RX ORDER — SPIRONOLACTONE 25 MG/1
1 TABLET, FILM COATED ORAL
Qty: 30 | Refills: 0
Start: 2019-09-30 | End: 2019-10-29

## 2019-09-30 RX ORDER — POLYETHYLENE GLYCOL 3350 17 G/17G
17 POWDER, FOR SOLUTION ORAL
Qty: 0 | Refills: 0 | DISCHARGE
Start: 2019-09-30

## 2019-09-30 RX ORDER — SACUBITRIL AND VALSARTAN 24; 26 MG/1; MG/1
1 TABLET, FILM COATED ORAL ONCE
Refills: 0 | Status: DISCONTINUED | OUTPATIENT
Start: 2019-09-30 | End: 2019-09-30

## 2019-09-30 RX ORDER — WARFARIN SODIUM 2.5 MG/1
2 TABLET ORAL ONCE
Refills: 0 | Status: DISCONTINUED | OUTPATIENT
Start: 2019-09-30 | End: 2019-09-30

## 2019-09-30 RX ORDER — DIGOXIN 250 MCG
1 TABLET ORAL
Qty: 30 | Refills: 0
Start: 2019-09-30 | End: 2019-10-29

## 2019-09-30 RX ORDER — CARVEDILOL PHOSPHATE 80 MG/1
1 CAPSULE, EXTENDED RELEASE ORAL
Qty: 60 | Refills: 0
Start: 2019-09-30 | End: 2019-10-29

## 2019-09-30 RX ADMIN — CARVEDILOL PHOSPHATE 3.12 MILLIGRAM(S): 80 CAPSULE, EXTENDED RELEASE ORAL at 06:22

## 2019-09-30 RX ADMIN — Medication 8 UNIT(S): at 17:42

## 2019-09-30 RX ADMIN — SODIUM CHLORIDE 3 MILLILITER(S): 9 INJECTION INTRAMUSCULAR; INTRAVENOUS; SUBCUTANEOUS at 05:20

## 2019-09-30 RX ADMIN — Medication 100 MILLIGRAM(S): at 06:22

## 2019-09-30 RX ADMIN — Medication 8 UNIT(S): at 08:58

## 2019-09-30 RX ADMIN — CARVEDILOL PHOSPHATE 3.12 MILLIGRAM(S): 80 CAPSULE, EXTENDED RELEASE ORAL at 17:42

## 2019-09-30 RX ADMIN — Medication 8 UNIT(S): at 12:52

## 2019-09-30 RX ADMIN — Medication 1 TABLET(S): at 12:51

## 2019-09-30 RX ADMIN — SPIRONOLACTONE 25 MILLIGRAM(S): 25 TABLET, FILM COATED ORAL at 08:57

## 2019-09-30 RX ADMIN — Medication 325 MILLIGRAM(S): at 12:50

## 2019-09-30 RX ADMIN — PANTOPRAZOLE SODIUM 40 MILLIGRAM(S): 20 TABLET, DELAYED RELEASE ORAL at 06:22

## 2019-09-30 RX ADMIN — Medication 0.12 MILLIGRAM(S): at 06:22

## 2019-09-30 RX ADMIN — Medication 1 MILLIGRAM(S): at 12:50

## 2019-09-30 RX ADMIN — Medication 4: at 08:58

## 2019-09-30 RX ADMIN — Medication 650 MILLIGRAM(S): at 06:22

## 2019-09-30 RX ADMIN — Medication 325 MILLIGRAM(S): at 17:41

## 2019-09-30 RX ADMIN — MAGNESIUM OXIDE 400 MG ORAL TABLET 400 MILLIGRAM(S): 241.3 TABLET ORAL at 17:42

## 2019-09-30 RX ADMIN — Medication 40 MILLIGRAM(S): at 08:57

## 2019-09-30 RX ADMIN — Medication 4: at 12:53

## 2019-09-30 RX ADMIN — Medication 325 MILLIGRAM(S): at 08:55

## 2019-09-30 RX ADMIN — MAGNESIUM OXIDE 400 MG ORAL TABLET 400 MILLIGRAM(S): 241.3 TABLET ORAL at 08:56

## 2019-09-30 RX ADMIN — Medication 100 MILLIGRAM(S): at 12:51

## 2019-09-30 RX ADMIN — SODIUM CHLORIDE 3 MILLILITER(S): 9 INJECTION INTRAMUSCULAR; INTRAVENOUS; SUBCUTANEOUS at 17:03

## 2019-09-30 RX ADMIN — Medication 500 MILLIGRAM(S): at 12:50

## 2019-09-30 RX ADMIN — Medication 81 MILLIGRAM(S): at 12:52

## 2019-09-30 NOTE — DISCHARGE NOTE PROVIDER - NSDCCPCAREPLAN_GEN_ALL_CORE_FT
PRINCIPAL DISCHARGE DIAGNOSIS  Diagnosis: Pleural effusion, left  Assessment and Plan of Treatment: Continue diuretics, daily weights, monitor I/O's. Follow up with Dr. Rodriguez & Dr. Fuller

## 2019-09-30 NOTE — DISCHARGE NOTE NURSING/CASE MANAGEMENT/SOCIAL WORK - PATIENT PORTAL LINK FT
You can access the FollowMyHealth Patient Portal offered by [facility] by registering at the following website: [email or web address]. By joining "[employer]’s FollowMyHealth portal, you will also be able to view your health information using other applications (apps) compatible with our system.

## 2019-09-30 NOTE — DISCHARGE NOTE PROVIDER - NSDCPNSUBOBJ_GEN_ALL_CORE
Subjective:        VITAL SIGNS    Vital Signs Last 24 Hrs    T(C): 36.6 (19 @ 06:17), Max: 37 (19 @ 21:38)    T(F): 97.8 (19 @ 06:17), Max: 98.6 (19 @ 21:38)    HR: 78 (19 @ 08:53) (72 - 92)    BP: 113/63 (19 @ 08:53) (98/52 - 116/52)    RR: 18 (19 @ 06:17) (18 - 18)    SpO2: 96% (19 @ 06:17) (95% - 100%)  on (O2)                  Telemetry:      LVEF:         MEDICATIONS    acetaminophen   Tablet .. 650 milliGRAM(s) Oral every 6 hours PRN    ascorbic acid 500 milliGRAM(s) Oral daily    aspirin enteric coated 81 milliGRAM(s) Oral daily    atorvastatin 80 milliGRAM(s) Oral at bedtime    carvedilol 3.125 milliGRAM(s) Oral every 12 hours    dextrose 50% Injectable 12.5 Gram(s) IV Push once    dextrose 50% Injectable 25 Gram(s) IV Push once    dextrose 50% Injectable 25 Gram(s) IV Push once    digoxin     Tablet 0.125 milliGRAM(s) Oral daily    docusate sodium 100 milliGRAM(s) Oral three times a day    ferrous sulfate Oral Tab/Cap - Peds 325 milliGRAM(s) Oral three times a day with meals    folic acid 1 milliGRAM(s) Oral daily    furosemide    Tablet 40 milliGRAM(s) Oral <User Schedule>    insulin glargine Injectable (LANTUS) 25 Unit(s) SubCutaneous at bedtime    insulin lispro (HumaLOG) corrective regimen sliding scale   SubCutaneous Before meals and at bedtime    insulin lispro Injectable (HumaLOG) 8 Unit(s) SubCutaneous three times a day before meals    magnesium oxide 400 milliGRAM(s) Oral two times a day with meals    methimazole 10 milliGRAM(s) Oral daily    multivitamin 1 Tablet(s) Oral daily    pantoprazole    Tablet 40 milliGRAM(s) Oral before breakfast    polyethylene glycol 3350 17 Gram(s) Oral two times a day    sacubitril 24 mG/valsartan 26 mG 1 Tablet(s) Oral <User Schedule>    saline laxative (FLEET) Rectal Enema 1 Enema Rectal daily PRN    sodium chloride 0.9% lock flush 3 milliLiter(s) IV Push every 8 hours    spironolactone 25 milliGRAM(s) Oral daily            PHYSICAL EXAM    General: well nourished, well developed, no acute distress    Neurology: alert and oriented x 3, nonfocal, no gross deficits    Respiratory: clear to auscultation bilaterally    CV: regular rate and rhythm, normal S1, S2    Abdomen: soft, nontender, nondistended, positive bowel sounds, last bowel movement     Extremities: warm, well perfused. no edema. + DP pulses    Incisions: midline sternal incision, + mepilex, c/d/i. sternum stable.    Chest tubes:     Epicardial Wires:    > EPM (settings) / isolated        I&O's Detail        29 Sep 2019 07:01  -  30 Sep 2019 07:00    --------------------------------------------------------    IN:      Oral Fluid: 720 mL    Total IN: 720 mL        OUT:      Stool: 2 mL      Voided: 1090 mL    Total OUT: 1092 mL        Total NET: -372 mL                    Weights:    Daily       Daily Weight in k.4 (30 Sep 2019 05:52)    Admit Wt: Drug Dosing Weight    Height (cm): 121.9 (23 Sep 2019 00:46)    Weight (kg): 65.8 (23 Sep 2019 00:46)    BMI (kg/m2): 44.3 (23 Sep 2019 00:46)    BSA (m2): 1.39 (23 Sep 2019 00:46)        LABS            134<L>  |  94<L>  |  29.0<H>    ----------------------------<  258<H>    4.3   |  27.0  |  1.09        Ca    9.2      30 Sep 2019 06:22    Phos  3.8         Mg     2.0             TPro  6.7  /  Alb  3.5  /  TBili  0.3<L>  /  DBili  x   /  AST  33<H>  /  ALT  29  /  AlkPhos  106                                         9.8      6.11  )-----------( 386      ( 30 Sep 2019 06:22 )               31.4              PT/INR - ( 30 Sep 2019 06:22 )   PT: 29.3 sec;   INR: 2.48 ratio                         Bilirubin Total, Serum: 0.3 mg/dL ( @ 06:22)        CAPILLARY BLOOD GLUCOSE            POCT Blood Glucose.: 244 mg/dL (30 Sep 2019 08:19)    POCT Blood Glucose.: 133 mg/dL (29 Sep 2019 21:23)    POCT Blood Glucose.: 135 mg/dL (29 Sep 2019 17:06)    POCT Blood Glucose.: 163 mg/dL (29 Sep 2019 11:55)                 Today's CXR:        Today's EKG:        PAST MEDICAL & SURGICAL HISTORY:    CHF exacerbation    Atrial fibrillation    Diabetes    Hypertension    Stented coronary artery    Coronary stent restenosis, sequela    Afib    Hypertension    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 Fuller on Sept 3 with Dr. Fuller.  Pt presents to ER with c/o SOB and CP, found to have b/l pleural effusions L>R on CXR.  She denies nausea, vomiting, diarrhea, dizziness, fever or chills.         Left pigtail CT placed without event.  TTE: revealed moderately enlarged left atrium, LVEF < 20%, severely enlarged RV with RV dysfxn, MARIELY, small pericardial effusion.     Cardiac meds adjusted for heart failure, pt diuresing well on Lasix. L pigtail d/c'd. Echo  revealed improved EF 35-40%, moderate RV dysfunction (no life vest indicated).                         Problem/Plan - 1:    ·  Problem: CHF exacerbation.  Plan: Diuretics to continue oral BID dosing. BNP improving.    Left pigtail CT placed for pleural effusion, drained approx 900cc and now removed    Appreciate cardiology input.     On Digoxin, Entresto and Coreg as well as Lasix 40 mg PO BID (negative fluid balance yesterday)    Increase Coreg to 6.25 BID per Dr. Olson.    ACE wrapped B/L LE    No indication for Lifevest at this time (improved LVEF)    Daily labs, CXR, strict I/Os.         Problem/Plan - 2:Problem: Atrial fibrillation.  Plan: Continue Coumadin, maintain INR 2-3    Digoxin, Coreg.         Problem/Plan - 3:    ·  Problem: Diabetes.  Plan: Increase Lantus to 25units (home dose is 30).  AM glucose 247.    Continue 8 uts Humalog pre meal tid     Accuchecks ACHS with MARIANNA    Endocrine following.         Problem/Plan - 4:    ·  Problem: Hypertension.  Plan: Continue Digoxin, Entresto and Coreg    Well controlled.         Problem/Plan - 5:    ·  Problem: Constipation by delayed colonic transit.  Plan: Last BM Wednesday.    Continue bowel regimen.Rectal exam done yesterday and no stool appreciated.    Start Lactulose ATC today until pt has BM per Dr. Olson.         Problem/Plan - 6:    Problem: Prophylactic measure. Plan: SCDs for DVT prophylaxis    Coumadin for AFib     Pantoprazole for GI prophylaxis        Discussed with Dr. Olson in AM rounds. Subjective: Pt. seen & examined        VITAL SIGNS    Vital Signs Last 24 Hrs    T(C): 36.6 (19 @ 06:17), Max: 37 (19 @ 21:38)    T(F): 97.8 (19 @ 06:17), Max: 98.6 (19 @ 21:38)    HR: 78 (19 @ 08:53) (72 - 92)    BP: 113/63 (19 @ 08:53) (98/52 - 116/52)    RR: 18 (19 @ 06:17) (18 - 18)    SpO2: 96% (19 @ 06:17) (95% - 100%)  on (O2)                  Telemetry:  Afib    LVEF:         MEDICATIONS    acetaminophen   Tablet .. 650 milliGRAM(s) Oral every 6 hours PRN    ascorbic acid 500 milliGRAM(s) Oral daily    aspirin enteric coated 81 milliGRAM(s) Oral daily    atorvastatin 80 milliGRAM(s) Oral at bedtime    carvedilol 3.125 milliGRAM(s) Oral every 12 hours    dextrose 50% Injectable 12.5 Gram(s) IV Push once    dextrose 50% Injectable 25 Gram(s) IV Push once    dextrose 50% Injectable 25 Gram(s) IV Push once    digoxin     Tablet 0.125 milliGRAM(s) Oral daily    docusate sodium 100 milliGRAM(s) Oral three times a day    ferrous sulfate Oral Tab/Cap - Peds 325 milliGRAM(s) Oral three times a day with meals    folic acid 1 milliGRAM(s) Oral daily    furosemide    Tablet 40 milliGRAM(s) Oral <User Schedule>    insulin glargine Injectable (LANTUS) 25 Unit(s) SubCutaneous at bedtime    insulin lispro (HumaLOG) corrective regimen sliding scale   SubCutaneous Before meals and at bedtime    insulin lispro Injectable (HumaLOG) 8 Unit(s) SubCutaneous three times a day before meals    magnesium oxide 400 milliGRAM(s) Oral two times a day with meals    methimazole 10 milliGRAM(s) Oral daily    multivitamin 1 Tablet(s) Oral daily    pantoprazole    Tablet 40 milliGRAM(s) Oral before breakfast    polyethylene glycol 3350 17 Gram(s) Oral two times a day    sacubitril 24 mG/valsartan 26 mG 1 Tablet(s) Oral <User Schedule>    saline laxative (FLEET) Rectal Enema 1 Enema Rectal daily PRN    sodium chloride 0.9% lock flush 3 milliLiter(s) IV Push every 8 hours    spironolactone 25 milliGRAM(s) Oral daily            PHYSICAL EXAM    General: well nourished, well developed, no acute distress    Neurology: alert and oriented x 3, nonfocal, no gross deficits    Respiratory: clear to auscultation bilaterally    CV: regular rate and rhythm, normal S1, S2    Abdomen: soft, nontender, nondistended, positive bowel sounds, last bowel movement     Extremities: warm, well perfused. no edema. + DP pulses    Incisions: midline sternal incision, + mepilex, c/d/i. sternum stable.    Chest tubes:     Epicardial Wires:    > EPM (settings) / isolated        I&O's Detail        29 Sep 2019 07:01  -  30 Sep 2019 07:00    --------------------------------------------------------    IN:      Oral Fluid: 720 mL    Total IN: 720 mL        OUT:      Stool: 2 mL      Voided: 1090 mL    Total OUT: 1092 mL        Total NET: -372 mL                    Weights:    Daily       Daily Weight in k.4 (30 Sep 2019 05:52)    Admit Wt: Drug Dosing Weight    Height (cm): 121.9 (23 Sep 2019 00:46)    Weight (kg): 65.8 (23 Sep 2019 00:46)    BMI (kg/m2): 44.3 (23 Sep 2019 00:46)    BSA (m2): 1.39 (23 Sep 2019 00:46)        LABS            134<L>  |  94<L>  |  29.0<H>    ----------------------------<  258<H>    4.3   |  27.0  |  1.09        Ca    9.2      30 Sep 2019 06:22    Phos  3.8         Mg     2.0             TPro  6.7  /  Alb  3.5  /  TBili  0.3<L>  /  DBili  x   /  AST  33<H>  /  ALT  29  /  AlkPhos  106  09-30                                       9.8      6.11  )-----------( 386      ( 30 Sep 2019 06:22 )               31.4              PT/INR - ( 30 Sep 2019 06:22 )   PT: 29.3 sec;   INR: 2.48 ratio                         Bilirubin Total, Serum: 0.3 mg/dL ( @ 06:22)        CAPILLARY BLOOD GLUCOSE            POCT Blood Glucose.: 244 mg/dL (30 Sep 2019 08:19)    POCT Blood Glucose.: 133 mg/dL (29 Sep 2019 21:23)    POCT Blood Glucose.: 135 mg/dL (29 Sep 2019 17:06)    POCT Blood Glucose.: 163 mg/dL (29 Sep 2019 11:55)                 Today's CXR:        Today's EKG:        PAST MEDICAL & SURGICAL HISTORY:    CHF exacerbation    Atrial fibrillation    Diabetes    Hypertension    Stented coronary artery    Coronary stent restenosis, sequela    Afib    Hypertension    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 Fuller on Sept 3 with Dr. Fuller.  Pt presents to ER with c/o SOB and CP, found to have b/l pleural effusions L>R on CXR.  She denies nausea, vomiting, diarrhea, dizziness, fever or chills.         Left pigtail CT placed without event.  TTE: revealed moderately enlarged left atrium, LVEF < 20%, severely enlarged RV with RV dysfxn, MARIELY, small pericardial effusion.     Cardiac meds adjusted for heart failure, pt diuresing well on Lasix. L pigtail d/c'd. Echo  revealed improved EF 35-40%, moderate RV dysfunction (no life vest indicated).                         Problem/Plan - 1:    ·  Problem: CHF exacerbation.  Plan: Diuretics to continue oral BID dosing. BNP improving.    Left pigtail CT placed for pleural effusion, drained approx 900cc and now removed    Appreciate cardiology input.     On Digoxin, Entresto and Coreg as well as Lasix 40 mg PO BID (negative fluid balance yesterday)    Increase Coreg to 6.25 BID per Dr. Olson.    ACE wrapped B/L LE    No indication for Lifevest at this time (improved LVEF)    Daily labs, CXR, strict I/Os.         Problem/Plan - 2:Problem: Atrial fibrillation.  Plan: Continue Coumadin, maintain INR 2-3    Digoxin, Coreg.         Problem/Plan - 3:    ·  Problem: Diabetes.  Plan: Increase Lantus to 25units (home dose is 30).  AM glucose 247.    Continue 8 uts Humalog pre meal tid     Accuchecks ACHS with MARIANNA    Endocrine following.         Problem/Plan - 4:    ·  Problem: Hypertension.  Plan: Continue Digoxin, Entresto and Coreg    Well controlled.         Problem/Plan - 5:    ·  Problem: Constipation by delayed colonic transit.  Plan: Last BM Wednesday.    Continue bowel regimen.Rectal exam done yesterday and no stool appreciated.    Start Lactulose ATC today until pt has BM per Dr. Olson.         Problem/Plan - 6:    Problem: Prophylactic measure. Plan: SCDs for DVT prophylaxis    Coumadin for AFib     Pantoprazole for GI prophylaxis        Discussed with Dr. Olson in AM rounds. Subjective: Pt. seen & examined, son at the bedside. Pt. denies any SOB or chest pain, states she feels like she needs to still have a bowel movement from yesterdays medication that was given.         VITAL SIGNS    Vital Signs Last 24 Hrs    T(C): 36.6 (19 @ 06:17), Max: 37 (19 @ 21:38)    T(F): 97.8 (19 @ 06:17), Max: 98.6 (19 @ 21:38)    HR: 78 (19 @ 08:53) (72 - 92)    BP: 113/63 (19 @ 08:53) (98/52 - 116/52)    RR: 18 (19 @ 06:17) (18 - 18)    SpO2: 96% (19 @ 06:17) (95% - 100%)  on (O2)                  Telemetry:  Afib    LVEF: 35%-40%        MEDICATIONS    acetaminophen   Tablet .. 650 milliGRAM(s) Oral every 6 hours PRN    ascorbic acid 500 milliGRAM(s) Oral daily    aspirin enteric coated 81 milliGRAM(s) Oral daily    atorvastatin 80 milliGRAM(s) Oral at bedtime    carvedilol 3.125 milliGRAM(s) Oral every 12 hours    dextrose 50% Injectable 12.5 Gram(s) IV Push once    dextrose 50% Injectable 25 Gram(s) IV Push once    dextrose 50% Injectable 25 Gram(s) IV Push once    digoxin     Tablet 0.125 milliGRAM(s) Oral daily    docusate sodium 100 milliGRAM(s) Oral three times a day    ferrous sulfate Oral Tab/Cap - Peds 325 milliGRAM(s) Oral three times a day with meals    folic acid 1 milliGRAM(s) Oral daily    furosemide    Tablet 40 milliGRAM(s) Oral <User Schedule>    insulin glargine Injectable (LANTUS) 25 Unit(s) SubCutaneous at bedtime    insulin lispro (HumaLOG) corrective regimen sliding scale   SubCutaneous Before meals and at bedtime    insulin lispro Injectable (HumaLOG) 8 Unit(s) SubCutaneous three times a day before meals    magnesium oxide 400 milliGRAM(s) Oral two times a day with meals    methimazole 10 milliGRAM(s) Oral daily    multivitamin 1 Tablet(s) Oral daily    pantoprazole    Tablet 40 milliGRAM(s) Oral before breakfast    polyethylene glycol 3350 17 Gram(s) Oral two times a day    sacubitril 24 mG/valsartan 26 mG 1 Tablet(s) Oral <User Schedule>    saline laxative (FLEET) Rectal Enema 1 Enema Rectal daily PRN    sodium chloride 0.9% lock flush 3 milliLiter(s) IV Push every 8 hours    spironolactone 25 milliGRAM(s) Oral daily            PHYSICAL EXAM    General: well nourished, well developed, no acute distress    Neurology: alert and oriented x 3, nonfocal, no gross deficits    Respiratory: Clear to auscultation bilaterally    CV: Irregularly irregular, rate and rhythm, normal S1, S2    Abdomen: soft, nontender, nondistended, positive bowel sounds, last bowel movement 19    Extremities: warm, well perfused. Trace edema x2BLE. + DP pulses    Incisions: Midline sternal incision & CT incisions CDI scabbed & TERRA, Sternum stable.            I&O's Detail        29 Sep 2019 07:01  -  30 Sep 2019 07:00    --------------------------------------------------------    IN:      Oral Fluid: 720 mL    Total IN: 720 mL        OUT:      Stool: 2 mL      Voided: 1090 mL    Total OUT: 1092 mL        Total NET: -372 mL                    Weights:    Daily       Daily Weight in k.4 (30 Sep 2019 05:52)    Admit Wt: Drug Dosing Weight    Height (cm): 121.9 (23 Sep 2019 00:46)    Weight (kg): 65.8 (23 Sep 2019 00:46)    BMI (kg/m2): 44.3 (23 Sep 2019 00:46)    BSA (m2): 1.39 (23 Sep 2019 00:46)        LABS            134<L>  |  94<L>  |  29.0<H>    ----------------------------<  258<H>    4.3   |  27.0  |  1.09        Ca    9.2      30 Sep 2019 06:22    Phos  3.8         Mg     2.0             TPro  6.7  /  Alb  3.5  /  TBili  0.3<L>  /  DBili  x   /  AST  33<H>  /  ALT  29  /  AlkPhos  106                                         9.8      6.11  )-----------( 386      ( 30 Sep 2019 06:22 )               31.4              PT/INR - ( 30 Sep 2019 06:22 )   PT: 29.3 sec;   INR: 2.48 ratio                         Bilirubin Total, Serum: 0.3 mg/dL ( @ 06:22)        CAPILLARY BLOOD GLUCOSE            POCT Blood Glucose.: 244 mg/dL (30 Sep 2019 08:19)    POCT Blood Glucose.: 133 mg/dL (29 Sep 2019 21:23)    POCT Blood Glucose.: 135 mg/dL (29 Sep 2019 17:06)    POCT Blood Glucose.: 163 mg/dL (29 Sep 2019 11:55)                 Today's CXR:EXAM:  XR CHEST PORTABLE ROUTINE 1V                              PROCEDURE DATE:  2019                  INTERPRETATION:  Portable chest radiograph dated 2019.        COMPARISON: 2019.        CLINICAL INFORMATION: [facility].    FINDINGS:        The airway is midline.    A zone of segmental atelectasis in the lingula. The remainder of the     lungs are clear.    There is no pleural effusion or pneumothorax.     Cardiomegaly and status post cardiac valve replacement are again noted.    The bones , status post sternotomy.         IMPRESSION:    Only a zone of segmental atelectasis in the lingula, otherwise,no acute     cardiopulmonary findings.            MICHI BANGURA M.D., ATTENDING RADIOLOGIST    This document has been electronically signed. Sep 30 2019 10:01AM        < end of copied text >                Today's EKG:< from: 12 Lead ECG (19 @ 07:41) >        Ventricular Rate 66 BPM        Atrial Rate 86 BPM        QRS Duration 104 ms        Q-T Interval 380 ms        QTC Calculation(Bezet) 398 ms        R Axis -3 degrees        T Axis 97 degrees        Diagnosis Line Atrial fibrillation    Nonspecific T wave abnormality    Abnormal ECG        Confirmed by ZAY BAGLEY ([id number]) on 2019 2:58:43 PM        < end of copied text >                PAST MEDICAL & SURGICAL HISTORY:    CHF exacerbation    Atrial fibrillation    Diabetes    Hypertension    Stented coronary artery    Coronary stent restenosis, sequela    Afib    Hypertension    S/P CABG x 2: s/p C2L/TV Repair/MVR/ NISSA Clip    History of ankle surgery: right orif    Post PTCA: 8 stents    H/O hernia repair: &#x27;s    Cataract    History of appendectomy             Assessment and Plan:     · Assessment        78yo Turkish speaking F seen with 2 sons whom she prefers to do translation.  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 Fuller on Sept 3 with Dr. Fuller.  Pt presents to ER with c/o SOB and CP, found to have b/l pleural effusions L>R on CXR.  She denies nausea, vomiting, diarrhea, dizziness, fever or chills.         Left pigtail CT placed without event.  TTE: revealed moderately enlarged left atrium, LVEF < 20%, severely enlarged RV with RV dysfxn, MARIELY, small pericardial effusion.     Cardiac meds adjusted for heart failure, pt diuresing well on Lasix. L pigtail d/c'd. Echo  revealed improved EF 35-40%, moderate RV dysfunction (no life vest indicated).                         Problem/Plan - 1:    Problem: CHF exacerbation.      Plan: EF 35%-40% on TTE     Continue Coreg 3.125mg BID.    Lasix discontinued, Torsemide 20mg daily ordered as per Dr. Olson. Continue Aldactone     Daily weights    Strict I/O's     As per cardiology decrease Entresto to once a day & administer at HS, hold dose today as per Dr. Bhatt for hypotension    Continue Digoxin for rate control.    Continue ACE bandages to BLE for leg edema    Orthostatic blood pressures ordered, if patient symptomatic will keep patient.    If asymptomatic discharge patient to home as per Dr. Avila    Discussed plan with Dr. Avila & Dr. Fuller        Problem/Plan - 2:Problem: Atrial fibrillation.      Plan: Spoke to Dr. Michael Zuleta to monitor & dose Coumadin, home blood draws arranged.    Continue Digoxin & Coreg for rate control        Problem/Plan - 3:    ·  Problem: Diabetes.      Plan: Endocrine following.     Continue FS AC/HS, Premeal, & Lantus.        Problem/Plan - 4:    ·  Problem: Hypertension.      Plan: Continue DASH diet    Continue Digoxin, Entresto, and Coreg            Problem/Plan - 5:    ·  Problem: Constipation by delayed colonic transit.      Plan: Resolved    Continue Miralax PRN, pt. had BM yesterday. Wednesday.        Problem/Plan - 6:    Problem: Prophylactic measure.     Plan: Continue Coumadin for AFib     Continue SCDs & increase mobilization for DVT prophylaxis    Continue Protonix for GI prophylaxis

## 2019-09-30 NOTE — DISCHARGE NOTE PROVIDER - NSDCFUADDINST_GEN_ALL_CORE_FT
1. Daily Shower  2. Weight yourself daily and notify any weight gain greater than 2-3 pounds in 24 hours.  3. Regular diet - low fat, low cholesterol, no added salt.  4. Cleanse midsternal incision daily while showering with warm water and mild soap, pat dry and maintain open to air.   DO NOT APPLY ANY LOTION, CREAMS, OR POWDERS TO INCISIONS, KEEP OPEN TO AIR  5. No driving until cleared by MD.   6. No heavy lifting nothing greater than 5 pounds until cleared by MD.   7. Call / Notify MD any fever greater than 101.0  8. Increase Activity as tolerated.  9. Utilize heart pillow to splint pillow

## 2019-09-30 NOTE — DISCHARGE NOTE PROVIDER - CARE PROVIDERS DIRECT ADDRESSES
,[email or web address]

## 2019-09-30 NOTE — DISCHARGE NOTE PROVIDER - NSDCFUADDAPPT_GEN_ALL_CORE_FT
Arrange a follow up with Dr. Richardson (cardiologist)  Dr. Rodriguez to follow & manage INR for Coumadin, please call & arrange a follow up.  Follow up appointment with Dr. Fuller on 10/16 at 1:45pm.  The cardiac surgery office is on the second floor of [facility].   Take the [facility] ("G") elevators to 2 and make a left.   Walk down to the second hallway on your left (before the double doors).   Sign says Cardiovascular and Thoracic Surgery. Turn left and walk down the long hallway.  The waiting room is through the double doors toward the end of the hallway.

## 2019-09-30 NOTE — DISCHARGE NOTE PROVIDER - HOSPITAL COURSE
80yo Thai speaking F seen with 2 sons whom she prefers to do translation.  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 Fuller on Sept 3 with Dr. Fuller.  Pt presents to ER with c/o SOB and CP, found to have b/l pleural effusions L>R on CXR.  She denies nausea, vomiting, diarrhea, dizziness, fever or chills.        9/23 Left pigtail CT placed without event.  TTE: revealed moderately enlarged left atrium, LVEF < 20%, severely enlarged RV with RV dysfxn, MARIELY, small pericardial effusion.    9/25 Cardiac meds adjusted for heart failure, pt diuresing well on Lasix. L pigtail d/c'd. Echo 9/26 revealed improved EF 35-40%, moderate RV dysfunction (no life vest indicated).

## 2019-09-30 NOTE — PROGRESS NOTE ADULT - SUBJECTIVE AND OBJECTIVE BOX
[place] [facility]                                                        Office: [address] [address]                                                       Telephone: [phone]. Fax:[phone]                                                                             PROGRESS NOTE   Reason for follow up: severe cardiomyopathy , heart failure. plerual effusion                             Overnight: No new events.   Update:    had bowel movemen t over the weekened.   Low BP.   Subjective: "  i am ok"   Complains of:  no new symptoms.   Review of symptoms: Cardiac:  No chest pain. +  dyspnea. No palpitations.  Respiratory:no cough.+ dyspnea  Gastrointestinal: No diarrhea. No abdominal pain. No bleeding.     Past medical history: No updates.   Chronic conditions:  Hypertension: controlled. CHF:  decompensated CAD: Stable ischemic heart disease.   	  Vitals:  Vital Signs Last 24 Hrs  T(C): 36.7 (09-30-19 @ 15:33), Max: 37 (09-29-19 @ 21:38)  T(F): 98 (09-30-19 @ 15:33), Max: 98.6 (09-29-19 @ 21:38)  HR: 79 (09-30-19 @ 15:33) (64 - 92)  BP: 101/45 (09-30-19 @ 15:33) (100/66 - 127/81)  BP(mean): --  RR: 18 (09-30-19 @ 15:33) (18 - 18)  SpO2: 100% (09-30-19 @ 15:33) (95% - 100%)      PHYSICAL EXAM:  Appearance: Comfortable. No acute distress  HEENT:  Head and neck: Atraumatic. Normocephalic.  Normal oral mucosa, PERRL, Neck is supple.    Neurologic: A & O x 3, no focal deficits. EOMI   Lymphatic: No cervical lymphadenopathy  Cardiovascular: irregular  S1 S2, No murmur, + rubs.  rubsno gallops.   Respiratory: bilateral based decrease breath sounds and bronchal breath sounds.   Gastrointestinal:  Soft, Non-tender, + BS  Lower Extremities: 1= bilateral  edema  Psychiatry: Patient is calm. No agitation. Mood & affect appropriate  Skin: No rashes/ ecchymoses/cyanosis/ulcers visualized on the face, hands or feet.    CURRENT MEDICATIONS:    MEDICATIONS  (STANDING):  carvedilol 3.125 milliGRAM(s) Oral every 12 hours  digoxin     Tablet 0.125 milliGRAM(s) Oral daily  sacubitril 24 mG/valsartan 26 mG 1 Tablet(s) Oral once  spironolactone 25 milliGRAM(s) Oral daily    docusate sodium  pantoprazole    Tablet  polyethylene glycol 3350  ascorbic acid  aspirin enteric coated  atorvastatin  dextrose 50% Injectable  dextrose 50% Injectable  dextrose 50% Injectable  ferrous sulfate Oral Tab/Cap - Peds  folic acid  insulin glargine Injectable (LANTUS)  insulin lispro (HumaLOG) corrective regimen sliding scale  insulin lispro Injectable (HumaLOG)  magnesium oxide  methimazole  multivitamin  sodium chloride 0.9% lock flush  warfarin    PRN: acetaminophen   Tablet .. PRN  saline laxative (FLEET) Rectal Enema PRN    LABS:	 	                           9.8    6.11  )-----------( 386      ( 30 Sep 2019 06:22 )             31.4   N=x    ; L=x        30 Sep 2019 06:22    134    |  94     |  29.0   ----------------------------<  258    4.3     |  27.0   |  1.09     Ca    9.2        30 Sep 2019 06:22  Phos  3.8       30 Sep 2019 06:22  Mg     2.0       30 Sep 2019 06:22    TPro  6.7    /  Alb  3.5    /  TBili  0.3    /  DBili  x      /  AST  33     /  ALT  29     /  AlkPhos  106    30 Sep 2019 06:22      Hepatic panel: 30 Sep 2019 06:22  6.7   | 3.5                            0.3   | 0.3  /x                              33    | 29                                /106  \par                                 Lipid Profile:   HgA1c: Hemoglobin A1C, Whole Blood: 6.1 %  TSH: Thyroid Stimulating Hormone, Serum: 1.22 uIU/mL      TELEMETRY: Reviewed  atrial fibrillation.       DIAGNOSTIC TESTING:  [ ] Echocardiogram: LVEF < 20%.  Severely enlarged RV and severely reduced RV function. normal mitral proshessis. Large pleural effusion bilaterally.  small pericardial effusion.   Repeat echo " LVEf 35-40%. moderate RV enalrgemd adn moderate RV dysfunction. small pericaridla effusion and bioprostihetic mitral valve.   <

## 2019-09-30 NOTE — PROGRESS NOTE ADULT - PROVIDER SPECIALTY LIST ADULT
CT Surgery
Cardiology
Cardiology
Endocrinology
Endocrinology
Cardiology
Endocrinology
Cardiology
Cardiology
Endocrinology
CT Surgery
CT Surgery

## 2019-09-30 NOTE — PROGRESS NOTE ADULT - ASSESSMENT
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 Fuller 9/3/19.  Son states they had called Dr. Fuller during the week d/t increased SOB and edema, and Spironolactone was added on Friday, 9/20 to aide in diuresis, she was advised to present to ER in the event lower ext edema and SOB did not improve.  Found to have b/l pleural effusions L>R on CXR. Currently pt is s/p Left pigtail, removal of 600ml serosanguineous fluid.  Since IV Lasix and pigtail patient reports improvement in SOB, c/t c/o mid sternal chest pain at surgical site which is well approximated with no s/s of infection.     1. HfrEF with RV dysfunction.    PO diuretics. d  beta-blocker and entresto.   afib rate control     2) GI: Costipation: Xray abdomen.     2. pleural effusion: incentive spirometer.  diuresis.   2. Persistent AFib  - Digoxin   -  beta-blocker     3. HTN   chf meds.     4. CAD  - c/w ASA, BB, statin  5) GI: Constipation: Gi regimen. Need bowel movement.   No further in-patient cardiac work-up/management is needed.  Follow-up in cardiology office in 2 weeks.

## 2019-09-30 NOTE — DISCHARGE NOTE PROVIDER - CARE PROVIDER_API CALL
Colton Fuller)  Surgery; Thoracic and Cardiac Surgery  [address] [place] [address]  Phone: [phone]  Fax: [phone]  Follow Up Time: 2 weeks    John Richardson)  Cardiovascular Disease; Internal Medicine; Interventional Cardiology  [address] [place] [address]  Phone: [phone]  Fax: [phone]  Follow Up Time: 1 week

## 2019-09-30 NOTE — DISCHARGE NOTE PROVIDER - PROVIDER TOKENS
PROVIDER:[TOKEN:[[id number]],FOLLOWUP:[2 weeks]],PROVIDER:[TOKEN:[[id number]],FOLLOWUP:[1 week]]

## 2019-09-30 NOTE — DISCHARGE NOTE PROVIDER - NSDCACTIVITY_GEN_ALL_CORE
Walking - Outdoors allowed/No heavy lifting/straining/Showering allowed/Do not make important decisions/Walking - Indoors allowed/Do not drive or operate machinery/Stairs allowed

## 2019-10-07 LAB
GLUCOSE BLDC GLUCOMTR-MCNC: 103 MG/DL — HIGH (ref 70–99)
GLUCOSE BLDC GLUCOMTR-MCNC: 116 MG/DL — HIGH (ref 70–99)
GLUCOSE BLDC GLUCOMTR-MCNC: 130 MG/DL — HIGH (ref 70–99)
GLUCOSE BLDC GLUCOMTR-MCNC: 154 MG/DL — HIGH (ref 70–99)
GLUCOSE BLDC GLUCOMTR-MCNC: 157 MG/DL — HIGH (ref 70–99)
GLUCOSE BLDC GLUCOMTR-MCNC: 183 MG/DL — HIGH (ref 70–99)
GLUCOSE BLDC GLUCOMTR-MCNC: 205 MG/DL — HIGH (ref 70–99)
GLUCOSE BLDC GLUCOMTR-MCNC: 218 MG/DL — HIGH (ref 70–99)

## 2019-10-12 RX ORDER — ATORVASTATIN CALCIUM 80 MG/1
1 TABLET, FILM COATED ORAL
Qty: 30 | Refills: 0
Start: 2019-10-12 | End: 2019-11-10

## 2019-10-16 ENCOUNTER — APPOINTMENT (OUTPATIENT)
Dept: CARDIOTHORACIC SURGERY | Facility: CLINIC | Age: 79
End: 2019-10-16

## 2019-10-31 PROCEDURE — P9059: CPT

## 2019-10-31 PROCEDURE — 82962 GLUCOSE BLOOD TEST: CPT

## 2019-10-31 PROCEDURE — 86923 COMPATIBILITY TEST ELECTRIC: CPT

## 2019-10-31 PROCEDURE — C1893: CPT

## 2019-10-31 PROCEDURE — 84132 ASSAY OF SERUM POTASSIUM: CPT

## 2019-10-31 PROCEDURE — 36415 COLL VENOUS BLD VENIPUNCTURE: CPT

## 2019-10-31 PROCEDURE — 83036 HEMOGLOBIN GLYCOSYLATED A1C: CPT

## 2019-10-31 PROCEDURE — C1887: CPT

## 2019-10-31 PROCEDURE — 85610 PROTHROMBIN TIME: CPT

## 2019-10-31 PROCEDURE — 83605 ASSAY OF LACTIC ACID: CPT

## 2019-10-31 PROCEDURE — 86965 POOLING BLOOD PLATELETS: CPT

## 2019-10-31 PROCEDURE — 86850 RBC ANTIBODY SCREEN: CPT

## 2019-10-31 PROCEDURE — C1894: CPT

## 2019-10-31 PROCEDURE — 97163 PT EVAL HIGH COMPLEX 45 MIN: CPT

## 2019-10-31 PROCEDURE — 80053 COMPREHEN METABOLIC PANEL: CPT

## 2019-10-31 PROCEDURE — 97110 THERAPEUTIC EXERCISES: CPT

## 2019-10-31 PROCEDURE — 84445 ASSAY OF TSI GLOBULIN: CPT

## 2019-10-31 PROCEDURE — 80074 ACUTE HEPATITIS PANEL: CPT

## 2019-10-31 PROCEDURE — 86900 BLOOD TYPING SEROLOGIC ABO: CPT

## 2019-10-31 PROCEDURE — 82435 ASSAY OF BLOOD CHLORIDE: CPT

## 2019-10-31 PROCEDURE — 82947 ASSAY GLUCOSE BLOOD QUANT: CPT

## 2019-10-31 PROCEDURE — 83735 ASSAY OF MAGNESIUM: CPT

## 2019-10-31 PROCEDURE — 71045 X-RAY EXAM CHEST 1 VIEW: CPT

## 2019-10-31 PROCEDURE — 93325 DOPPLER ECHO COLOR FLOW MAPG: CPT

## 2019-10-31 PROCEDURE — 33010: CPT

## 2019-10-31 PROCEDURE — 81001 URINALYSIS AUTO W/SCOPE: CPT

## 2019-10-31 PROCEDURE — 86901 BLOOD TYPING SEROLOGIC RH(D): CPT

## 2019-10-31 PROCEDURE — 74018 RADEX ABDOMEN 1 VIEW: CPT

## 2019-10-31 PROCEDURE — 84480 ASSAY TRIIODOTHYRONINE (T3): CPT

## 2019-10-31 PROCEDURE — 93320 DOPPLER ECHO COMPLETE: CPT

## 2019-10-31 PROCEDURE — 84436 ASSAY OF TOTAL THYROXINE: CPT

## 2019-10-31 PROCEDURE — 96374 THER/PROPH/DIAG INJ IV PUSH: CPT

## 2019-10-31 PROCEDURE — 93308 TTE F-UP OR LMTD: CPT

## 2019-10-31 PROCEDURE — C1889: CPT

## 2019-10-31 PROCEDURE — 99285 EMERGENCY DEPT VISIT HI MDM: CPT | Mod: 25

## 2019-10-31 PROCEDURE — 87641 MR-STAPH DNA AMP PROBE: CPT

## 2019-10-31 PROCEDURE — 82550 ASSAY OF CK (CPK): CPT

## 2019-10-31 PROCEDURE — 94002 VENT MGMT INPAT INIT DAY: CPT

## 2019-10-31 PROCEDURE — 80162 ASSAY OF DIGOXIN TOTAL: CPT

## 2019-10-31 PROCEDURE — 93880 EXTRACRANIAL BILAT STUDY: CPT

## 2019-10-31 PROCEDURE — 36430 TRANSFUSION BLD/BLD COMPNT: CPT

## 2019-10-31 PROCEDURE — 87640 STAPH A DNA AMP PROBE: CPT

## 2019-10-31 PROCEDURE — 80076 HEPATIC FUNCTION PANEL: CPT

## 2019-10-31 PROCEDURE — P9016: CPT

## 2019-10-31 PROCEDURE — 82803 BLOOD GASES ANY COMBINATION: CPT

## 2019-10-31 PROCEDURE — 84295 ASSAY OF SERUM SODIUM: CPT

## 2019-10-31 PROCEDURE — C1769: CPT

## 2019-10-31 PROCEDURE — 94010 BREATHING CAPACITY TEST: CPT

## 2019-10-31 PROCEDURE — 76000 FLUOROSCOPY <1 HR PHYS/QHP: CPT

## 2019-10-31 PROCEDURE — 80048 BASIC METABOLIC PNL TOTAL CA: CPT

## 2019-10-31 PROCEDURE — 82553 CREATINE MB FRACTION: CPT

## 2019-10-31 PROCEDURE — 84484 ASSAY OF TROPONIN QUANT: CPT

## 2019-10-31 PROCEDURE — 93306 TTE W/DOPPLER COMPLETE: CPT

## 2019-10-31 PROCEDURE — 85014 HEMATOCRIT: CPT

## 2019-10-31 PROCEDURE — 85730 THROMBOPLASTIN TIME PARTIAL: CPT

## 2019-10-31 PROCEDURE — 93312 ECHO TRANSESOPHAGEAL: CPT

## 2019-10-31 PROCEDURE — 97116 GAIT TRAINING THERAPY: CPT

## 2019-10-31 PROCEDURE — 83880 ASSAY OF NATRIURETIC PEPTIDE: CPT

## 2019-10-31 PROCEDURE — P9012: CPT

## 2019-10-31 PROCEDURE — 93005 ELECTROCARDIOGRAM TRACING: CPT

## 2019-10-31 PROCEDURE — 82330 ASSAY OF CALCIUM: CPT

## 2019-10-31 PROCEDURE — 84443 ASSAY THYROID STIM HORMONE: CPT

## 2019-10-31 PROCEDURE — 97530 THERAPEUTIC ACTIVITIES: CPT

## 2019-10-31 PROCEDURE — 85027 COMPLETE CBC AUTOMATED: CPT

## 2019-11-20 NOTE — PROGRESS NOTE ADULT - SUBJECTIVE AND OBJECTIVE BOX
Patient was signed out to me by Dr. Glover at end of his shift. Sign-out included relevant details of history, physical, and work-up to date. Chart has been reviewed, new test results have been noted, and patient has been re-evaluated. Summary Findings:    Results for orders placed or performed during the hospital encounter of 11/19/19   CBC with Automated Differential   Result Value    WBC 6.7    RBC 3.95 (L)    HGB 11.0 (L)    HCT 35.5 (L)    MCV 89.9    MCH 27.8    MCHC 31.0 (L)    RDW-CV 13.9        NRBC 0    DIFF TYPE AUTOMATED DIFFERENTIAL    Neutrophil 70    LYMPH 18    MONO 9    EOSIN 2    BASO 1    Percent Immature Granuloctyes 0    Absolute Neutrophil 4.7    Absolute Lymph 1.2    Absolute Mono 0.6    Absolute Eos 0.1    Absolute Baso 0.1    Absolute Immature Granulocytes 0.0   COMPREHENSIVE METABOLIC PANEL   Result Value    Sodium 144    Potassium 3.5    Chloride 112 (H)    Carbon Dioxide 26    Anion Gap 10    Glucose 84    BUN 11    Creatinine 1.08 (H)    GFR Estimate,  63     Comment: eGFR 60 - 89 mL/min/1.73m2 = Mild decrease in kidney function.    GFR Estimate, Non  54     Comment: eGFR 30-59 mL/min/1.73m2 = Moderate decrease in kidney function. Stage 3 CKD (chronic kidney disease) or moderate kidney disease.    BUN/Creatinine Ratio 10    CALCIUM 9.7    TOTAL BILIRUBIN 0.5    AST/SGOT 20    ALT/SGPT 12    ALK PHOSPHATASE 96    TOTAL PROTEIN 7.3    Albumin 3.7    GLOBULIN 3.6    A/G Ratio, Serum 1.0   Magnesium   Result Value    MAGNESIUM 2.1   Acetaminophen Level   Result Value    ACETAMINOPHEN <2 (L)   Salicylate Level   Result Value    SALICYLATE <2.8   Drug Abuse Screen, Urine   Result Value    U-Amphetamines NEGATIVE     Comment: Cutoff = 500ng/ml    U-Barbiturates NEGATIVE     Comment: Cutoff = 200 ng/mL    U-Benzodiazepines NEGATIVE     Comment: Cutoff = 200 ng/mL    Cannabinoids (THC) UA NEGATIVE     Comment: cutoff = 50 ng/mL    U-Cocaine/Metabolite  Brief EP Follow Up Note:    Telemetry overnight revealed rate controlled AF with no long pauses or significant bradycardia. Patient is tolerating Metoprolol tartrate 25mg BID well.    Plan:  - Change to Metoprolol Succinate 50mg daily in anticipation of discharge  - Continue warfarin for elevated CHADS2-VASc of 7    Discussed with CT Surgery team.    Thank you for this consultation. EP will sign off. Please contact EP team with any questions.    Melo Pacheco MD  Clinical Cardiac Electrophysiology NEGATIVE     Comment: cutoff = 150 ng/ml    Opiates UA POSITIVE (A)     Comment: cutoff = 300 ng/mL    U-PHENCYCLIDINE NEGATIVE     Comment: Cutoff = 25 ng/mL  Drug screening is for medical purposes only.  A confirmation by GC/MS may be ordered to verify clinically questionable false positive results.     Alcohol   Result Value    Alcohol Ethyl None detected.   URINALYSIS WITH MICRO & CULTURE IF INDICATED   Result Value    COLOR YELLOW    APPEARANCE CLEAR    GLUCOSE(URINE) NEGATIVE    BILIRUBIN NEGATIVE    KETONES NEGATIVE    SPECIFIC GRAVITY 1.006    BLOOD NEGATIVE    pH 7.0    PROTEIN(URINE) NEGATIVE    UROBILINOGEN 0.2    NITRITE NEGATIVE    LEUKOCYTE ESTERASE TRACE (A)    Squamous EPI'S 1 to 5    RBC 1 to 2    WBC 1 to 5    BACTERIA NONE SEEN    Hyaline Casts 1 to 5    SPECIMEN TYPE URINE CLEAN CATCH   Prothrombin Time   Result Value    PROTIME 14.1 (H)    INR 1.3     Comment: INR Therapeutic Range: 2.0 to 3.0 (2.5 to 3.5 recommended for recurrent thrombotic episodes and mechanical prosthetic heart valves.)    Lithium   Result Value    Lithium 0.6   Levetiracetam Level   Result Value    Levetiracetam 18.0     Comment: The proposed reference range for seizure control is 6.0 -46.0 mcg/mL, although this range has not been adequately validated by clinical trials. Renal function and pregnancy may affect levels. The relationship between serum concentrations and toxicity is   not known.     This test has been modified from the 's instructions. Its performance characteristics were determined by [facility] in a manner consistent with CLIA requirements.     Chem 8 Panel - Point of Care   Result Value    Sodium     Potassium POC 3.4    Chloride  (H)    CALCIUM IONIZED-POC 1.37 (H)    CO2 Total 25 (H)    GLUCOSE POC 90     Comment: Reference range is for a fasting sample.    BUN POC 10    HEMATOCRIT POC 34.0 (L)    Hemoglobin POC 11.6 (L)    ANION GAP POC 16    Creatinine POC 1.10 (H)     Estimated GFR  (POC) 61     Comment: eGFR 60 - 89 mL/min/1.73m2 = Mild decrease in kidney function.    Estimated GFR Non- (POC) 53     Comment: eGFR 30-59 mL/min/1.73m2 = Moderate decrease in kidney function. Stage 3 CKD (chronic kidney disease) or moderate kidney disease.   Troponin I - Point of Care   Result Value    Troponin I POC <0.10     No orders to display     Vitals  Vitals:    11/20/19 0130 11/20/19 0200 11/20/19 0230 11/20/19 0330   BP: 169/87 166/85 (!) 193/95 (!) 143/99   Pulse: 63 69 80 78   Resp: 17 17 20 20   Temp:       TempSrc:       SpO2: 98% 99% 96% 96%   Weight:       Height:           ED Course    12:15 AM Per RN, after the pt received Zyprexa, her visual hallucinations have worsened (seeing bugs, cats).     12:30 AM I introduced myself to the pt. The pt is currently willing to go to [facility] at this time for further care.    12:34 AM I spoke with TAP at [facility] regarding the patient's hx, presentation today, and results of the ED work up. I stated the pt arrived in the ED today with visual hallucinations that worsened with Zyprexa.     1:32 AM I spoke with Dr. Stark regarding the patient's hx, presentation today, and results of the ED work up. I stated the pt arrived in the ED today with visual hallucinations. The pt has a psychiatric history, and has been off of her psychiatric medication for at least a month. I stated the pt currently lives by herself. I discussed that I would like her to be seen in a medical bed with a psych consult at [facility] (which she is agreeable to). Dr. Stark would like the pt to be evaluated for possible psychiatric admission.    1:37 AM I spoke with Intake regarding the patient's hx, presentation today, and results of the ED work up.  I stated the pt arrived in the ED today with visual hallucinations with negative work up for a medical cause. The pt has a psychiatric history, and has been off of her psychiatric medication for at least a month.   Intake will assess the pt.     3:05 AM I spoke with Intake, who believes she is not appropriate for the psychiatric unit because she requires daily care (three times a day) when she is at home.     3:06 AM I spoke with TAP in regards to the pt. I stated she is unable to go to the psychiatric unit because of care requirements.     4:30 AM TAP called to inform me that Dr. Stark accepts the pt at [facility].       MDM  Critical Care time spent on this patient outside of billable procedures:  None    Diagnosis  ED Diagnoses        Final diagnoses    Hallucinations          Psychosis, unspecified psychosis type (CMS/HCC)              Patient will be accepted by Dr. Stark in transfer to [facility].    Patient is transferred via EMS transport in stable condition.    I have reviewed the information recorded by the scribe for accuracy and agree with its contents.    ____________________________________________________________________    Bill Lora acting as a scribe for Dr. Diana Peace  Dictation # [id number]  Scribe: Bill Peace MD  11/20/19 0515

## 2020-10-19 NOTE — INPATIENT CERTIFICATION FOR MEDICARE PATIENTS - THE SEVERITY OF SIGNS/SYMPTOMS. (SEE ED/ADMIT DOCUMENTS)
Shreya is a 6 year old female who presents for sore throat.  The symptoms have been present for the last 2 days.  Associated symptoms include some mild fever, body aches, head ache.  Pt. Denies stiff neck, visual changes or significant sinus pressure.      ROS- negative for chest pains, shortness of breath, GI symptoms.    Past medical :   Patient Active Problem List   Diagnosis   • Excessive milk intake   • Term birth of female    • Encounter for routine child health examination without abnormal findings   • BMI (body mass index), pediatric, 95-99% for age     Shreya had no medications administered during this visit.      Objective  Visit Vitals  Pulse 100   Temp 97.8 °F (36.6 °C) (Tympanic)   Resp 24   Wt 19.5 kg (43 lb)   SpO2 96%     No acute distress, non toxic in appearance  TM's clear  ororpharynx - red, airways patent  Tonsils- enlarged    Lungs- CTA  CVS S1S2 RRR no rubs murmur or gallop    Strep screen- negative     Assesment- viral pharyngitis  Plan-  OTC advil/tylenol, warm salt water gargle, throat lozenges              Risk of spread discussed  Side effects of all medications were discussed.  Patient is instructed to follow up in 2-3 days or as needed.  Patient is to go to the emergency room for any significant change or worsening.  Patient was discharged in a stable condition and was in agreement with the plan.  No further questions.    Rufino Heath, DO     1. The severity of signs/symptoms.(See ED/admit documents)

## 2020-11-18 NOTE — AIRWAY REMOVAL NOTE  ADULT & PEDS - O2 DELIVERY METHOD
Weekly Weight:  Weigh yourself at least once a week to help keep track of your progress between visits. Tracking your weight will provide you with important feedback about the changes you are making. To measure your weight as accurately as possible, weigh yourself at the same time of day, wearing the same clothes, and using the same scale.       Food and Beverage Log:  Keep a log of all food and beverages that you consume.  Keeping track of calories will help you lose weight, because monitoring will help you become more aware of what you are eating and recognize your eating patterns.  Be mindful of your hunger level before eating and your satiety level after eating.  Just keeping records will help you make healthy changes in your diet and eat fewer calories.    Tips for keeping a calorie count:     ? Each day, aim for the daily calorie goal of 8597-6130 calories/day.    ? Record your food intake immediately after eating. If possible, look up calories before or immediately after eating.     ? Download an more like My Fitness Pal, Lose It!, or TellApart (Code: 87803) To keep track of your calories.    ? Measuring foods (using measuring cups or spoons) will help you be more accurate with counting calories.     ? Keep a running calorie count throughout the day.     ? Count daily calories toward a weekly calorie total. If you eat too many calories one day, cut back slightly over the next few days to meet your weekly goal.         Meal Planning & Grocery Shopping    Meal planning builds the foundation for healthy eating. When you have structured ideas for healthy meals and foods available at home to prepare those meals, weight control becomes easier.  If only healthy foods are available at home, then you will be much more likely to eat healthy foods. And you will be less likely to go to a restaurant or  a fast food meal, which tend to be unhealthy and higher in calories than meals prepared at home.      Take 5-10  minutes each week to plan meals for the next 7 days.  Make a grocery list based on the meal plan.    Grocery Shopping Tips:  ? Shop on a full stomach.  ? Schedule your shopping for times when you are most motivated and able to be disciplined about your purchases. For example, after a stressful day at work it may be difficult to make the healthiest choices. Shopping at other times, such as early in the morning or after dinner, may be easier.  ? Focus your shopping on the outside aisles of the store, which tend to contain more fresh foods and lower calorie foods. The inside aisles tend to have more processed foods.  ? Stick to your list. Avoid buying unhealthy items just because they are on sale.   ? Compare nutrition labels to check the number of calories and percentage of fat.      What to buy:    Vegetables  - Fresh vegetables  - Frozen vegetables with no sauce or added salt  - Canned vegetables with no sauce or added salt    Protein  - Lean meats, such as chicken and turkey  - Limit red meats, such as beef to no more than 1x/week  - Limit processed meats, such as cold cuts, pimentel, sausage, and hot dogs. Look for brands that have no nitrites and are minimally processed. Consider turkey sausage or turkey pimentel.  - Fish and Shellfish  - Eggs  - Dried beans  - Canned beans (reduced sodium)    Fat  - Use healthy oils, such as olive oil or canola oil, for cooking, salad dressings, etc.  - Unflavored nuts and seeds  - Nut butters (no added sugar)    Dairy  - Yogurt (no sugar added)  - Cheese  - Low-fat milk  - Unsweetened nondairy milks (almond milk, soy milk, etc)    Fruit  - Fresh Fruit  - Frozen fruit with no added sugar  - Canned fruit with no added sugar  - Dried fruit with no added sugar  - 100% fruit juice    Whole Grains  - Single ingredient grains, such as oats, quinoa, brown rice  - Whole-wheat pasta  - Sprouted whole-grain bread    What to avoid:  - Avoid fried foods  - Avoid foods with added sugar  - Avoid  sugar-sweetened beverages  - Avoid ultra-processed foods      Lifestyle Activity    Lifestyle activity consists of all the activities that burn calories during the course of a normal day. Using the stairs, washing the dishes, or even getting up to turn off the television are all examples of lifestyle activity. All activities, no matter how small, burn calories. Increasing lifestyle activity can help with weight control, so building physical activity into your everyday routine is important.     A pedometer is a tool that can help you track how much lifestyle activity you are getting. It can help you stay active. A pedometer counts each step you take and displays your total steps on the screen. Many smartphones, including iPhones and Androids, have applications that automatically count your steps. If you decide to use this method, make sure you are holding or wearing your smartphone so it can count all of your steps.     During the next 2 weeks, aim for 5,000 or more steps per day. Each week aim to increase your daily step goal by 250 so that you will reach an average of 10,000 steps per day (equal to walking 4 to 5 miles).     Start making more active choices in your routine in order to increase the amount of walking you do each day.     Strategies to Increase Lifestyle Activity:    ? Take several 5-10-minute walks during the day.   ? Set a reminder to take a 5 minute break from your desk every hour.   ? Choose the farthest entrance to a building that you are entering.   ? Host walking meetings at work.   ? Walk down the taveras to contact co-workers face-to-face, instead of emailing or calling.  ? Walk to a restroom, water cooler, or copy machine on a different floor at work.   ? Walk during your lunch break.   ? Park farther away in parking lots.   ? Get off the bus or train earlier and walk farther to your destination.   ? Take the stairs rather than the elevator or the escalator.   ? Start a walking club with  co-workers or neighbors.   ? Walk - don't drive - for trips less than one mile.   ? Take an after-dinner walk with family.   ? Go for a walk while talking on your wireless phone.   ? Walk the dog more often.   ? If you prefer to stay indoors because of the weather, try walking in a shopping mall or doing laps around a large store.   ? Purchase a treadmill to use at home.   ? Schedule time for walking every week and stick to it like any other appointment.   ? Or think of your own strategy: _______________________________       Physical Activity    Physical activity improves your health and helps with weight control, especially weight loss maintenance.      When starting to incorporate an exercise routine into your day, it is helpful to set specific goals.  For example, I will walk at moderate intensity from 12:30-12:45pm on Monday, Wednesday, and Friday.  Schedule your exercise time into your calendar.  Think of any potential barriers that may come up and prevent you from exercising.  Develop solutions or back-up plans for when these barriers may arise.    Walking is an ideal activity to start with if you do not currently have an exercise routine. You can make the activity more fun by doing it with a friend or listening to music or a book on tape while you do it. If you don't enjoy walking, think of other activities you like, such as bike riding or swimming.  Other alternatives include group fitness classes or exercise at home using DVDs, Apps, etc.    If you are new to exercising, then start with 10 minutes 3-4 days per week.  After two weeks, increase to 15 minutes 3-4 days per week.  If you already exercise more than this, continue at your higher level, or increase by 10-15 minutes if able.         Aerobic Activity  Aerobic Activity gets you breathing harder and your heart beating faster.  Eventually, you should work up to 150 - 300 minutes of moderate-intensity aerobic activity every week or 75 - 150 minutes of  vigorous-intensity aerobic activity every week.  An easy way to gauge the intensity of your activity is with the Talk Test.  During moderate activity, you should be able to talk, but not sing without having to pause for a breath.  During vigorous activity, you shouldn't be able to say more than a few words without pausing for a breath.  You should not push yourself until you are completely out of breath, tired, or sore.    Examples of Aerobic Activity:  - Walking  - Running  - Swimming  - Biking  - Playing sports like tennis or basketball  - Dancing  - Jumping Rope      Strength Training  It is also recommended that you perform muscle-strengthening activities at least 2 days per week.  Be sure to include activities that work all major muscle groups (legs, arms, abdomen, back, buttocks, chest, and shoulders)    Examples of Strength Training  - Lifting weights  - Working with resistance band  - Using your body weight for resistance (squats, push-ups, sit-ups)  - Pilates  - Yoga      https://health.gov/moveyourway/activity-planner/      Remember to keep a record of your activity to track your progress.         aerosol mask

## 2021-04-19 NOTE — DISCHARGE NOTE PROVIDER - NSDCHC_MEDRECSTATUS_GEN_ALL_CORE
Admission Reconciliation is Completed  Discharge Reconciliation is Not Complete done Admission Reconciliation is Completed  Discharge Reconciliation is Completed

## 2021-06-28 NOTE — PRE-OP CHECKLIST - NSBLOODTRANS_GEN_A_CORE_SIUH
[place] [phone] or www.Ud[employer]  · Call the toll free National Suicide Prevention Hotlines   · National Suicide Prevention Lifeline 724-712-OYAN (4346)  · National Hope Line Network 800-SUICIDE (021-4340)       no...

## 2021-07-26 NOTE — DISCHARGE NOTE NURSING/CASE MANAGEMENT/SOCIAL WORK - NSDCPEPTCOWAFU_GEN_ALL_CORE
"Infectious Disease Progress Note    Assessment/Plan     Impression: Fevers, multiple potential etiologies. Fevers resolved     Initial presentation of encephalopathy which has improved--likely secondary to narcotics     Had a Port-A-Cath in for couple years, but seems to be working okay.     Has multiple excoriations on both legs that she says are \"from mosquito bites\" none are grossly purulent.  Improved.      Has distended abdomen.  CT shows old fractures and some fluid around fractures. MRI confirms fluid--unclear if abscess or cancer.  S/p aspiration on 7/23-GS with [facility], culture is pending    Says her longstanding sinus symptoms are improved.      Recommendations:   Would recommend sending home on at least 4 weeks of cefazolin with repeat MRI at that time.    Principal Problem:    Hip pain, right  Active Problems:    Confusion      RIDGE BARAJAS MD    [phone]      Subjective  Tired, wants to go home.     Objective    Vital signs in last 24 hours  Temp:  [98.2  F (36.8  C)-99  F (37.2  C)] 98.2  F (36.8  C)  Pulse:  [] 89  Resp:  [16-18] 16  BP: (105-177)/(57-86) 108/60  SpO2:  [94 %-99 %] 94 %  Wt Readings from Last 3 Encounters:   07/25/21 92.5 kg (204 lb)   04/09/18 98.7 kg (217 lb 11.2 oz)   11/17/17 97.1 kg (214 lb)           Intake/Output last 3 shifts  I/O last 3 completed shifts:  In: 3360 [P.O.:3360]  Out: 7550 [Urine:7550]  Intake/Output this shift:  No intake/output data recorded.    Review of Systems   Pertinent items are noted in HPI., otherwise negative.    Physical Exam  General appearance: alert, appears stated age and cooperative  Head: Normocephalic, without obvious abnormality, atraumatic  Lungs: normal respiratory pattern  Extremities: Full excoriations on both lower legs. She has a bloody wound on her left great toe and left fifth toe--improving  Skin: No rash, better excoriations.    Neurologic: Mental status:clear.   Pertinent Labs   Lab Results: personally reviewed. "     Recent Labs   Lab 07/26/21  0657 07/23/21  0642 07/20/21  0537   WBC 12.3*  --   --    HGB 7.6*  --   --    HCT 23.7*  --   --     367 317      Results for JULES ROPER (MRN [id number]) as of 7/22/2021 13:51   Ref. Range 7/18/2021 07:22 7/20/2021 02:06 7/22/2021 07:03   CRP Latest Ref Range: 0.0-<0.8 mg/dL 31.7 (H) 35.4 (H) 30.8 (H)     Recent Labs   Lab 07/26/21  0657      CO2 27   BUN 7*     7-Day Micro Results    Collected Updated Procedure Result Status    07/23/2021 1854 07/23/2021 2035 Asymptomatic COVID-19 Virus (Coronavirus) by PCR Nasopharyngeal [[id number]]    Swab from Nasopharyngeal    Final result Component Value   No component results           07/23/2021 1854 07/23/2021 2035 SARS-COV2 (COVID-19) Virus RT-PCR [[id number]]    Swab from Nasopharyngeal    Final result Component Value   SARS CoV2 PCR Negative   NEGATIVE: SARS-CoV-2 (COVID-19) RNA not detected, presumed negative.           07/23/2021 1500 07/26/2021 0806 Abscess Aerobic Bacterial Culture Routine [[id number]]    (Abnormal)   Abscess from Retroperitoneal    Final result Component Value   Culture 4+ Staphylococcus aureusAbnormal            07/23/2021 1500 07/25/2021 0759 Gram stain [[id number]]   (Abnormal)   Abscess from Retroperitoneal    Final result Component Value   Gram Stain Result 4+ PMNsAbnormal    Gram Stain Result 4+ Gram positive cocci in pairsAbnormal            07/23/2021 1500 07/26/2021 0707 Anaerobic Bacterial Culture Routine [[id number]]   Aspirate from Pelvis              Pertinent Radiology   Radiology Results:   Procedure Component Value Units Date/Time   CT Chest/Abdomen/Pelvis w Contrast [[id number]] Collected: 07/19/21 1736   Order Status: Completed Updated: 07/19/21 2014   Narrative:     EXAM: CT CHEST/ABDOMEN/PELVIS W CONTRAST   LOCATION: [facility]   DATE/TIME: 7/19/2021 5:36 PM     INDICATION: Sepsis   COMPARISON: 12/9/2020   TECHNIQUE: CT scan of the  chest, abdomen, and pelvis was performed following injection of IV contrast. Multiplanar reformats were obtained. Dose reduction techniques were used.   CONTRAST: 100 mL Isovue-370     FINDINGS:   LUNGS AND PLEURA: Stable linear scarring posterior left upper lobe unchanged. Linear scarring peripheral right middle lobe and lateral right upper lobe also unchanged, no new pneumonia. There is a new tiny right pleural effusion.     MEDIASTINUM/AXILLAE: No lymphadenopathy. Normal-sized heart. Port-A-Cath present     CORONARY ARTERY CALCIFICATION: None.     HEPATOBILIARY: Normal.     PANCREAS: Normal.     SPLEEN: Normal.     ADRENAL GLANDS: Normal.     KIDNEYS/BLADDER: There is minimal bilateral hydronephrosis and mild prominence of each ureter. Bladder is distended with estimated volume more than 800 cc consistent with bladder outlet obstruction or neurogenic bladder.     BOWEL: There are a few loops of mildly dilated air and fluid distended loops of jejunum without a definitive site of transition or obstructing focus. This may relate to a mild mid small bowel obstruction or mild ileus. There is no inflammatory bowel wall    thickening.     LYMPH NODES: Normal.     VASCULATURE: Unremarkable.     PELVIC ORGANS: No adnexal lesion or ascites.     MUSCULOSKELETAL: Diffuse mixed lytic/sclerotic bony metastases unchanged. There are prominent bilateral sacral alar fractures with dense bony sclerosis and what appears to be heterotopic ossifications about the sacral alar fractures.     Prior fractures left pubic ring unchanged but there are now small surrounding soft tissue fullness suggesting small hematomas associated with the fractures. There are tiny air bubble seen adjacent to the left inferior pubic ramus fracture within the   abductor musculature these air bubbles reside in a small collection that measures 2.5 x 1 cm (image 452). These air bubbles are new compared to previous exam. There is also a small fluid collection  associated with the superior pubic ramus fracture (image    415) measuring 2 x 1.5 cm. In addition, there is also a tiny collection along the undersurface of the left pubic symphysis measuring 2.5 x 1.2 cm (image 425).     There is mild fullness of right paraspinous, erectus spinal muscle at low lumbar spine which appears new, can't exclude myositis or inflammation involving the right paraspinous musculature. No definite discrete fluid collection evident.    Impression:     IMPRESSION:   1.  Mild new fullness of right paraspinal musculature low lumbar spine raising possibility of myositis. A discrete abscess collection is not evident.   2.  Left obturator ring fracture with small fluid pockets associated with the fractures, one of which also demonstrates a few tiny air bubbles.   3.  Complex bilateral sacral alar fractures with surrounding zones of heterotopic ossification.   4.  Prominent distention of bladder with borderline bilateral hydronephrosis. Suggest determining post void residual.   5.  MRI of pelvis and lower lumbar spine region may be beneficial to assess for paraspinal myositis or fluid collection, as well as to assess the tiny fluid collections associated with left obturator ring fractures.                      Go for blood test as directed. Because your dose is based on the INR blood test it is very important that you get your blood tested on the date and time that you are scheduled and keep your healthcare providers appointments.   Please follow up with your doctor within 3 days of discharge to schedule your next blood test.

## 2022-05-13 NOTE — ED ADULT NURSE NOTE - BREATHING
Not sure what to bill with breast and pelvic exam?  Just an annual?  She just saw cesar?
spontaneous

## 2023-01-25 NOTE — H&P PST ADULT - RESPIRATORY AND THORAX
----- Message from Tran Thorne sent at 1/25/2023 10:31 AM CST -----  Contact: [phone]  Patient wife would like to consult with a nurse in regards to his current concerns she stated his symptoms since having a mild a stroke in early part of January. Please call back at [phone]. Thanks cricket      details…

## 2023-05-18 NOTE — PROGRESS NOTE ADULT - SUBJECTIVE AND OBJECTIVE BOX
HPI:  79 yo F with longstanding history of CHF, multivalvular dx presents with exacerbation of CHF.  Son states pt has been getting progressively more SOB over the last 10 days, of which the last 2 days were particularly worse.  Pt's functional status is minimal ambulation at home and has 2 pillow orthopnea.  Pt has a mitral clip scheduled with Dr. Oshea for next week, although son states insurance will not cover surgery and it may be cancelled.  Pt states (through translation with son) that her abdomin has increased in size, and is becoming painful, along with the swelling in her legs getting progressively worse over the last week.     PAST MEDICAL & SURGICAL HISTORY:  Atrial fibrillation  Diabetes  Hypertension  Stented coronary artery  Coronary stent restenosis, sequela  Afib  Hypertension  History of appendectomy    FAMILY HISTORY:      SOCIAL HISTORY:    REVIEW OF SYSTEMS:  Constitutional: No fever, no chills, no change in weight.  Eyes: No eye swelling,no  blurry vision, no redness, no loss of vision.  Neck: No neck pain, no change in voice.  Lungs: No shortness of breath, no wheezing, no cough  CV: No chest pain, no palpitations, no pain with walking.  GI: No nausea, no vomiting, no constipation, no diarrhea, no abdominal pain  : No urinary frequency, no blood in urine, no difficulty voiding.  Musculoskeletal: No muscle pain, no joint pain  Skin: No rash.  Neurologic: No headaches, no weakness, no burning or pain in feet  Endocrine: No heat intolerance, no cold intolerance  Psych: No depression, no anxiety    MEDICATIONS  (STANDING):  amiodarone    Tablet 200 milliGRAM(s) Oral daily  aspirin enteric coated 81 milliGRAM(s) Oral daily  atorvastatin 80 milliGRAM(s) Oral at bedtime  digoxin     Tablet 0.125 milliGRAM(s) Oral daily  furosemide   Injectable 40 milliGRAM(s) IV Push two times a day  insulin glargine Injectable (LANTUS) 20 Unit(s) SubCutaneous at bedtime  isosorbide   mononitrate ER Tablet (IMDUR) 60 milliGRAM(s) Oral daily  lisinopril 10 milliGRAM(s) Oral daily  losartan 25 milliGRAM(s) Oral daily  magnesium oxide 400 milliGRAM(s) Oral two times a day with meals  metFORMIN 500 milliGRAM(s) Oral two times a day  methimazole 5 milliGRAM(s) Oral daily  pantoprazole    Tablet 40 milliGRAM(s) Oral before breakfast  polyethylene glycol 3350 17 Gram(s) Oral daily  sodium chloride 0.9% lock flush 3 milliLiter(s) IV Push every 8 hours    MEDICATIONS  (PRN):  ALBUTerol    90 MICROgram(s) HFA Inhaler 2 Puff(s) Inhalation every 6 hours PRN Shortness of Breath and/or Wheezing    Allergies  No Known Allergies      PHYSICAL EXAM:    Vital Signs Last 24 Hrs  T(C): 36.4 (2019 10:00), Max: 36.7 (2019 19:52)  T(F): 97.5 (2019 10:00), Max: 98 (2019 19:52)  HR: 69 (2019 14:00) (49 - 70)  BP: 137/63 (2019 14:00) (105/57 - 167/67)  BP(mean): 91 (2019 14:00) (71 - 110)  RR: 19 (2019 14:00) (18 - 49)  SpO2: 100% (2019 14:00) (98% - 100%)  General appearance: Well developed, well nourished.  Eyes: Pupils equal and reactive to light. EOM full. No exophthalmos.  Neck: Trachea midline. No thyroid enlargement.  Lungs: Normal respiratory excursion. Lungs clear.  CV: Regular cardiac rhythm. No murmur. Carotid and pedal pulses intact.  Abdomen: Soft, non tender, no organomegaly or mass.  Musculoskeletal: No cyanosis, clubbing  Skin: Warm and moist. No rash.   Neuro: Cranial nerves intact. Normal motor and sensory function.   Psych: Normal affect, good judgement.    LABS:                        10.4   6.0   )-----------( 281      ( 2019 06:46 )             31.1     04-30    131<L>  |  88<L>  |  20.0  ----------------------------<  186<H>  3.8   |  28.0  |  0.96    Ca    9.6      2019 06:46  Mg     1.7     -    TPro  6.9  /  Alb  3.8  /  TBili  0.8  /  DBili  x   /  AST  19  /  ALT  13  /  AlkPhos  111  04-30    Urinalysis Basic - ( 2019 00:52 )    Color: Yellow / Appearance: Clear / S.005 / pH: x  Gluc: x / Ketone: Negative  / Bili: Negative / Urobili: 1 mg/dL   Blood: x / Protein: 30 mg/dL / Nitrite: Negative   Leuk Esterase: Negative / RBC: 0-2 /HPF / WBC Negative   Sq Epi: x / Non Sq Epi: Occasional / Bacteria: x    LIVER FUNCTIONS - ( 2019 06:46 )  Alb: 3.8 g/dL / Pro: 6.9 g/dL / ALK PHOS: 111 U/L / ALT: 13 U/L / AST: 19 U/L / GGT: x         Hemoglobin A1C, Whole Blood: 8.3 % ( @ 02:42)  T4, Serum: 6.8 ug/dL ( @ 10:25) Finasteride Male Counseling: Finasteride Counseling:  I discussed with the patient the risks of use of finasteride including but not limited to decreased libido, decreased ejaculate volume, gynecomastia, and depression. Women should not handle medication.  All of the patient's questions and concerns were addressed. Finasteride Counseling:  I discussed with the patient the risks of use of finasteride including but not limited to decreased libido, decreased ejaculate volume, gynecomastia, and depression. Women should not handle medication.  All of the patient's questions and concerns were addressed.

## 2023-07-20 NOTE — H&P PST ADULT - PSYCHIATRIC
Discharge instructions reviewed with Pt. Pt verbalizes understanding at this time. Prescriptions/medications reviewed with pt at this time. VS as noted. Pt condition stable at this time. No concerns voiced.        Faby Van RN  07/20/23 5199 Affect and characteristics of appearance, verbalizations, behaviors are appropriate

## 2024-10-02 NOTE — DISCHARGE NOTE ADULT - INSTRUCTIONS
Choose lean meats and poultry without skin and prepare them without added saturated and trans fat.  Eat fish at least twice a week. Recent research shows that eating oily fish containing omega-3 fatty acids (for example, salmon, trout and herring) may help lower your risk of death from coronary artery disease.  Select fat-free, 1 percent fat and low-fat dairy products.  Cut back on foods containing partially hydrogenated vegetable oils to reduce trans fat in your diet.   To lower cholesterol, reduce saturated fat to no more than 5 to 6 percent of total calories. For someone eating 2,000 calories a day, that’s about 13 grams of saturated fat.  Cut back on beverages and foods with added sugars.  Choose and prepare foods with little or no salt. To lower blood pressure, aim to eat no more than 2,400 milligrams of sodium per day. Reducing daily intake to 1,500 mg is desirable because it can lower blood pressure even further.  If you drink alcohol, drink in moderation. That means one drink per day if you’re a woman and two drinks  per day if you’re a man.  Follow the American Heart Association recommendations when you eat out, and keep an eye on your portion sizes.
done

## 2025-02-26 NOTE — ED ADULT TRIAGE NOTE - BP NONINVASIVE DIASTOLIC (MM HG)
After Your Colonoscopy Instructions    DIET:  Resume your usual diet as able  Nausea may be expected for the first 24 to 48 hours.  Start eating a bland diet (toast, gelatin, 7-up, hot cereal, crackers, sherbet, broth and soup).  Drink plenty of fluids (6-8 glasses of water a day).  Avoid greasy or spicy foods for 24 hours.    ACTIVITY:  Rest quietly at home for the remainder of the day. You may resume normal activities tomorrow.  Do not do anything that requires coordination the day of the procedure, such as climbing ladders, using a sharp knife, operating machinery, driving.   May resume driving and/or operating machinery in 24 hours  May Shower tomorrow     WHAT TO EXPECT:  Mild abdominal discomfort, bloating and gas pains.  A scant amount of rectal bleeding following a biopsy, polypectomy or if you have hemorrhoids is normal.  Return of your usual bowel movements in 1-2 days.  A tired feeling after the procedure due to the medications given to help you relax.       PROBLEMS TO WATCH FOR - NOTIFY YOUR SURGEON IF YOU HAVE ANY OF THESE SYMPTOMS:  Severe abdominal pain or bloating.   Signs of infection including chills or temperature over 101 degrees.  Large amounts of bright red rectal bleeding, blood clots or black colored stool.  Vomiting blood or black colored liquid.    FOLLOW -UP:  If a specimen was taken, please allow at least 7-10  business days for pathology results.  Someone will either call or send a letter with your pathology results.      If you have not received a letter or phone call, or have any questions or concerns please call Dr. Ramos    60

## 2025-07-21 NOTE — PROGRESS NOTE ADULT - OPHTHALMOLOGIC
What Type Of Note Output Would You Prefer (Optional)?: Standard Output
Hpi Title: Evaluation of Skin Lesions
not applicable
not applicable